# Patient Record
Sex: FEMALE | Race: WHITE | NOT HISPANIC OR LATINO | Employment: OTHER | URBAN - METROPOLITAN AREA
[De-identification: names, ages, dates, MRNs, and addresses within clinical notes are randomized per-mention and may not be internally consistent; named-entity substitution may affect disease eponyms.]

---

## 2017-02-21 ENCOUNTER — HOSPITAL ENCOUNTER (OUTPATIENT)
Dept: CARDIOLOGY | Facility: CLINIC | Age: 77
Discharge: HOME | End: 2017-02-21
Attending: INTERNAL MEDICINE | Admitting: INTERNAL MEDICINE
Payer: MEDICARE

## 2017-02-21 DIAGNOSIS — I10 BENIGN ESSENTIAL HYPERTENSION: ICD-10-CM

## 2017-02-21 DIAGNOSIS — I42.9 CARDIOMYOPATHY (H): ICD-10-CM

## 2017-02-21 LAB
ANION GAP SERPL CALCULATED.3IONS-SCNC: 12.6 MMOL/L (ref 6–17)
BUN SERPL-MCNC: 16 MG/DL (ref 7–30)
CALCIUM SERPL-MCNC: 9.6 MG/DL (ref 8.5–10.5)
CHLORIDE SERPL-SCNC: 88 MMOL/L (ref 98–107)
CO2 SERPL-SCNC: 34 MMOL/L (ref 23–29)
CREAT SERPL-MCNC: 0.79 MG/DL (ref 0.7–1.3)
GFR SERPL CREATININE-BSD FRML MDRD: 71 ML/MIN/1.7M2
GLUCOSE SERPL-MCNC: 113 MG/DL (ref 70–105)
POTASSIUM SERPL-SCNC: 3.6 MMOL/L (ref 3.5–5.1)
SODIUM SERPL-SCNC: 131 MMOL/L (ref 136–145)

## 2017-02-21 PROCEDURE — 80048 BASIC METABOLIC PNL TOTAL CA: CPT | Performed by: INTERNAL MEDICINE

## 2017-02-21 PROCEDURE — 36415 COLL VENOUS BLD VENIPUNCTURE: CPT | Performed by: INTERNAL MEDICINE

## 2017-02-21 PROCEDURE — 93306 TTE W/DOPPLER COMPLETE: CPT

## 2017-02-21 PROCEDURE — 93306 TTE W/DOPPLER COMPLETE: CPT | Mod: 26 | Performed by: INTERNAL MEDICINE

## 2017-02-27 ENCOUNTER — OFFICE VISIT (OUTPATIENT)
Dept: CARDIOLOGY | Facility: CLINIC | Age: 77
End: 2017-02-27
Attending: INTERNAL MEDICINE
Payer: COMMERCIAL

## 2017-02-27 VITALS
HEART RATE: 68 BPM | HEIGHT: 60 IN | DIASTOLIC BLOOD PRESSURE: 80 MMHG | SYSTOLIC BLOOD PRESSURE: 130 MMHG | WEIGHT: 146 LBS | BODY MASS INDEX: 28.66 KG/M2

## 2017-02-27 DIAGNOSIS — I10 BENIGN ESSENTIAL HYPERTENSION: ICD-10-CM

## 2017-02-27 DIAGNOSIS — E78.00 HYPERCHOLESTEROLEMIA: ICD-10-CM

## 2017-02-27 DIAGNOSIS — I42.9 CARDIOMYOPATHY (H): ICD-10-CM

## 2017-02-27 PROCEDURE — 99214 OFFICE O/P EST MOD 30 MIN: CPT | Performed by: INTERNAL MEDICINE

## 2017-02-27 RX ORDER — HYDROCHLOROTHIAZIDE 25 MG/1
25 TABLET ORAL DAILY
Qty: 90 TABLET | Refills: 3 | Status: SHIPPED | OUTPATIENT
Start: 2017-02-27 | End: 2017-09-05

## 2017-02-27 RX ORDER — VALSARTAN 320 MG/1
320 TABLET ORAL DAILY
Qty: 90 TABLET | Refills: 3 | Status: SHIPPED | OUTPATIENT
Start: 2017-02-27 | End: 2017-11-06

## 2017-02-27 NOTE — PROGRESS NOTES
REFERRING PHYSICIAN:  Malaika Rodriguez MD      HISTORY OF PRESENT ILLNESS:  It is my pleasure to see Larry Melendez who is a very pleasant 76-year-old patient with a history of idiopathic cardiomyopathy.  Her ejection fraction is usually around the 45% jodi and echocardiography which was performed approximately a week ago again showed an EF of 45%.  No significant valvular heart disease is present.  Her blood pressure is well controlled at 130/80.  In the past, the hydrochlorothiazide appears to cause hyponatremia and hypokalemia.  Her sodium a week ago was 131 which is mildly reduced and potassium was normal at 3.6.  On further discussion, however, she tells me that she has this cough which has been going on for a year.  It is a dry, hacking cough, it is a tickling cough and occurs at any time.  This is very suspicious for a lisinopril-induced cough.  She has been on lisinopril for a long time so it is somewhat odd that it would start now, but I have certainly seen patients who have been on long-term ACE inhibitors start to develop ACE inhibitor-induced cough.  So I will stop the lisinopril and switch her to valsartan 320 mg and continue hydrochlorothiazide 25 mg.  She has no chest pains or chest pressure.      IMPRESSION:   1.  Cardiomyopathy.  The ejection fraction is still maintained at the mildly reduced level at 45%.  The left ventricular size is normal.   2.  Possible lisinopril-induced cough.  The history is very suggestive of this.  The only unusual part is that she has been on this medication for a long time and it is interesting that this would start only a year ago.   3.  Hyponatremia most likely due to hydrochlorothiazide.  The degree of hyponatremia is mild.      PLAN:  We will stop the lisinopril/hydrochlorothiazide combination drug and start her on valsartan 320 mg and hydrochlorothiazide 25 mg.  Both of these pills will be separate because the combination is not generic.  We will check a  basic metabolic profile in 1 week's time.  I have warned her that it takes about 2 weeks for ACE inhibitor-induced cough to go away on cessation of the ACE inhibitor.  I will see her back again in 6 months to see how she is doing.  We will also check a blood pressure when she comes in for her blood test to ensure that her blood pressure is under good control with the valsartan.  It is my pleasure to be involved in the care of this very nice patient.      cc:   Malaika Rodriguez MD   [facility]   [address]   [place]  [address]         YADIRA PERKINS MD, [facility]             D: 2017 10:02   T: 2017 14:04   MT: geno      Name:     ELAYNE SALDAÑA   MRN:      [id number]        Account:      [id number]   :      1940           Service Date: 2017      Document: [id number]

## 2017-02-27 NOTE — LETTER
2/27/2017    Malaika Rodriguez MD  [employer] [employer]   [address]  [facility] [address]    RE: Larry Melendez       Dear Colleague,    REFERRING PHYSICIAN:  Malaika Rodriguez MD      It is my pleasure to see Larry Melendez who is a very pleasant 76-year-old patient with a history of idiopathic cardiomyopathy.  Her ejection fraction is usually around the 45% jodi and echocardiography which was performed approximately a week ago again showed an EF of 45%.  No significant valvular heart disease is present.  Her blood pressure is well controlled at 130/80.  In the past, the hydrochlorothiazide appears to cause hyponatremia and hypokalemia.  Her sodium a week ago was 131 which is mildly reduced and potassium was normal at 3.6.  On further discussion, however, she tells me that she has this cough which has been going on for a year.  It is a dry, hacking cough, it is a tickling cough and occurs at any time.  This is very suspicious for a lisinopril-induced cough.  She has been on lisinopril for a long time so it is somewhat odd that it would start now, but I have certainly seen patients who have been on long-term ACE inhibitors start to develop ACE inhibitor-induced cough.  So I will stop the lisinopril and switch her to valsartan 320 mg and continue hydrochlorothiazide 25 mg.  She has no chest pains or chest pressure.     Outpatient Encounter Prescriptions as of 2/27/2017   Medication Sig Dispense Refill     valsartan (DIOVAN) 320 MG tablet Take 1 tablet (320 mg) by mouth daily 90 tablet 3     hydrochlorothiazide (HYDRODIURIL) 25 MG tablet Take 1 tablet (25 mg) by mouth daily 90 tablet 3     Acetaminophen (TYLENOL PO) Take 500 mg by mouth as needed for mild pain or fever       Cholecalciferol (VITAMIN D) 2000 UNITS tablet Take 1 tablet by mouth daily       potassium chloride SA (K-DUR,KLOR-CON M) 10 MEQ tablet Take 1 tablet (10 mEq) by mouth daily 90 tablet 3     carvedilol (COREG) 25 MG tablet Take 1 tablet  (25 mg) by mouth 2 times daily 180 tablet 3     simvastatin (ZOCOR) 10 MG tablet Take 1 tablet (10 mg) by mouth At Bedtime 90 tablet 3     omeprazole (PRILOSEC OTC) 20 MG tablet Take 20 mg by mouth daily       cyanocobalamin 1000 MCG/ML injection Inject 1 mL as directed every 30 days.       [DISCONTINUED] lisinopril-hydrochlorothiazide (PRINZIDE,ZESTORETIC) 20-12.5 MG per tablet Take 1 tablet by mouth 2 times daily       No facility-administered encounter medications on file as of 2/27/2017.       IMPRESSION:   1.  Cardiomyopathy.  The ejection fraction is still maintained at the mildly reduced level at 45%.  The left ventricular size is normal.   2.  Possible lisinopril-induced cough.  The history is very suggestive of this.  The only unusual part is that she has been on this medication for a long time and it is interesting that this would start only a year ago.   3.  Hyponatremia most likely due to hydrochlorothiazide.  The degree of hyponatremia is mild.      PLAN:  We will stop the lisinopril/hydrochlorothiazide combination drug and start her on valsartan 320 mg and hydrochlorothiazide 25 mg.  Both of these pills will be separate because the combination is not generic.  We will check a basic metabolic profile in 1 week's time.  I have warned her that it takes about 2 weeks for ACE inhibitor-induced cough to go away on cessation of the ACE inhibitor.  I will see her back again in 6 months to see how she is doing.  We will also check a blood pressure when she comes in for her blood test to ensure that her blood pressure is under good control with the valsartan.  It is my pleasure to be involved in the care of this very nice patient.     Sincerely,    Brett Smith MD    [facility]

## 2017-02-27 NOTE — PROGRESS NOTES
HPI and Plan:   See dictation    Orders Placed This Encounter   Procedures     Basic metabolic panel     Basic metabolic panel     Follow-Up with Cardiologist     Follow-Up with Nurse       Orders Placed This Encounter   Medications     valsartan (DIOVAN) 320 MG tablet     Sig: Take 1 tablet (320 mg) by mouth daily     Dispense:  90 tablet     Refill:  3     hydrochlorothiazide (HYDRODIURIL) 25 MG tablet     Sig: Take 1 tablet (25 mg) by mouth daily     Dispense:  90 tablet     Refill:  3       Medications Discontinued During This Encounter   Medication Reason     lisinopril-hydrochlorothiazide (PRINZIDE,ZESTORETIC) 20-12.5 MG per tablet          Encounter Diagnoses   Name Primary?     Cardiomyopathy      Hypercholesterolemia      Benign essential hypertension        CURRENT MEDICATIONS:  Current Outpatient Prescriptions   Medication Sig Dispense Refill     valsartan (DIOVAN) 320 MG tablet Take 1 tablet (320 mg) by mouth daily 90 tablet 3     hydrochlorothiazide (HYDRODIURIL) 25 MG tablet Take 1 tablet (25 mg) by mouth daily 90 tablet 3     Acetaminophen (TYLENOL PO) Take 500 mg by mouth as needed for mild pain or fever       Cholecalciferol (VITAMIN D) 2000 UNITS tablet Take 1 tablet by mouth daily       potassium chloride SA (K-DUR,KLOR-CON M) 10 MEQ tablet Take 1 tablet (10 mEq) by mouth daily 90 tablet 3     carvedilol (COREG) 25 MG tablet Take 1 tablet (25 mg) by mouth 2 times daily 180 tablet 3     simvastatin (ZOCOR) 10 MG tablet Take 1 tablet (10 mg) by mouth At Bedtime 90 tablet 3     omeprazole (PRILOSEC OTC) 20 MG tablet Take 20 mg by mouth daily       cyanocobalamin 1000 MCG/ML injection Inject 1 mL as directed every 30 days.         ALLERGIES     Allergies   Allergen Reactions     Aspirin Other (See Comments)     Bleeding              Bleeding, GI Lesion         Codeine Sulfate GI Disturbance       PAST MEDICAL HISTORY:  Past Medical History   Diagnosis Date     Arthritis      Breast cancer (H)       Cardiomyopathy (H)      ideopathic     Hypercholesterolemia      Hypertension      Hypokalemia      Malignant neoplasm (H)      Shortness of breath      Shoulder pain        PAST SURGICAL HISTORY:  Past Surgical History   Procedure Laterality Date     Back surgery       Partial masectomy       Cholecystectomy       Eye surgery         FAMILY HISTORY:  Family History   Problem Relation Age of Onset     Family History Negative No family hx of        SOCIAL HISTORY:  Social History     Social History     Marital status: Single     Spouse name: N/A     Number of children: N/A     Years of education: N/A     Social History Main Topics     Smoking status: Never Smoker     Smokeless tobacco: None     Alcohol use Yes      Comment: socially     Drug use: No     Sexual activity: No     Other Topics Concern     Special Diet No     Exercise No     Social History Narrative       Review of Systems:  Skin:  Negative       Eyes:  Positive for glasses    ENT:  Negative      Respiratory:  Positive for cough;dyspnea on exertion     Cardiovascular:    Positive for;lightheadedness    Gastroenterology: Positive for nausea    Genitourinary:  not assessed      Musculoskeletal:  Positive for back pain;joint pain    Neurologic:  Negative      Psychiatric:  Negative      Heme/Lymph/Imm:  Negative      Endocrine:  Negative        Physical Exam:  Vitals: /80  Pulse 68  Ht 1.524 m (5')  Wt 66.2 kg (146 lb)  BMI 28.51 kg/m2    Constitutional:  cooperative, alert and oriented, well developed, well nourished, in no acute distress overweight      Skin:  warm and dry to the touch, no apparent skin lesions or masses noted        Head:  normocephalic, no masses or lesions;normocephalic        Eyes:  pupils equal and round, conjunctivae and lids unremarkable, sclera white, no xanthalasma, EOMS intact, no nystagmus        ENT:  no pallor or cyanosis, dentition good        Neck:  carotid pulses are full and equal bilaterally, JVP normal, no carotid  bruit, no thyromegaly        Chest:  normal breath sounds, clear to auscultation, normal A-P diameter, normal symmetry, normal respiratory excursion, no use of accessory muscles          Cardiac: regular rhythm;normal S1 and S2;no murmurs, gallops or rubs detected   S4              Abdomen:  abdomen soft, non-tender, BS normoactive, no mass, no HSM, no bruits        Vascular: pulses full and equal, no bruits auscultated                                        Extremities and Back:  no deformities, clubbing, cyanosis, erythema observed;no edema              Neurological:  affect appropriate, oriented to time, person and place;no gross motor deficits              CC  Brett Smith MD   [facility]  [address]  [place] [address]

## 2017-03-06 ENCOUNTER — ALLIED HEALTH/NURSE VISIT (OUTPATIENT)
Dept: CARDIOLOGY | Facility: CLINIC | Age: 77
End: 2017-03-06
Attending: INTERNAL MEDICINE
Payer: COMMERCIAL

## 2017-03-06 VITALS — SYSTOLIC BLOOD PRESSURE: 158 MMHG | HEART RATE: 76 BPM | DIASTOLIC BLOOD PRESSURE: 82 MMHG

## 2017-03-06 DIAGNOSIS — I10 BENIGN ESSENTIAL HYPERTENSION: ICD-10-CM

## 2017-03-06 DIAGNOSIS — I42.9 CARDIOMYOPATHY (H): ICD-10-CM

## 2017-03-06 LAB
ANION GAP SERPL CALCULATED.3IONS-SCNC: 12.6 MMOL/L (ref 6–17)
BUN SERPL-MCNC: 19 MG/DL (ref 7–30)
CALCIUM SERPL-MCNC: 9.7 MG/DL (ref 8.5–10.5)
CHLORIDE SERPL-SCNC: 90 MMOL/L (ref 98–107)
CO2 SERPL-SCNC: 33 MMOL/L (ref 23–29)
CREAT SERPL-MCNC: 1 MG/DL (ref 0.7–1.3)
GFR SERPL CREATININE-BSD FRML MDRD: 54 ML/MIN/1.7M2
GLUCOSE SERPL-MCNC: 119 MG/DL (ref 70–105)
POTASSIUM SERPL-SCNC: 3.6 MMOL/L (ref 3.5–5.1)
SODIUM SERPL-SCNC: 132 MMOL/L (ref 136–145)

## 2017-03-06 PROCEDURE — 99207 ZZC NO CHARGE LOS: CPT

## 2017-03-06 PROCEDURE — 80048 BASIC METABOLIC PNL TOTAL CA: CPT | Performed by: INTERNAL MEDICINE

## 2017-03-06 PROCEDURE — 36415 COLL VENOUS BLD VENIPUNCTURE: CPT | Performed by: INTERNAL MEDICINE

## 2017-03-06 NOTE — NURSING NOTE
Blood pressure check per Dr. Albino Connors.  B/P was 158/82, Pulse was 76.  Patient stated she did take her medications this morning.  Results sent to team 5 RN for review.

## 2017-03-06 NOTE — MR AVS SNAPSHOT
After Visit Summary   3/6/2017    Larry Melendez    MRN: [id number]           Patient Information     Date Of Birth          1940        Visit Information        Provider Department      3/6/2017 9:30 AM [facility]        Today's Diagnoses     Benign essential hypertension           Follow-ups after your visit        Your next 10 appointments already scheduled     Mar 06, 2017  9:00 AM CST   LAB with [facility]   [facility] ([facility])    [address] [place] [address] [address]  [facility] [address] [phone]           Patient must bring picture ID.  Patient should be prepared to give a urine specimen  Please do not eat 10-12 hours before your appointment if you are coming in fasting for labs on lipids, cholesterol, or glucose (sugar).  Pregnant women should follow their Care Team instructions. Water with medications is okay. Do not drink coffee or other fluids.   If you have concerns about taking  your medications, please ask at office or if scheduling via [place], send a message by clicking on Secure Messaging, Message Your Care Team.            Mar 06, 2017  9:30 AM CST   Nurse Only with [facility]   [facility] ([facility])    [address] [place] [address] [address]  [facility] [id number] [phone]              Who to contact     If you have questions or need follow up information about today's clinic visit or your schedule please contact [facility] directly at [phone].  Normal or non-critical lab and imaging results will be communicated to you by MyChart, letter or phone within 4 business days after the clinic has received the results. If you do not hear from us within 7 days, please contact the clinic through MyChart or phone. If you have a critical or abnormal lab  "result, we will notify you by phone as soon as possible.  Submit refill requests through [employer] or call your pharmacy and they will forward the refill request to us. Please allow 3 business days for your refill to be completed.          Additional Information About Your Visit        CPXihart Information     LiveLeaf lets you send messages to your doctor, view your test results, renew your prescriptions, schedule appointments and more. To sign up, go to [email or web address] . Click on \"Log in\" on the left side of the screen, which will take you to the Welcome page. Then click on \"Sign up Now\" on the right side of the page.     You will be asked to enter the access code listed below, as well as some personal information. Please follow the directions to create your username and password.     Your access code is: [email or web address]  Expires: 2017 10:06 AM     Your access code will  in 90 days. If you need help or a new code, please call your [facility] clinic or [phone].        Care EveryWhere ID     This is your Care EveryWhere ID. This could be used by other organizations to access your [facility] medical records  [id number]        Your Vitals Were     Pulse                   76            Blood Pressure from Last 3 Encounters:   17 158/82   17 130/80   16 128/72    Weight from Last 3 Encounters:   17 66.2 kg (146 lb)   16 64 kg (141 lb)   10/08/15 64 kg (141 lb)              We Performed the Following     Follow-Up with Nurse        Primary Care Provider Office Phone # Fax #    Malaika Rodriguez [phone]       [address]  [facility] [address]        Thank you!     Thank you for choosing [facility]  for your care. Our goal is always to provide you with excellent care. Hearing back from our patients is one way we can continue to improve our services. Please take a few minutes to complete the written " survey that you may receive in the mail after your visit with us. Thank you!             Your Updated Medication List - Protect others around you: Learn how to safely use, store and throw away your medicines at www.disposemymeds.org.          This list is accurate as of: 3/6/17  8:47 AM.  Always use your most recent med list.                   Brand Name Dispense Instructions for use    carvedilol 25 MG tablet    COREG    180 tablet    Take 1 tablet (25 mg) by mouth 2 times daily       cyanocobalamin 1000 MCG/ML injection    VITAMIN B12     Inject 1 mL as directed every 30 days.       hydrochlorothiazide 25 MG tablet    HYDRODIURIL    90 tablet    Take 1 tablet (25 mg) by mouth daily       potassium chloride SA 10 MEQ CR tablet    K-DUR/KLOR-CON M    90 tablet    Take 1 tablet (10 mEq) by mouth daily       priLOSEC OTC 20 MG tablet   Generic drug:  omeprazole      Take 20 mg by mouth daily       simvastatin 10 MG tablet    ZOCOR    90 tablet    Take 1 tablet (10 mg) by mouth At Bedtime       TYLENOL PO      Take 500 mg by mouth as needed for mild pain or fever       valsartan 320 MG tablet    DIOVAN    90 tablet    Take 1 tablet (320 mg) by mouth daily       vitamin D 2000 UNITS tablet      Take 1 tablet by mouth daily

## 2017-03-07 ENCOUNTER — TELEPHONE (OUTPATIENT)
Dept: CARDIOLOGY | Facility: CLINIC | Age: 77
End: 2017-03-07

## 2017-03-07 DIAGNOSIS — I10 HTN (HYPERTENSION): Primary | ICD-10-CM

## 2017-03-07 NOTE — TELEPHONE ENCOUNTER
"Reviewed BMP showing   Recent Labs   Lab Test  03/06/17   0847  02/21/17   0954   NA  132*  131*   POTASSIUM  3.6  3.6   CHLORIDE  90*  88*   CO2  33*  34*   ANIONGAP  12.6  12.6   GLC  119*  113*   BUN  19  16   CR  1.00  0.79   PENNY  9.7  9.6       Blood pressure check per Dr. Albino Connors. B/P was 158/82, Pulse was 76. Patient stated she did take her medications this morning.    Per office note dated 2/27/17, Dr. Smith recommended, \"We will stop the lisinopril/hydrochlorothiazide combination drug and start her on valsartan 320 mg and hydrochlorothiazide 25 mg. Both of these pills will be separate because the combination is not generic. We will check a basic metabolic profile in 1 week's time.\"     Will message Dr. Smith to review. LP[place] RN   "

## 2017-03-07 NOTE — TELEPHONE ENCOUNTER
BP check is of no benbefit as she did not take her meds. Would recheck in 1 week with taking her meds. BMP is acceptable though Na is a little low. Thx

## 2017-03-08 RX ORDER — AMLODIPINE BESYLATE 5 MG/1
5 TABLET ORAL DAILY
Qty: 30 TABLET | Refills: 2 | Status: SHIPPED | OUTPATIENT
Start: 2017-03-08 | End: 2017-05-04

## 2017-03-08 NOTE — TELEPHONE ENCOUNTER
Called pt with recommendations from Dr. Smith to start amlodipine 5 mg daily in addition the what she is already taking & get BP check in 2 weeks.   Prescription escripted to [facility] & order placed for BP check in 2 weeks. LPenfield RN

## 2017-03-23 ENCOUNTER — ALLIED HEALTH/NURSE VISIT (OUTPATIENT)
Dept: CARDIOLOGY | Facility: CLINIC | Age: 77
End: 2017-03-23
Payer: COMMERCIAL

## 2017-03-23 DIAGNOSIS — I10 BENIGN ESSENTIAL HYPERTENSION: Primary | ICD-10-CM

## 2017-03-23 PROCEDURE — 99207 ZZC NO CHARGE NURSE ONLY: CPT | Performed by: INTERNAL MEDICINE

## 2017-03-23 NOTE — MR AVS SNAPSHOT
"              After Visit Summary   3/23/2017    Larry Melendez    MRN: [id number]           Patient Information     Date Of Birth          1940        Visit Information        Provider Department      3/23/2017 10:30 AM [facility]        Today's Diagnoses     Benign essential hypertension    -  1       Follow-ups after your visit        Who to contact     If you have questions or need follow up information about today's clinic visit or your schedule please contact [facility] directly at [phone].  Normal or non-critical lab and imaging results will be communicated to you by [employer], letter or phone within 4 business days after the clinic has received the results. If you do not hear from us within 7 days, please contact the clinic through [employer] or phone. If you have a critical or abnormal lab result, we will notify you by phone as soon as possible.  Submit refill requests through i[employer]ult or call your pharmacy and they will forward the refill request to us. Please allow 3 business days for your refill to be completed.          Additional Information About Your Visit        MyChart Information     iCat[employer]ult lets you send messages to your doctor, view your test results, renew your prescriptions, schedule appointments and more. To sign up, go to [email or web address] . Click on \"Log in\" on the left side of the screen, which will take you to the Welcome page. Then click on \"Sign up Now\" on the right side of the page.     You will be asked to enter the access code listed below, as well as some personal information. Please follow the directions to create your username and password.     Your access code is: [email or web address]  Expires: 2017 11:06 AM     Your access code will  in 90 days. If you need help or a new code, please call your [facility] clinic or [phone].        Care EveryWhere " ID     This is your Care EveryWhere ID. This could be used by other organizations to access your [facility] medical records  [id number]         Blood Pressure from Last 3 Encounters:   03/06/17 158/82   02/27/17 130/80   02/18/16 128/72    Weight from Last 3 Encounters:   02/27/17 66.2 kg (146 lb)   02/18/16 64 kg (141 lb)   10/08/15 64 kg (141 lb)              Today, you had the following     No orders found for display       Primary Care Provider Office Phone # Fax #    Malaika Rodriguez [phone]       [address]  [facility] [address]        Thank you!     Thank you for choosing [facility] [place]  for your care. Our goal is always to provide you with excellent care. Hearing back from our patients is one way we can continue to improve our services. Please take a few minutes to complete the written survey that you may receive in the mail after your visit with us. Thank you!             Your Updated Medication List - Protect others around you: Learn how to safely use, store and throw away your medicines at www.disposemymeds.org.          This list is accurate as of: 3/23/17 10:42 AM.  Always use your most recent med list.                   Brand Name Dispense Instructions for use    amLODIPine 5 MG tablet    NORVASC    30 tablet    Take 1 tablet (5 mg) by mouth daily       carvedilol 25 MG tablet    COREG    180 tablet    Take 1 tablet (25 mg) by mouth 2 times daily       cyanocobalamin 1000 MCG/ML injection    VITAMIN B12     Inject 1 mL as directed every 30 days.       hydrochlorothiazide 25 MG tablet    HYDRODIURIL    90 tablet    Take 1 tablet (25 mg) by mouth daily       potassium chloride SA 10 MEQ CR tablet    K-DUR/KLOR-CON M    90 tablet    Take 1 tablet (10 mEq) by mouth daily       priLOSEC OTC 20 MG tablet   Generic drug:  omeprazole      Take 20 mg by mouth daily       simvastatin 10 MG tablet    ZOCOR    90 tablet    Take 1 tablet  (10 mg) by mouth At Bedtime       TYLENOL PO      Take 500 mg by mouth as needed for mild pain or fever       valsartan 320 MG tablet    DIOVAN    90 tablet    Take 1 tablet (320 mg) by mouth daily       vitamin D 2000 UNITS tablet      Take 1 tablet by mouth daily

## 2017-03-23 NOTE — NURSING NOTE
Patient here today for blood pressure check.      BP: 138/63  P:84    Patient is taking all medications as directed. Cough has improved.     Itzel PEREIRA CMA

## 2017-03-28 NOTE — TELEPHONE ENCOUNTER
Attempted to call pt with recommendations from Dr. Smith, left message for pt to call back. LPenfield RN

## 2017-03-29 DIAGNOSIS — I10 BENIGN ESSENTIAL HYPERTENSION: ICD-10-CM

## 2017-03-29 RX ORDER — POTASSIUM CHLORIDE 750 MG/1
10 TABLET, EXTENDED RELEASE ORAL DAILY
Qty: 90 TABLET | Refills: 3 | Status: SHIPPED | OUTPATIENT
Start: 2017-03-29 | End: 2017-12-08

## 2017-03-29 NOTE — TELEPHONE ENCOUNTER
Pt called back, informed of recommendations from Dr. Smith. Pt requests refill of potassium. Prescription refilled as requested. LPenfield RN

## 2017-05-04 DIAGNOSIS — I10 HTN (HYPERTENSION): ICD-10-CM

## 2017-05-04 RX ORDER — AMLODIPINE BESYLATE 5 MG/1
5 TABLET ORAL DAILY
Qty: 30 TABLET | Refills: 2 | Status: SHIPPED | OUTPATIENT
Start: 2017-05-04 | End: 2017-08-04

## 2017-08-04 DIAGNOSIS — I10 HTN (HYPERTENSION): ICD-10-CM

## 2017-08-04 RX ORDER — AMLODIPINE BESYLATE 5 MG/1
5 TABLET ORAL DAILY
Qty: 90 TABLET | Refills: 2 | Status: SHIPPED | OUTPATIENT
Start: 2017-08-04 | End: 2017-09-05

## 2017-09-05 ENCOUNTER — OFFICE VISIT (OUTPATIENT)
Dept: CARDIOLOGY | Facility: CLINIC | Age: 77
End: 2017-09-05
Attending: INTERNAL MEDICINE
Payer: COMMERCIAL

## 2017-09-05 VITALS
DIASTOLIC BLOOD PRESSURE: 64 MMHG | OXYGEN SATURATION: 98 % | SYSTOLIC BLOOD PRESSURE: 164 MMHG | WEIGHT: 140.9 LBS | HEART RATE: 80 BPM | BODY MASS INDEX: 27.66 KG/M2 | HEIGHT: 60 IN

## 2017-09-05 DIAGNOSIS — I42.0 DILATED CARDIOMYOPATHY (H): ICD-10-CM

## 2017-09-05 DIAGNOSIS — E78.00 PURE HYPERCHOLESTEROLEMIA: ICD-10-CM

## 2017-09-05 DIAGNOSIS — I42.9 CARDIOMYOPATHY (H): ICD-10-CM

## 2017-09-05 DIAGNOSIS — I10 BENIGN ESSENTIAL HYPERTENSION: ICD-10-CM

## 2017-09-05 DIAGNOSIS — I10 ESSENTIAL HYPERTENSION: ICD-10-CM

## 2017-09-05 DIAGNOSIS — E78.00 HYPERCHOLESTEROLEMIA: ICD-10-CM

## 2017-09-05 LAB
ANION GAP SERPL CALCULATED.3IONS-SCNC: 7.8 MMOL/L (ref 6–17)
BUN SERPL-MCNC: 12 MG/DL (ref 7–30)
CALCIUM SERPL-MCNC: 9.3 MG/DL (ref 8.5–10.5)
CHLORIDE SERPL-SCNC: 88 MMOL/L (ref 98–107)
CO2 SERPL-SCNC: 36 MMOL/L (ref 23–29)
CREAT SERPL-MCNC: 0.77 MG/DL (ref 0.7–1.3)
GFR SERPL CREATININE-BSD FRML MDRD: 73 ML/MIN/1.7M2
GLUCOSE SERPL-MCNC: 111 MG/DL (ref 70–105)
POTASSIUM SERPL-SCNC: 3.8 MMOL/L (ref 3.5–5.1)
SODIUM SERPL-SCNC: 128 MMOL/L (ref 136–145)

## 2017-09-05 PROCEDURE — 80048 BASIC METABOLIC PNL TOTAL CA: CPT | Performed by: INTERNAL MEDICINE

## 2017-09-05 PROCEDURE — 99214 OFFICE O/P EST MOD 30 MIN: CPT | Performed by: INTERNAL MEDICINE

## 2017-09-05 PROCEDURE — 36415 COLL VENOUS BLD VENIPUNCTURE: CPT | Performed by: INTERNAL MEDICINE

## 2017-09-05 RX ORDER — AMLODIPINE BESYLATE 10 MG/1
10 TABLET ORAL DAILY
Qty: 90 TABLET | Refills: 3 | Status: SHIPPED | OUTPATIENT
Start: 2017-09-05 | End: 2018-05-02

## 2017-09-05 RX ORDER — SIMVASTATIN 10 MG
10 TABLET ORAL AT BEDTIME
Qty: 90 TABLET | Refills: 3 | Status: SHIPPED | OUTPATIENT
Start: 2017-09-05 | End: 2018-11-15

## 2017-09-05 RX ORDER — CARVEDILOL 25 MG/1
37.5 TABLET ORAL 2 TIMES DAILY
Qty: 270 TABLET | Refills: 3 | Status: SHIPPED | OUTPATIENT
Start: 2017-09-05 | End: 2017-09-25

## 2017-09-05 NOTE — LETTER
9/5/2017    Malaika Rodriguez MD  [facility] [address]  [facility] [address]    RE: Larry Melendez       Dear Colleague,    I had the pleasure of seeing Larry Melendez in the [facility].    It was my pleasure to see your patient, Janessa Melendez, who has a history of an idiopathic cardiomyopathy and hypertension.  She has also run into problems with mild hyponatremia with her medical therapy.  Today her blood pressure is high at 164/64.  I rechecked her blood pressure and it was 168/88.  She is in a significant amount of discomfort with her left shoulder and she was also quite worried and crying because she appears to have a hematological disorder also.  She is following with hematologist and seeing the hematologist later this week.  Even though the hydrochlorothiazide which was reduced from 25 mg to 12.5 mg, her sodium is still low today at 128.  Her kidney function is normal and her potassium is normal at 3.8.  Her last echocardiogram showed no change in her ejection fraction from previously.  Her EF was felt to be in the 45% range.  The left ventricle is normal in size.  No significant valvular heart disease is present.  She is due to have surgery to her left shoulder in November because of bad arthritis and discomfort in that shoulder.      She does not have any symptoms of congestive heart failure.  She has no symptoms of angina pectoris.  She has no significant valvular heart disease and she has no malignant dysrhythmias.  Therefore, she would be regarded as being low risk for an intraoperative cardiac event.     Outpatient Encounter Prescriptions as of 9/5/2017   Medication Sig Dispense Refill     amLODIPine (NORVASC) 10 MG tablet Take 1 tablet (10 mg) by mouth daily 90 tablet 3     carvedilol (COREG) 25 MG tablet Take 1.5 tablets (37.5 mg) by mouth 2 times daily 270 tablet 3     simvastatin (ZOCOR) 10 MG tablet Take 1 tablet (10 mg) by mouth At  Bedtime 90 tablet 3     potassium chloride SA (K-DUR/KLOR-CON M) 10 MEQ CR tablet Take 1 tablet (10 mEq) by mouth daily 90 tablet 3     valsartan (DIOVAN) 320 MG tablet Take 1 tablet (320 mg) by mouth daily 90 tablet 3     Acetaminophen (TYLENOL PO) Take 500 mg by mouth as needed for mild pain or fever       Cholecalciferol (VITAMIN D) 2000 UNITS tablet Take 1 tablet by mouth daily       omeprazole (PRILOSEC OTC) 20 MG tablet Take 20 mg by mouth daily       [DISCONTINUED] amLODIPine (NORVASC) 5 MG tablet Take 1 tablet (5 mg) by mouth daily 90 tablet 2     [DISCONTINUED] hydrochlorothiazide (HYDRODIURIL) 25 MG tablet Take 1 tablet (25 mg) by mouth daily 90 tablet 3     [DISCONTINUED] carvedilol (COREG) 25 MG tablet Take 1 tablet (25 mg) by mouth 2 times daily 180 tablet 3     [DISCONTINUED] simvastatin (ZOCOR) 10 MG tablet Take 1 tablet (10 mg) by mouth At Bedtime 90 tablet 3     cyanocobalamin 1000 MCG/ML injection Inject 1 mL as directed every 30 days.       No facility-administered encounter medications on file as of 9/5/2017.       IMPRESSION:   1.  Essential hypertension.  Her blood pressure does not appear to be well controlled today, although she is quite emotional about all of the medical issues that her coming at her at one time which may be contributing to the higher than normal blood pressure (she told me that at the hematology office, her blood pressure was normal).   2.  Hyponatremia.  The most likely cause for this is hydrochlorothiazide but valsartan could also be contributing.   3.  Mild idiopathic cardiomyopathy.  Her ejection fraction in February was mildly reduced as it has been previously.   4.  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  She is due to have an operation in November.      PLAN:  We will try to get her blood pressure under better control.  I will stop the hydrochlorothiazide as this is causing hyponatremia.  I will increase the dose of  the carvedilol to 37.5 mg twice a day.  I will also increase the amlodipine to 10 mg per day.  I will have the patient follow up in 2 weeks' time with one of our nurse practitioners to see if the blood pressure has improved.  We will also check a basic metabolic profile at that time to see if the elimination of hydrochlorothiazide will reversed the hyponatremia.      The patient would be regarded as being low risk for an intraoperative cardiac event given that she does not have symptoms of decompensated heart failure, unstable angina, severe stenotic valvular lesions nor malignant dysrhythmias.  However, her blood pressure will need to become under better control prior to surgery, which I think will be achieved.  I will see the patient back again in 6 months' time and I will repeat her echocardiogram at that stage.      It is my pleasure to be involved in the care of this extremely nice patient.     Sincerely,    Brett Smith MD     [facility]

## 2017-09-05 NOTE — PROGRESS NOTES
HPI and Plan:   See dictation    Orders Placed This Encounter   Procedures     Basic metabolic panel     Basic metabolic panel     Follow-Up with Nurse     Follow-Up with Cardiologist     Echocardiogram       Orders Placed This Encounter   Medications     amLODIPine (NORVASC) 10 MG tablet     Sig: Take 1 tablet (10 mg) by mouth daily     Dispense:  90 tablet     Refill:  3     carvedilol (COREG) 25 MG tablet     Sig: Take 1.5 tablets (37.5 mg) by mouth 2 times daily     Dispense:  270 tablet     Refill:  3     simvastatin (ZOCOR) 10 MG tablet     Sig: Take 1 tablet (10 mg) by mouth At Bedtime     Dispense:  90 tablet     Refill:  3       Medications Discontinued During This Encounter   Medication Reason     hydrochlorothiazide (HYDRODIURIL) 25 MG tablet      amLODIPine (NORVASC) 5 MG tablet Reorder     carvedilol (COREG) 25 MG tablet Reorder     simvastatin (ZOCOR) 10 MG tablet Reorder         Encounter Diagnoses   Name Primary?     Dilated cardiomyopathy (H)      Hypercholesterolemia      Benign essential hypertension      Essential hypertension      Pure hypercholesterolemia        CURRENT MEDICATIONS:  Current Outpatient Prescriptions   Medication Sig Dispense Refill     amLODIPine (NORVASC) 10 MG tablet Take 1 tablet (10 mg) by mouth daily 90 tablet 3     carvedilol (COREG) 25 MG tablet Take 1.5 tablets (37.5 mg) by mouth 2 times daily 270 tablet 3     simvastatin (ZOCOR) 10 MG tablet Take 1 tablet (10 mg) by mouth At Bedtime 90 tablet 3     potassium chloride SA (K-DUR/KLOR-CON M) 10 MEQ CR tablet Take 1 tablet (10 mEq) by mouth daily 90 tablet 3     valsartan (DIOVAN) 320 MG tablet Take 1 tablet (320 mg) by mouth daily 90 tablet 3     Acetaminophen (TYLENOL PO) Take 500 mg by mouth as needed for mild pain or fever       Cholecalciferol (VITAMIN D) 2000 UNITS tablet Take 1 tablet by mouth daily       omeprazole (PRILOSEC OTC) 20 MG tablet Take 20 mg by mouth daily       [DISCONTINUED] amLODIPine (NORVASC) 5 MG  tablet Take 1 tablet (5 mg) by mouth daily 90 tablet 2     [DISCONTINUED] carvedilol (COREG) 25 MG tablet Take 1 tablet (25 mg) by mouth 2 times daily 180 tablet 3     [DISCONTINUED] simvastatin (ZOCOR) 10 MG tablet Take 1 tablet (10 mg) by mouth At Bedtime 90 tablet 3     cyanocobalamin 1000 MCG/ML injection Inject 1 mL as directed every 30 days.         ALLERGIES     Allergies   Allergen Reactions     Aspirin Other (See Comments)     Bleeding              Bleeding, GI Lesion         Codeine Sulfate GI Disturbance       PAST MEDICAL HISTORY:  Past Medical History:   Diagnosis Date     Arthritis      Breast cancer (H)      Cardiomyopathy (H)     ideopathic     Hypercholesterolemia      Hypertension      Hypokalemia      Malignant neoplasm (H)      Shortness of breath      Shoulder pain        PAST SURGICAL HISTORY:  Past Surgical History:   Procedure Laterality Date     BACK SURGERY       CHOLECYSTECTOMY       EYE SURGERY       partial masectomy         FAMILY HISTORY:  Family History   Problem Relation Age of Onset     Family History Negative No family hx of        SOCIAL HISTORY:  Social History     Social History     Marital status: Single     Spouse name: N/A     Number of children: N/A     Years of education: N/A     Social History Main Topics     Smoking status: Never Smoker     Smokeless tobacco: Never Used     Alcohol use Yes      Comment: socially     Drug use: No     Sexual activity: No     Other Topics Concern     Special Diet No     Exercise No     Social History Narrative       Review of Systems:  Skin:  Negative       Eyes:  Positive for glasses    ENT:  Negative      Respiratory:  Positive for dyspnea on exertion     Cardiovascular:  Negative Positive for;fatigue    Gastroenterology: Positive for nausea    Genitourinary:  not assessed      Musculoskeletal:  Positive for back pain;joint pain;nocturnal cramping Left shoulder pain   Neurologic:  Negative      Psychiatric:  Negative      Heme/Lymph/Imm:   Negative allergies;anemia    Endocrine:  Negative        Physical Exam:  Vitals: /64  Pulse 80  Ht 1.524 m (5')  Wt 63.9 kg (140 lb 14.4 oz)  SpO2 98%  BMI 27.52 kg/m2    Constitutional:  cooperative, alert and oriented, well developed, well nourished, in no acute distress overweight;appears anxious      Skin:  warm and dry to the touch, no apparent skin lesions or masses noted        Head:  normocephalic, no masses or lesions;normocephalic        Eyes:  pupils equal and round, conjunctivae and lids unremarkable, sclera white, no xanthalasma, EOMS intact, no nystagmus        ENT:  no pallor or cyanosis, dentition good        Neck:  carotid pulses are full and equal bilaterally, JVP normal, no carotid bruit, no thyromegaly        Chest:  normal breath sounds, clear to auscultation, normal A-P diameter, normal symmetry, normal respiratory excursion, no use of accessory muscles          Cardiac: regular rhythm;normal S1 and S2;no murmurs, gallops or rubs detected   S4              Abdomen:  abdomen soft, non-tender, BS normoactive, no mass, no HSM, no bruits        Vascular: pulses full and equal, no bruits auscultated                                        Extremities and Back:  no deformities, clubbing, cyanosis, erythema observed;no edema              Neurological:  affect appropriate, oriented to time, person and place;no gross motor deficits              CC  Brett Smith MD  [address]  [place] [address]

## 2017-09-05 NOTE — MR AVS SNAPSHOT
After Visit Summary   9/5/2017    Larry Melendez    MRN: [id number]           Patient Information     Date Of Birth          1940        Visit Information        Provider Department      9/5/2017 3:45 PM Brett Smith MD [facility]        Today's Diagnoses     Dilated cardiomyopathy (H)        Hypercholesterolemia        Benign essential hypertension        Essential hypertension        Pure hypercholesterolemia           Follow-ups after your visit        Additional Services     Follow-Up with Nurse           Follow-Up with Cardiologist                 Future tests that were ordered for you today     Open Future Orders        Priority Expected Expires Ordered    Follow-Up with Cardiologist Routine 3/4/2018 9/5/2018 9/5/2017    Basic metabolic panel Routine 3/4/2018 9/5/2018 9/5/2017    Echocardiogram Routine 3/4/2018 9/5/2018 9/5/2017    Follow-Up with Nurse Routine 9/19/2017 9/5/2018 9/5/2017    Basic metabolic panel Routine 9/19/2017 9/5/2018 9/5/2017            Who to contact     If you have questions or need follow up information about today's clinic visit or your schedule please contact [facility] directly at [phone].  Normal or non-critical lab and imaging results will be communicated to you by Collectrichart, letter or phone within 4 business days after the clinic has received the results. If you do not hear from us within 7 days, please contact the clinic through Collectrichart or phone. If you have a critical or abnormal lab result, we will notify you by phone as soon as possible.  Submit refill requests through MyEdu or call your pharmacy and they will forward the refill request to us. Please allow 3 business days for your refill to be completed.          Additional Information About Your Visit        MyChart Information     MyEdu lets you send messages to your doctor, view your test  "results, renew your prescriptions, schedule appointments and more. To sign up, go to [email or web address] . Click on \"Log in\" on the left side of the screen, which will take you to the Welcome page. Then click on \"Sign up Now\" on the right side of the page.     You will be asked to enter the access code listed below, as well as some personal information. Please follow the directions to create your username and password.     Your access code is: [email or web address]  Expires: 2017  3:58 PM     Your access code will  in 90 days. If you need help or a new code, please call your [place] clinic or [phone].        Care EveryWhere ID     This is your Care EveryWhere ID. This could be used by other organizations to access your [place] medical records  [id number]        Your Vitals Were     Pulse Height Pulse Oximetry BMI (Body Mass Index)          80 1.524 m (5') 98% 27.52 kg/m2         Blood Pressure from Last 3 Encounters:   17 164/64   17 158/82   17 130/80    Weight from Last 3 Encounters:   17 63.9 kg (140 lb 14.4 oz)   17 66.2 kg (146 lb)   16 64 kg (141 lb)              We Performed the Following     Follow-Up with Cardiologist          Today's Medication Changes          These changes are accurate as of: 17  3:58 PM.  If you have any questions, ask your nurse or doctor.               These medicines have changed or have updated prescriptions.        Dose/Directions    amLODIPine 10 MG tablet   Commonly known as:  NORVASC   This may have changed:    - medication strength  - how much to take   Used for:  Essential hypertension   Changed by:  Brett Smith MD        Dose:  10 mg   Take 1 tablet (10 mg) by mouth daily   Quantity:  90 tablet   Refills:  3       carvedilol 25 MG tablet   Commonly known as:  COREG   This may have changed:  how much to take   Used for:  Dilated cardiomyopathy (H), Benign essential hypertension   Changed by:  Sarah" Brett Lee MD        Dose:  37.5 mg   Take 1.5 tablets (37.5 mg) by mouth 2 times daily   Quantity:  270 tablet   Refills:  3         Stop taking these medicines if you haven't already. Please contact your care team if you have questions.     hydrochlorothiazide 25 MG tablet   Commonly known as:  HYDRODIURIL   Stopped by:  Brett Smith MD                Where to get your medicines      These medications were sent to [facility] [address] [place] [address] AT [place] [id number] [address]     Phone:  [phone]     amLODIPine 10 MG tablet    carvedilol 25 MG tablet    simvastatin 10 MG tablet                Primary Care Provider Office Phone # Fax #    Malaika Rodriguez [phone]       [address]  [facility] [address]        Equal Access to Services     [facility]: Hadii aad ku hadasho Soomaali, waaxda luqadaha, qaybta kaalmada adeegyada, waxay idiin hayaan adedakota galindo . So [facility] [phone].    ATENCIÓN: Si habla español, tiene a munoz disposición servicios gratuitos de asistencia lingüística. Jason al [phone].    We comply with applicable federal civil rights laws and [place] laws. We do not discriminate on the basis of race, color, national origin, age, disability sex, sexual orientation or gender identity.            Thank you!     Thank you for choosing [facility] [place]  for your care. Our goal is always to provide you with excellent care. Hearing back from our patients is one way we can continue to improve our services. Please take a few minutes to complete the written survey that you may receive in the mail after your visit with us. Thank you!             Your Updated Medication List - Protect others around you: Learn how to safely use, store and throw away your medicines at www.disposemymeds.org.          This list is accurate as of: 9/5/17   3:58 PM.  Always use your most recent med list.                   Brand Name Dispense Instructions for use Diagnosis    amLODIPine 10 MG tablet    NORVASC    90 tablet    Take 1 tablet (10 mg) by mouth daily    Essential hypertension       carvedilol 25 MG tablet    COREG    270 tablet    Take 1.5 tablets (37.5 mg) by mouth 2 times daily    Dilated cardiomyopathy (H), Benign essential hypertension       cyanocobalamin 1000 MCG/ML injection    VITAMIN B12     Inject 1 mL as directed every 30 days.        potassium chloride SA 10 MEQ CR tablet    K-DUR/KLOR-CON M    90 tablet    Take 1 tablet (10 mEq) by mouth daily    Benign essential hypertension       priLOSEC OTC 20 MG tablet   Generic drug:  omeprazole      Take 20 mg by mouth daily        simvastatin 10 MG tablet    ZOCOR    90 tablet    Take 1 tablet (10 mg) by mouth At Bedtime    Pure hypercholesterolemia       TYLENOL PO      Take 500 mg by mouth as needed for mild pain or fever        valsartan 320 MG tablet    DIOVAN    90 tablet    Take 1 tablet (320 mg) by mouth daily    Benign essential hypertension       vitamin D 2000 UNITS tablet      Take 1 tablet by mouth daily

## 2017-09-05 NOTE — PROGRESS NOTES
HISTORY OF PRESENT ILLNESS:  It was my pleasure to see your patient, Janessa Melendez, who has a history of an idiopathic cardiomyopathy and hypertension.  She has also run into problems with mild hyponatremia with her medical therapy.  Today her blood pressure is high at 164/64.  I rechecked her blood pressure and it was 168/88.  She is in a significant amount of discomfort with her left shoulder and she was also quite worried and crying because she appears to have a hematological disorder also.  She is following with hematologist and seeing the hematologist later this week.  Even though the hydrochlorothiazide which was reduced from 25 mg to 12.5 mg, her sodium is still low today at 128.  Her kidney function is normal and her potassium is normal at 3.8.  Her last echocardiogram showed no change in her ejection fraction from previously.  Her EF was felt to be in the 45% range.  The left ventricle is normal in size.  No significant valvular heart disease is present.  She is due to have surgery to her left shoulder in November because of bad arthritis and discomfort in that shoulder.      She does not have any symptoms of congestive heart failure.  She has no symptoms of angina pectoris.  She has no significant valvular heart disease and she has no malignant dysrhythmias.  Therefore, she would be regarded as being low risk for an intraoperative cardiac event.      IMPRESSION:   1.  Essential hypertension.  Her blood pressure does not appear to be well controlled today, although she is quite emotional about all of the medical issues that her coming at her at one time which may be contributing to the higher than normal blood pressure (she told me that at the hematology office, her blood pressure was normal).   2.  Hyponatremia.  The most likely cause for this is hydrochlorothiazide but valsartan could also be contributing.   3.  Mild idiopathic cardiomyopathy.  Her ejection fraction in February was mildly reduced  as it has been previously.   4.  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  She is due to have an operation in November.      PLAN:  We will try to get her blood pressure under better control.  I will stop the hydrochlorothiazide as this is causing hyponatremia.  I will increase the dose of the carvedilol to 37.5 mg twice a day.  I will also increase the amlodipine to 10 mg per day.  I will have the patient follow up in 2 weeks' time with one of our nurse practitioners to see if the blood pressure has improved.  We will also check a basic metabolic profile at that time to see if the elimination of hydrochlorothiazide will reversed the hyponatremia.      The patient would be regarded as being low risk for an intraoperative cardiac event given that she does not have symptoms of decompensated heart failure, unstable angina, severe stenotic valvular lesions nor malignant dysrhythmias.  However, her blood pressure will need to become under better control prior to surgery, which I think will be achieved.  I will see the patient back again in 6 months' time and I will repeat her echocardiogram at that stage.      It is my pleasure to be involved in the care of this extremely nice patient.      cc:   Malaika Rodriguez MD    [facility]   [facility] Document Management - [id number]   [address]   [place] [address]         YADIRA PERKINS MD, [facility]             D: 2017 16:06   T: 2017 17:52   MT: CAYDEN      Name:     ELAYNE SALDAÑA   MRN:      [id number]        Account:      [id number]   :      1940           Service Date: 2017      Document: [id number]

## 2017-09-19 ENCOUNTER — CARE COORDINATION (OUTPATIENT)
Dept: CARDIOLOGY | Facility: CLINIC | Age: 77
End: 2017-09-19

## 2017-09-19 ENCOUNTER — ALLIED HEALTH/NURSE VISIT (OUTPATIENT)
Dept: CARDIOLOGY | Facility: CLINIC | Age: 77
End: 2017-09-19
Payer: COMMERCIAL

## 2017-09-19 VITALS — DIASTOLIC BLOOD PRESSURE: 86 MMHG | SYSTOLIC BLOOD PRESSURE: 150 MMHG

## 2017-09-19 DIAGNOSIS — I10 ESSENTIAL HYPERTENSION: ICD-10-CM

## 2017-09-19 DIAGNOSIS — I10 BENIGN ESSENTIAL HYPERTENSION: ICD-10-CM

## 2017-09-19 DIAGNOSIS — E78.00 PURE HYPERCHOLESTEROLEMIA: ICD-10-CM

## 2017-09-19 DIAGNOSIS — E78.00 HYPERCHOLESTEROLEMIA: ICD-10-CM

## 2017-09-19 DIAGNOSIS — I42.0 DILATED CARDIOMYOPATHY (H): ICD-10-CM

## 2017-09-19 LAB
ANION GAP SERPL CALCULATED.3IONS-SCNC: 11.3 MMOL/L (ref 6–17)
BUN SERPL-MCNC: 13 MG/DL (ref 7–30)
CALCIUM SERPL-MCNC: 9.4 MG/DL (ref 8.5–10.5)
CHLORIDE SERPL-SCNC: 95 MMOL/L (ref 98–107)
CO2 SERPL-SCNC: 31 MMOL/L (ref 23–29)
CREAT SERPL-MCNC: 0.74 MG/DL (ref 0.7–1.3)
GFR SERPL CREATININE-BSD FRML MDRD: 77 ML/MIN/1.7M2
GLUCOSE SERPL-MCNC: 117 MG/DL (ref 70–105)
POTASSIUM SERPL-SCNC: 4.3 MMOL/L (ref 3.5–5.1)
SODIUM SERPL-SCNC: 133 MMOL/L (ref 136–145)

## 2017-09-19 PROCEDURE — 80048 BASIC METABOLIC PNL TOTAL CA: CPT | Performed by: INTERNAL MEDICINE

## 2017-09-19 PROCEDURE — 36415 COLL VENOUS BLD VENIPUNCTURE: CPT | Performed by: INTERNAL MEDICINE

## 2017-09-19 PROCEDURE — 99207 ZZC NO CHARGE NURSE ONLY: CPT | Performed by: INTERNAL MEDICINE

## 2017-09-19 NOTE — PROGRESS NOTES
Hyponatremia has significantly improved but BP is mildly raised. Will possibly be able to control BP with higher doses of carvedilol, otherwise we may be able to add hydralazine or hytrin. Is she due to see me again soon.Thx    Yes I would see her sooner. Thx

## 2017-09-19 NOTE — PROGRESS NOTES
"Reviewed BMP showing   Recent Labs   Lab Test  09/19/17   1108  09/05/17   1419   NA  133*  128*   POTASSIUM  4.3  3.8   CHLORIDE  95*  88*   CO2  31*  36*   ANIONGAP  11.3  7.8   GLC  117*  111*   BUN  13  12   CR  0.74  0.77   PENNY  9.4  9.3     BP reading today were   Pt. Here for a BP check. Automatic BP cuff was used.  3 readings were recorded with an average BP of 150/86.     Readings   1. 156/84  2.148/92  3.148/82    Per last office note dated 9/5/17, Dr. Smith recommended, \"We will try to get her blood pressure under better control.  I will stop the hydrochlorothiazide as this is causing hyponatremia.  I will increase the dose of the carvedilol to 37.5 mg twice a day.  I will also increase the amlodipine to 10 mg per day.  I will have the patient follow up in 2 weeks' time with one of our nurse practitioners to see if the blood pressure has improved.  We will also check a basic metabolic profile at that time to see if the elimination of hydrochlorothiazide will reversed the hyponatremia.\"    Will message Dr. Smith to review. LPenfield RN   "

## 2017-09-19 NOTE — PROGRESS NOTES
Pt. Here for a BP check. Automatic BP cuff was used.  3 readings were recorded with an average BP of 150/86.    Readings   1. 156/84  2.148/92  3.148/82

## 2017-09-19 NOTE — MR AVS SNAPSHOT
After Visit Summary   9/19/2017    Larry Melendez    MRN: [id number]           Patient Information     Date Of Birth          1940        Visit Information        Provider Department      9/19/2017 10:30 AM [facility]        Today's Diagnoses     Dilated cardiomyopathy (H)        Hypercholesterolemia        Benign essential hypertension        Essential hypertension        Pure hypercholesterolemia           Follow-ups after your visit        Your next 10 appointments already scheduled     Sep 19, 2017 11:00 AM CDT   LAB with [facility]   [facility] ([facility])    [address] [place] [id number] [phone]           Patient must bring picture ID. Patient should be prepared to give a urine specimen  Please do not eat 10-12 hours before your appointment if you are coming in fasting for labs on lipids, cholesterol, or glucose (sugar). Pregnant women should follow their Care Team instructions. Water with medications is okay. Do not drink coffee or other fluids. If you have concerns about taking  your medications, please ask at office or if scheduling via [employer], send a message by clicking on Secure Messaging, Message Your Care Team.              Who to contact     If you have questions or need follow up information about today's clinic visit or your schedule please contact [facility] directly at [phone].  Normal or non-critical lab and imaging results will be communicated to you by MyChart, letter or phone within 4 business days after the clinic has received the results. If you do not hear from us within 7 days, please contact the clinic through [employer]wishhart or phone. If you have a critical or abnormal lab result, we will notify you by phone as soon as possible.  Submit refill requests through [employer] or call  "your pharmacy and they will forward the refill request to us. Please allow 3 business days for your refill to be completed.          Additional Information About Your Visit        MyChart Information     [employer] lets you send messages to your doctor, view your test results, renew your prescriptions, schedule appointments and more. To sign up, go to [email or web address] . Click on \"Log in\" on the left side of the screen, which will take you to the Welcome page. Then click on \"Sign up Now\" on the right side of the page.     You will be asked to enter the access code listed below, as well as some personal information. Please follow the directions to create your username and password.     Your access code is: [email or web address]  Expires: 2017  3:58 PM     Your access code will  in 90 days. If you need help or a new code, please call your [facility] clinic or [phone].        Care EveryWhere ID     This is your Care EveryWhere ID. This could be used by other organizations to access your [facility] medical records  [id number]         Blood Pressure from Last 3 Encounters:   17 150/86   17 164/64   17 158/82    Weight from Last 3 Encounters:   17 63.9 kg (140 lb 14.4 oz)   17 66.2 kg (146 lb)   16 64 kg (141 lb)              We Performed the Following     Follow-Up with Nurse        Primary Care Provider Office Phone # Fax #    Malaika Rodriguez [phone]       [facility] [address]  [facility] [address]        Equal Access to Services     [facility] : Hadstew Feldman, fiorella merino, rakel ivey. So [facility] [phone].    ATENCIÓN: Si habla español, tiene a munoz disposición servicios gratuitos de asistencia lingüística. Jason al [phone].    We comply with applicable federal civil rights laws and [place] laws. We do not discriminate on the basis of race, color, national origin, " age, disability sex, sexual orientation or gender identity.            Thank you!     Thank you for choosing [facility] [place]  for your care. Our goal is always to provide you with excellent care. Hearing back from our patients is one way we can continue to improve our services. Please take a few minutes to complete the written survey that you may receive in the mail after your visit with us. Thank you!             Your Updated Medication List - Protect others around you: Learn how to safely use, store and throw away your medicines at www.disposemymeds.org.          This list is accurate as of: 9/19/17 10:35 AM.  Always use your most recent med list.                   Brand Name Dispense Instructions for use Diagnosis    amLODIPine 10 MG tablet    NORVASC    90 tablet    Take 1 tablet (10 mg) by mouth daily    Essential hypertension       carvedilol 25 MG tablet    COREG    270 tablet    Take 1.5 tablets (37.5 mg) by mouth 2 times daily    Dilated cardiomyopathy (H), Benign essential hypertension       cyanocobalamin 1000 MCG/ML injection    VITAMIN B12     Inject 1 mL as directed every 30 days.        potassium chloride SA 10 MEQ CR tablet    K-DUR/KLOR-CON M    90 tablet    Take 1 tablet (10 mEq) by mouth daily    Benign essential hypertension       priLOSEC OTC 20 MG tablet   Generic drug:  omeprazole      Take 20 mg by mouth daily        simvastatin 10 MG tablet    ZOCOR    90 tablet    Take 1 tablet (10 mg) by mouth At Bedtime    Pure hypercholesterolemia       TYLENOL PO      Take 500 mg by mouth as needed for mild pain or fever        valsartan 320 MG tablet    DIOVAN    90 tablet    Take 1 tablet (320 mg) by mouth daily    Benign essential hypertension       vitamin D 2000 UNITS tablet      Take 1 tablet by mouth daily

## 2017-09-21 ENCOUNTER — CARE COORDINATION (OUTPATIENT)
Dept: CARDIOLOGY | Facility: CLINIC | Age: 77
End: 2017-09-21

## 2017-09-21 NOTE — PROGRESS NOTES
"Received call from pt stating that she has had a lot of chest pressure, \"like her bra is too tight, \" and feeling bloated, states she feels \"like a whale.\" she states it feels like it did before when her diuretic was stopped. She states she gets a little short of breath with exertion, denies shortness of breath at rest. Her symptoms are a little worse with exertion, a little better with rest but symptoms are constant & has been there since Sunday and getting worse. He denies swelling in ankles, but states she feels like her abdomen is bloated. Symptoms are not affected by eating & states she has not eaten a lot and no heavy meals. Pt requesting to take a half dose (12.5 mg ) of her diuretic to see if this helps. Pt advised she needs to go to the ED but states she does not feel it is that bad, she is just a \"little uncomfortable.\" Pt advised ok to take half dose or 12.5 mg HCTZ but should go to the ED. Pt states she will try the half dose HCTZ & if symptoms do not improve she will go to the ED otherwise she will see Dr. Smith Tuesday as scheduled. Will message Dr. Smith to review. LPenfield RN   "

## 2017-09-22 DIAGNOSIS — E87.6 HYPOKALEMIA: Primary | ICD-10-CM

## 2017-09-22 DIAGNOSIS — I42.9 CARDIOMYOPATHY, UNSPECIFIED (H): ICD-10-CM

## 2017-09-22 NOTE — PROGRESS NOTES
Called pt with recommendations from Dr. Smith. Order for BMP entered & scheduled prior to office visit with Dr. Smith 9/25/17. LPenfield RN

## 2017-09-25 ENCOUNTER — OFFICE VISIT (OUTPATIENT)
Dept: CARDIOLOGY | Facility: CLINIC | Age: 77
End: 2017-09-25
Payer: COMMERCIAL

## 2017-09-25 VITALS
HEART RATE: 83 BPM | BODY MASS INDEX: 27.68 KG/M2 | DIASTOLIC BLOOD PRESSURE: 81 MMHG | WEIGHT: 141 LBS | HEIGHT: 60 IN | SYSTOLIC BLOOD PRESSURE: 147 MMHG

## 2017-09-25 DIAGNOSIS — E87.6 HYPOKALEMIA: Primary | ICD-10-CM

## 2017-09-25 DIAGNOSIS — I42.9 CARDIOMYOPATHY, UNSPECIFIED (H): ICD-10-CM

## 2017-09-25 DIAGNOSIS — I10 BENIGN ESSENTIAL HYPERTENSION: ICD-10-CM

## 2017-09-25 DIAGNOSIS — E87.6 HYPOKALEMIA: ICD-10-CM

## 2017-09-25 DIAGNOSIS — E78.00 PURE HYPERCHOLESTEROLEMIA: ICD-10-CM

## 2017-09-25 DIAGNOSIS — E78.00 HYPERCHOLESTEROLEMIA: ICD-10-CM

## 2017-09-25 DIAGNOSIS — I42.0 DILATED CARDIOMYOPATHY (H): ICD-10-CM

## 2017-09-25 LAB
ANION GAP SERPL CALCULATED.3IONS-SCNC: 11 MMOL/L (ref 6–17)
BUN SERPL-MCNC: 16 MG/DL (ref 7–30)
CALCIUM SERPL-MCNC: 9.7 MG/DL (ref 8.5–10.5)
CHLORIDE SERPL-SCNC: 89 MMOL/L (ref 98–107)
CO2 SERPL-SCNC: 33 MMOL/L (ref 23–29)
CREAT SERPL-MCNC: 0.88 MG/DL (ref 0.7–1.3)
GFR SERPL CREATININE-BSD FRML MDRD: 63 ML/MIN/1.7M2
GLUCOSE SERPL-MCNC: 153 MG/DL (ref 70–105)
POTASSIUM SERPL-SCNC: 4 MMOL/L (ref 3.5–5.1)
SODIUM SERPL-SCNC: 129 MMOL/L (ref 136–145)

## 2017-09-25 PROCEDURE — 99213 OFFICE O/P EST LOW 20 MIN: CPT | Performed by: INTERNAL MEDICINE

## 2017-09-25 PROCEDURE — 80048 BASIC METABOLIC PNL TOTAL CA: CPT | Performed by: INTERNAL MEDICINE

## 2017-09-25 PROCEDURE — 36415 COLL VENOUS BLD VENIPUNCTURE: CPT | Performed by: INTERNAL MEDICINE

## 2017-09-25 RX ORDER — CARVEDILOL 25 MG/1
50 TABLET ORAL 2 TIMES DAILY
Qty: 360 TABLET | Refills: 3 | Status: SHIPPED | OUTPATIENT
Start: 2017-09-25 | End: 2018-11-15

## 2017-09-25 NOTE — MR AVS SNAPSHOT
"              After Visit Summary   9/25/2017    Larry Melendez    MRN: [id number]           Patient Information     Date Of Birth          1940        Visit Information        Provider Department      9/25/2017 1:15 PM Brett Smith MD [facility] [place]        Today's Diagnoses     Hypokalemia    -  1    Cardiomyopathy, unspecified (H)        Hypercholesterolemia        Benign essential hypertension        Pure hypercholesterolemia        Dilated cardiomyopathy (H)           Follow-ups after your visit        Additional Services     Follow-Up with Nurse                 Future tests that were ordered for you today     Open Future Orders        Priority Expected Expires Ordered    Follow-Up with Nurse Routine 10/25/2017 9/25/2018 9/25/2017            Who to contact     If you have questions or need follow up information about today's clinic visit or your schedule please contact [facility] directly at [phone].  Normal or non-critical lab and imaging results will be communicated to you by MyChart, letter or phone within 4 business days after the clinic has received the results. If you do not hear from us within 7 days, please contact the clinic through [employer] or phone. If you have a critical or abnormal lab result, we will notify you by phone as soon as possible.  Submit refill requests through [employer] or call your pharmacy and they will forward the refill request to us. Please allow 3 business days for your refill to be completed.          Additional Information About Your Visit        MyChart Information     [employer] lets you send messages to your doctor, view your test results, renew your prescriptions, schedule appointments and more. To sign up, go to [email or web address] . Click on \"Log in\" on the left side of the screen, which will take you to the Welcome page. Then click on \"Sign up Now\" on the " right side of the page.     You will be asked to enter the access code listed below, as well as some personal information. Please follow the directions to create your username and password.     Your access code is: [email or web address]  Expires: 2017  3:58 PM     Your access code will  in 90 days. If you need help or a new code, please call your [facility] clinic or [phone].        Care EveryWhere ID     This is your Care EveryWhere ID. This could be used by other organizations to access your [facility] medical records  [id number]        Your Vitals Were     Pulse Height BMI (Body Mass Index)             83 1.524 m (5') 27.54 kg/m2          Blood Pressure from Last 3 Encounters:   17 147/81   17 150/86   17 164/64    Weight from Last 3 Encounters:   17 64 kg (141 lb)   17 63.9 kg (140 lb 14.4 oz)   17 66.2 kg (146 lb)                 Today's Medication Changes          These changes are accurate as of: 17  1:48 PM.  If you have any questions, ask your nurse or doctor.               These medicines have changed or have updated prescriptions.        Dose/Directions    carvedilol 25 MG tablet   Commonly known as:  COREG   This may have changed:  how much to take   Used for:  Dilated cardiomyopathy (H), Benign essential hypertension   Changed by:  Brett Smith MD        Dose:  50 mg   Take 2 tablets (50 mg) by mouth 2 times daily   Quantity:  360 tablet   Refills:  3            Where to get your medicines      These medications were sent to [facility] [address] [place], [place] [address] [place] [address]  [address] [address]     Phone:  [phone]     carvedilol 25 MG tablet                Primary Care Provider Office Phone # Fax #    Malaika Rodriguez [phone]       [employer]  [address]  [facility] [address]        Equal Access to Services     [place] [facility]: Emigdio samano  Soike, wamichelleda luqadaha, qaybta kaalmada mike, rakel lewis skylerjose greenbergloreta pernell. So [facility] [phone].    ATENCIÓN: Si olamide leahy, tiene a munoz disposición servicios gratuitos de asistencia lingüística. Jason gama [phone].    We comply with applicable federal civil rights laws and [place] laws. We do not discriminate on the basis of race, color, national origin, age, disability sex, sexual orientation or gender identity.            Thank you!     Thank you for choosing [facility] [place]  for your care. Our goal is always to provide you with excellent care. Hearing back from our patients is one way we can continue to improve our services. Please take a few minutes to complete the written survey that you may receive in the mail after your visit with us. Thank you!             Your Updated Medication List - Protect others around you: Learn how to safely use, store and throw away your medicines at www.disposemymeds.org.          This list is accurate as of: 9/25/17  1:48 PM.  Always use your most recent med list.                   Brand Name Dispense Instructions for use Diagnosis    amLODIPine 10 MG tablet    NORVASC    90 tablet    Take 1 tablet (10 mg) by mouth daily    Essential hypertension       carvedilol 25 MG tablet    COREG    360 tablet    Take 2 tablets (50 mg) by mouth 2 times daily    Dilated cardiomyopathy (H), Benign essential hypertension       cyanocobalamin 1000 MCG/ML injection    VITAMIN B12     Inject 1 mL as directed every 30 days.        potassium chloride SA 10 MEQ CR tablet    K-DUR/KLOR-CON M    90 tablet    Take 1 tablet (10 mEq) by mouth daily    Benign essential hypertension       priLOSEC OTC 20 MG tablet   Generic drug:  omeprazole      Take 20 mg by mouth daily        simvastatin 10 MG tablet    ZOCOR    90 tablet    Take 1 tablet (10 mg) by mouth At Bedtime    Pure hypercholesterolemia       TYLENOL PO      Take 500 mg by mouth as  needed for mild pain or fever        valsartan 320 MG tablet    DIOVAN    90 tablet    Take 1 tablet (320 mg) by mouth daily    Benign essential hypertension       vitamin D 2000 UNITS tablet      Take 1 tablet by mouth daily

## 2017-09-25 NOTE — PROGRESS NOTES
HPI and Plan:   See dictation    Orders Placed This Encounter   Procedures     Follow-Up with Nurse       Orders Placed This Encounter   Medications     carvedilol (COREG) 25 MG tablet     Sig: Take 2 tablets (50 mg) by mouth 2 times daily     Dispense:  360 tablet     Refill:  3       Medications Discontinued During This Encounter   Medication Reason     carvedilol (COREG) 25 MG tablet Reorder         Encounter Diagnoses   Name Primary?     Hypokalemia Yes     Cardiomyopathy, unspecified (H)      Hypercholesterolemia      Benign essential hypertension      Pure hypercholesterolemia      Dilated cardiomyopathy (H)        CURRENT MEDICATIONS:  Current Outpatient Prescriptions   Medication Sig Dispense Refill     carvedilol (COREG) 25 MG tablet Take 2 tablets (50 mg) by mouth 2 times daily 360 tablet 3     amLODIPine (NORVASC) 10 MG tablet Take 1 tablet (10 mg) by mouth daily 90 tablet 3     simvastatin (ZOCOR) 10 MG tablet Take 1 tablet (10 mg) by mouth At Bedtime 90 tablet 3     potassium chloride SA (K-DUR/KLOR-CON M) 10 MEQ CR tablet Take 1 tablet (10 mEq) by mouth daily 90 tablet 3     valsartan (DIOVAN) 320 MG tablet Take 1 tablet (320 mg) by mouth daily 90 tablet 3     Acetaminophen (TYLENOL PO) Take 500 mg by mouth as needed for mild pain or fever       Cholecalciferol (VITAMIN D) 2000 UNITS tablet Take 1 tablet by mouth daily       omeprazole (PRILOSEC OTC) 20 MG tablet Take 20 mg by mouth daily       cyanocobalamin 1000 MCG/ML injection Inject 1 mL as directed every 30 days.       [DISCONTINUED] carvedilol (COREG) 25 MG tablet Take 1.5 tablets (37.5 mg) by mouth 2 times daily 270 tablet 3       ALLERGIES     Allergies   Allergen Reactions     Aspirin Other (See Comments)     Bleeding              Bleeding, GI Lesion         Codeine Sulfate GI Disturbance       PAST MEDICAL HISTORY:  Past Medical History:   Diagnosis Date     Arthritis      Breast cancer (H)      Cardiomyopathy (H)     ideopathic      Hypercholesterolemia      Hypertension      Hypokalemia      Malignant neoplasm (H)      Shortness of breath      Shoulder pain        PAST SURGICAL HISTORY:  Past Surgical History:   Procedure Laterality Date     BACK SURGERY       CHOLECYSTECTOMY       EYE SURGERY       partial masectomy         FAMILY HISTORY:  Family History   Problem Relation Age of Onset     Family History Negative No family hx of        SOCIAL HISTORY:  Social History     Social History     Marital status: Single     Spouse name: N/A     Number of children: N/A     Years of education: N/A     Social History Main Topics     Smoking status: Never Smoker     Smokeless tobacco: Never Used     Alcohol use Yes      Comment: socially     Drug use: No     Sexual activity: No     Other Topics Concern     Special Diet No     Exercise No     Social History Narrative       Review of Systems:  Skin:  Negative       Eyes:  Positive for glasses    ENT:  Negative      Respiratory:  Negative       Cardiovascular:    Positive for;fatigue    Gastroenterology: Negative      Genitourinary:  not assessed      Musculoskeletal:  Positive for back pain;joint pain;nocturnal cramping Left shoulder pain   Neurologic:  Negative      Psychiatric:         Heme/Lymph/Imm:  Negative      Endocrine:  Negative        Physical Exam:  Vitals: /81  Pulse 83  Ht 1.524 m (5')  Wt 64 kg (141 lb)  BMI 27.54 kg/m2    Constitutional:           Skin:           Head:           Eyes:           ENT:           Neck:           Chest:             Cardiac:                    Abdomen:           Vascular:                                          Extremities and Back:                 Neurological:                 CC  No referring provider defined for this encounter.

## 2017-09-25 NOTE — LETTER
9/25/2017    Malaika Rodriguez MD  [employer] [address]  [facility] [address]    RE: Larry Benavideze       Dear Colleague,    I had the pleasure of seeing Larry Melendez in the [facility].    REFERRING PHYSICIAN:  Dr. Malaika Rodriguez.      It is my pleasure to see your patient, Larry Melendez, who is a very pleasant 76-year-old female patient with a history of mild idiopathic cardiomyopathy and hypertension.  If you remember, the patient was developing hyponatremia with hydrochlorothiazide, so we stopped the hydrochlorothiazide and increased the dose of carvedilol to 37.5 mg twice a day.  Her sodium did improve to 133, and her renal function remained normal.  However, the patient felt that she was getting bloated, especially in her abdominal area but not in her ankles.  So she started taking half the dose of the hydrochlorothiazide, which is 12.5 mg a day.  She did not take any today.  Her blood pressure today is 147/81.  I retook the blood pressure at 146/78.  Her sodium dropped again to 129 with the re-addition of the hydrochlorothiazide.  So we are in somewhat of a difficult situation because the hydrochlorothiazide clearly does reduce her blood pressure, but it causes hyponatremia also.  The patient's abdominal bloating has improved.  She feels that she is very gassy, and she notices a lot of borborygmi in her abdomen.  So I have a feeling that the bloating is not due to fluid, especially given that her ankles did not swell.      IMPRESSION:   1.  Hypertension.  Her blood pressure is not ideally controlled, but unfortunately we have with the dilemma of hydrochlorothiazide, improving blood pressure control but causing hyponatremia.   2.  Abdominal bloating is probably not due to fluid.      PLAN:  I will have her stop the hydrochlorothiazide, but I will increase the carvedilol to 50 mg twice a day.  We have enough blood pressure and heart rate to allow  us to do that, and hopefully we will get a bit better blood pressure control with the carvedilol 50 twice a day, valsartan 320 mg per day and the amlodipine medication.  I will have the patient follow up in 2 weeks' time with a nurse visit to check her blood pressure.  It has been my pleasure to be involved in the care of this somewhat difficult case.          Again, thank you for allowing me to participate in the care of your patient.      Sincerely,    Brett Smith MD    [facility]

## 2017-09-26 NOTE — PROGRESS NOTES
REFERRING PHYSICIAN:  Dr. Malaika Rodriguez.      HISTORY OF PRESENT ILLNESS:  It is my pleasure to see your patient, Larry Melendez, who is a very pleasant 76-year-old female patient with a history of mild idiopathic cardiomyopathy and hypertension.  If you remember, the patient was developing hyponatremia with hydrochlorothiazide, so we stopped the hydrochlorothiazide and increased the dose of carvedilol to 37.5 mg twice a day.  Her sodium did improve to 133, and her renal function remained normal.  However, the patient felt that she was getting bloated, especially in her abdominal area but not in her ankles.  So she started taking half the dose of the hydrochlorothiazide, which is 12.5 mg a day.  She did not take any today.  Her blood pressure today is 147/81.  I retook the blood pressure at 146/78.  Her sodium dropped again to 129 with the re-addition of the hydrochlorothiazide.  So we are in somewhat of a difficult situation because the hydrochlorothiazide clearly does reduce her blood pressure, but it causes hyponatremia also.  The patient's abdominal bloating has improved.  She feels that she is very gassy, and she notices a lot of borborygmi in her abdomen.  So I have a feeling that the bloating is not due to fluid, especially given that her ankles did not swell.      IMPRESSION:   1.  Hypertension.  Her blood pressure is not ideally controlled, but unfortunately we have with the dilemma of hydrochlorothiazide, improving blood pressure control but causing hyponatremia.   2.  Abdominal bloating is probably not due to fluid.      PLAN:  I will have her stop the hydrochlorothiazide, but I will increase the carvedilol to 50 mg twice a day.  We have enough blood pressure and heart rate to allow us to do that, and hopefully we will get a bit better blood pressure control with the carvedilol 50 twice a day, valsartan 320 mg per day and the amlodipine medication.  I will have the patient follow up in 2 weeks'  time with a nurse visit to check her blood pressure.  It has been my pleasure to be involved in the care of this somewhat difficult case.      cc:   [place] [address]         YADIRA PERKINS MD, [facility]             D: 2017 13:47   T: 2017 19:38   MT: OLGA      Name:     ELAYNE SALDAÑA   MRN:      [id number]        Account:      [id number]   :      1940           Service Date: 2017      Document: [id number]

## 2017-11-02 ENCOUNTER — ALLIED HEALTH/NURSE VISIT (OUTPATIENT)
Dept: CARDIOLOGY | Facility: CLINIC | Age: 77
End: 2017-11-02
Attending: INTERNAL MEDICINE
Payer: COMMERCIAL

## 2017-11-02 VITALS — DIASTOLIC BLOOD PRESSURE: 85 MMHG | HEART RATE: 82 BPM | SYSTOLIC BLOOD PRESSURE: 146 MMHG

## 2017-11-02 DIAGNOSIS — I10 BENIGN ESSENTIAL HYPERTENSION: ICD-10-CM

## 2017-11-02 PROCEDURE — 99207 ZZC NO CHARGE LOS: CPT | Performed by: INTERNAL MEDICINE

## 2017-11-02 NOTE — MR AVS SNAPSHOT
"              After Visit Summary   2017    Larry Melendez    MRN: [id number]           Patient Information     Date Of Birth          1940        Visit Information        Provider Department      2017 10:30 AM [facility]IT[facility]        Today's Diagnoses     Benign essential hypertension           Follow-ups after your visit        Follow-up notes from your care team     Return for BP Recheck.      Who to contact     If you have questions or need follow up information about today's clinic visit or your schedule please contact [facility] directly at [phone].  Normal or non-critical lab and imaging results will be communicated to you by Taquillahart, letter or phone within 4 business days after the clinic has received the results. If you do not hear from us within 7 days, please contact the clinic through Taquillahart or phone. If you have a critical or abnormal lab result, we will notify you by phone as soon as possible.  Submit refill requests through Good Thing or call your pharmacy and they will forward the refill request to us. Please allow 3 business days for your refill to be completed.          Additional Information About Your Visit        MyChart Information     Good Thing lets you send messages to your doctor, view your test results, renew your prescriptions, schedule appointments and more. To sign up, go to [email or web address] . Click on \"Log in\" on the left side of the screen, which will take you to the Welcome page. Then click on \"Sign up Now\" on the right side of the page.     You will be asked to enter the access code listed below, as well as some personal information. Please follow the directions to create your username and password.     Your access code is: [email or web address]  Expires: 2017  3:58 PM     Your access code will  in 90 days. If you need help or a new code, please call " your [place] clinic or [phone].        Care EveryWhere ID     This is your Care EveryWhere ID. This could be used by other organizations to access your [place] medical records  [id number]        Your Vitals Were     Pulse                   82            Blood Pressure from Last 3 Encounters:   11/02/17 146/85   09/25/17 147/81   09/19/17 150/86    Weight from Last 3 Encounters:   09/25/17 64 kg (141 lb)   09/05/17 63.9 kg (140 lb 14.4 oz)   02/27/17 66.2 kg (146 lb)              We Performed the Following     Follow-Up with Nurse        Primary Care Provider Office Phone # Fax #    Malaika Rodriguez [phone]       [facility] [address]  [facility] [address]        Equal Access to Services     [place] : Hadii samira sutton hadasho Soike, waaxda luqadaha, qaybta kaalmada adeegyada, rakel galindo . So [facility] [phone].    ATENCIÓN: Si habla español, tiene a munoz disposición servicios gratuitos de asistencia lingüística. Jason al [phone].    We comply with applicable federal civil rights laws and [place] laws. We do not discriminate on the basis of race, color, national origin, age, disability, sex, sexual orientation, or gender identity.            Thank you!     Thank you for choosing [facility]  for your care. Our goal is always to provide you with excellent care. Hearing back from our patients is one way we can continue to improve our services. Please take a few minutes to complete the written survey that you may receive in the mail after your visit with us. Thank you!             Your Updated Medication List - Protect others around you: Learn how to safely use, store and throw away your medicines at www.disposemymeds.org.          This list is accurate as of: 11/2/17 10:47 AM.  Always use your most recent med list.                   Brand Name Dispense Instructions for use Diagnosis    amLODIPine 10 MG tablet     NORVASC    90 tablet    Take 1 tablet (10 mg) by mouth daily    Essential hypertension       carvedilol 25 MG tablet    COREG    360 tablet    Take 2 tablets (50 mg) by mouth 2 times daily    Dilated cardiomyopathy (H), Benign essential hypertension       cyanocobalamin 1000 MCG/ML injection    VITAMIN B12     Inject 1 mL as directed every 30 days.        potassium chloride SA 10 MEQ CR tablet    K-DUR/KLOR-CON M    90 tablet    Take 1 tablet (10 mEq) by mouth daily    Benign essential hypertension       priLOSEC OTC 20 MG tablet   Generic drug:  omeprazole      Take 20 mg by mouth daily        simvastatin 10 MG tablet    ZOCOR    90 tablet    Take 1 tablet (10 mg) by mouth At Bedtime    Pure hypercholesterolemia       TYLENOL PO      Take 500 mg by mouth as needed for mild pain or fever        valsartan 320 MG tablet    DIOVAN    90 tablet    Take 1 tablet (320 mg) by mouth daily    Benign essential hypertension       vitamin D 2000 UNITS tablet      Take 1 tablet by mouth daily

## 2017-11-03 ENCOUNTER — CARE COORDINATION (OUTPATIENT)
Dept: CARDIOLOGY | Facility: CLINIC | Age: 77
End: 2017-11-03

## 2017-11-03 NOTE — PROGRESS NOTES
Would switch valsartan to either olmesartan 40mg per day or candesartan 32 mg per day depending on insurance. Thx

## 2017-11-03 NOTE — PROGRESS NOTES
Blood pressure check showed /85 (BP Location: Left arm) Pulse 82   Current meds are   Current Outpatient Prescriptions   Medication     carvedilol (COREG) 25 MG tablet     amLODIPine (NORVASC) 10 MG tablet     simvastatin (ZOCOR) 10 MG tablet     potassium chloride SA (K-DUR/KLOR-CON M) 10 MEQ CR tablet     valsartan (DIOVAN) 320 MG tablet     Acetaminophen (TYLENOL PO)     Cholecalciferol (VITAMIN D) 2000 UNITS tablet     omeprazole (PRILOSEC OTC) 20 MG tablet     cyanocobalamin 1000 MCG/ML injection     No current facility-administered medications for this visit.      Will message Dr. Smith to review. LPenfield RN

## 2017-11-06 DIAGNOSIS — I10 ESSENTIAL HYPERTENSION: Primary | ICD-10-CM

## 2017-11-06 RX ORDER — OLMESARTAN MEDOXOMIL 40 MG/1
40 TABLET ORAL DAILY
Qty: 30 TABLET | Refills: 1 | Status: SHIPPED | OUTPATIENT
Start: 2017-11-06 | End: 2017-11-07

## 2017-11-06 NOTE — ADDENDUM NOTE
Addended by: PENFIELD, LYNETTE EILEEN BARTELT on: 11/6/2017 04:57 PM     Modules accepted: Orders

## 2017-11-07 DIAGNOSIS — I10 ESSENTIAL HYPERTENSION: ICD-10-CM

## 2017-11-07 RX ORDER — OLMESARTAN MEDOXOMIL 40 MG/1
40 TABLET ORAL DAILY
Qty: 90 TABLET | Refills: 2 | Status: SHIPPED | OUTPATIENT
Start: 2017-11-07 | End: 2018-10-08

## 2017-11-22 ENCOUNTER — ALLIED HEALTH/NURSE VISIT (OUTPATIENT)
Dept: CARDIOLOGY | Facility: CLINIC | Age: 77
End: 2017-11-22
Attending: INTERNAL MEDICINE

## 2017-11-22 VITALS — DIASTOLIC BLOOD PRESSURE: 70 MMHG | SYSTOLIC BLOOD PRESSURE: 130 MMHG | HEART RATE: 82 BPM

## 2017-11-22 DIAGNOSIS — I10 ESSENTIAL HYPERTENSION: ICD-10-CM

## 2017-11-22 LAB
ANION GAP SERPL CALCULATED.3IONS-SCNC: 12.1 MMOL/L (ref 6–17)
BUN SERPL-MCNC: 11 MG/DL (ref 7–30)
CALCIUM SERPL-MCNC: 9.1 MG/DL (ref 8.5–10.5)
CHLORIDE SERPL-SCNC: 96 MMOL/L (ref 98–107)
CO2 SERPL-SCNC: 31 MMOL/L (ref 23–29)
CREAT SERPL-MCNC: 0.88 MG/DL (ref 0.7–1.3)
GFR SERPL CREATININE-BSD FRML MDRD: 62 ML/MIN/1.7M2
GLUCOSE SERPL-MCNC: 123 MG/DL (ref 70–105)
POTASSIUM SERPL-SCNC: 4.1 MMOL/L (ref 3.5–5.1)
SODIUM SERPL-SCNC: 135 MMOL/L (ref 136–145)

## 2017-11-22 PROCEDURE — 80048 BASIC METABOLIC PNL TOTAL CA: CPT | Performed by: INTERNAL MEDICINE

## 2017-11-22 PROCEDURE — 36415 COLL VENOUS BLD VENIPUNCTURE: CPT | Performed by: INTERNAL MEDICINE

## 2017-11-22 NOTE — NURSING NOTE
Took patients blood pressure today in clinic.  130/76 on Left arm.  Patient complains of bilateral edema, increased in the last 2 weeks.  Sent to lab for Blood Draw. Cee SOMMER([facility])

## 2017-11-22 NOTE — MR AVS SNAPSHOT
After Visit Summary   11/22/2017    Larry Melendez    MRN: [id number]           Patient Information     Date Of Birth          1940        Visit Information        Provider Department      11/22/2017 10:30 AM [facility]        Today's Diagnoses     Essential hypertension           Follow-ups after your visit        Your next 10 appointments already scheduled     Nov 22, 2017 11:00 AM CST   LAB with [facility]   [facility] ([facility])    [address] [place] [address] [address]  [facility] [id number] [phone]           Please do not eat 10-12 hours before your appointment if you are coming in fasting for labs on lipids, cholesterol, or glucose (sugar). This does not apply to pregnant women. Water, hot tea and black coffee (with nothing added) are okay. Do not drink other fluids, diet soda or chew gum.              Who to contact     If you have questions or need follow up information about today's clinic visit or your schedule please contact [facility] directly at [phone].  Normal or non-critical lab and imaging results will be communicated to you by Tellus Technologyhart, letter or phone within 4 business days after the clinic has received the results. If you do not hear from us within 7 days, please contact the clinic through Destineert or phone. If you have a critical or abnormal lab result, we will notify you by phone as soon as possible.  Submit refill requests through StarCard or call your pharmacy and they will forward the refill request to us. Please allow 3 business days for your refill to be completed.          Additional Information About Your Visit        Tellus Technologyhart Information     StarCard lets you send messages to your doctor, view your test results, renew your prescriptions, schedule appointments and more. To sign up, go to  [email or web address] . Click on \"Log in\" on the left side of the screen, which will take you to the Welcome page. Then click on \"Sign up Now\" on the right side of the page.     You will be asked to enter the access code listed below, as well as some personal information. Please follow the directions to create your username and password.     Your access code is: [email or web address]  Expires: 2017  2:58 PM     Your access code will  in 90 days. If you need help or a new code, please call your [place] clinic or [phone].        Care EveryWhere ID     This is your Care EveryWhere ID. This could be used by other organizations to access your [place] medical records  [id number]        Your Vitals Were     Pulse                   82            Blood Pressure from Last 3 Encounters:   17 130/70   17 146/85   17 147/81    Weight from Last 3 Encounters:   17 64 kg (141 lb)   17 63.9 kg (140 lb 14.4 oz)   17 66.2 kg (146 lb)              We Performed the Following     Follow-Up with Nurse        Primary Care Provider Office Phone # Fax #    Malaika Rodriguez [phone]       [facility] [address] [facility]  [facility] [address]        Equal Access to Services     [place] : Hadii samira Feldman, waaxda luqshana, qaybta kaalkate mckeon, rakel galindo . So [facility] [phone].    ATENCIÓN: Si habla español, tiene a munoz disposición servicios gratuitos de asistencia lingüística. Jason al [phone].    We comply with applicable federal civil rights laws and [place] laws. We do not discriminate on the basis of race, color, national origin, age, disability, sex, sexual orientation, or gender identity.            Thank you!     Thank you for choosing [facility]  for your care. Our goal is always to provide you with excellent care. Hearing back from our patients is one way we can continue to " improve our services. Please take a few minutes to complete the written survey that you may receive in the mail after your visit with us. Thank you!             Your Updated Medication List - Protect others around you: Learn how to safely use, store and throw away your medicines at www.disposemymeds.org.          This list is accurate as of: 11/22/17 10:31 AM.  Always use your most recent med list.                   Brand Name Dispense Instructions for use Diagnosis    amLODIPine 10 MG tablet    NORVASC    90 tablet    Take 1 tablet (10 mg) by mouth daily    Essential hypertension       carvedilol 25 MG tablet    COREG    360 tablet    Take 2 tablets (50 mg) by mouth 2 times daily    Dilated cardiomyopathy (H), Benign essential hypertension       cyanocobalamin 1000 MCG/ML injection    VITAMIN B12     Inject 1 mL as directed every 30 days.        olmesartan 40 MG tablet    BENICAR    90 tablet    Take 1 tablet (40 mg) by mouth daily    Essential hypertension       potassium chloride SA 10 MEQ CR tablet    K-DUR/KLOR-CON M    90 tablet    Take 1 tablet (10 mEq) by mouth daily    Benign essential hypertension       priLOSEC OTC 20 MG tablet   Generic drug:  omeprazole      Take 20 mg by mouth daily        simvastatin 10 MG tablet    ZOCOR    90 tablet    Take 1 tablet (10 mg) by mouth At Bedtime    Pure hypercholesterolemia       TYLENOL PO      Take 500 mg by mouth as needed for mild pain or fever        vitamin D 2000 UNITS tablet      Take 1 tablet by mouth daily

## 2017-11-27 ENCOUNTER — TELEPHONE (OUTPATIENT)
Dept: CARDIOLOGY | Facility: CLINIC | Age: 77
End: 2017-11-27

## 2017-11-27 NOTE — TELEPHONE ENCOUNTER
Took patients blood pressure today in clinic.  130/76 on Left arm.  Patient complains of bilateral edema, increased in the last 2 weeks.  Sent to lab for Blood Draw. Cee SOMMER([facility])    Reviewed BMP showing   Recent Labs   Lab Test  11/22/17   1026  09/25/17   1216   NA  135*  129*   POTASSIUM  4.1  4.0   CHLORIDE  96*  89*   CO2  31*  33*   ANIONGAP  12.1  11   GLC  123*  153*   BUN  11  16   CR  0.88  0.88   PENNY  9.1  9.7     Pt switched from valsartan to olmesartan 40mg per day two weeks ago. Will let Dr. Smith know.

## 2017-11-28 NOTE — TELEPHONE ENCOUNTER
Called pt to let her know that BMP & BP are acceptable. Pt states that her ankles & legs are still swollen, have been for 2-3 weeks. Pt states she cannot get her shoes on this morning, which are usually rather loose fitting. Pt states she follows a low sodium diet but admits to eating cheese, but not on a daily basis. She states the swelling is better in the morning but gets worse throughout the day. Pt advised she should come in to see an MARINO & scheduled to see BERNADETTE Rondon. Pt aware of apt date, time & location. LPenfield RN

## 2017-11-29 ENCOUNTER — OFFICE VISIT (OUTPATIENT)
Dept: CARDIOLOGY | Facility: CLINIC | Age: 77
End: 2017-11-29
Payer: COMMERCIAL

## 2017-11-29 VITALS
SYSTOLIC BLOOD PRESSURE: 131 MMHG | HEIGHT: 60 IN | BODY MASS INDEX: 28.23 KG/M2 | WEIGHT: 143.8 LBS | DIASTOLIC BLOOD PRESSURE: 75 MMHG | HEART RATE: 75 BPM

## 2017-11-29 DIAGNOSIS — R60.0 FLUID RETENTION IN LEGS: ICD-10-CM

## 2017-11-29 DIAGNOSIS — I25.10 CORONARY ARTERY DISEASE INVOLVING NATIVE CORONARY ARTERY OF NATIVE HEART WITHOUT ANGINA PECTORIS: ICD-10-CM

## 2017-11-29 DIAGNOSIS — E78.00 HYPERCHOLESTEROLEMIA: ICD-10-CM

## 2017-11-29 DIAGNOSIS — I10 ESSENTIAL HYPERTENSION: Primary | ICD-10-CM

## 2017-11-29 PROCEDURE — 99214 OFFICE O/P EST MOD 30 MIN: CPT | Performed by: PHYSICIAN ASSISTANT

## 2017-11-29 RX ORDER — FUROSEMIDE 20 MG
20 TABLET ORAL DAILY
Qty: 30 TABLET | Refills: 3 | Status: SHIPPED | OUTPATIENT
Start: 2017-11-29 | End: 2017-11-30

## 2017-11-29 NOTE — MR AVS SNAPSHOT
After Visit Summary   11/29/2017    Larry Melendez    MRN: [id number]           Patient Information     Date Of Birth          1940        Visit Information        Provider Department      11/29/2017 1:50 PM Annette López PA [facility]        Today's Diagnoses     Coronary artery disease involving native coronary artery of native heart without angina pectoris    -  1    Hypercholesterolemia        Essential hypertension        Fluid retention in legs          Care Instructions    Visit Summary:    Today we discussed:   You are retaining some fluid in your legs but you have had problems with the prior diuretic due to low blood sodium levels.  We will try a different diuretic today called furosemide (Lasix)-->please take 1 pill (20mg) once daily.    We will recheck labs in 1 week, and I will call you with results.  We will move up your echocardiogram which was scheduled for early next year. Since you have a surgery coming up it will be helpful to have it prior to this as well.       Please call my nurse Cece at [phone] with any questions or concerns. Scheduling phone number: [phone] if needed.                 Follow-ups after your visit        Future tests that were ordered for you today     Open Future Orders        Priority Expected Expires Ordered    Basic metabolic panel Routine 12/6/2017 11/29/2018 11/29/2017    Echocardiogram Routine 12/6/2017 11/29/2018 11/29/2017            Who to contact     If you have questions or need follow up information about today's clinic visit or your schedule please contact [facility] directly at [phone].  Normal or non-critical lab and imaging results will be communicated to you by MyChart, letter or phone within 4 business days after the clinic has received the results. If you do not hear from us within 7 days, please contact the clinic through A2Zlogixt or  "phone. If you have a critical or abnormal lab result, we will notify you by phone as soon as possible.  Submit refill requests through Sunshine or call your pharmacy and they will forward the refill request to us. Please allow 3 business days for your refill to be completed.          Additional Information About Your Visit        coconehart Information     Sunshine lets you send messages to your doctor, view your test results, renew your prescriptions, schedule appointments and more. To sign up, go to [email or web address] . Click on \"Log in\" on the left side of the screen, which will take you to the Welcome page. Then click on \"Sign up Now\" on the right side of the page.     You will be asked to enter the access code listed below, as well as some personal information. Please follow the directions to create your username and password.     Your access code is: [email or web address]  Expires: 2017  2:58 PM     Your access code will  in 90 days. If you need help or a new code, please call your [place] clinic or [phone].        Care EveryWhere ID     This is your Care EveryWhere ID. This could be used by other organizations to access your [place] medical records  [id number]        Your Vitals Were     Pulse Height BMI (Body Mass Index)             75 1.524 m (5') 28.08 kg/m2          Blood Pressure from Last 3 Encounters:   17 131/75   17 130/70   17 146/85    Weight from Last 3 Encounters:   17 65.2 kg (143 lb 12.8 oz)   17 64 kg (141 lb)   17 63.9 kg (140 lb 14.4 oz)                 Today's Medication Changes          These changes are accurate as of: 17  2:12 PM.  If you have any questions, ask your nurse or doctor.               Start taking these medicines.        Dose/Directions    furosemide 20 MG tablet   Commonly known as:  LASIX   Used for:  Fluid retention in legs, Essential hypertension        Dose:  20 mg   Take 1 tablet (20 mg) by mouth daily   Quantity:  " 30 tablet   Refills:  3            Where to get your medicines      These medications were sent to [facility] - [place], [place] - [address] AT [facility] [address]  [address] [address]     Phone:  [phone]     furosemide 20 MG tablet                Primary Care Provider Office Phone # Fax #    Malaika Rodriguez [phone]       [facility] [address]  [facility] [address]        Equal Access to Services     [place] : Hadii aad ku hadasho Soomaali, waaxda luqadaha, qaybta kaalmada adeegyada, waxay idiin hayaan adeeg khararadha galindo . So [facility] [phone].    ATENCIÓN: Si habla español, tiene a munoz disposición servicios gratuitos de asistencia lingüística. [facility] [phone].    We comply with applicable federal civil rights laws and [place] laws. We do not discriminate on the basis of race, color, national origin, age, disability, sex, sexual orientation, or gender identity.            Thank you!     Thank you for choosing [facility]  for your care. Our goal is always to provide you with excellent care. Hearing back from our patients is one way we can continue to improve our services. Please take a few minutes to complete the written survey that you may receive in the mail after your visit with us. Thank you!             Your Updated Medication List - Protect others around you: Learn how to safely use, store and throw away your medicines at www.disposemymeds.org.          This list is accurate as of: 11/29/17  2:12 PM.  Always use your most recent med list.                   Brand Name Dispense Instructions for use Diagnosis    amLODIPine 10 MG tablet    NORVASC    90 tablet    Take 1 tablet (10 mg) by mouth daily    Essential hypertension       carvedilol 25 MG tablet    COREG    360 tablet    Take 2 tablets (50 mg) by mouth 2 times daily    Dilated cardiomyopathy (H), Benign essential  hypertension       furosemide 20 MG tablet    LASIX    30 tablet    Take 1 tablet (20 mg) by mouth daily    Fluid retention in legs, Essential hypertension       olmesartan 40 MG tablet    BENICAR    90 tablet    Take 1 tablet (40 mg) by mouth daily    Essential hypertension       potassium chloride SA 10 MEQ CR tablet    K-DUR/KLOR-CON M    90 tablet    Take 1 tablet (10 mEq) by mouth daily    Benign essential hypertension       priLOSEC OTC 20 MG tablet   Generic drug:  omeprazole      Take 20 mg by mouth daily        simvastatin 10 MG tablet    ZOCOR    90 tablet    Take 1 tablet (10 mg) by mouth At Bedtime    Pure hypercholesterolemia       TYLENOL PO      Take 500 mg by mouth as needed for mild pain or fever        vitamin D 2000 UNITS tablet      Take 1 tablet by mouth daily

## 2017-11-29 NOTE — LETTER
11/29/2017    Malaika Rodriguez MD  [facility]   [address]  [facility] [address]    RE: Larry Conleychette       Dear Colleague,    I had the pleasure of seeing Larry Melendez in the [facility].      Cardiology Progress Note    Date of Service: 11/29/2017      Reason for visit:   Lower extremity edema, hypertension    Primary cardiologist: Dr. Brett Smith      HPI:  Ms. Melendez is a very pleasant 77 year old female with a history of mild idiopathic cardiomyopathy, mild coronary disease (as evidenced by CT coronary angio in 2009) and systemic hypertension.  She had been on a combo lisinopril/HCTZ which was controlling her blood pressure well for some time. However, she has had intermittent issues with hypokalemia and more recently, hyponatremia, felt secondary to the HCTZ. In addition, in Feb of this year she developed a cough; thus her ACE-I component was changed to valsartan. At that time, her echocardiogram showed mildly depressed but stable EF of 45% with possibly impaired relaxation pattern as well. She saw Dr. Smith last in September, at which time her BP was again somewhat elevated. The HCTZ was discontinued given the ongoing electrolyte disturbances, and her carvedilol and amlodipine doses were increased. She felt she was feeling bloated in her abdomen at that time, and thus restarted her HCTZ at 12.5mg daily. Follow up labs again showed hyponatremia. Thus, the HCTZ was once again discontinued and carvedilol increased a bit further to 50mg BID. Her systolic blood pressure remained suboptimally controlled upon routine recheck earlier this month (reported as 146/85). Via phone, Dr. Smith opted to change her valsartan to olmesartan 40mg daily. Her SBP has improved to 130s routinely, but she has now developed lower extremity edema, prompting her visit today.    She did have follow up labs after her recent medication changes which were  unremarkable. Her sodium is now near normal at 135, and potassium is also normal at 4.1. Over the last few weeks she has developed ankle edema which is new for her. She had trouble getting her shoes on yesterday and she is concerned. She has also noted a slight increase in ZHU, especially stairs or if she's carrying groceries etc. She denies CP, palpitations, PND, or orthopnea. She does also continue to have some bloating in her abdomen intermittently. BP is acceptable today at 131/75. Of note, she is still awaiting shoulder replacement surgery which is scheduled for January 3, 2018 and she is concerned this will impair her ability to have the surgery done.       ASSESSMENT/PLAN:    1.  Mild idiopathic cardiomyopathy:   --Most recent echo Feb 2017 showing EF 45% but may also be component of diastolic dysfunction noted.    --She is unable to tolerate HCTZ;  while this controls her BP well, she continues to have electrolyte abnormalities. However, without any diuretic she is now having fluid retention. Will trial addition of low dose furosemide, 20mg daily.   --She will continue to weigh herself daily at home and monitor her BP.    --Recheck labs in 1 week with close attention to electrolytes. It is noted she is already on low dose potassium supplementation.   --Given plans for shoulder surgery in January will also move up her scheduled repeat echocardiogram and rule out any worsening of her cardiomyopathy as she remains off ACE-I for the last several months.      2.  Hypertension:   --Improved today, and she states running 120s at home. Continue ARB and BB without change. If BP remains controlled and still some volume retention may also consider decrease of amlodipine dose.     3. Hypercholesterolemia.    --Last lipid profile 2015, excellent LDL 52. Continue simvastatin without change.       Follow up plan:  Will call pt with results of testing above; keep previous plan for follow up with Dr. Brett Smith in  March, or with myself earlier if needed.        Orders this Visit:  Orders Placed This Encounter   Procedures     Basic metabolic panel     Echocardiogram     Orders Placed This Encounter   Medications     furosemide (LASIX) 20 MG tablet     Sig: Take 1 tablet (20 mg) by mouth daily     Dispense:  30 tablet     Refill:  3     Medications Discontinued During This Encounter   Medication Reason     cyanocobalamin 1000 MCG/ML injection Medication Reconciliation Clean Up           CURRENT MEDICATIONS:  Current Outpatient Prescriptions   Medication Sig Dispense Refill     furosemide (LASIX) 20 MG tablet Take 1 tablet (20 mg) by mouth daily 30 tablet 3     olmesartan (BENICAR) 40 MG tablet Take 1 tablet (40 mg) by mouth daily 90 tablet 2     carvedilol (COREG) 25 MG tablet Take 2 tablets (50 mg) by mouth 2 times daily 360 tablet 3     amLODIPine (NORVASC) 10 MG tablet Take 1 tablet (10 mg) by mouth daily 90 tablet 3     simvastatin (ZOCOR) 10 MG tablet Take 1 tablet (10 mg) by mouth At Bedtime 90 tablet 3     potassium chloride SA (K-DUR/KLOR-CON M) 10 MEQ CR tablet Take 1 tablet (10 mEq) by mouth daily 90 tablet 3     Acetaminophen (TYLENOL PO) Take 500 mg by mouth as needed for mild pain or fever       Cholecalciferol (VITAMIN D) 2000 UNITS tablet Take 1 tablet by mouth daily       omeprazole (PRILOSEC OTC) 20 MG tablet Take 20 mg by mouth daily         ALLERGIES     Allergies   Allergen Reactions     Aspirin Other (See Comments)     Bleeding              Bleeding, GI Lesion         Codeine Sulfate GI Disturbance       PAST MEDICAL HISTORY:  Past Medical History:   Diagnosis Date     Arthritis      Breast cancer (H)      Cardiomyopathy (H)     ideopathic     Hypercholesterolemia      Hypertension      Hypokalemia      Malignant neoplasm (H)      Shortness of breath      Shoulder pain        PAST SURGICAL HISTORY:  Past Surgical History:   Procedure Laterality Date     BACK SURGERY       CHOLECYSTECTOMY       EYE SURGERY        partial masectomy         FAMILY HISTORY:  Family History   Problem Relation Age of Onset     Family History Negative No family hx of        SOCIAL HISTORY:  Social History     Social History     Marital status: Single     Spouse name: N/A     Number of children: N/A     Years of education: N/A     Social History Main Topics     Smoking status: Never Smoker     Smokeless tobacco: Never Used     Alcohol use Yes      Comment: socially     Drug use: No     Sexual activity: No     Other Topics Concern     Special Diet No     Exercise No     Social History Narrative       Review of Systems:  Cardiovascular: negative for chest pain, palpitations, orthopnea, PND, pos for LE edema  Constitutional: negative for chills, sweats, fevers   Resp: Negative for dyspnea at rest, pos for dyspnea on exertion, neg for cough, wheezing, hemoptysis, known chronic lung disease  HEENT: Negative for new visual changes, frequent headaches  Gastrointestinal: negative for abdominal pain, pos for bloating. Neg for diarrhea, blood in stool, nausea, vomiting  Hematologic/lymphatic: negative for current systemic anticoagulation, hx of blood clots  Musculoskeletal: negative for new back pain, pos joint pain--shoulder  Neurological: negative for focal weakness, LOC, seizures, syncope      Physical Exam:  Vitals: /75  Pulse 75  Ht 1.524 m (5')  Wt 65.2 kg (143 lb 12.8 oz)  BMI 28.08 kg/m2   Wt Readings from Last 4 Encounters:   11/29/17 65.2 kg (143 lb 12.8 oz)   09/25/17 64 kg (141 lb)   09/05/17 63.9 kg (140 lb 14.4 oz)   02/27/17 66.2 kg (146 lb)       GEN:  In general, this is a well nourished  female in no acute distress on room air.  Patient ambulatory.  HEENT:  Pupils equal, round. Sclerae nonicteric. Clear oropharynx. Mucous membranes moist.  NECK: Supple, no masses appreciated. Trachea midline. Mild JVD while sitting upright.  C/V:  Regular rate and rhythm, no murmur, rub or gallop.   RESP: Respirations are unlabored.  No use of accessory muscles. Clear to auscultation bilaterally without wheezing, rales, or rhonchi.  GI: Abdomen soft, nontender, nondistended. No HSM appreciated.   EXTREM: 1+ bilateral ankle edema. No cyanosis or clubbing.  MS: Large joints without obvious swelling or erythema.   NEURO: Alert and oriented, cooperative. Gait not formally assessed. No obvious focal deficits.   PSYCH: Normal affect.  SKIN: Warm and dry. No rashes or petechiae appreciated.       Recent Lab Results:    BMP RESULTS:  Lab Results   Component Value Date     (L) 11/22/2017    POTASSIUM 4.1 11/22/2017    CHLORIDE 96 (L) 11/22/2017    CO2 31 (H) 11/22/2017    ANIONGAP 12.1 11/22/2017     (H) 11/22/2017    BUN 11 11/22/2017    CR 0.88 11/22/2017    GFRESTIMATED 62 11/22/2017    GFRESTBLACK 76 11/22/2017    PENNY 9.1 11/22/2017        New/Pertinent imaging results since last visit:    ECHO FEB 2017  Interpretation Summary     Left ventricular systolic function is mildly reduced.  LVEF 45% by biplane.  The transmitral spectral Doppler flow pattern is suggestive of impaired LV  relaxation.     Compared to echo dated 10/05/2015 no significant changes.      Annette López PA-C  [facility]  Pager [phone]    Thank you for allowing me to participate in the care of your patient.    Sincerely,     BERNADETTE Rondon     [facility]

## 2017-11-29 NOTE — PROGRESS NOTES
Cardiology Progress Note    Date of Service: 11/29/2017      Reason for visit:   Lower extremity edema, hypertension    Primary cardiologist: Dr. Brett Smith      HPI:  Ms. Melendez is a very pleasant 77 year old female with a history of mild idiopathic cardiomyopathy, mild coronary disease (as evidenced by CT coronary angio in 2009) and systemic hypertension.  She had been on a combo lisinopril/HCTZ which was controlling her blood pressure well for some time. However, she has had intermittent issues with hypokalemia and more recently, hyponatremia, felt secondary to the HCTZ. In addition, in Feb of this year she developed a cough; thus her ACE-I component was changed to valsartan. At that time, her echocardiogram showed mildly depressed but stable EF of 45% with possibly impaired relaxation pattern as well. She saw Dr. Smith last in September, at which time her BP was again somewhat elevated. The HCTZ was discontinued given the ongoing electrolyte disturbances, and her carvedilol and amlodipine doses were increased. She felt she was feeling bloated in her abdomen at that time, and thus restarted her HCTZ at 12.5mg daily. Follow up labs again showed hyponatremia. Thus, the HCTZ was once again discontinued and carvedilol increased a bit further to 50mg BID. Her systolic blood pressure remained suboptimally controlled upon routine recheck earlier this month (reported as 146/85). Via phone, Dr. Smith opted to change her valsartan to olmesartan 40mg daily. Her SBP has improved to 130s routinely, but she has now developed lower extremity edema, prompting her visit today.    She did have follow up labs after her recent medication changes which were unremarkable. Her sodium is now near normal at 135, and potassium is also normal at 4.1. Over the last few weeks she has developed ankle edema which is new for her. She had trouble getting her shoes on yesterday and she is concerned. She has also noted a  slight increase in ZHU, especially stairs or if she's carrying groceries etc. She denies CP, palpitations, PND, or orthopnea. She does also continue to have some bloating in her abdomen intermittently. BP is acceptable today at 131/75. Of note, she is still awaiting shoulder replacement surgery which is scheduled for January 3, 2018 and she is concerned this will impair her ability to have the surgery done.       ASSESSMENT/PLAN:    1.  Mild idiopathic cardiomyopathy:   --Most recent echo Feb 2017 showing EF 45% but may also be component of diastolic dysfunction noted.    --She is unable to tolerate HCTZ;  while this controls her BP well, she continues to have electrolyte abnormalities. However, without any diuretic she is now having fluid retention. Will trial addition of low dose furosemide, 20mg daily.   --She will continue to weigh herself daily at home and monitor her BP.    --Recheck labs in 1 week with close attention to electrolytes. It is noted she is already on low dose potassium supplementation.   --Given plans for shoulder surgery in January will also move up her scheduled repeat echocardiogram and rule out any worsening of her cardiomyopathy as she remains off ACE-I for the last several months.      2.  Hypertension:   --Improved today, and she states running 120s at home. Continue ARB and BB without change. If BP remains controlled and still some volume retention may also consider decrease of amlodipine dose.     3. Hypercholesterolemia.    --Last lipid profile 2015, excellent LDL 52. Continue simvastatin without change.       Follow up plan:  Will call pt with results of testing above; keep previous plan for follow up with Dr. Brett Smith in March, or with myself earlier if needed.        Orders this Visit:  Orders Placed This Encounter   Procedures     Basic metabolic panel     Echocardiogram     Orders Placed This Encounter   Medications     furosemide (LASIX) 20 MG tablet     Sig: Take 1  tablet (20 mg) by mouth daily     Dispense:  30 tablet     Refill:  3     Medications Discontinued During This Encounter   Medication Reason     cyanocobalamin 1000 MCG/ML injection Medication Reconciliation Clean Up           CURRENT MEDICATIONS:  Current Outpatient Prescriptions   Medication Sig Dispense Refill     furosemide (LASIX) 20 MG tablet Take 1 tablet (20 mg) by mouth daily 30 tablet 3     olmesartan (BENICAR) 40 MG tablet Take 1 tablet (40 mg) by mouth daily 90 tablet 2     carvedilol (COREG) 25 MG tablet Take 2 tablets (50 mg) by mouth 2 times daily 360 tablet 3     amLODIPine (NORVASC) 10 MG tablet Take 1 tablet (10 mg) by mouth daily 90 tablet 3     simvastatin (ZOCOR) 10 MG tablet Take 1 tablet (10 mg) by mouth At Bedtime 90 tablet 3     potassium chloride SA (K-DUR/KLOR-CON M) 10 MEQ CR tablet Take 1 tablet (10 mEq) by mouth daily 90 tablet 3     Acetaminophen (TYLENOL PO) Take 500 mg by mouth as needed for mild pain or fever       Cholecalciferol (VITAMIN D) 2000 UNITS tablet Take 1 tablet by mouth daily       omeprazole (PRILOSEC OTC) 20 MG tablet Take 20 mg by mouth daily         ALLERGIES     Allergies   Allergen Reactions     Aspirin Other (See Comments)     Bleeding              Bleeding, GI Lesion         Codeine Sulfate GI Disturbance       PAST MEDICAL HISTORY:  Past Medical History:   Diagnosis Date     Arthritis      Breast cancer (H)      Cardiomyopathy (H)     ideopathic     Hypercholesterolemia      Hypertension      Hypokalemia      Malignant neoplasm (H)      Shortness of breath      Shoulder pain        PAST SURGICAL HISTORY:  Past Surgical History:   Procedure Laterality Date     BACK SURGERY       CHOLECYSTECTOMY       EYE SURGERY       partial masectomy         FAMILY HISTORY:  Family History   Problem Relation Age of Onset     Family History Negative No family hx of        SOCIAL HISTORY:  Social History     Social History     Marital status: Single     Spouse name: N/A      Number of children: N/A     Years of education: N/A     Social History Main Topics     Smoking status: Never Smoker     Smokeless tobacco: Never Used     Alcohol use Yes      Comment: socially     Drug use: No     Sexual activity: No     Other Topics Concern     Special Diet No     Exercise No     Social History Narrative       Review of Systems:  Cardiovascular: negative for chest pain, palpitations, orthopnea, PND, pos for LE edema  Constitutional: negative for chills, sweats, fevers   Resp: Negative for dyspnea at rest, pos for dyspnea on exertion, neg for cough, wheezing, hemoptysis, known chronic lung disease  HEENT: Negative for new visual changes, frequent headaches  Gastrointestinal: negative for abdominal pain, pos for bloating. Neg for diarrhea, blood in stool, nausea, vomiting  Hematologic/lymphatic: negative for current systemic anticoagulation, hx of blood clots  Musculoskeletal: negative for new back pain, pos joint pain--shoulder  Neurological: negative for focal weakness, LOC, seizures, syncope      Physical Exam:  Vitals: /75  Pulse 75  Ht 1.524 m (5')  Wt 65.2 kg (143 lb 12.8 oz)  BMI 28.08 kg/m2   Wt Readings from Last 4 Encounters:   11/29/17 65.2 kg (143 lb 12.8 oz)   09/25/17 64 kg (141 lb)   09/05/17 63.9 kg (140 lb 14.4 oz)   02/27/17 66.2 kg (146 lb)       GEN:  In general, this is a well nourished  female in no acute distress on room air.  Patient ambulatory.  HEENT:  Pupils equal, round. Sclerae nonicteric. Clear oropharynx. Mucous membranes moist.  NECK: Supple, no masses appreciated. Trachea midline. Mild JVD while sitting upright.  C/V:  Regular rate and rhythm, no murmur, rub or gallop.   RESP: Respirations are unlabored. No use of accessory muscles. Clear to auscultation bilaterally without wheezing, rales, or rhonchi.  GI: Abdomen soft, nontender, nondistended. No HSM appreciated.   EXTREM: 1+ bilateral ankle edema. No cyanosis or clubbing.  MS: Large joints  without obvious swelling or erythema.   NEURO: Alert and oriented, cooperative. Gait not formally assessed. No obvious focal deficits.   PSYCH: Normal affect.  SKIN: Warm and dry. No rashes or petechiae appreciated.       Recent Lab Results:    BMP RESULTS:  Lab Results   Component Value Date     (L) 11/22/2017    POTASSIUM 4.1 11/22/2017    CHLORIDE 96 (L) 11/22/2017    CO2 31 (H) 11/22/2017    ANIONGAP 12.1 11/22/2017     (H) 11/22/2017    BUN 11 11/22/2017    CR 0.88 11/22/2017    GFRESTIMATED 62 11/22/2017    GFRESTBLACK 76 11/22/2017    PENNY 9.1 11/22/2017        New/Pertinent imaging results since last visit:    ECHO FEB 2017  Interpretation Summary     Left ventricular systolic function is mildly reduced.  LVEF 45% by biplane.  The transmitral spectral Doppler flow pattern is suggestive of impaired LV  relaxation.     Compared to echo dated 10/05/2015 no significant changes.      Annette López PA-C  [facility]  Pager [phone]

## 2017-11-29 NOTE — PATIENT INSTRUCTIONS
Visit Summary:    Today we discussed:   You are retaining some fluid in your legs but you have had problems with the prior diuretic due to low blood sodium levels.  We will try a different diuretic today called furosemide (Lasix)-->please take 1 pill (20mg) once daily.    We will recheck labs in 1 week, and I will call you with results.  We will move up your echocardiogram which was scheduled for early next year. Since you have a surgery coming up it will be helpful to have it prior to this as well.       Please call my nurse Cece at [phone] with any questions or concerns. Scheduling phone number: [phone] if needed.

## 2017-11-30 DIAGNOSIS — I10 ESSENTIAL HYPERTENSION: ICD-10-CM

## 2017-11-30 DIAGNOSIS — R60.0 FLUID RETENTION IN LEGS: ICD-10-CM

## 2017-11-30 RX ORDER — FUROSEMIDE 20 MG
20 TABLET ORAL DAILY
Qty: 90 TABLET | Refills: 3 | Status: SHIPPED | OUTPATIENT
Start: 2017-11-30 | End: 2018-01-15 | Stop reason: DRUGHIGH

## 2017-12-07 ENCOUNTER — HOSPITAL ENCOUNTER (OUTPATIENT)
Dept: CARDIOLOGY | Facility: CLINIC | Age: 77
Discharge: HOME | End: 2017-12-07
Attending: INTERNAL MEDICINE | Admitting: INTERNAL MEDICINE
Payer: MEDICARE

## 2017-12-07 ENCOUNTER — TELEPHONE (OUTPATIENT)
Dept: CARDIOLOGY | Facility: CLINIC | Age: 77
End: 2017-12-07

## 2017-12-07 DIAGNOSIS — I25.10 CORONARY ARTERY DISEASE INVOLVING NATIVE CORONARY ARTERY OF NATIVE HEART WITHOUT ANGINA PECTORIS: ICD-10-CM

## 2017-12-07 DIAGNOSIS — I10 ESSENTIAL HYPERTENSION: ICD-10-CM

## 2017-12-07 LAB
ANION GAP SERPL CALCULATED.3IONS-SCNC: 11.3 MMOL/L (ref 6–17)
BUN SERPL-MCNC: 15 MG/DL (ref 7–30)
CALCIUM SERPL-MCNC: 9 MG/DL (ref 8.5–10.5)
CHLORIDE SERPL-SCNC: 94 MMOL/L (ref 98–107)
CO2 SERPL-SCNC: 31 MMOL/L (ref 23–29)
CREAT SERPL-MCNC: 0.61 MG/DL (ref 0.7–1.3)
GFR SERPL CREATININE-BSD FRML MDRD: >90 ML/MIN/1.7M2
GLUCOSE SERPL-MCNC: 124 MG/DL (ref 70–105)
POTASSIUM SERPL-SCNC: 3.3 MMOL/L (ref 3.5–5.1)
SODIUM SERPL-SCNC: 133 MMOL/L (ref 136–145)

## 2017-12-07 PROCEDURE — 93306 TTE W/DOPPLER COMPLETE: CPT | Mod: 26 | Performed by: INTERNAL MEDICINE

## 2017-12-07 PROCEDURE — 36415 COLL VENOUS BLD VENIPUNCTURE: CPT | Performed by: PHYSICIAN ASSISTANT

## 2017-12-07 PROCEDURE — 25500064 ZZH RX 255 OP 636: Performed by: FAMILY MEDICINE

## 2017-12-07 PROCEDURE — 40000264 ECHO COMPLETE WITH LUMASON

## 2017-12-07 PROCEDURE — 80048 BASIC METABOLIC PNL TOTAL CA: CPT | Performed by: PHYSICIAN ASSISTANT

## 2017-12-07 RX ADMIN — SULFUR HEXAFLUORIDE 5 ML: KIT at 14:29

## 2017-12-07 NOTE — TELEPHONE ENCOUNTER
Cardiology  December 7, 2017  3:50 PM    Called patient to discuss results of her echo and labs after medication changes.      Interpretation Summary     The visual ejection fraction is estimated at 50-55%.  The study was technically difficult. Contrast was used without apparent  Complications.      Results for ELAYNE SALDAÑA (MRN [id number]) as of 12/7/2017 15:46   Ref. Range 12/7/2017 13:09   Sodium Latest Ref Range: 136 - 145 mmol/L 133 (L)   Potassium Latest Ref Range: 3.5 - 5.1 mmol/L 3.3 (L)   Chloride Latest Ref Range: 98 - 107 mmol/L 94 (L)   Carbon Dioxide Latest Ref Range: 23 - 29 mmol/L 31 (H)   Urea Nitrogen Latest Ref Range: 7 - 30 mg/dL 15   Creatinine Latest Ref Range: 0.70 - 1.30 mg/dL 0.61 (L)   GFR Estimate Latest Ref Range: >60 mL/min/1.7m2 >90   GFR Estimate If Black Latest Ref Range: >60 mL/min/1.7m2 >90   Calcium Latest Ref Range: 8.5 - 10.5 mg/dL 9.0   Anion Gap Latest Ref Range: 6 - 17 mmol/L 11.3       ASSESSMENT/PLAN:  Echo shows slight improvement of EF; (now up to 50-55% compared to 2/2/107 45%). No mention of diastolic dysfunction.  Low again show mild hyponatremia and hypokalemia.   Called patient to discuss, no answer, voicemail left.   Would like update on her weights, BP, and overall how she's feeling prior to making medication changes.      Annette López PA-C  [facility]  Pager [phone]

## 2017-12-08 ENCOUNTER — CARE COORDINATION (OUTPATIENT)
Dept: CARDIOLOGY | Facility: CLINIC | Age: 77
End: 2017-12-08

## 2017-12-08 DIAGNOSIS — I10 BENIGN ESSENTIAL HYPERTENSION: ICD-10-CM

## 2017-12-08 DIAGNOSIS — E87.6 HYPOKALEMIA: Primary | ICD-10-CM

## 2017-12-08 RX ORDER — POTASSIUM CHLORIDE 750 MG/1
20 TABLET, EXTENDED RELEASE ORAL DAILY
Qty: 180 TABLET | Refills: 1 | Status: SHIPPED | OUTPATIENT
Start: 2017-12-08 | End: 2018-01-08

## 2017-12-08 NOTE — PROGRESS NOTES
Call back to patient with plan for repeat BMP in 7/10 days.  She now states she is having shoulder surgery on 1/3/2018 and will be seeing her Primary MD for pre-op sometime in December and will have labs then.    Elizabeth HALEY set patient up to see Dr Brett Connors on 12/22/20017@ 845 am - patient states agreement to come for visit as long as it does not snow.   Cece Roberts RN 12/08/17 2:52 PM     1503 Call back to patient ie Dr Brett Connors visit not needed per MD and his nurse team - patient agrees to get BMP at preop with PMD and have forwarded to Annette FLEMING.  Cece Roberts RN 12/08/17 3:04 PM

## 2017-12-08 NOTE — PROGRESS NOTES
Call to patient w/[facility] plan from Annette FLEMING  1. Echo looks ok  2. Continue lasix 20 mg daily  3. Increase potassium supplement to 20 meq daily.     Patient states weight is approximately 140 lbs and remains stable, edema in lower extremities is gone, patient feels better, blood pressure was done when test was done and was 133/80.   Patient states she is having shoulder surgery with Dr Horan at [place] in January and wants to make sure everything is in order and she is cleared to proceed from a cardiology standpoint.    Reviewed echo results and plan of care with patient who states understanding.   Messaged to PA and team 5 nurse to get review need for cardiac clearance with Dr Brett Connors.  Cece Roberts RN 12/08/17 2:29 PM

## 2017-12-08 NOTE — PROGRESS NOTES
Sounds good, I'm glad she's feeling better with less edema.  Please let her know I reviewed her labs with Dr. Smith as well, and he agreed with plan, but would like to repeat labs in the next few weeks to ensure the electrolytes remain acceptable. I placed an order for a BMP in 7-10 days. Can you please get this scheduled for her? I will follow up with her via phone, and Dr. Smith's team can arrange for whatever is needed regarding clearance for her surgery.    Thanks,  Annette

## 2017-12-08 NOTE — PROGRESS NOTES
Discussed cardiac clearance with Dr. Smith, recommends pt may proceed with shoulder surgery from a cardiovascular perspective. Pt does not need further testing or office visit. Attempted to call pt, left message for pt to call back. LPenfield RN

## 2017-12-11 NOTE — PROGRESS NOTES
Call from patient for letter from Dr Brett Connors for surgery clearance to be faxed to  Fax # [phone] for Dr Horan attn to [facility] for Orthopedic surgery on shoulder on 1/3/2018. See note from Dr Albino Connors nurse ie MD cleared for surgery - fowarded to Dr Albino Connors Team to address - patient informed.  Cece Roberts RN 12/11/17 11:35 AM

## 2017-12-15 ENCOUNTER — TRANSFERRED RECORDS (OUTPATIENT)
Dept: HEALTH INFORMATION MANAGEMENT | Facility: CLINIC | Age: 77
End: 2017-12-15

## 2017-12-15 ENCOUNTER — TELEPHONE (OUTPATIENT)
Dept: CARDIOLOGY | Facility: CLINIC | Age: 77
End: 2017-12-15

## 2017-12-15 LAB
ANION GAP SERPL CALCULATED.3IONS-SCNC: 12 MMOL/L
BUN SERPL-MCNC: 16 MG/DL
CALCIUM SERPL-MCNC: 9.5 MG/DL
CHLORIDE SERPLBLD-SCNC: 95 MMOL/L
CO2 SERPL-SCNC: 26 MMOL/L
CREAT SERPL-MCNC: 0.74 MG/DL
ERYTHROCYTE [DISTWIDTH] IN BLOOD BY AUTOMATED COUNT: 13.4 %
ERYTHROCYTE [DISTWIDTH] IN BLOOD BY AUTOMATED COUNT: 13.4 %
GFR SERPL CREATININE-BSD FRML MDRD: >60 ML/MIN/1.73M2
GLUCOSE SERPL-MCNC: 110 MG/DL (ref 70–99)
HCT VFR BLD AUTO: 37.2 %
HCT VFR BLD AUTO: 37.2 %
HEMOGLOBIN: 12.3 G/DL
HEMOGLOBIN: 12.6 G/DL
MCH RBC QN AUTO: 30.5 PG
MCH RBC QN AUTO: 30.5 PG
MCHC RBC AUTO-ENTMCNC: 33.9 G/DL
MCHC RBC AUTO-ENTMCNC: 33.9 G/DL
MCV RBC AUTO: 90 FL
MCV RBC AUTO: 90 FL
PLATELET # BLD AUTO: 310 10^9/L
PLATELET # BLD AUTO: 310 10^9/L
POTASSIUM SERPL-SCNC: 4.1 MMOL/L
RBC # BLD AUTO: 4.13 10^12/L
RBC # BLD AUTO: 4.13 10^12/L
SODIUM SERPL-SCNC: 133 MMOL/L
WBC # BLD AUTO: 8.3 10^9/L
WBC # BLD AUTO: 8.3 10^9/L

## 2017-12-15 NOTE — TELEPHONE ENCOUNTER
Triage call from patient: she states she was called to set up a bmp for follow up, however she states her pre-op physical was done today at her [facility] and they did a bmp. She asks that [facility]INO Annette López access that report rather than draw the same lab twice this week. Repeat bmp was ordered 7-10 days after increasing potassium to 20mEq daily with lasix 20mg daily.  Will message MARINO López with update

## 2017-12-22 NOTE — PROGRESS NOTES
Letter stating that pt is cleared from CV standpoint for shoulder surgery signed by Dr. Smith & faxed to Dr. Horan at [phone].

## 2017-12-27 DIAGNOSIS — I42.0 DILATED CARDIOMYOPATHY (H): Primary | ICD-10-CM

## 2018-01-08 ENCOUNTER — NURSING HOME VISIT (OUTPATIENT)
Dept: GERIATRICS | Facility: CLINIC | Age: 78
End: 2018-01-08
Payer: COMMERCIAL

## 2018-01-08 VITALS
WEIGHT: 151.2 LBS | TEMPERATURE: 98.3 F | BODY MASS INDEX: 31.74 KG/M2 | DIASTOLIC BLOOD PRESSURE: 73 MMHG | HEIGHT: 58 IN | SYSTOLIC BLOOD PRESSURE: 160 MMHG | HEART RATE: 78 BPM | OXYGEN SATURATION: 95 % | RESPIRATION RATE: 18 BRPM

## 2018-01-08 DIAGNOSIS — K59.01 SLOW TRANSIT CONSTIPATION: ICD-10-CM

## 2018-01-08 DIAGNOSIS — Z96.612 S/P REVERSE TOTAL SHOULDER ARTHROPLASTY, LEFT: ICD-10-CM

## 2018-01-08 DIAGNOSIS — I25.10 CORONARY ARTERY DISEASE INVOLVING NATIVE CORONARY ARTERY OF NATIVE HEART WITHOUT ANGINA PECTORIS: ICD-10-CM

## 2018-01-08 DIAGNOSIS — Z71.89 ADVANCED DIRECTIVES, COUNSELING/DISCUSSION: ICD-10-CM

## 2018-01-08 DIAGNOSIS — M12.812 ROTATOR CUFF ARTHROPATHY, LEFT: Primary | ICD-10-CM

## 2018-01-08 DIAGNOSIS — D62 ANEMIA DUE TO BLOOD LOSS, ACUTE: ICD-10-CM

## 2018-01-08 DIAGNOSIS — I10 ESSENTIAL HYPERTENSION: ICD-10-CM

## 2018-01-08 PROCEDURE — 99310 SBSQ NF CARE HIGH MDM 45: CPT | Performed by: NURSE PRACTITIONER

## 2018-01-08 RX ORDER — POTASSIUM CHLORIDE 750 MG/1
10 TABLET, EXTENDED RELEASE ORAL 2 TIMES DAILY
COMMUNITY
End: 2018-06-13

## 2018-01-08 NOTE — PROGRESS NOTES
[place] [facility]  PRIMARY CARE PROVIDER AND CLINIC:  Malaika Rodriguez [facility] [address] / [facility] [address]  Chief Complaint   Patient presents with     Hospital F/U       HPI:    Larry Melendez is a 77 year old  (1940),admitted to the [facility] from [facility] .  Hospital stay 01/03/2018 through 01/06/2018.  Admitted to this facility for  rehab, medical management and nursing care.  HPI information obtained from: facility chart records, facility staff, patient report, [facility] chart review and Care Everywhere [facility] chart review.   She is status post left reverse total shoulder arthroplasty for rotator cuff arthropathy 1/3/2018 by Dr Maged Horan. Tolerated the procedure well. Hgb 10.6 at discharge.      Current issues are:         Rotator cuff arthropathy, left  S/P reverse total shoulder arthroplasty, left-feeling fairly well. Pain controlled, but is severe at times. Reports the  immobilizer is uncomfortable.   Coronary artery disease involving native coronary artery of native heart without angina pectoris-reports wheezing at times with activity, which is not new for her. No cough, shortness of breath or chest pain. Using IS.   Anemia due to blood loss, acute-pre op Hgb 12.6. She saw Hematology in 9/2017 for normocytic normochromic anemia.   Essential hypertension-BPs:  160/73, 148/80, 166/78   HR: 74-85  Slow transit constipation-reports no BM for 1-2 days. No nausea or abdominal pain. Fair appetite    CODE STATUS/ADVANCE DIRECTIVES DISCUSSION:   CPR/Full code   Patient's living condition: lives alone    ALLERGIES:Aspirin and Codeine sulfate  PAST MEDICAL HISTORY:  has a past medical history of Arthritis; Breast cancer (H); Cardiomyopathy (H); Hypercholesterolemia; Hypertension; Hypokalemia; Malignant neoplasm (H); Shortness of breath; and Shoulder pain.  PAST SURGICAL HISTORY:  has a past surgical history that includes back  "surgery; partial masectomy; Cholecystectomy; and Eye surgery.  FAMILY HISTORY: family history is negative for Family History Negative.  SOCIAL HISTORY:  reports that she has never smoked. She has never used smokeless tobacco. She reports that she drinks alcohol. She reports that she does not use illicit drugs.    Post Discharge Medication Reconciliation Status: discharge medications reconciled and changed, per note/orders (see AVS).  Current Outpatient Prescriptions   Medication Sig Dispense Refill     ONDANSETRON PO Take 4 mg by mouth every 8 hours as needed for nausea       OXYCODONE HCL PO Take 5-10 mg by mouth every 4 hours as needed       potassium chloride (K-TAB,KLOR-CON) 10 MEQ tablet Take 10 mEq by mouth 2 times daily       Omeprazole (PRILOSEC PO) Take 10 mg by mouth daily       furosemide (LASIX) 20 MG tablet Take 1 tablet (20 mg) by mouth daily 90 tablet 3     olmesartan (BENICAR) 40 MG tablet Take 1 tablet (40 mg) by mouth daily 90 tablet 2     carvedilol (COREG) 25 MG tablet Take 2 tablets (50 mg) by mouth 2 times daily 360 tablet 3     amLODIPine (NORVASC) 10 MG tablet Take 1 tablet (10 mg) by mouth daily 90 tablet 3     simvastatin (ZOCOR) 10 MG tablet Take 1 tablet (10 mg) by mouth At Bedtime 90 tablet 3     Acetaminophen (TYLENOL PO) Take 500 mg by mouth every 6 hours as needed for mild pain or fever        Cholecalciferol (VITAMIN D) 2000 UNITS tablet Take 1 tablet by mouth daily         ROS:  10 point ROS of systems including Constitutional, Eyes, Respiratory, Cardiovascular, Gastroenterology, Genitourinary, Integumentary, Muscularskeletal, Psychiatric were all negative except for pertinent positives noted in my HPI.    Exam:  /73  Pulse 78  Temp 98.3  F (36.8  C)  Resp 18  Ht 4' 10\" (1.473 m)  Wt 151 lb 3.2 oz (68.6 kg)  SpO2 95%  BMI 31.6 kg/m2  GENERAL APPEARANCE:  Alert, in no distress  ENT:  Mouth and posterior oropharynx normal, moist mucous membranes, normal hearing " acuity  EYES:  EOM normal, conjunctiva and lids normal, PERRL  NECK:  No adenopathy,masses or thyromegaly  RESP:  respiratory effort and palpation of chest normal, lungs clear to auscultation , no respiratory distress  CV:  Palpation and auscultation of heart done , regular rate and rhythm, no murmur, +2 pedal pulses, peripheral edema trace in bilateral LE  ABDOMEN:  normal bowel sounds, soft, nontender, no hepatosplenomegaly or other masses  M/S:   left UE in immobilizer,  +CMS fingers. Good strength and ROM other extremities. No joint inflammation  SKIN:  left shoulder incision clean,dry, intact, no erythema. No rashes or open areas  PSYCH:  oriented X 3, normal insight, judgement and memory, affect and mood normal    Lab/Diagnostic data:  Last Basic Metabolic Panel:  Lab Results   Component Value Date     12/15/2017      Lab Results   Component Value Date    POTASSIUM 4.1 12/15/2017     Lab Results   Component Value Date    CHLORIDE 95 12/15/2017     Lab Results   Component Value Date    PENNY 9.5 12/15/2017     Lab Results   Component Value Date    CO2 26 12/15/2017     Lab Results   Component Value Date    BUN 16 12/15/2017     Lab Results   Component Value Date    CR 0.74 12/15/2017     Lab Results   Component Value Date     12/15/2017     Lab Results   Component Value Date    WBC 8.3 12/15/2017     Lab Results   Component Value Date    RBC 4.13 12/15/2017     Lab Results   Component Value Date    HGB 12.6 12/15/2017    HGB 12.3 12/15/2017     Lab Results   Component Value Date    HCT 37.2 12/15/2017     Lab Results   Component Value Date    MCV 90 12/15/2017     Lab Results   Component Value Date    MCH 30.5 12/15/2017     Lab Results   Component Value Date    MCHC 33.9 12/15/2017     Lab Results   Component Value Date    RDW 13.4 12/15/2017     Lab Results   Component Value Date     12/15/2017       ASSESSMENT / PLAN:  (M12.812) Rotator cuff arthropathy, left  (primary encounter  diagnosis)  (Z96.612) S/P reverse total shoulder arthroplasty, left  Comment: pain controlled. Incision healing without signs of infection. She declines scheduled pain meds.   Plan: PHYSICAL THERAPY/OT. Continue tylenol, oxycodone. Maintain immobilizer, therapy will check placement. Ortho follow up 7-10 days. Encourage IS.     (I25.10) Coronary artery disease involving native coronary artery of native heart without angina pectoris  Comment: no acute issues  Plan: continue carvedilol, lasix, olmesartan, statin. Cardiology follow up per usual routine.     (D62) Anemia due to blood loss, acute  Comment: acute on chronic anemia  Plan: Hgb    (I10) Essential hypertension  Comment: controlled. Pain contributing to occasional elevated reading  Plan: continue amlodipine, carvedilol, lasix, olmesartan. Monitor VS. Follow BMP. Manage pain.     (K59.01) Slow transit constipation  Comment: related to surgery, narcotic, impaired mobility  Plan: senna S bid and monitor    (Z71.89) Advanced directives, counseling/discussion  Comment: does not have a health care directive. She requests  Full Code  Plan: POLST completed.           Total time spent with patient visit at the skilled nursing facility was 38 mins including patient visit and review of past records. Greater than 50% of total time spent with counseling and coordinating care due to complexity of care    Electronically signed by:  KATIE Cain CNP

## 2018-01-09 PROBLEM — Z96.612 S/P REVERSE TOTAL SHOULDER ARTHROPLASTY, LEFT: Status: ACTIVE | Noted: 2018-01-09

## 2018-01-09 PROBLEM — D62 ANEMIA DUE TO BLOOD LOSS, ACUTE: Status: ACTIVE | Noted: 2018-01-09

## 2018-01-09 PROBLEM — K59.01 SLOW TRANSIT CONSTIPATION: Status: ACTIVE | Noted: 2018-01-09

## 2018-01-09 PROBLEM — R53.81 PHYSICAL DECONDITIONING: Status: ACTIVE | Noted: 2018-01-09

## 2018-01-09 PROBLEM — Z71.89 ADVANCED DIRECTIVES, COUNSELING/DISCUSSION: Status: ACTIVE | Noted: 2018-01-09

## 2018-01-09 PROBLEM — M12.812 ROTATOR CUFF ARTHROPATHY, LEFT: Status: ACTIVE | Noted: 2018-01-09

## 2018-01-09 RX ORDER — AMOXICILLIN 250 MG
1 CAPSULE ORAL 2 TIMES DAILY
COMMUNITY
End: 2018-06-12

## 2018-01-11 ENCOUNTER — NURSING HOME VISIT (OUTPATIENT)
Dept: GERIATRICS | Facility: CLINIC | Age: 78
End: 2018-01-11
Payer: COMMERCIAL

## 2018-01-11 VITALS
OXYGEN SATURATION: 96 % | DIASTOLIC BLOOD PRESSURE: 67 MMHG | HEART RATE: 78 BPM | WEIGHT: 146.2 LBS | BODY MASS INDEX: 30.69 KG/M2 | RESPIRATION RATE: 18 BRPM | HEIGHT: 58 IN | SYSTOLIC BLOOD PRESSURE: 148 MMHG | TEMPERATURE: 98.3 F

## 2018-01-11 DIAGNOSIS — D62 ANEMIA DUE TO BLOOD LOSS, ACUTE: ICD-10-CM

## 2018-01-11 DIAGNOSIS — I25.10 CORONARY ARTERY DISEASE INVOLVING NATIVE CORONARY ARTERY OF NATIVE HEART WITHOUT ANGINA PECTORIS: ICD-10-CM

## 2018-01-11 DIAGNOSIS — M12.812 ROTATOR CUFF ARTHROPATHY, LEFT: Primary | ICD-10-CM

## 2018-01-11 DIAGNOSIS — K59.01 SLOW TRANSIT CONSTIPATION: ICD-10-CM

## 2018-01-11 DIAGNOSIS — Z96.612 S/P REVERSE TOTAL SHOULDER ARTHROPLASTY, LEFT: ICD-10-CM

## 2018-01-11 DIAGNOSIS — R53.81 PHYSICAL DECONDITIONING: ICD-10-CM

## 2018-01-11 DIAGNOSIS — I10 ESSENTIAL HYPERTENSION: ICD-10-CM

## 2018-01-11 PROCEDURE — 99309 SBSQ NF CARE MODERATE MDM 30: CPT | Performed by: NURSE PRACTITIONER

## 2018-01-11 NOTE — PROGRESS NOTES
[facility] [facility]    Chief Complaint   Patient presents with     RECHECK       HPI:    Larry Melendez is a 77 year old  (1940), who is being seen today for an episodic care visit at [facility].  HPI information obtained from: facility chart records, facility staff, patient report, [facility] chart review and Care Everywhere [facility] chart review.   She came to this facility 1/6/2018 for short term rehab and medical management status post left reverse total shoulder arthroplasty for rotator cuff arthropathy 1/3/2018 by Dr Maged Horan. Tolerated the procedure well. Hgb 10.6 at discharge.      Current issues are:          Rotator cuff arthropathy, left  S/P reverse total shoulder arthroplasty, left-reports pain is controlled. Feeling well.   Anemia due to blood loss, acute-Hgb ordered for today but phlebotomist was unable to obtain sample from her foot. Unable to use LUE due to surgery, unable to use RUE due to history of breast cancer. Denies feeling dizzy or lightheaded.   Coronary artery disease involving native coronary artery of native heart without angina pectoris-mild shortness of breath with exertion. No cough or chest pain.   Essential hypertension-BPs:  148/67, 124/54, 160/73  HR: 74-85  Slow transit constipation-reports regular BMs  Physical deconditioning-ambulating 200 ft without device. Requires min assist with cares.       ALLERGIES: Aspirin and Codeine sulfate  Past Medical, Surgical, Family and Social History reviewed and updated in [facility].    Current Outpatient Prescriptions   Medication Sig Dispense Refill     senna-docusate (SENOKOT-S;PERICOLACE) 8.6-50 MG per tablet Take 1 tablet by mouth 2 times daily       ONDANSETRON PO Take 4 mg by mouth every 8 hours as needed for nausea       OXYCODONE HCL PO Take 5-10 mg by mouth every 4 hours as needed       potassium chloride (K-TAB,KLOR-CON) 10 MEQ tablet Take 10 mEq by mouth 2 times daily       Omeprazole (PRILOSEC  "PO) Take 10 mg by mouth daily       furosemide (LASIX) 20 MG tablet Take 1 tablet (20 mg) by mouth daily 90 tablet 3     olmesartan (BENICAR) 40 MG tablet Take 1 tablet (40 mg) by mouth daily 90 tablet 2     carvedilol (COREG) 25 MG tablet Take 2 tablets (50 mg) by mouth 2 times daily 360 tablet 3     amLODIPine (NORVASC) 10 MG tablet Take 1 tablet (10 mg) by mouth daily 90 tablet 3     simvastatin (ZOCOR) 10 MG tablet Take 1 tablet (10 mg) by mouth At Bedtime 90 tablet 3     Acetaminophen (TYLENOL PO) Take 500 mg by mouth every 6 hours as needed for mild pain or fever        Cholecalciferol (VITAMIN D) 2000 UNITS tablet Take 1 tablet by mouth daily       Medications reviewed:  Medications reconciled to facility chart and changes were made to reflect current medications as identified as above med list. Below are the changes that were made:   Medications stopped since last EPIC medication reconciliation:   There are no discontinued medications.    Medications started since last [facility] medication reconciliation:  No orders of the defined types were placed in this encounter.      REVIEW OF SYSTEMS:  10 point ROS of systems including Constitutional, Eyes, Respiratory, Cardiovascular, Gastroenterology, Genitourinary, Integumentary, Muscularskeletal, Psychiatric were all negative except for pertinent positives noted in my HPI.    Physical Exam:  /67  Pulse 78  Temp 98.3  F (36.8  C)  Resp 18  Ht 4' 10\" (1.473 m)  Wt 146 lb 3.2 oz (66.3 kg)  SpO2 96%  BMI 30.56 kg/m2  GENERAL APPEARANCE:  Alert, in no distress  ENT:  Mouth and posterior oropharynx normal, moist mucous membranes, normal hearing acuity  EYES:  EOM normal, conjunctiva and lids normal  NECK:  No adenopathy,masses or thyromegaly  RESP:  respiratory effort and palpation of chest normal, lungs clear to auscultation , no respiratory distress  CV:  Palpation and auscultation of heart done , regular rate and rhythm, no murmur, +2 pedal pulses, no LE " edema  ABDOMEN: soft, nontender, no hepatosplenomegaly or other masses  M/S:   left UE in immobilizer,  +CMS fingers. Good strength and ROM other extremities. No joint inflammation  SKIN:  left shoulder incision clean,dry, intact, no erythema. No rashes or open areas  PSYCH:  oriented X 3, normal insight, judgement and memory, affect and mood normal    Recent Labs:   Last Basic Metabolic Panel:  Lab Results   Component Value Date     12/15/2017      Lab Results   Component Value Date    POTASSIUM 4.1 12/15/2017     Lab Results   Component Value Date    CHLORIDE 95 12/15/2017     Lab Results   Component Value Date    PENNY 9.5 12/15/2017     Lab Results   Component Value Date    CO2 26 12/15/2017     Lab Results   Component Value Date    BUN 16 12/15/2017     Lab Results   Component Value Date    CR 0.74 12/15/2017     Lab Results   Component Value Date     12/15/2017     Lab Results   Component Value Date    WBC 8.3 12/15/2017     Lab Results   Component Value Date    RBC 4.13 12/15/2017     Lab Results   Component Value Date    HGB 12.6 12/15/2017    HGB 12.3 12/15/2017     Lab Results   Component Value Date    HCT 37.2 12/15/2017     Lab Results   Component Value Date    MCV 90 12/15/2017    MCV 90 12/15/2017     Lab Results   Component Value Date    MCH 30.5 12/15/2017     Lab Results   Component Value Date    MCHC 33.9 12/15/2017     Lab Results   Component Value Date    RDW 13.4 12/15/2017     Lab Results   Component Value Date     12/15/2017   ASSESSMENT / PLAN:  (M12.812) Rotator cuff arthropathy, left  (primary encounter diagnosis)  (Z96.612) S/P reverse total shoulder arthroplasty, left  Comment: incision healing without signs of infection. Pain improved  Plan: continue oxycodone, tylenol. Maintain immobilizer. Ortho follow up as scheduled.     (D62) Anemia due to blood loss, acute  Comment: unable to obtain Hgb today. No s/s of worsening anemia  Plan: monitor symptoms    (I25.10) Coronary  artery disease involving native coronary artery of native heart without angina pectoris  Comment: no acute issues  Plan: continue carvedilol, lasix, olmesartan, statin. Cardiology follow up per usual routine.     (I10) Essential hypertension  Comment: controlled.   Plan: continue amlodipine, carvedilol, lasix, olmesartan. Monitor VS.     (K59.01) Slow transit constipation  Comment: improved  Plan: continue bowel regimen    (R53.81) Physical deconditioning  Comment: making good progress in therapies  Plan: continue PHYSICAL THERAPY/OT. Anticipate discharge next week. She is considering staying with a friend for a period of time before returning home alone.         Electronically signed by  KATIE Cain CNP

## 2018-01-15 ENCOUNTER — DISCHARGE SUMMARY NURSING HOME (OUTPATIENT)
Dept: GERIATRICS | Facility: CLINIC | Age: 78
End: 2018-01-15
Payer: COMMERCIAL

## 2018-01-15 VITALS
TEMPERATURE: 98.4 F | DIASTOLIC BLOOD PRESSURE: 56 MMHG | HEIGHT: 58 IN | OXYGEN SATURATION: 100 % | HEART RATE: 77 BPM | RESPIRATION RATE: 18 BRPM | SYSTOLIC BLOOD PRESSURE: 127 MMHG | WEIGHT: 143.6 LBS | BODY MASS INDEX: 30.14 KG/M2

## 2018-01-15 DIAGNOSIS — K59.01 SLOW TRANSIT CONSTIPATION: ICD-10-CM

## 2018-01-15 DIAGNOSIS — M12.812 ROTATOR CUFF ARTHROPATHY, LEFT: Primary | ICD-10-CM

## 2018-01-15 DIAGNOSIS — I25.10 CORONARY ARTERY DISEASE INVOLVING NATIVE CORONARY ARTERY OF NATIVE HEART WITHOUT ANGINA PECTORIS: ICD-10-CM

## 2018-01-15 DIAGNOSIS — D62 ANEMIA DUE TO BLOOD LOSS, ACUTE: ICD-10-CM

## 2018-01-15 DIAGNOSIS — I10 ESSENTIAL HYPERTENSION: ICD-10-CM

## 2018-01-15 DIAGNOSIS — R53.81 PHYSICAL DECONDITIONING: ICD-10-CM

## 2018-01-15 DIAGNOSIS — Z96.612 S/P REVERSE TOTAL SHOULDER ARTHROPLASTY, LEFT: ICD-10-CM

## 2018-01-15 PROCEDURE — 99316 NF DSCHRG MGMT 30 MIN+: CPT | Performed by: NURSE PRACTITIONER

## 2018-01-15 NOTE — PROGRESS NOTES
[place] [facility] DISCHARGE SUMMARY    PATIENT'S NAME: Larry Melendez  YOB: 1940  MEDICAL RECORD NUMBER:  [id number]    PRIMARY CARE PROVIDER AND CLINIC RESPONSIBLE [facility]ER[facility] [address] / [facility] [address]     CODE STATUS/ADVANCE DIRECTIVES DISCUSSION:   CPR/Full code        Allergies   Allergen Reactions     Aspirin Other (See Comments)     Bleeding              Bleeding, GI Lesion         Codeine Sulfate GI Disturbance       TRANSFERRING PROVIDERS: KATIE Cain CNP, Ortiz Weinstein MD  DATE OF SNF ADMISSION:  January / 06 / 2018  DATE OF SNF (anticipated) DISCHARGE: January / 17 / 2018  DISCHARGE DISPOSITION: Non-FMG Provider   Nursing Facility: [facility]  stay 01/03/2018 to 01/06/2018.     Condition on Discharge:  Improving.  Cognitive Scores: SLUMS 21/30    Equipment: no aids    DISCHARGE DIAGNOSIS:   1. Rotator cuff arthropathy, left    2. S/P reverse total shoulder arthroplasty, left    3. Anemia due to blood loss, acute    4. Coronary artery disease involving native coronary artery of native heart without angina pectoris    5. Essential hypertension    6. Slow transit constipation    7. Physical deconditioning        HPI Nursing Facility Course:  HPI information obtained from: facility chart records, facility staff, patient report, [facility] chart review and Care Everywhere [facility] chart review.She came to this facility for short term rehab and medical management status post left reverse total shoulder arthroplasty for rotator cuff arthropathy 1/3/2018 by Dr Maged Horan. Tolerated the procedure well. Hgb 10.6 at hospital discharge.  She has worked with PHYSICAL THERAPY and OT with good results. Ambulating 200 ft with no device. Independent with toileting and dressing. Requires stand by to min assist with bathing.   ASSESSMENT / PLAN:  (M12.982) Rotator cuff  arthropathy, left  (primary encounter diagnosis)  (Z96.612) S/P reverse total shoulder arthroplasty, left  Comment: pain controlled. Incision healing without signs of infection.   Plan: discharge home, with assistance from a friend. Continue oxycodone and tylenol. Maintain immobilizer. Outpatient therapies and follow up as below.     (D62) Anemia due to blood loss, acute  Comment: venipuncture was attempted from her LE, but unsuccessful. Her LUE can't be used due to shoulder surgery and RUE can't be used due to history of breast cancer. No s/s of anemia.   Plan: follow up labs per PCP    (I25.10) Coronary artery disease involving native coronary artery of native heart without angina pectoris  Comment: no acute issues.   Plan: continue carvedilol, lasix, olmesartan, statin. Follow up with Cardiology per usual routine.     (I10) Essential hypertension  Comment: controlled with BPs: 127/56, 148/67, 124/54   HR: 77-83  Plan: continue amlodipine, carvedilol, lasix, olmesartan. Follow up with PCP    (K59.01) Slow transit constipation  Comment: managed  Plan: continue bowel regimen    (R53.81) Physical deconditioning  Comment: progress in therapies as above  Plan: outpatient therapies for ongoing strengthening and ADL safety.       PAST MEDICAL HISTORY:  has a past medical history of Arthritis; Breast cancer (H); Cardiomyopathy (H); Hypercholesterolemia; Hypertension; Hypokalemia; Malignant neoplasm (H); Shortness of breath; and Shoulder pain.    DISCHARGE MEDICATIONS:  Current Outpatient Prescriptions   Medication Sig Dispense Refill     Furosemide (LASIX PO) Take 20 mg by mouth every other day       senna-docusate (SENOKOT-S;PERICOLACE) 8.6-50 MG per tablet Take 1 tablet by mouth 2 times daily       OXYCODONE HCL PO Take 5-10 mg by mouth every 4 hours as needed       potassium chloride (K-TAB,KLOR-CON) 10 MEQ tablet Take 10 mEq by mouth 2 times daily       Omeprazole (PRILOSEC PO) Take 10 mg by mouth daily        "olmesartan (BENICAR) 40 MG tablet Take 1 tablet (40 mg) by mouth daily 90 tablet 2     carvedilol (COREG) 25 MG tablet Take 2 tablets (50 mg) by mouth 2 times daily 360 tablet 3     amLODIPine (NORVASC) 10 MG tablet Take 1 tablet (10 mg) by mouth daily 90 tablet 3     simvastatin (ZOCOR) 10 MG tablet Take 1 tablet (10 mg) by mouth At Bedtime 90 tablet 3     Acetaminophen (TYLENOL PO) Take 500 mg by mouth every 6 hours as needed for mild pain or fever        Cholecalciferol (VITAMIN D) 2000 UNITS tablet Take 1 tablet by mouth daily         MEDICATION CHANGES/RATIONALE:   Senna scheduled  Controlled medications sent with patient: Medication: oxycodone , 60 tabs given to patient at the time of discharge to take home     ROS:    10 point ROS of systems including Constitutional, Eyes, Respiratory, Cardiovascular, Gastroenterology, Genitourinary, Integumentary, Muscularskeletal, Psychiatric were all negative except for pertinent positives noted in my HPI.    Physical Exam:   Vitals: /56  Pulse 77  Temp 98.4  F (36.9  C)  Resp 18  Ht 4' 10\" (1.473 m)  Wt 143 lb 9.6 oz (65.1 kg)  SpO2 100%  BMI 30.01 kg/m2  BMI= Body mass index is 30.01 kg/(m^2).    GENERAL APPEARANCE:  Alert, in no distress  ENT:  Mouth and posterior oropharynx normal, moist mucous membranes, normal hearing acuity  EYES:  EOM normal, conjunctiva and lids normal  NECK:  No adenopathy,masses or thyromegaly  RESP:  respiratory effort and palpation of chest normal, lungs clear to auscultation , no respiratory distress  CV:  Palpation and auscultation of heart done , regular rate and rhythm, no murmur, +2 pedal pulses, no LE edema  ABDOMEN: soft, nontender, no hepatosplenomegaly or other masses  M/S:   left UE in immobilizer,  +CMS fingers. Good strength and ROM other extremities. No joint inflammation  SKIN:  left shoulder incision clean,dry, intact, no erythema. No rashes or open areas  PSYCH:  oriented X 3, normal insight, judgement and memory, " affect and mood normal    DISCHARGE PLAN:  Occupational Therapy, Physical Therapy and From:  [facility]  Patient instructed to follow-up with:  PCP in 7 days      Current [place] scheduled appointments:  No future appointments.    MTM referral needed and placed by this provider: No    Pending labs: None  SNF labs   Last Basic Metabolic Panel:  Lab Results   Component Value Date     12/15/2017      Lab Results   Component Value Date    POTASSIUM 4.1 12/15/2017     Lab Results   Component Value Date    CHLORIDE 95 12/15/2017     Lab Results   Component Value Date    PENNY 9.5 12/15/2017     Lab Results   Component Value Date    CO2 26 12/15/2017     Lab Results   Component Value Date    BUN 16 12/15/2017     Lab Results   Component Value Date    CR 0.74 12/15/2017     Lab Results   Component Value Date     12/15/2017     Lab Results   Component Value Date    WBC 8.3 12/15/2017     Lab Results   Component Value Date    RBC 4.13 12/15/2017     Lab Results   Component Value Date    HGB 12.6 12/15/2017    HGB 12.3 12/15/2017     Lab Results   Component Value Date    HCT 37.2 12/15/2017     Lab Results   Component Value Date    MCV 90 12/15/2017     Lab Results   Component Value Date    MCH 30.5 12/15/2017     Lab Results   Component Value Date    MCHC 33.9 12/15/2017     Lab Results   Component Value Date    RDW 13.4 12/15/2017     Lab Results   Component Value Date     12/15/2017     Discharge Treatments: Shoulder immobilizer at all times    TOTAL DISCHARGE TIME:   Greater than 30 minutes  Electronically signed by:  KATIE Cain CNP

## 2018-03-09 ENCOUNTER — APPOINTMENT (OUTPATIENT)
Dept: ULTRASOUND IMAGING | Facility: CLINIC | Age: 78
End: 2018-03-09
Attending: EMERGENCY MEDICINE
Payer: MEDICARE

## 2018-03-09 ENCOUNTER — APPOINTMENT (OUTPATIENT)
Dept: GENERAL RADIOLOGY | Facility: CLINIC | Age: 78
End: 2018-03-09
Attending: EMERGENCY MEDICINE
Payer: MEDICARE

## 2018-03-09 ENCOUNTER — HOSPITAL ENCOUNTER (EMERGENCY)
Facility: CLINIC | Age: 78
Discharge: HOME | End: 2018-03-09
Attending: EMERGENCY MEDICINE | Admitting: EMERGENCY MEDICINE
Payer: MEDICARE

## 2018-03-09 VITALS
WEIGHT: 138 LBS | HEIGHT: 60 IN | BODY MASS INDEX: 27.09 KG/M2 | TEMPERATURE: 98.2 F | HEART RATE: 91 BPM | OXYGEN SATURATION: 96 % | DIASTOLIC BLOOD PRESSURE: 78 MMHG | RESPIRATION RATE: 20 BRPM | SYSTOLIC BLOOD PRESSURE: 147 MMHG

## 2018-03-09 DIAGNOSIS — Z98.890 HISTORY OF SHOULDER SURGERY: ICD-10-CM

## 2018-03-09 DIAGNOSIS — M79.89 LEG SWELLING: ICD-10-CM

## 2018-03-09 LAB
ALBUMIN SERPL-MCNC: 4.2 G/DL (ref 3.4–5)
ALP SERPL-CCNC: 102 U/L (ref 40–150)
ALT SERPL W P-5'-P-CCNC: 19 U/L (ref 0–50)
ANION GAP SERPL CALCULATED.3IONS-SCNC: 12 MMOL/L (ref 3–14)
AST SERPL W P-5'-P-CCNC: 16 U/L (ref 0–45)
BASOPHILS # BLD AUTO: 0 10E9/L (ref 0–0.2)
BASOPHILS NFR BLD AUTO: 0.6 %
BILIRUB SERPL-MCNC: 0.5 MG/DL (ref 0.2–1.3)
BUN SERPL-MCNC: 13 MG/DL (ref 7–30)
CALCIUM SERPL-MCNC: 9.2 MG/DL (ref 8.5–10.1)
CHLORIDE SERPL-SCNC: 99 MMOL/L (ref 94–109)
CO2 SERPL-SCNC: 24 MMOL/L (ref 20–32)
CREAT SERPL-MCNC: 0.6 MG/DL (ref 0.52–1.04)
DIFFERENTIAL METHOD BLD: NORMAL
EOSINOPHIL # BLD AUTO: 0.1 10E9/L (ref 0–0.7)
EOSINOPHIL NFR BLD AUTO: 2.2 %
ERYTHROCYTE [DISTWIDTH] IN BLOOD BY AUTOMATED COUNT: 15 % (ref 10–15)
GFR SERPL CREATININE-BSD FRML MDRD: >90 ML/MIN/1.7M2
GLUCOSE SERPL-MCNC: 110 MG/DL (ref 70–99)
HCT VFR BLD AUTO: 38 % (ref 35–47)
HGB BLD-MCNC: 12.7 G/DL (ref 11.7–15.7)
IMM GRANULOCYTES # BLD: 0 10E9/L (ref 0–0.4)
IMM GRANULOCYTES NFR BLD: 0.2 %
LYMPHOCYTES # BLD AUTO: 1.7 10E9/L (ref 0.8–5.3)
LYMPHOCYTES NFR BLD AUTO: 26.9 %
MCH RBC QN AUTO: 28.5 PG (ref 26.5–33)
MCHC RBC AUTO-ENTMCNC: 33.4 G/DL (ref 31.5–36.5)
MCV RBC AUTO: 85 FL (ref 78–100)
MONOCYTES # BLD AUTO: 0.5 10E9/L (ref 0–1.3)
MONOCYTES NFR BLD AUTO: 8.5 %
NEUTROPHILS # BLD AUTO: 3.9 10E9/L (ref 1.6–8.3)
NEUTROPHILS NFR BLD AUTO: 61.6 %
NRBC # BLD AUTO: 0 10*3/UL
NRBC BLD AUTO-RTO: 0 /100
NT-PROBNP SERPL-MCNC: 1542 PG/ML (ref 0–1800)
PLATELET # BLD AUTO: 291 10E9/L (ref 150–450)
POTASSIUM SERPL-SCNC: 3.5 MMOL/L (ref 3.4–5.3)
PROT SERPL-MCNC: 8.1 G/DL (ref 6.8–8.8)
RBC # BLD AUTO: 4.45 10E12/L (ref 3.8–5.2)
SODIUM SERPL-SCNC: 135 MMOL/L (ref 133–144)
WBC # BLD AUTO: 6.4 10E9/L (ref 4–11)

## 2018-03-09 PROCEDURE — 93970 EXTREMITY STUDY: CPT

## 2018-03-09 PROCEDURE — 83880 ASSAY OF NATRIURETIC PEPTIDE: CPT | Performed by: EMERGENCY MEDICINE

## 2018-03-09 PROCEDURE — 85025 COMPLETE CBC W/AUTO DIFF WBC: CPT | Performed by: EMERGENCY MEDICINE

## 2018-03-09 PROCEDURE — 99285 EMERGENCY DEPT VISIT HI MDM: CPT | Mod: 25

## 2018-03-09 PROCEDURE — 80053 COMPREHEN METABOLIC PANEL: CPT | Performed by: EMERGENCY MEDICINE

## 2018-03-09 PROCEDURE — 93005 ELECTROCARDIOGRAM TRACING: CPT

## 2018-03-09 PROCEDURE — 71046 X-RAY EXAM CHEST 2 VIEWS: CPT

## 2018-03-09 ASSESSMENT — ENCOUNTER SYMPTOMS
FEVER: 0
SHORTNESS OF BREATH: 1
COUGH: 0

## 2018-03-09 NOTE — ED AVS SNAPSHOT
Emergency Department    [address] [facility] [address]    Phone:  [phone]    Fax:  [phone]                                       Larry Melendez   MRN: [id number]    Department:   Emergency Department   Date of Visit:  3/9/2018           After Visit Summary Signature Page     I have received my discharge instructions, and my questions have been answered. I have discussed any challenges I see with this plan with the nurse or doctor.    ..........................................................................................................................................  Patient/Patient Representative Signature      ..........................................................................................................................................  Patient Representative Print Name and Relationship to Patient    ..................................................               ................................................  Date                                            Time    ..........................................................................................................................................  Reviewed by Signature/Title    ...................................................              ..............................................  Date                                                            Time

## 2018-03-09 NOTE — ED AVS SNAPSHOT
Emergency Department    [address] [facility] [address]    Phone:  [phone]    Fax:  [phone]                                       Larry Melendez   MRN: [id number]    Department:   Emergency Department   Date of Visit:  3/9/2018           Patient Information     Date Of Birth          1940        Your diagnoses for this visit were:     Leg swelling     History of shoulder surgery        You were seen by Larry Chu MD.      Follow-up Information     Follow up with Malaika Rodriguez MD. Call in 3 days.    Specialty:  Family Practice    Contact information:    [facility]  [address]  [facility] [address] [phone]          Follow up with  Emergency Department.    Specialty:  EMERGENCY MEDICINE    Why:  As needed, If symptoms worsen    Contact information:    [address] [facility] [address] [phone]      Discharge References/Attachments     LEG SWELLING IN BOTH LEGS (ENGLISH)      Future Appointments        Provider Department Dept Phone Center    5/2/2018 1:20 PM CHANDLER An [facility] HEART AT [place] [phone] [facility]    5/2/2018 2:15 PM Brett Smith MD, MD [facility] Heart [place] [phone] [facility]      24 Hour Appointment Hotline       To make an appointment at any [facility] call [phone] ([phone]). If you don't have a family doctor or clinic, we will help you find one. [facility] are conveniently located to serve the needs of you and your family.             Review of your medicines      Our records show that you are taking the medicines listed below. If these are incorrect, please call your family doctor or clinic.        Dose / Directions Last dose taken    amLODIPine 10 MG tablet   Commonly known as:  NORVASC   Dose:  10 mg   Quantity:  90 tablet        Take 1 tablet (10 mg) by mouth daily    Refills:  3        carvedilol 25 MG tablet   Commonly known as:  COREG   Dose:  50 mg   Quantity:  360 tablet        Take 2 tablets (50 mg) by mouth 2 times daily   Refills:  3        LASIX PO   Dose:  20 mg        Take 20 mg by mouth every other day   Refills:  0        olmesartan 40 MG tablet   Commonly known as:  BENICAR   Dose:  40 mg   Quantity:  90 tablet        Take 1 tablet (40 mg) by mouth daily   Refills:  2        OXYCODONE HCL PO   Dose:  5-10 mg        Take 5-10 mg by mouth every 4 hours as needed   Refills:  0        potassium chloride 10 MEQ tablet   Commonly known as:  K-TAB,KLOR-CON   Dose:  10 mEq        Take 10 mEq by mouth 2 times daily   Refills:  0        PRILOSEC PO   Dose:  10 mg        Take 10 mg by mouth daily   Refills:  0        senna-docusate 8.6-50 MG per tablet   Commonly known as:  SENOKOT-S;PERICOLACE   Dose:  1 tablet        Take 1 tablet by mouth 2 times daily   Refills:  0        simvastatin 10 MG tablet   Commonly known as:  ZOCOR   Dose:  10 mg   Quantity:  90 tablet        Take 1 tablet (10 mg) by mouth At Bedtime   Refills:  3        TYLENOL PO   Dose:  500 mg        Take 500 mg by mouth every 6 hours as needed for mild pain or fever   Refills:  0        vitamin D 2000 UNITS tablet   Dose:  1 tablet        Take 1 tablet by mouth daily   Refills:  0                Procedures and tests performed during your visit     CBC with platelets differential    Comprehensive metabolic panel    EKG 12-lead, tracing only    Nt probnp inpatient    US Lower Extremity Venous Duplex Bilateral    XR Chest 2 Views      Orders Needing Specimen Collection     None      Pending Results     Date and Time Order Name Status Description    3/9/2018 1844 EKG 12-lead, tracing only Preliminary             Pending Culture Results     No orders found from 3/7/2018 to 3/10/2018.            Pending Results Instructions     If you had any lab results that were not finalized at the time of your Discharge, you  can call the ED Lab Result RN at [phone]. You will be contacted by this team for any positive Lab results or changes in treatment. The nurses are available 7 days a week from 10A to 6:30P.  You can leave a message 24 hours per day and they will return your call.        Test Results From Your Hospital Stay        3/9/2018  7:11 PM      Component Results     Component Value Ref Range & Units Status    WBC 6.4 4.0 - 11.0 10e9/L Final    RBC Count 4.45 3.8 - 5.2 10e12/L Final    Hemoglobin 12.7 11.7 - 15.7 g/dL Final    Hematocrit 38.0 35.0 - 47.0 % Final    MCV 85 78 - 100 fl Final    MCH 28.5 26.5 - 33.0 pg Final    MCHC 33.4 31.5 - 36.5 g/dL Final    RDW 15.0 10.0 - 15.0 % Final    Platelet Count 291 150 - 450 10e9/L Final    Diff Method Automated Method  Final    % Neutrophils 61.6 % Final    % Lymphocytes 26.9 % Final    % Monocytes 8.5 % Final    % Eosinophils 2.2 % Final    % Basophils 0.6 % Final    % Immature Granulocytes 0.2 % Final    Nucleated RBCs 0 0 /100 Final    Absolute Neutrophil 3.9 1.6 - 8.3 10e9/L Final    Absolute Lymphocytes 1.7 0.8 - 5.3 10e9/L Final    Absolute Monocytes 0.5 0.0 - 1.3 10e9/L Final    Absolute Eosinophils 0.1 0.0 - 0.7 10e9/L Final    Absolute Basophils 0.0 0.0 - 0.2 10e9/L Final    Abs Immature Granulocytes 0.0 0 - 0.4 10e9/L Final    Absolute Nucleated RBC 0.0  Final         3/9/2018  7:34 PM      Component Results     Component Value Ref Range & Units Status    Sodium 135 133 - 144 mmol/L Final    Potassium 3.5 3.4 - 5.3 mmol/L Final    Chloride 99 94 - 109 mmol/L Final    Carbon Dioxide 24 20 - 32 mmol/L Final    Anion Gap 12 3 - 14 mmol/L Final    Glucose 110 (H) 70 - 99 mg/dL Final    Urea Nitrogen 13 7 - 30 mg/dL Final    Creatinine 0.60 0.52 - 1.04 mg/dL Final    GFR Estimate >90 >60 mL/min/1.7m2 Final    Non  GFR Calc    GFR Estimate If Black >90 >60 mL/min/1.7m2 Final    African American GFR Calc    Calcium 9.2 8.5 - 10.1 mg/dL Final    Bilirubin  Total 0.5 0.2 - 1.3 mg/dL Final    Albumin 4.2 3.4 - 5.0 g/dL Final    Protein Total 8.1 6.8 - 8.8 g/dL Final    Alkaline Phosphatase 102 40 - 150 U/L Final    ALT 19 0 - 50 U/L Final    AST 16 0 - 45 U/L Final         3/9/2018  7:34 PM      Component Results     Component Value Ref Range & Units Status    N-Terminal Pro BNP Inpatient 1542 0 - 1800 pg/mL Final       Reference range shown and results flagged as abnormal are suggested inpatient   cut points for confirming diagnosis if CHF in an acute setting. Establishing a   baseline value for each individual patient is useful for follow-up. An   inpatient or emergency department NT-proPBNP <300 pg/mL effectively rules out   acute CHF, with 99% negative predictive value.  The outpatient non-acute reference range for ruling out CHF is:   0-125 pg/mL (age 18 to less than 75)   0-450 pg/mL (age 75 yrs and older)           3/9/2018  8:43 PM      Narrative     CHEST TWO VIEWS     3/9/2018 7:15 PM     HISTORY: Two months exertional dyspnea.     COMPARISON: None.    FINDINGS: Heart at upper limits of normal in size. Prior right  thoracotomy with fibrosis right mid lung and some pleural thickening  about the right hemithorax. Nothing clearly acute though I do not have  previous films for comparison. Left total shoulder arthroplasty.        Impression     IMPRESSION:   1. Fibrotic changes and probable pleural thickening right mid lung and  base. Prior right thoracotomy.  2. Nothing clearly acute.     RICK AGUERO MD         3/9/2018  8:45 PM      Narrative     VENOUS ULTRASOUND BOTH LEGS  3/9/2018 8:25 PM     HISTORY:  Leg swelling.  Rule out DVT.    COMPARISON: None.    FINDINGS: Examination of the deep veins with graded compression and  color flow Doppler with spectral wave form analysis shows  no evidence  of thrombus in the common femoral vein, femoral vein, popliteal vein  or calf veins.  There is no venous insufficiency.        Impression     IMPRESSION: No evidence  of deep venous thrombosis.       RICK AGUERO MD                Clinical Quality Measure: Blood Pressure Screening     Your blood pressure was checked while you were in the emergency department today. The last reading we obtained was  BP: 150/79 . Please read the guidelines below about what these numbers mean and what you should do about them.  If your systolic blood pressure (the top number) is less than 120 and your diastolic blood pressure (the bottom number) is less than 80, then your blood pressure is normal. There is nothing more that you need to do about it.  If your systolic blood pressure (the top number) is 120-139 or your diastolic blood pressure (the bottom number) is 80-89, your blood pressure may be higher than it should be. You should have your blood pressure rechecked within a year by a primary care provider.  If your systolic blood pressure (the top number) is 140 or greater or your diastolic blood pressure (the bottom number) is 90 or greater, you may have high blood pressure. High blood pressure is treatable, but if left untreated over time it can put you at risk for heart attack, stroke, or kidney failure. You should have your blood pressure rechecked by a primary care provider within the next 4 weeks.  If your provider in the emergency department today gave you specific instructions to follow-up with your doctor or provider even sooner than that, you should follow that instruction and not wait for up to 4 weeks for your follow-up visit.        Thank you for choosing [facility]       Thank you for choosing [facility] for your care. Our goal is always to provide you with excellent care. Hearing back from our patients is one way we can continue to improve our services. Please take a few minutes to complete the written survey that you may receive in the mail after you visit with us. Thank you!        [employer] Information     [employer] lets you send messages to your doctor, view your test results, renew  "your prescriptions, schedule appointments and more. To sign up, go to [email or web address] . Click on \"Log in\" on the left side of the screen, which will take you to the Welcome page. Then click on \"Sign up Now\" on the right side of the page.     You will be asked to enter the access code listed below, as well as some personal information. Please follow the directions to create your username and password.     Your access code is: [email or web address]  Expires: 2018  8:49 PM     Your access code will  in 90 days. If you need help or a new code, please call your [facility] clinic or [phone].        Care EveryWhere ID     This is your Care EveryWhere ID. This could be used by other organizations to access your [facility] medical records  [id number]        Equal Access to Services     [place] : Emigdio Feldman, fiorella merino, lilian mckeon, rakel galindo . So [facility] [phone].    ATENCIÓN: Si habla español, tiene a munoz disposición servicios gratuitos de asistencia lingüística. Llame al [phone].    We comply with applicable federal civil rights laws and [place] laws. We do not discriminate on the basis of race, color, national origin, age, disability, sex, sexual orientation, or gender identity.            After Visit Summary       This is your record. Keep this with you and show to your community pharmacist(s) and doctor(s) at your next visit.                  "

## 2018-03-10 LAB — INTERPRETATION ECG - MUSE: NORMAL

## 2018-03-10 NOTE — ED NOTES
Patient given ice pack for shoulder. Patient had shoulder surgery 8 weeks ago, states she has not been home to ice it. Patient is resting comfortably at this time, updated on wait for ultrasound.

## 2018-05-02 ENCOUNTER — OFFICE VISIT (OUTPATIENT)
Dept: CARDIOLOGY | Facility: CLINIC | Age: 78
End: 2018-05-02
Attending: INTERNAL MEDICINE
Payer: COMMERCIAL

## 2018-05-02 VITALS
HEART RATE: 70 BPM | BODY MASS INDEX: 28.07 KG/M2 | WEIGHT: 143 LBS | HEIGHT: 60 IN | DIASTOLIC BLOOD PRESSURE: 84 MMHG | SYSTOLIC BLOOD PRESSURE: 138 MMHG

## 2018-05-02 DIAGNOSIS — I10 ESSENTIAL HYPERTENSION: ICD-10-CM

## 2018-05-02 DIAGNOSIS — I42.0 DILATED CARDIOMYOPATHY (H): Primary | ICD-10-CM

## 2018-05-02 DIAGNOSIS — R60.0 FLUID RETENTION IN LEGS: ICD-10-CM

## 2018-05-02 DIAGNOSIS — I10 BENIGN ESSENTIAL HYPERTENSION: ICD-10-CM

## 2018-05-02 DIAGNOSIS — E78.00 PURE HYPERCHOLESTEROLEMIA: ICD-10-CM

## 2018-05-02 LAB
ANION GAP SERPL CALCULATED.3IONS-SCNC: 12.4 MMOL/L (ref 6–17)
BUN SERPL-MCNC: 13 MG/DL (ref 7–30)
CALCIUM SERPL-MCNC: 9.4 MG/DL (ref 8.5–10.5)
CHLORIDE SERPL-SCNC: 99 MMOL/L (ref 98–107)
CO2 SERPL-SCNC: 31 MMOL/L (ref 23–29)
CREAT SERPL-MCNC: 0.84 MG/DL (ref 0.7–1.3)
GFR SERPL CREATININE-BSD FRML MDRD: 66 ML/MIN/1.7M2
GLUCOSE SERPL-MCNC: 141 MG/DL (ref 70–105)
POTASSIUM SERPL-SCNC: 4.4 MMOL/L (ref 3.5–5.1)
SODIUM SERPL-SCNC: 138 MMOL/L (ref 136–145)

## 2018-05-02 PROCEDURE — 99214 OFFICE O/P EST MOD 30 MIN: CPT | Performed by: INTERNAL MEDICINE

## 2018-05-02 PROCEDURE — 36415 COLL VENOUS BLD VENIPUNCTURE: CPT | Performed by: INTERNAL MEDICINE

## 2018-05-02 PROCEDURE — 80048 BASIC METABOLIC PNL TOTAL CA: CPT | Performed by: INTERNAL MEDICINE

## 2018-05-02 RX ORDER — TERAZOSIN 2 MG/1
2 CAPSULE ORAL AT BEDTIME
Qty: 30 CAPSULE | Refills: 11 | Status: SHIPPED | OUTPATIENT
Start: 2018-05-02 | End: 2018-05-03

## 2018-05-02 NOTE — LETTER
5/2/2018      Malaika Rodriguez MD, MD  [facility] [address]  [facility] [address]      RE: Larry Melendez       Dear Colleague,    I had the pleasure of seeing Larry Melendez in the [facility].    Service Date: 05/02/2018      REFERRING PHYSICIAN:  Dr. Malaika Rodriguez.      HISTORY OF PRESENT ILLNESS:  It is my pleasure to see your patient, Larry Melendez, who we are seeing for mild idiopathic cardiomyopathy, essential hypertension, hyponatremia due to hydrochlorothiazide and peripheral edema.  The patient was seen in the emergency room at the beginning of March for leg swelling.  Ultrasound of both legs showed no evidence of DVT.  Almost certainly the cause of her peripheral edema is amlodipine 10 mg, which has a 25% incidence of edema at that dose.  We were finding it difficult to control her blood pressure.  In the past, hydrochlorothiazide worked very well but it caused significant hyponatremia.  It appears now that even though her blood pressure is relatively well controlled at 138/84 that we cannot use amlodipine.  She is having significant difficulty putting shoes on now with this drug.  Today, her electrolytes are excellent.  Her sodium is 138, potassium is 4.4.  Her BUN is 13, and creatinine is 0.84.  So, the furosemide 20 mg which she is taking every day now is not causing the same degree of hyponatremia that hydrochlorothiazide did.  Her last echocardiogram showed that her ejection fraction was in the 50%-55% range, which is a significant improvement from previously.  No significant valvular heart disease is present.  As I mentioned, her blood pressure is upper normal at 138/84.      IMPRESSION:   1.  Cardiomyopathy.  The patient's ejection fraction is in the low normal range at 50%-55%.   2.  Essential hypertension.  It has been difficult finding the right combination of antiarrhythmics without causing side effects as I mentioned above.   3.   Peripheral edema almost certainly due to amlodipine 10 mg per day.  This dose of amlodipine has a high incidence of peripheral edema.   4.  Normalization of hyponatremia on cessation of hydrochlorothiazide.      PLAN:  I will stop the amlodipine medication, and I will start her on an alpha blocker, terazosin 2 mg per day.  I will have the patient return to see us in 2 weeks' time to see if her blood pressure is under good control.  This medication can be titrated up.  I will see the patient myself in about 6 months' time.  She has been a pleasure to be involved in the care of this very nice patient.  As always, she has been told to call us if she has any questions or any concerns.      cc:   Malaika Rodriguez MD    [facility]    [address] [place] [address]         YADIRA PERKNIS MD, [facility]             D: 2018   T: 2018   MT: OLGA      Name:     ELAYNE SALDAÑA   MRN:      [id number]        Account:      [id number]   :      1940           Service Date: 2018      Document: [id number]           Outpatient Encounter Prescriptions as of 2018   Medication Sig Dispense Refill     Acetaminophen (TYLENOL PO) Take 500 mg by mouth every 6 hours as needed for mild pain or fever        carvedilol (COREG) 25 MG tablet Take 2 tablets (50 mg) by mouth 2 times daily 360 tablet 3     Cholecalciferol (VITAMIN D) 2000 UNITS tablet Take 1 tablet by mouth daily       Furosemide (LASIX PO) Take 20 mg by mouth every other day       olmesartan (BENICAR) 40 MG tablet Take 1 tablet (40 mg) by mouth daily 90 tablet 2     Omeprazole (PRILOSEC PO) Take 10 mg by mouth daily       potassium chloride (K-TAB,KLOR-CON) 10 MEQ tablet Take 10 mEq by mouth 2 times daily       simvastatin (ZOCOR) 10 MG tablet Take 1 tablet (10 mg) by mouth At Bedtime 90 tablet 3     [DISCONTINUED] terazosin (HYTRIN) 2 MG capsule Take 1 capsule (2 mg) by mouth At Bedtime 30 capsule 11     OXYCODONE  HCL PO Take 5-10 mg by mouth every 4 hours as needed       senna-docusate (SENOKOT-S;PERICOLACE) 8.6-50 MG per tablet Take 1 tablet by mouth 2 times daily       [DISCONTINUED] amLODIPine (NORVASC) 10 MG tablet Take 1 tablet (10 mg) by mouth daily 90 tablet 3     No facility-administered encounter medications on file as of 5/2/2018.        Again, thank you for allowing me to participate in the care of your patient.      Sincerely,    Brett Smith MD, MD     [facility]

## 2018-05-02 NOTE — MR AVS SNAPSHOT
After Visit Summary   5/2/2018    Larry Melendez    MRN: [id number]           Patient Information     Date Of Birth          1940        Visit Information        Provider Department      5/2/2018 2:15 PM Brett Smith MD [facility]        Today's Diagnoses     Dilated cardiomyopathy (H)    -  1    Benign essential hypertension        Essential hypertension        Pure hypercholesterolemia        Fluid retention in legs           Follow-ups after your visit        Additional Services     Follow-Up with Cardiac Advanced Practice Provider           Follow-Up with Cardiologist                 Your next 10 appointments already scheduled     Jun 12, 2018  2:30 PM CDT   Return Visit with KATIE Herrera CNP   [facility])    [facility] [place] [id number]  [facility] [address] [phone] OPT 2              Future tests that were ordered for you today     Open Future Orders        Priority Expected Expires Ordered    Lipid Profile Routine 10/29/2018 5/2/2019 5/2/2018    ALT Routine 10/29/2018 5/2/2019 5/2/2018    Basic metabolic panel Routine 10/29/2018 5/2/2019 5/2/2018    Follow-Up with Cardiologist Routine 10/29/2018 5/2/2019 5/2/2018    Follow-Up with Cardiac Advanced Practice Provider Routine 5/16/2018 5/2/2019 5/2/2018            Who to contact     If you have questions or need follow up information about today's clinic visit or your schedule please contact [facility] directly at [phone].  Normal or non-critical lab and imaging results will be communicated to you by MyChart, letter or phone within 4 business days after the clinic has received the results. If you do not hear from us within 7 days, please contact the clinic through MyChart or phone. If you have a critical or abnormal lab result, we will notify you by  "phone as soon as possible.  Submit refill requests through [employer] or call your pharmacy and they will forward the refill request to us. Please allow 3 business days for your refill to be completed.          Additional Information About Your Visit        [employer] Information     [employer] lets you send messages to your doctor, view your test results, renew your prescriptions, schedule appointments and more. To sign up, go to [email or web address] . Click on \"Log in\" on the left side of the screen, which will take you to the Welcome page. Then click on \"Sign up Now\" on the right side of the page.     You will be asked to enter the access code listed below, as well as some personal information. Please follow the directions to create your username and password.     Your access code is: [email or web address]  Expires: 2018  9:49 PM     Your access code will  in 90 days. If you need help or a new code, please call your [facility] clinic or [phone].        Care EveryWhere ID     This is your Care EveryWhere ID. This could be used by other organizations to access your [facility] medical records  [id number]        Your Vitals Were     Pulse Height BMI (Body Mass Index)             70 1.524 m (5') 27.93 kg/m2          Blood Pressure from Last 3 Encounters:   18 138/84   18 147/78   01/15/18 127/56    Weight from Last 3 Encounters:   18 64.9 kg (143 lb)   18 62.6 kg (138 lb)   01/15/18 65.1 kg (143 lb 9.6 oz)              We Performed the Following     Follow-Up with Cardiologist          Today's Medication Changes          These changes are accurate as of 18  2:51 PM.  If you have any questions, ask your nurse or doctor.               Start taking these medicines.        Dose/Directions    terazosin 2 MG capsule   Commonly known as:  HYTRIN   Used for:  Benign essential hypertension   Started by:  Brett Smith MD        Dose:  2 mg   Take 1 capsule (2 mg) by mouth At Bedtime "   Quantity:  30 capsule   Refills:  11         Stop taking these medicines if you haven't already. Please contact your care team if you have questions.     amLODIPine 10 MG tablet   Commonly known as:  NORVASC   Stopped by:  Brett Smith MD                Where to get your medicines      These medications were sent to [facility] - [place], [place] - [address] AT [facility] [address]  [address] [address]     Phone:  [phone]     terazosin 2 MG capsule                Primary Care Provider Office Phone # Fax #    Malaika Rodriguez [phone]       [facility]  [address]  [facility] [address]        Equal Access to Services     [place] : Hadii aad ku hadasho Soomaali, waaxda luqadaha, qaybta kaalmada adeegyada, waxay rubenin haysaran galindo . So [facility] [phone].    ATENCIÓN: Si habla español, tiene a munoz disposición servicios gratuitos de asistencia lingüística. [facility] [phone].    We comply with applicable federal civil rights laws and [place] laws. We do not discriminate on the basis of race, color, national origin, age, disability, sex, sexual orientation, or gender identity.            Thank you!     Thank you for choosing [facility]  for your care. Our goal is always to provide you with excellent care. Hearing back from our patients is one way we can continue to improve our services. Please take a few minutes to complete the written survey that you may receive in the mail after your visit with us. Thank you!             Your Updated Medication List - Protect others around you: Learn how to safely use, store and throw away your medicines at www.disposemymeds.org.          This list is accurate as of 5/2/18  2:51 PM.  Always use your most recent med list.                   Brand Name Dispense Instructions for use Diagnosis    carvedilol 25 MG tablet    COREG    360  tablet    Take 2 tablets (50 mg) by mouth 2 times daily    Dilated cardiomyopathy (H), Benign essential hypertension       LASIX PO      Take 20 mg by mouth every other day        olmesartan 40 MG tablet    BENICAR    90 tablet    Take 1 tablet (40 mg) by mouth daily    Essential hypertension       OXYCODONE HCL PO      Take 5-10 mg by mouth every 4 hours as needed        potassium chloride 10 MEQ tablet    K-TAB,KLOR-CON     Take 10 mEq by mouth 2 times daily        PRILOSEC PO      Take 10 mg by mouth daily        senna-docusate 8.6-50 MG per tablet    SENOKOT-S;PERICOLACE     Take 1 tablet by mouth 2 times daily        simvastatin 10 MG tablet    ZOCOR    90 tablet    Take 1 tablet (10 mg) by mouth At Bedtime    Pure hypercholesterolemia       terazosin 2 MG capsule    HYTRIN    30 capsule    Take 1 capsule (2 mg) by mouth At Bedtime    Benign essential hypertension       TYLENOL PO      Take 500 mg by mouth every 6 hours as needed for mild pain or fever        vitamin D 2000 units tablet      Take 1 tablet by mouth daily

## 2018-05-02 NOTE — LETTER
5/2/2018    Malaika Rodriguez MD, MD  [address]  [facility] [address]    RE: Larry Melendez       Dear Colleague,    I had the pleasure of seeing Larry Melendez in the [facility].    HPI and Plan:   See dictation    Orders Placed This Encounter   Procedures     Lipid Profile     ALT     Basic metabolic panel     Follow-Up with Cardiac Advanced Practice Provider     Follow-Up with Cardiologist       Orders Placed This Encounter   Medications     terazosin (HYTRIN) 2 MG capsule     Sig: Take 1 capsule (2 mg) by mouth At Bedtime     Dispense:  30 capsule     Refill:  11       Medications Discontinued During This Encounter   Medication Reason     amLODIPine (NORVASC) 10 MG tablet          Encounter Diagnoses   Name Primary?     Dilated cardiomyopathy (H) Yes     Benign essential hypertension      Essential hypertension      Pure hypercholesterolemia      Fluid retention in legs        CURRENT MEDICATIONS:  Current Outpatient Prescriptions   Medication Sig Dispense Refill     Acetaminophen (TYLENOL PO) Take 500 mg by mouth every 6 hours as needed for mild pain or fever        carvedilol (COREG) 25 MG tablet Take 2 tablets (50 mg) by mouth 2 times daily 360 tablet 3     Cholecalciferol (VITAMIN D) 2000 UNITS tablet Take 1 tablet by mouth daily       Furosemide (LASIX PO) Take 20 mg by mouth every other day       olmesartan (BENICAR) 40 MG tablet Take 1 tablet (40 mg) by mouth daily 90 tablet 2     Omeprazole (PRILOSEC PO) Take 10 mg by mouth daily       potassium chloride (K-TAB,KLOR-CON) 10 MEQ tablet Take 10 mEq by mouth 2 times daily       simvastatin (ZOCOR) 10 MG tablet Take 1 tablet (10 mg) by mouth At Bedtime 90 tablet 3     terazosin (HYTRIN) 2 MG capsule Take 1 capsule (2 mg) by mouth At Bedtime 30 capsule 11     OXYCODONE HCL PO Take 5-10 mg by mouth every 4 hours as needed       senna-docusate (SENOKOT-S;PERICOLACE) 8.6-50 MG per tablet Take 1  tablet by mouth 2 times daily         ALLERGIES     Allergies   Allergen Reactions     Aspirin Other (See Comments)     Bleeding              Bleeding, GI Lesion         Codeine Sulfate GI Disturbance       PAST MEDICAL HISTORY:  Past Medical History:   Diagnosis Date     Arthritis      Breast cancer (H)      Cardiomyopathy (H)     ideopathic     Hypercholesterolemia      Hypertension      Hypokalemia      Malignant neoplasm (H)      Shortness of breath      Shoulder pain        PAST SURGICAL HISTORY:  Past Surgical History:   Procedure Laterality Date     BACK SURGERY       CHOLECYSTECTOMY       EYE SURGERY       partial masectomy         FAMILY HISTORY:  Family History   Problem Relation Age of Onset     Family History Negative No family hx of        SOCIAL HISTORY:  Social History     Social History     Marital status: Single     Spouse name: N/A     Number of children: N/A     Years of education: N/A     Social History Main Topics     Smoking status: Never Smoker     Smokeless tobacco: Never Used     Alcohol use Yes      Comment: socially     Drug use: No     Sexual activity: No     Other Topics Concern     Special Diet No     Exercise No     Social History Narrative       Review of Systems:  Skin:  Positive for pigmentation;rash     Eyes:  Positive for glasses    ENT:  Negative      Respiratory:  Positive for wheezing;dyspnea on exertion;shortness of breath     Cardiovascular:    Positive for;edema    Gastroenterology: Negative      Genitourinary:  not assessed      Musculoskeletal:  Positive for back pain;joint pain;nocturnal cramping Left shoulder pain   Neurologic:  Negative      Psychiatric:  Negative      Heme/Lymph/Imm:  Positive for allergies    Endocrine:  Negative        Physical Exam:  Vitals: /84  Pulse 70  Ht 1.524 m (5')  Wt 64.9 kg (143 lb)  BMI 27.93 kg/m2    Constitutional:  cooperative, alert and oriented, well developed, well nourished, in no acute distress overweight;appears anxious       Skin:  warm and dry to the touch, no apparent skin lesions or masses noted          Head:  normocephalic, no masses or lesions;normocephalic        Eyes:  pupils equal and round, conjunctivae and lids unremarkable, sclera white, no xanthalasma, EOMS intact, no nystagmus        Lymph:      ENT:  no pallor or cyanosis, dentition good        Neck:  carotid pulses are full and equal bilaterally, JVP normal, no carotid bruit        Respiratory:  normal breath sounds, clear to auscultation, normal A-P diameter, normal symmetry, normal respiratory excursion, no use of accessory muscles         Cardiac: regular rhythm;normal S1 and S2;no murmurs, gallops or rubs detected   S4            pulses full and equal, no bruits auscultated                                        GI:  not assessed this visit        Extremities and Muscular Skeletal:  no deformities, clubbing, cyanosis, erythema observed   bilateral LE edema;trace          Neurological:  no gross motor deficits;affect appropriate        Psych:  Alert and Oriented x 3        CC  Brett Smith MD  [address]  [place] [address]                Thank you for allowing me to participate in the care of your patient.      Sincerely,     Brett Smith MD, MD     [facility]    cc:   Brett Smith MD  [address]  [place] [address]

## 2018-05-02 NOTE — PROGRESS NOTES
HPI and Plan:   See dictation    Orders Placed This Encounter   Procedures     Lipid Profile     ALT     Basic metabolic panel     Follow-Up with Cardiac Advanced Practice Provider     Follow-Up with Cardiologist       Orders Placed This Encounter   Medications     terazosin (HYTRIN) 2 MG capsule     Sig: Take 1 capsule (2 mg) by mouth At Bedtime     Dispense:  30 capsule     Refill:  11       Medications Discontinued During This Encounter   Medication Reason     amLODIPine (NORVASC) 10 MG tablet          Encounter Diagnoses   Name Primary?     Dilated cardiomyopathy (H) Yes     Benign essential hypertension      Essential hypertension      Pure hypercholesterolemia      Fluid retention in legs        CURRENT MEDICATIONS:  Current Outpatient Prescriptions   Medication Sig Dispense Refill     Acetaminophen (TYLENOL PO) Take 500 mg by mouth every 6 hours as needed for mild pain or fever        carvedilol (COREG) 25 MG tablet Take 2 tablets (50 mg) by mouth 2 times daily 360 tablet 3     Cholecalciferol (VITAMIN D) 2000 UNITS tablet Take 1 tablet by mouth daily       Furosemide (LASIX PO) Take 20 mg by mouth every other day       olmesartan (BENICAR) 40 MG tablet Take 1 tablet (40 mg) by mouth daily 90 tablet 2     Omeprazole (PRILOSEC PO) Take 10 mg by mouth daily       potassium chloride (K-TAB,KLOR-CON) 10 MEQ tablet Take 10 mEq by mouth 2 times daily       simvastatin (ZOCOR) 10 MG tablet Take 1 tablet (10 mg) by mouth At Bedtime 90 tablet 3     terazosin (HYTRIN) 2 MG capsule Take 1 capsule (2 mg) by mouth At Bedtime 30 capsule 11     OXYCODONE HCL PO Take 5-10 mg by mouth every 4 hours as needed       senna-docusate (SENOKOT-S;PERICOLACE) 8.6-50 MG per tablet Take 1 tablet by mouth 2 times daily         ALLERGIES     Allergies   Allergen Reactions     Aspirin Other (See Comments)     Bleeding              Bleeding, GI Lesion         Codeine Sulfate GI Disturbance       PAST MEDICAL HISTORY:  Past Medical History:    Diagnosis Date     Arthritis      Breast cancer (H)      Cardiomyopathy (H)     ideopathic     Hypercholesterolemia      Hypertension      Hypokalemia      Malignant neoplasm (H)      Shortness of breath      Shoulder pain        PAST SURGICAL HISTORY:  Past Surgical History:   Procedure Laterality Date     BACK SURGERY       CHOLECYSTECTOMY       EYE SURGERY       partial masectomy         FAMILY HISTORY:  Family History   Problem Relation Age of Onset     Family History Negative No family hx of        SOCIAL HISTORY:  Social History     Social History     Marital status: Single     Spouse name: N/A     Number of children: N/A     Years of education: N/A     Social History Main Topics     Smoking status: Never Smoker     Smokeless tobacco: Never Used     Alcohol use Yes      Comment: socially     Drug use: No     Sexual activity: No     Other Topics Concern     Special Diet No     Exercise No     Social History Narrative       Review of Systems:  Skin:  Positive for pigmentation;rash     Eyes:  Positive for glasses    ENT:  Negative      Respiratory:  Positive for wheezing;dyspnea on exertion;shortness of breath     Cardiovascular:    Positive for;edema    Gastroenterology: Negative      Genitourinary:  not assessed      Musculoskeletal:  Positive for back pain;joint pain;nocturnal cramping Left shoulder pain   Neurologic:  Negative      Psychiatric:  Negative      Heme/Lymph/Imm:  Positive for allergies    Endocrine:  Negative        Physical Exam:  Vitals: /84  Pulse 70  Ht 1.524 m (5')  Wt 64.9 kg (143 lb)  BMI 27.93 kg/m2    Constitutional:  cooperative, alert and oriented, well developed, well nourished, in no acute distress overweight;appears anxious      Skin:  warm and dry to the touch, no apparent skin lesions or masses noted          Head:  normocephalic, no masses or lesions;normocephalic        Eyes:  pupils equal and round, conjunctivae and lids unremarkable, sclera white, no xanthalasma,  EOMS intact, no nystagmus        Lymph:      ENT:  no pallor or cyanosis, dentition good        Neck:  carotid pulses are full and equal bilaterally, JVP normal, no carotid bruit        Respiratory:  normal breath sounds, clear to auscultation, normal A-P diameter, normal symmetry, normal respiratory excursion, no use of accessory muscles         Cardiac: regular rhythm;normal S1 and S2;no murmurs, gallops or rubs detected   S4            pulses full and equal, no bruits auscultated                                        GI:  not assessed this visit        Extremities and Muscular Skeletal:  no deformities, clubbing, cyanosis, erythema observed   bilateral LE edema;trace          Neurological:  no gross motor deficits;affect appropriate        Psych:  Alert and Oriented x 3        CC  Brett Smith MD  [address]  [place] [address]

## 2018-05-03 DIAGNOSIS — I10 BENIGN ESSENTIAL HYPERTENSION: ICD-10-CM

## 2018-05-03 RX ORDER — TERAZOSIN 2 MG/1
2 CAPSULE ORAL AT BEDTIME
Qty: 90 CAPSULE | Refills: 3 | Status: SHIPPED | OUTPATIENT
Start: 2018-05-03 | End: 2018-06-12

## 2018-05-03 NOTE — PROGRESS NOTES
Service Date: 05/02/2018      REFERRING PHYSICIAN:  Dr. Malaika Rodriguez.      HISTORY OF PRESENT ILLNESS:  It is my pleasure to see your patient, Larry Melendez, who we are seeing for mild idiopathic cardiomyopathy, essential hypertension, hyponatremia due to hydrochlorothiazide and peripheral edema.  The patient was seen in the emergency room at the beginning of March for leg swelling.  Ultrasound of both legs showed no evidence of DVT.  Almost certainly the cause of her peripheral edema is amlodipine 10 mg, which has a 25% incidence of edema at that dose.  We were finding it difficult to control her blood pressure.  In the past, hydrochlorothiazide worked very well but it caused significant hyponatremia.  It appears now that even though her blood pressure is relatively well controlled at 138/84 that we cannot use amlodipine.  She is having significant difficulty putting shoes on now with this drug.  Today, her electrolytes are excellent.  Her sodium is 138, potassium is 4.4.  Her BUN is 13, and creatinine is 0.84.  So, the furosemide 20 mg which she is taking every day now is not causing the same degree of hyponatremia that hydrochlorothiazide did.  Her last echocardiogram showed that her ejection fraction was in the 50%-55% range, which is a significant improvement from previously.  No significant valvular heart disease is present.  As I mentioned, her blood pressure is upper normal at 138/84.      IMPRESSION:   1.  Cardiomyopathy.  The patient's ejection fraction is in the low normal range at 50%-55%.   2.  Essential hypertension.  It has been difficult finding the right combination of antiarrhythmics without causing side effects as I mentioned above.   3.  Peripheral edema almost certainly due to amlodipine 10 mg per day.  This dose of amlodipine has a high incidence of peripheral edema.   4.  Normalization of hyponatremia on cessation of hydrochlorothiazide.      PLAN:  I will stop the amlodipine  medication, and I will start her on an alpha blocker, terazosin 2 mg per day.  I will have the patient return to see us in 2 weeks' time to see if her blood pressure is under good control.  This medication can be titrated up.  I will see the patient myself in about 6 months' time.  She has been a pleasure to be involved in the care of this very nice patient.  As always, she has been told to call us if she has any questions or any concerns.      cc:   Malaika Rodriguez MD    [facility]    [address] [place] [address]         YADIRA PERKINS MD, [facility]             D: 2018   T: 2018   MT: OLGA      Name:     ELAYNE SALDAÑA   MRN:      [id number]        Account:      [id number]   :      1940           Service Date: 2018      Document: [id number]

## 2018-06-12 ENCOUNTER — DOCUMENTATION ONLY (OUTPATIENT)
Dept: CARDIOLOGY | Facility: CLINIC | Age: 78
End: 2018-06-12

## 2018-06-12 ENCOUNTER — OFFICE VISIT (OUTPATIENT)
Dept: CARDIOLOGY | Facility: CLINIC | Age: 78
End: 2018-06-12
Attending: INTERNAL MEDICINE
Payer: COMMERCIAL

## 2018-06-12 VITALS
DIASTOLIC BLOOD PRESSURE: 84 MMHG | HEART RATE: 84 BPM | BODY MASS INDEX: 28.88 KG/M2 | HEIGHT: 60 IN | WEIGHT: 147.1 LBS | SYSTOLIC BLOOD PRESSURE: 146 MMHG

## 2018-06-12 DIAGNOSIS — I25.10 CORONARY ARTERY DISEASE INVOLVING NATIVE CORONARY ARTERY OF NATIVE HEART WITHOUT ANGINA PECTORIS: ICD-10-CM

## 2018-06-12 DIAGNOSIS — I42.0 DILATED CARDIOMYOPATHY (H): ICD-10-CM

## 2018-06-12 DIAGNOSIS — I10 BENIGN ESSENTIAL HYPERTENSION: ICD-10-CM

## 2018-06-12 DIAGNOSIS — I10 BENIGN ESSENTIAL HYPERTENSION: Primary | ICD-10-CM

## 2018-06-12 DIAGNOSIS — E78.00 HYPERCHOLESTEROLEMIA: Primary | ICD-10-CM

## 2018-06-12 DIAGNOSIS — I10 ESSENTIAL HYPERTENSION: ICD-10-CM

## 2018-06-12 DIAGNOSIS — E78.00 PURE HYPERCHOLESTEROLEMIA: ICD-10-CM

## 2018-06-12 LAB — NT-PROBNP SERPL-MCNC: 2627 PG/ML (ref 0–450)

## 2018-06-12 PROCEDURE — 36415 COLL VENOUS BLD VENIPUNCTURE: CPT | Performed by: NURSE PRACTITIONER

## 2018-06-12 PROCEDURE — 83880 ASSAY OF NATRIURETIC PEPTIDE: CPT | Performed by: NURSE PRACTITIONER

## 2018-06-12 PROCEDURE — 99214 OFFICE O/P EST MOD 30 MIN: CPT | Performed by: NURSE PRACTITIONER

## 2018-06-12 RX ORDER — POTASSIUM CHLORIDE 750 MG/1
10 TABLET, EXTENDED RELEASE ORAL 2 TIMES DAILY
Qty: 90 TABLET | Refills: 1 | Status: CANCELLED | OUTPATIENT
Start: 2018-06-12

## 2018-06-12 RX ORDER — TERAZOSIN 5 MG/1
5 CAPSULE ORAL AT BEDTIME
Qty: 90 CAPSULE | Refills: 3 | Status: SHIPPED | OUTPATIENT
Start: 2018-06-12 | End: 2019-06-24

## 2018-06-12 NOTE — MR AVS SNAPSHOT
After Visit Summary   6/12/2018    Larry Melendez    MRN: [id number]           Patient Information     Date Of Birth          1940        Visit Information        Provider Department      6/12/2018 2:30 PM Lakhwinder Bianchi APRN CNP [facility]        Today's Diagnoses     Hypercholesterolemia    -  1    Dilated cardiomyopathy (H)        Benign essential hypertension        Essential hypertension        Pure hypercholesterolemia        Coronary artery disease involving native coronary artery of native heart without angina pectoris          Care Instructions    Take extra dose of furosemide(Lasix) today    Check a marker for fluid retention  If that is up, we will have you take more extra furosemide    Increase terazosin to 4mg daily(2 pills until gone)  Then increase Terazosin to 5mg daily( one daily) when you  the rx    See me in one month with nonfasting labs          Follow-ups after your visit        Additional Services     Follow-Up with Cardiac Advanced Practice Provider                 Future tests that were ordered for you today     Open Future Orders        Priority Expected Expires Ordered    Basic metabolic panel Routine 7/17/2018 6/12/2019 6/12/2018    Follow-Up with Cardiac Advanced Practice Provider Routine 7/17/2018 6/12/2019 6/12/2018    N terminal pro BNP outpatient Routine 6/12/2018 6/12/2019 6/12/2018            Who to contact     If you have questions or need follow up information about today's clinic visit or your schedule please contact [facility] directly at [phone].  Normal or non-critical lab and imaging results will be communicated to you by MyChart, letter or phone within 4 business days after the clinic has received the results. If you do not hear from us within 7 days, please contact the clinic through MyChart or phone. If you have a critical or abnormal lab  "result, we will notify you by phone as soon as possible.  Submit refill requests through [employer] or call your pharmacy and they will forward the refill request to us. Please allow 3 business days for your refill to be completed.          Additional Information About Your Visit        [employer] Information     [employer] lets you send messages to your doctor, view your test results, renew your prescriptions, schedule appointments and more. To sign up, go to [email or web address] . Click on \"Log in\" on the left side of the screen, which will take you to the Welcome page. Then click on \"Sign up Now\" on the right side of the page.     You will be asked to enter the access code listed below, as well as some personal information. Please follow the directions to create your username and password.     Your access code is: [email or web address]  Expires: 9/10/2018  2:53 PM     Your access code will  in 90 days. If you need help or a new code, please call your [place] clinic or [phone].        Care EveryWhere ID     This is your Care EveryWhere ID. This could be used by other organizations to access your [place] medical records  [id number]        Your Vitals Were     Pulse Height BMI (Body Mass Index)             84 1.524 m (5') 28.73 kg/m2          Blood Pressure from Last 3 Encounters:   18 146/84   18 138/84   18 147/78    Weight from Last 3 Encounters:   18 66.7 kg (147 lb 1.6 oz)   18 64.9 kg (143 lb)   18 62.6 kg (138 lb)              We Performed the Following     Follow-Up with Cardiac Advanced Practice Provider          Today's Medication Changes          These changes are accurate as of 18  2:53 PM.  If you have any questions, ask your nurse or doctor.               These medicines have changed or have updated prescriptions.        Dose/Directions    terazosin 5 MG capsule   Commonly known as:  HYTRIN   This may have changed:    - medication strength  - how much to take "   Used for:  Benign essential hypertension   Changed by:  Lakhwinder Bianchi APRN CNP        Dose:  5 mg   Take 1 capsule (5 mg) by mouth At Bedtime   Quantity:  90 capsule   Refills:  3            Where to get your medicines      These medications were sent to [id number] - [place], [place] - [address] AT [facility] [address]  [address] [facility] [place] [address]     Phone:  [phone]     terazosin 5 MG capsule                Primary Care Provider Office Phone # Fax #    Malaika Rodriguez [phone]       [email or web address]  [address]  [facility] [address]        Equal Access to Services     [place] : Hadii aad ku hadasho Soike, waaxda luqadaha, qaybta kaalmada adeegyada, rakel galindo . So [facility] [phone].    ATENCIÓN: Si habla español, tiene a munoz disposición servicios gratuitos de asistencia lingüística. Wesleyame al [phone].    We comply with applicable federal civil rights laws and [place] laws. We do not discriminate on the basis of race, color, national origin, age, disability, sex, sexual orientation, or gender identity.            Thank you!     Thank you for choosing [facility]  for your care. Our goal is always to provide you with excellent care. Hearing back from our patients is one way we can continue to improve our services. Please take a few minutes to complete the written survey that you may receive in the mail after your visit with us. Thank you!             Your Updated Medication List - Protect others around you: Learn how to safely use, store and throw away your medicines at www.disposemymeds.org.          This list is accurate as of 6/12/18  2:53 PM.  Always use your most recent med list.                   Brand Name Dispense Instructions for use Diagnosis    carvedilol 25 MG tablet    COREG    360 tablet    Take 2 tablets (50 mg) by mouth 2 times daily    Dilated  cardiomyopathy (H), Benign essential hypertension       LASIX PO      Take 20 mg by mouth daily        olmesartan 40 MG tablet    BENICAR    90 tablet    Take 1 tablet (40 mg) by mouth daily    Essential hypertension       OXYCODONE HCL PO      Take 5-10 mg by mouth every 4 hours as needed        potassium chloride 10 MEQ tablet    K-TAB,KLOR-CON     Take 10 mEq by mouth 2 times daily        PRILOSEC PO      Take 10 mg by mouth daily        simvastatin 10 MG tablet    ZOCOR    90 tablet    Take 1 tablet (10 mg) by mouth At Bedtime    Pure hypercholesterolemia       terazosin 5 MG capsule    HYTRIN    90 capsule    Take 1 capsule (5 mg) by mouth At Bedtime    Benign essential hypertension       TYLENOL PO      Take 500 mg by mouth every 6 hours as needed for mild pain or fever        vitamin D 2000 units tablet      Take 1 tablet by mouth daily

## 2018-06-12 NOTE — LETTER
6/12/2018    Malaika Rodriguez MD, MD  [facility] [address]  [facility] [address]    RE: Larry Melendez       Dear Colleague,    I had the pleasure of seeing Larry Melendez in the [facility].    History of Present Illness:    Larry Melendez is a 77 year old female followed here by Dr. Albino Connors.  She returns today to follow-up on changes made to her blood pressure medications at the last office visit.    She has a history of a mild idiopathic cardiomyopathy, with hypertension and hyponatremia due to hydrochlorothiazide and edema.  She had a coronary angiogram in 2009 showing normal coronary arteries.  Her edema has resolved with discontinuation of amlodipine at the last office visit.  She had gone to the emergency room in the spring of this year due to leg swelling and ultrasound ruled out DVT. Remote history of hemoptysis suspicious for cancer with right lung resection which she reports was negative for cancer.    With discontinuation of amlodipine she was placed on terazosin and remains on carvedilol Lasix 20 mg a day Benicar 40 mg per day for her blood pressure.    Last echocardiogram in December 2017 revealed LV function of 50-55%.  She has normal renal function.  Last basic metabolic panel early May off of hydrochlorothiazide showed resolution of her hyponatremia with a sodium of 138.    Today her blood pressure is high between 150 and 160 systolic.  She is quite emotionally upset.    Social history: She lost her daughter due to enlarged heart and she had 3 children.  The youngest was 6 months old at the time.  These children are now grown and one of [facility]'s greatest support systems.  Her son age 57 has been diagnosed with lung cancer and undergone treatment.  He is awaiting his scan results today to see if the treatment was successful.  She moved here from [place] after marrying a man in the  and became .  She was living  with a gentleman for 27 years and was not .  He went to a nursing home and passed away several years ago.  She has been employed at [employer] however had shoulder replacement several months ago and is not able to work.  This has put financial stress on her as well.    Today she feels slightly breathless.  I suspect this in part is from the anxiety over the social issues coupled with the fact that her blood pressure is high.  Her edema has resolved.  Her lungs are clear.  JVP is mildly elevated. Lungs are clear; JVP mildly elevated.        Impression/Plan:    1.  History of idiopathic cardiomyopathy-improved   -ejection fraction 50-55%    2.  Normal coronary arteries as of 2009    3.  Hypertension-uncontrolled (she did have an elevated left ventricular end-diastolic pressure in the Cath Lab in 2009) associated with increased shortness of breath and weight gain of approximately 5 pounds.  I suspect she has diastolic dysfunction.  -Increase trazodone to 4 mg per day until her prescription is gone, then fill the 5 mg prescription I sent in.  She is having a mild amount of dry mouth and if this worsens we may need to switch to hydralazine  -Continue Benicar and Coreg  -Increase furosemide to 40 mg once today then back to 20 mg daily  -Check a BNP.  If this is elevated, continue higher dose Lasix for a few days and follow basic metabolic panel closely with her history of hyponatremia (on hydrochlorothiazide)  -See me back in 4-6 weeks with a basic metabolic panel  -Edema has resolved off amlodipine    4.  Dyslipidemia  -Continue simvastatin 10 mg per day  -Last LDL in our system in 2015 was 52  -Follow through primary care    5.  Full social stressors causing some anxiety likely contributing to her uncontrolled blood pressures.    It has been a pleasure seeing Lrary Melendez in follow up.  I will see her back in approximately 4-6 weeks and I will call her with her BNP levels when they  return.  Greater than 50% of this 30 minute visit was spent in counseling    Lakhwinder Bianchi, MSN, APRN-BC, CNP  Cardiology    Orders Placed This Encounter   Procedures     N terminal pro BNP outpatient     Basic metabolic panel     Follow-Up with Cardiac Advanced Practice Provider     Orders Placed This Encounter   Medications     terazosin (HYTRIN) 5 MG capsule     Sig: Take 1 capsule (5 mg) by mouth At Bedtime     Dispense:  90 capsule     Refill:  3     Medications Discontinued During This Encounter   Medication Reason     senna-docusate (SENOKOT-S;PERICOLACE) 8.6-50 MG per tablet Therapy completed     terazosin (HYTRIN) 2 MG capsule Reorder         Encounter Diagnoses   Name Primary?     Dilated cardiomyopathy (H)      Benign essential hypertension      Essential hypertension      Pure hypercholesterolemia      Hypercholesterolemia Yes     Coronary artery disease involving native coronary artery of native heart without angina pectoris        CURRENT MEDICATIONS:  Current Outpatient Prescriptions   Medication Sig Dispense Refill     Acetaminophen (TYLENOL PO) Take 500 mg by mouth every 6 hours as needed for mild pain or fever        carvedilol (COREG) 25 MG tablet Take 2 tablets (50 mg) by mouth 2 times daily 360 tablet 3     Cholecalciferol (VITAMIN D) 2000 UNITS tablet Take 1 tablet by mouth daily       Furosemide (LASIX PO) Take 20 mg by mouth daily        olmesartan (BENICAR) 40 MG tablet Take 1 tablet (40 mg) by mouth daily 90 tablet 2     Omeprazole (PRILOSEC PO) Take 10 mg by mouth daily       potassium chloride (K-TAB,KLOR-CON) 10 MEQ tablet Take 10 mEq by mouth 2 times daily       simvastatin (ZOCOR) 10 MG tablet Take 1 tablet (10 mg) by mouth At Bedtime 90 tablet 3     terazosin (HYTRIN) 5 MG capsule Take 1 capsule (5 mg) by mouth At Bedtime 90 capsule 3     OXYCODONE HCL PO Take 5-10 mg by mouth every 4 hours as needed       [DISCONTINUED] terazosin (HYTRIN) 2 MG capsule Take 1 capsule (2 mg) by  mouth At Bedtime 90 capsule 3       ALLERGIES     Allergies   Allergen Reactions     Amlodipine      swelling     Aspirin Other (See Comments)     Bleeding              Bleeding, GI Lesion         Codeine Sulfate GI Disturbance       PAST MEDICAL HISTORY:  Past Medical History:   Diagnosis Date     Arthritis      Breast cancer (H)      Cardiomyopathy (H)     ideopathic     Hypercholesterolemia      Hypertension      Hypokalemia      Malignant neoplasm (H)      Shortness of breath      Shoulder pain        PAST SURGICAL HISTORY:  Past Surgical History:   Procedure Laterality Date     BACK SURGERY       CHOLECYSTECTOMY       EYE SURGERY       partial masectomy         FAMILY HISTORY:  Family History   Problem Relation Age of Onset     Other Cancer Mother      Other Cancer Brother      Family History Negative No family hx of        SOCIAL HISTORY:  Social History     Social History     Marital status: Single     Spouse name: N/A     Number of children: N/A     Years of education: N/A     Social History Main Topics     Smoking status: Never Smoker     Smokeless tobacco: Never Used     Alcohol use Yes      Comment: socially     Drug use: No     Sexual activity: No     Other Topics Concern     Special Diet No     Exercise No     Social History Narrative       Review of Systems:  Skin:  Negative       Eyes:  Positive for glasses    ENT:  Negative      Respiratory:  Positive for wheezing;dyspnea on exertion;shortness of breath;dyspnea at rest SOB on and off, sometimes when at rest   Cardiovascular:  Negative for;palpitations;edema;lightheadedness;dizziness Positive for;fatigue    Gastroenterology: Positive for reflux    Genitourinary:  not assessed      Musculoskeletal:  Positive for back pain;joint pain;nocturnal cramping    Neurologic:  Negative      Psychiatric:  Positive for anxiety    Heme/Lymph/Imm:  Positive for allergies    Endocrine:  Negative        Physical Exam:  Vitals: /84  Pulse 84  Ht 1.524 m (5')   Wt 66.7 kg (147 lb 1.6 oz)  BMI 28.73 kg/m2    Constitutional:  cooperative, alert and oriented, well developed, well nourished, in no acute distress overweight;appears anxious      Skin:  warm and dry to the touch, no apparent skin lesions or masses noted          Head:  normocephalic, no masses or lesions;normocephalic        Eyes:  pupils equal and round, conjunctivae and lids unremarkable, sclera white, no xanthalasma, EOMS intact, no nystagmus        Lymph:      ENT:  no pallor or cyanosis, dentition good        Neck:  carotid pulses are full and equal bilaterally, JVP normal, no carotid bruit        Respiratory:  normal breath sounds, clear to auscultation, normal A-P diameter, normal symmetry, normal respiratory excursion, no use of accessory muscles         Cardiac: regular rhythm;normal S1 and S2;no murmurs, gallops or rubs detected   S4            pulses full and equal, no bruits auscultated                                        GI:  not assessed this visit        Extremities and Muscular Skeletal:  no deformities, clubbing, cyanosis, erythema observed   bilateral LE edema;trace          Neurological:  no gross motor deficits;affect appropriate        Psych:  Alert and Oriented x 3      Recent Lab Results:  LIPID RESULTS:  Lab Results   Component Value Date    CHOL 135 10/08/2015    HDL 59 10/08/2015    LDL 52 (L) 10/08/2015    TRIG 120 10/08/2015    CHOLHDLRATIO 2.3 10/08/2015       LIVER ENZYME RESULTS:  Lab Results   Component Value Date    AST 16 03/09/2018    ALT 19 03/09/2018       CBC RESULTS:  Lab Results   Component Value Date    WBC 6.4 03/09/2018    RBC 4.45 03/09/2018    HGB 12.7 03/09/2018    HCT 38.0 03/09/2018    MCV 85 03/09/2018    MCH 28.5 03/09/2018    MCHC 33.4 03/09/2018    RDW 15.0 03/09/2018     03/09/2018       BMP RESULTS:  Lab Results   Component Value Date     05/02/2018    POTASSIUM 4.4 05/02/2018    CHLORIDE 99 05/02/2018    CO2 31 (H) 05/02/2018    ANIONGAP 12.4  05/02/2018     (H) 05/02/2018    BUN 13 05/02/2018    CR 0.84 05/02/2018    GFRESTIMATED 66 05/02/2018    GFRESTBLACK 80 05/02/2018    PENNY 9.4 05/02/2018        A1C RESULTS:  No results found for: A1C    INR RESULTS:  Lab Results   Component Value Date    INR 0.97 06/05/2009       Thank you for allowing me to participate in the care of your patient.    Sincerely,     KATIE Herrera [facility]

## 2018-06-12 NOTE — PROGRESS NOTES
History of Present Illness:    Larry Melendez is a 77 year old female followed here by Dr. Albino Connors.  She returns today to follow-up on changes made to her blood pressure medications at the last office visit.    She has a history of a mild idiopathic cardiomyopathy, with hypertension and hyponatremia due to hydrochlorothiazide and edema.  She had a coronary angiogram in 2009 showing normal coronary arteries.  Her edema has resolved with discontinuation of amlodipine at the last office visit.  She had gone to the emergency room in the spring of this year due to leg swelling and ultrasound ruled out DVT. Remote history of hemoptysis suspicious for cancer with right lung resection which she reports was negative for cancer.    With discontinuation of amlodipine she was placed on terazosin and remains on carvedilol Lasix 20 mg a day Benicar 40 mg per day for her blood pressure.    Last echocardiogram in December 2017 revealed LV function of 50-55%.  She has normal renal function.  Last basic metabolic panel early May off of hydrochlorothiazide showed resolution of her hyponatremia with a sodium of 138.    Today her blood pressure is high between 150 and 160 systolic.  She is quite emotionally upset.    Social history: She lost her daughter due to enlarged heart and she had 3 children.  The youngest was 6 months old at the time.  These children are now grown and one of Larry's greatest support systems.  Her son age 57 has been diagnosed with lung cancer and undergone treatment.  He is awaiting his scan results today to see if the treatment was successful.  She moved here from [place] after marrying a man in the  and became .  She was living with a gentleman for 27 years and was not .  He went to a nursing home and passed away several years ago.  She has been employed at [employer] however had shoulder replacement several months ago and is not able to work.  This has put  financial stress on her as well.    Today she feels slightly breathless.  I suspect this in part is from the anxiety over the social issues coupled with the fact that her blood pressure is high.  Her edema has resolved.  Her lungs are clear.  JVP is mildly elevated. Lungs are clear; JVP mildly elevated.        Impression/Plan:    1.  History of idiopathic cardiomyopathy-improved   -ejection fraction 50-55%    2.  Normal coronary arteries as of 2009    3.  Hypertension-uncontrolled (she did have an elevated left ventricular end-diastolic pressure in the Cath Lab in 2009) associated with increased shortness of breath and weight gain of approximately 5 pounds.  I suspect she has diastolic dysfunction.  -Increase terazosin to 4 mg per day until her prescription is gone, then fill the 5 mg prescription I sent in.  She is having a mild amount of dry mouth and if this worsens we may need to switch to hydralazine  -Continue Benicar and Coreg  -Increase furosemide to 40 mg once today then back to 20 mg daily  -Check a BNP.  If this is elevated, continue higher dose Lasix for a few days and follow basic metabolic panel closely with her history of hyponatremia (on hydrochlorothiazide)  -See me back in 4-6 weeks with a basic metabolic panel  -Edema has resolved off amlodipine    4.  Dyslipidemia  -Continue simvastatin 10 mg per day  -Last LDL in our system in 2015 was 52  -Follow through primary care    5.  Full social stressors causing some anxiety likely contributing to her uncontrolled blood pressures.    It has been a pleasure seeing Larry Melendez in follow up.  I will see her back in approximately 4-6 weeks and I will call her with her BNP levels when they return.  Greater than 50% of this 30 minute visit was spent in counseling    Lakhwinder Bianchi, MSN, APRN-BC, CNP  Cardiology    Orders Placed This Encounter   Procedures     N terminal pro BNP outpatient     Basic metabolic panel     Follow-Up with Cardiac  Advanced Practice Provider     Orders Placed This Encounter   Medications     terazosin (HYTRIN) 5 MG capsule     Sig: Take 1 capsule (5 mg) by mouth At Bedtime     Dispense:  90 capsule     Refill:  3     Medications Discontinued During This Encounter   Medication Reason     senna-docusate (SENOKOT-S;PERICOLACE) 8.6-50 MG per tablet Therapy completed     terazosin (HYTRIN) 2 MG capsule Reorder         Encounter Diagnoses   Name Primary?     Dilated cardiomyopathy (H)      Benign essential hypertension      Essential hypertension      Pure hypercholesterolemia      Hypercholesterolemia Yes     Coronary artery disease involving native coronary artery of native heart without angina pectoris        CURRENT MEDICATIONS:  Current Outpatient Prescriptions   Medication Sig Dispense Refill     Acetaminophen (TYLENOL PO) Take 500 mg by mouth every 6 hours as needed for mild pain or fever        carvedilol (COREG) 25 MG tablet Take 2 tablets (50 mg) by mouth 2 times daily 360 tablet 3     Cholecalciferol (VITAMIN D) 2000 UNITS tablet Take 1 tablet by mouth daily       Furosemide (LASIX PO) Take 20 mg by mouth daily        olmesartan (BENICAR) 40 MG tablet Take 1 tablet (40 mg) by mouth daily 90 tablet 2     Omeprazole (PRILOSEC PO) Take 10 mg by mouth daily       potassium chloride (K-TAB,KLOR-CON) 10 MEQ tablet Take 10 mEq by mouth 2 times daily       simvastatin (ZOCOR) 10 MG tablet Take 1 tablet (10 mg) by mouth At Bedtime 90 tablet 3     terazosin (HYTRIN) 5 MG capsule Take 1 capsule (5 mg) by mouth At Bedtime 90 capsule 3     OXYCODONE HCL PO Take 5-10 mg by mouth every 4 hours as needed       [DISCONTINUED] terazosin (HYTRIN) 2 MG capsule Take 1 capsule (2 mg) by mouth At Bedtime 90 capsule 3       ALLERGIES     Allergies   Allergen Reactions     Amlodipine      swelling     Aspirin Other (See Comments)     Bleeding              Bleeding, GI Lesion         Codeine Sulfate GI Disturbance       PAST MEDICAL HISTORY:  Past  Medical History:   Diagnosis Date     Arthritis      Breast cancer (H)      Cardiomyopathy (H)     ideopathic     Hypercholesterolemia      Hypertension      Hypokalemia      Malignant neoplasm (H)      Shortness of breath      Shoulder pain        PAST SURGICAL HISTORY:  Past Surgical History:   Procedure Laterality Date     BACK SURGERY       CHOLECYSTECTOMY       EYE SURGERY       partial masectomy         FAMILY HISTORY:  Family History   Problem Relation Age of Onset     Other Cancer Mother      Other Cancer Brother      Family History Negative No family hx of        SOCIAL HISTORY:  Social History     Social History     Marital status: Single     Spouse name: N/A     Number of children: N/A     Years of education: N/A     Social History Main Topics     Smoking status: Never Smoker     Smokeless tobacco: Never Used     Alcohol use Yes      Comment: socially     Drug use: No     Sexual activity: No     Other Topics Concern     Special Diet No     Exercise No     Social History Narrative       Review of Systems:  Skin:  Negative       Eyes:  Positive for glasses    ENT:  Negative      Respiratory:  Positive for wheezing;dyspnea on exertion;shortness of breath;dyspnea at rest SOB on and off, sometimes when at rest   Cardiovascular:  Negative for;palpitations;edema;lightheadedness;dizziness Positive for;fatigue    Gastroenterology: Positive for reflux    Genitourinary:  not assessed      Musculoskeletal:  Positive for back pain;joint pain;nocturnal cramping    Neurologic:  Negative      Psychiatric:  Positive for anxiety    Heme/Lymph/Imm:  Positive for allergies    Endocrine:  Negative        Physical Exam:  Vitals: /84  Pulse 84  Ht 1.524 m (5')  Wt 66.7 kg (147 lb 1.6 oz)  BMI 28.73 kg/m2    Constitutional:  cooperative, alert and oriented, well developed, well nourished, in no acute distress overweight;appears anxious      Skin:  warm and dry to the touch, no apparent skin lesions or masses noted           Head:  normocephalic, no masses or lesions;normocephalic        Eyes:  pupils equal and round, conjunctivae and lids unremarkable, sclera white, no xanthalasma, EOMS intact, no nystagmus        Lymph:      ENT:  no pallor or cyanosis, dentition good        Neck:  carotid pulses are full and equal bilaterally, JVP normal, no carotid bruit        Respiratory:  normal breath sounds, clear to auscultation, normal A-P diameter, normal symmetry, normal respiratory excursion, no use of accessory muscles         Cardiac: regular rhythm;normal S1 and S2;no murmurs, gallops or rubs detected   S4            pulses full and equal, no bruits auscultated                                        GI:  not assessed this visit        Extremities and Muscular Skeletal:  no deformities, clubbing, cyanosis, erythema observed   bilateral LE edema;trace          Neurological:  no gross motor deficits;affect appropriate        Psych:  Alert and Oriented x 3      Recent Lab Results:  LIPID RESULTS:  Lab Results   Component Value Date    CHOL 135 10/08/2015    HDL 59 10/08/2015    LDL 52 (L) 10/08/2015    TRIG 120 10/08/2015    CHOLHDLRATIO 2.3 10/08/2015       LIVER ENZYME RESULTS:  Lab Results   Component Value Date    AST 16 03/09/2018    ALT 19 03/09/2018       CBC RESULTS:  Lab Results   Component Value Date    WBC 6.4 03/09/2018    RBC 4.45 03/09/2018    HGB 12.7 03/09/2018    HCT 38.0 03/09/2018    MCV 85 03/09/2018    MCH 28.5 03/09/2018    MCHC 33.4 03/09/2018    RDW 15.0 03/09/2018     03/09/2018       BMP RESULTS:  Lab Results   Component Value Date     05/02/2018    POTASSIUM 4.4 05/02/2018    CHLORIDE 99 05/02/2018    CO2 31 (H) 05/02/2018    ANIONGAP 12.4 05/02/2018     (H) 05/02/2018    BUN 13 05/02/2018    CR 0.84 05/02/2018    GFRESTIMATED 66 05/02/2018    GFRESTBLACK 80 05/02/2018    PENNY 9.4 05/02/2018        A1C RESULTS:  No results found for: A1C    INR RESULTS:  Lab Results   Component Value Date     INR 0.97 06/05/2009           CC  Brett Smith MD  [id number]  [place] [id number]

## 2018-06-12 NOTE — PATIENT INSTRUCTIONS
Take extra dose of furosemide(Lasix) today    Check a marker for fluid retention  If that is up, we will have you take more extra furosemide    Increase terazosin to 4mg daily(2 pills until gone)  Then increase Terazosin to 5mg daily( one daily) when you  the rx    See me in one month with nonfasting labs

## 2018-06-12 NOTE — LETTER
6/12/2018    Malaika Rodriguez MD, MD  [employer] [address]  [facility] [address]    RE: Larry Melendez       Dear Colleague,    I had the pleasure of seeing Larry Melendez in the [facility].    History of Present Illness:    Larry Melendez is a 77 year old female followed here by Dr. Albino Connors.  She returns today to follow-up on changes made to her blood pressure medications at the last office visit.    She has a history of a mild idiopathic cardiomyopathy, with hypertension and hyponatremia due to hydrochlorothiazide and edema.  She had a coronary angiogram in 2009 showing normal coronary arteries.  Her edema has resolved with discontinuation of amlodipine at the last office visit.  She had gone to the emergency room in the spring of this year due to leg swelling and ultrasound ruled out DVT. Remote history of hemoptysis suspicious for cancer with right lung resection which she reports was negative for cancer.    With discontinuation of amlodipine she was placed on terazosin and remains on carvedilol Lasix 20 mg a day Benicar 40 mg per day for her blood pressure.    Last echocardiogram in December 2017 revealed LV function of 50-55%.  She has normal renal function.  Last basic metabolic panel early May off of hydrochlorothiazide showed resolution of her hyponatremia with a sodium of 138.    Today her blood pressure is high between 150 and 160 systolic.  She is quite emotionally upset.    Social history: She lost her daughter due to enlarged heart and she had 3 children.  The youngest was 6 months old at the time.  These children are now grown and one of [facility]'s greatest support systems.  Her son age 57 has been diagnosed with lung cancer and undergone treatment.  He is awaiting his scan results today to see if the treatment was successful.  She moved here from [place] after marrying a man in the  and became .  She was living  with a gentleman for 27 years and was not .  He went to a nursing home and passed away several years ago.  She has been employed at [employer] however had shoulder replacement several months ago and is not able to work.  This has put financial stress on her as well.    Today she feels slightly breathless.  I suspect this in part is from the anxiety over the social issues coupled with the fact that her blood pressure is high.  Her edema has resolved.  Her lungs are clear.  JVP is mildly elevated. Lungs are clear; JVP mildly elevated.        Impression/Plan:    1.  History of idiopathic cardiomyopathy-improved   -ejection fraction 50-55%    2.  Normal coronary arteries as of 2009    3.  Hypertension-uncontrolled (she did have an elevated left ventricular end-diastolic pressure in the Cath Lab in 2009) associated with increased shortness of breath and weight gain of approximately 5 pounds.  I suspect she has diastolic dysfunction.  -Increase trazodone to 4 mg per day until her prescription is gone, then fill the 5 mg prescription I sent in.  She is having a mild amount of dry mouth and if this worsens we may need to switch to hydralazine  -Continue Benicar and Coreg  -Increase furosemide to 40 mg once today then back to 20 mg daily  -Check a BNP.  If this is elevated, continue higher dose Lasix for a few days and follow basic metabolic panel closely with her history of hyponatremia (on hydrochlorothiazide)  -See me back in 4-6 weeks with a basic metabolic panel  -Edema has resolved off amlodipine    4.  Dyslipidemia  -Continue simvastatin 10 mg per day  -Last LDL in our system in 2015 was 52  -Follow through primary care    5.  Full social stressors causing some anxiety likely contributing to her uncontrolled blood pressures.    It has been a pleasure seeing Larry Melendez in follow up.  I will see her back in approximately 4-6 weeks and I will call her with her BNP levels when they  return.  Greater than 50% of this 30 minute visit was spent in counseling    Lakhwinder Bianchi, MSN, APRN-BC, CNP  Cardiology    Orders Placed This Encounter   Procedures     N terminal pro BNP outpatient     Basic metabolic panel     Follow-Up with Cardiac Advanced Practice Provider     Orders Placed This Encounter   Medications     terazosin (HYTRIN) 5 MG capsule     Sig: Take 1 capsule (5 mg) by mouth At Bedtime     Dispense:  90 capsule     Refill:  3     Medications Discontinued During This Encounter   Medication Reason     senna-docusate (SENOKOT-S;PERICOLACE) 8.6-50 MG per tablet Therapy completed     terazosin (HYTRIN) 2 MG capsule Reorder         Encounter Diagnoses   Name Primary?     Dilated cardiomyopathy (H)      Benign essential hypertension      Essential hypertension      Pure hypercholesterolemia      Hypercholesterolemia Yes     Coronary artery disease involving native coronary artery of native heart without angina pectoris        CURRENT MEDICATIONS:  Current Outpatient Prescriptions   Medication Sig Dispense Refill     Acetaminophen (TYLENOL PO) Take 500 mg by mouth every 6 hours as needed for mild pain or fever        carvedilol (COREG) 25 MG tablet Take 2 tablets (50 mg) by mouth 2 times daily 360 tablet 3     Cholecalciferol (VITAMIN D) 2000 UNITS tablet Take 1 tablet by mouth daily       Furosemide (LASIX PO) Take 20 mg by mouth daily        olmesartan (BENICAR) 40 MG tablet Take 1 tablet (40 mg) by mouth daily 90 tablet 2     Omeprazole (PRILOSEC PO) Take 10 mg by mouth daily       potassium chloride (K-TAB,KLOR-CON) 10 MEQ tablet Take 10 mEq by mouth 2 times daily       simvastatin (ZOCOR) 10 MG tablet Take 1 tablet (10 mg) by mouth At Bedtime 90 tablet 3     terazosin (HYTRIN) 5 MG capsule Take 1 capsule (5 mg) by mouth At Bedtime 90 capsule 3     OXYCODONE HCL PO Take 5-10 mg by mouth every 4 hours as needed       [DISCONTINUED] terazosin (HYTRIN) 2 MG capsule Take 1 capsule (2 mg) by  mouth At Bedtime 90 capsule 3       ALLERGIES     Allergies   Allergen Reactions     Amlodipine      swelling     Aspirin Other (See Comments)     Bleeding              Bleeding, GI Lesion         Codeine Sulfate GI Disturbance       PAST MEDICAL HISTORY:  Past Medical History:   Diagnosis Date     Arthritis      Breast cancer (H)      Cardiomyopathy (H)     ideopathic     Hypercholesterolemia      Hypertension      Hypokalemia      Malignant neoplasm (H)      Shortness of breath      Shoulder pain        PAST SURGICAL HISTORY:  Past Surgical History:   Procedure Laterality Date     BACK SURGERY       CHOLECYSTECTOMY       EYE SURGERY       partial masectomy         FAMILY HISTORY:  Family History   Problem Relation Age of Onset     Other Cancer Mother      Other Cancer Brother      Family History Negative No family hx of        SOCIAL HISTORY:  Social History     Social History     Marital status: Single     Spouse name: N/A     Number of children: N/A     Years of education: N/A     Social History Main Topics     Smoking status: Never Smoker     Smokeless tobacco: Never Used     Alcohol use Yes      Comment: socially     Drug use: No     Sexual activity: No     Other Topics Concern     Special Diet No     Exercise No     Social History Narrative       Review of Systems:  Skin:  Negative       Eyes:  Positive for glasses    ENT:  Negative      Respiratory:  Positive for wheezing;dyspnea on exertion;shortness of breath;dyspnea at rest SOB on and off, sometimes when at rest   Cardiovascular:  Negative for;palpitations;edema;lightheadedness;dizziness Positive for;fatigue    Gastroenterology: Positive for reflux    Genitourinary:  not assessed      Musculoskeletal:  Positive for back pain;joint pain;nocturnal cramping    Neurologic:  Negative      Psychiatric:  Positive for anxiety    Heme/Lymph/Imm:  Positive for allergies    Endocrine:  Negative        Physical Exam:  Vitals: /84  Pulse 84  Ht 1.524 m (5')   Wt 66.7 kg (147 lb 1.6 oz)  BMI 28.73 kg/m2    Constitutional:  cooperative, alert and oriented, well developed, well nourished, in no acute distress overweight;appears anxious      Skin:  warm and dry to the touch, no apparent skin lesions or masses noted          Head:  normocephalic, no masses or lesions;normocephalic        Eyes:  pupils equal and round, conjunctivae and lids unremarkable, sclera white, no xanthalasma, EOMS intact, no nystagmus        Lymph:      ENT:  no pallor or cyanosis, dentition good        Neck:  carotid pulses are full and equal bilaterally, JVP normal, no carotid bruit        Respiratory:  normal breath sounds, clear to auscultation, normal A-P diameter, normal symmetry, normal respiratory excursion, no use of accessory muscles         Cardiac: regular rhythm;normal S1 and S2;no murmurs, gallops or rubs detected   S4            pulses full and equal, no bruits auscultated                                        GI:  not assessed this visit        Extremities and Muscular Skeletal:  no deformities, clubbing, cyanosis, erythema observed   bilateral LE edema;trace          Neurological:  no gross motor deficits;affect appropriate        Psych:  Alert and Oriented x 3      Recent Lab Results:  LIPID RESULTS:  Lab Results   Component Value Date    CHOL 135 10/08/2015    HDL 59 10/08/2015    LDL 52 (L) 10/08/2015    TRIG 120 10/08/2015    CHOLHDLRATIO 2.3 10/08/2015       LIVER ENZYME RESULTS:  Lab Results   Component Value Date    AST 16 03/09/2018    ALT 19 03/09/2018       CBC RESULTS:  Lab Results   Component Value Date    WBC 6.4 03/09/2018    RBC 4.45 03/09/2018    HGB 12.7 03/09/2018    HCT 38.0 03/09/2018    MCV 85 03/09/2018    MCH 28.5 03/09/2018    MCHC 33.4 03/09/2018    RDW 15.0 03/09/2018     03/09/2018       BMP RESULTS:  Lab Results   Component Value Date     05/02/2018    POTASSIUM 4.4 05/02/2018    CHLORIDE 99 05/02/2018    CO2 31 (H) 05/02/2018    ANIONGAP 12.4  05/02/2018     (H) 05/02/2018    BUN 13 05/02/2018    CR 0.84 05/02/2018    GFRESTIMATED 66 05/02/2018    GFRESTBLACK 80 05/02/2018    PENNY 9.4 05/02/2018        A1C RESULTS:  No results found for: A1C    INR RESULTS:  Lab Results   Component Value Date    INR 0.97 06/05/2009           CC  Brett Smith MD  [address]  [place] [address]                  Thank you for allowing me to participate in the care of your patient.      Sincerely,     KATIE Herrera CNP     [facility]    cc:   Brett Smith MD  [address]  [place] [address]

## 2018-06-13 RX ORDER — POTASSIUM CHLORIDE 750 MG/1
10 TABLET, EXTENDED RELEASE ORAL 2 TIMES DAILY
Qty: 100 TABLET | Refills: 3 | Status: SHIPPED | OUTPATIENT
Start: 2018-06-13 | End: 2018-06-14

## 2018-06-13 NOTE — PROGRESS NOTES
Please call her  bnp is up to 2627 and she was sob with 4 pound wt gain when I saw her    I had told her to take lasix 20mg bid today and I think we need to do that for 4-5 days either 40 daily or 20 bid with one extra potassium pill each day for 4-5 days(already takes 2 per day)    Have her track weight and call after 4 days of this and see if we do 5 days    Needs bmp repeat in one week with bp check please with nursing    Let me know how weight does please, as always    thx

## 2018-06-13 NOTE — PROGRESS NOTES
Called pt, reviewed labs showing she may have extra fluid, so DTK would like her to take Lasix 20mg BID x 4-5 days, not just 1 day. Pt took Lasix 20mg BID yesterday, she will take again today. Told pt to plan on taking Lasix 20mg BID through Saturday 6/16, as well as 1 extra tablet of KCL today through Saturday, and go back to normal dose of Lasix and KCL on Sunday. Pt stated understanding. Pt weighing herself but not writing it down. Asked pt to write down her weight and we will call her Friday for update. Wt is down 3# today per pt from yesterday. Pt scheduled for BP check next week on 6/19, added BMP to that. Pt in agreement. Pt also requested KCL refill, Rx sent. Messaged CORE RN board to call pt Friday with update. SUDHA Moore 3:18 PM 06/13/18

## 2018-06-14 RX ORDER — POTASSIUM CHLORIDE 750 MG/1
10 TABLET, EXTENDED RELEASE ORAL 2 TIMES DAILY
Qty: 100 TABLET | Refills: 3 | Status: SHIPPED | OUTPATIENT
Start: 2018-06-14 | End: 2018-11-26

## 2018-06-15 NOTE — PROGRESS NOTES
Spoke w/ pt. Her breathing is much more comfortable, and she can move around her house w/o significant sx.     Clinic wt 6/12 147#. Wt is down to 140.6# and she plans to cont w/ increased lasix/KCL through tomorrow.     We reviewed plan for BP check and BMP 6/19.     Confirmed w/ her a refill for KCL was sent yesterday.    Asked her to call w/ concerns/questions. FYI to Lakhwinder Ho CNP.    Valerie Joyner [facility] Clinic RN 9:47 AM 06/15/18  [phone]

## 2018-06-19 ENCOUNTER — DOCUMENTATION ONLY (OUTPATIENT)
Dept: CARDIOLOGY | Facility: CLINIC | Age: 78
End: 2018-06-19

## 2018-06-19 ENCOUNTER — ALLIED HEALTH/NURSE VISIT (OUTPATIENT)
Dept: CARDIOLOGY | Facility: CLINIC | Age: 78
End: 2018-06-19
Attending: NURSE PRACTITIONER
Payer: COMMERCIAL

## 2018-06-19 DIAGNOSIS — I10 BENIGN ESSENTIAL HYPERTENSION: ICD-10-CM

## 2018-06-19 LAB
ANION GAP SERPL CALCULATED.3IONS-SCNC: 9.6 MMOL/L (ref 6–17)
BUN SERPL-MCNC: 13 MG/DL (ref 7–30)
CALCIUM SERPL-MCNC: 9.3 MG/DL (ref 8.5–10.5)
CHLORIDE SERPL-SCNC: 97 MMOL/L (ref 98–107)
CO2 SERPL-SCNC: 31 MMOL/L (ref 23–29)
CREAT SERPL-MCNC: 0.82 MG/DL (ref 0.7–1.3)
GFR SERPL CREATININE-BSD FRML MDRD: 68 ML/MIN/1.7M2
GLUCOSE SERPL-MCNC: 109 MG/DL (ref 70–105)
POTASSIUM SERPL-SCNC: 4.6 MMOL/L (ref 3.5–5.1)
SODIUM SERPL-SCNC: 133 MMOL/L (ref 136–145)

## 2018-06-19 PROCEDURE — 80048 BASIC METABOLIC PNL TOTAL CA: CPT | Performed by: NURSE PRACTITIONER

## 2018-06-19 PROCEDURE — 99207 ZZC NO CHARGE NURSE ONLY: CPT

## 2018-06-19 PROCEDURE — 36415 COLL VENOUS BLD VENIPUNCTURE: CPT | Performed by: NURSE PRACTITIONER

## 2018-06-19 NOTE — PROGRESS NOTES
Last office visit: 6/12/2018    Previous blood pressure:   146/84   Mm Hg     Previous heart rate: 84     bpm    Time of reading:10:15am    Morning medications were taken at: 6:45am    Today's blood pressure:  148/80     mm Hg    Today's heart rate:   74    bpm     Ordering Provider:KATIE Herrera CNP    Results sent to team # :Core team    Additional comments:

## 2018-06-19 NOTE — PROGRESS NOTES
Her labs today look okay    bp here high    I need her to wear a  24 hour bp monitor as OP before I see her in July    Want to make sure none of these numbers are white coat    Can you call to arrange please    She wore one in past and they looked pretty good in spite of her being 140 in clinic

## 2018-06-19 NOTE — MR AVS SNAPSHOT
After Visit Summary   6/19/2018    Larry Melendez    MRN: [id number]           Patient Information     Date Of Birth          1940        Visit Information        Provider Department      6/19/2018 10:30 AM [facility]        Today's Diagnoses     Benign essential hypertension           Follow-ups after your visit        Your next 10 appointments already scheduled     Jun 19, 2018 10:30 AM CDT   Nurse Only with [facility]   [facility] ([facility])    [address] [id number] [phone] OPT 2            Jun 19, 2018 10:50 AM CDT   LAB with [facility]   [facility] ([facility])    [address] [place] [id number] [phone]           Please do not eat 10-12 hours before your appointment if you are coming in fasting for labs on lipids, cholesterol, or glucose (sugar). This does not apply to pregnant women. Water, hot tea and black coffee (with nothing added) are okay. Do not drink other fluids, diet soda or chew gum.            Jul 17, 2018  8:30 AM CDT   LAB with [facility]   [facility] ([facility])    [address] [place] [id number] [phone]           Please do not eat 10-12 hours before your appointment if you are coming in fasting for labs on lipids, cholesterol, or glucose (sugar). This does not apply to pregnant women. Water, hot tea and black coffee (with nothing added) are okay. Do not drink other fluids, diet soda or chew gum.            Jul 17, 2018  9:30 AM CDT   Return Visit with KATIE Herrera CNP   [facility] ([facility])    [address] [address] [phone] OPT 2              Who to  "contact     If you have questions or need follow up information about today's clinic visit or your schedule please contact [facility] directly at [phone].  Normal or non-critical lab and imaging results will be communicated to you by MyChart, letter or phone within 4 business days after the clinic has received the results. If you do not hear from us within 7 days, please contact the clinic through MyChart or phone. If you have a critical or abnormal lab result, we will notify you by phone as soon as possible.  Submit refill requests through [employer] or call your pharmacy and they will forward the refill request to us. Please allow 3 business days for your refill to be completed.          Additional Information About Your Visit        [employer] Information     [facility] lets you send messages to your doctor, view your test results, renew your prescriptions, schedule appointments and more. To sign up, go to [email or web address] . Click on \"Log in\" on the left side of the screen, which will take you to the Welcome page. Then click on \"Sign up Now\" on the right side of the page.     You will be asked to enter the access code listed below, as well as some personal information. Please follow the directions to create your username and password.     Your access code is: [email or web address]  Expires: 9/10/2018  2:53 PM     Your access code will  in 90 days. If you need help or a new code, please call your [place] clinic or [phone].        Care EveryWhere ID     This is your Care EveryWhere ID. This could be used by other organizations to access your [place] medical records  [id number]         Blood Pressure from Last 3 Encounters:   18 146/84   18 138/84   18 147/78    Weight from Last 3 Encounters:   18 66.7 kg (147 lb 1.6 oz)   18 64.9 kg (143 lb)   18 62.6 kg (138 lb)              We Performed the Following     Follow-Up with Nurse     "    Primary Care Provider Office Phone # Fax #    Malaika Rodriguez [phone]       [facility] [address]  [facility] [address]        Equal Access to Services     [place] : Hadii samira ku alessandroo Soklausali, waaxda luqadaha, qaybta kaalmada adeeliasda, rakel fernando laKimberlysaran gimenez. So [facility] [phone].    ATENCIÓN: Si habla español, tiene a munoz disposición servicios gratuitos de asistencia lingüística. Llame al [phone].    We comply with applicable federal civil rights laws and [place] laws. We do not discriminate on the basis of race, color, national origin, age, disability, sex, sexual orientation, or gender identity.            Thank you!     Thank you for choosing [facility]  for your care. Our goal is always to provide you with excellent care. Hearing back from our patients is one way we can continue to improve our services. Please take a few minutes to complete the written survey that you may receive in the mail after your visit with us. Thank you!             Your Updated Medication List - Protect others around you: Learn how to safely use, store and throw away your medicines at www.disposemymeds.org.          This list is accurate as of 6/19/18 10:22 AM.  Always use your most recent med list.                   Brand Name Dispense Instructions for use Diagnosis    carvedilol 25 MG tablet    COREG    360 tablet    Take 2 tablets (50 mg) by mouth 2 times daily    Dilated cardiomyopathy (H), Benign essential hypertension       LASIX PO      Take 20 mg by mouth daily        olmesartan 40 MG tablet    BENICAR    90 tablet    Take 1 tablet (40 mg) by mouth daily    Essential hypertension       OXYCODONE HCL PO      Take 5-10 mg by mouth every 4 hours as needed        potassium chloride 10 MEQ tablet    K-TAB,KLOR-CON    100 tablet    Take 1 tablet (10 mEq) by mouth 2 times daily And as needed as directed.    Benign essential  hypertension       PRILOSEC PO      Take 10 mg by mouth daily        simvastatin 10 MG tablet    ZOCOR    90 tablet    Take 1 tablet (10 mg) by mouth At Bedtime    Pure hypercholesterolemia       terazosin 5 MG capsule    HYTRIN    90 capsule    Take 1 capsule (5 mg) by mouth At Bedtime    Benign essential hypertension       TYLENOL PO      Take 500 mg by mouth every 6 hours as needed for mild pain or fever        vitamin D 2000 units tablet      Take 1 tablet by mouth daily

## 2018-06-20 NOTE — PROGRESS NOTES
Called and spoke with pt.  Reviewed that BMP done yesterday was reviewed by DTK and looks ok.  Also reviewed that DTK recommends she have a 24-hr BP monitor done before she is seen on 7/17/18 in order to determine if high BPs in clinic are true or related to white coat.  Pt verbalized understanding and agrees to be transferred to scheduling to arrange.    SUDHA Black 2:39 PM 6/20/2018

## 2018-06-20 NOTE — PROGRESS NOTES
Called pt, no answer, LVM requesting return call to review non-urgent labs and recommendations from DTK.    SUDHA Black 10:19 AM 6/20/2018

## 2018-06-20 NOTE — PROGRESS NOTES
Received return VM from pt.    Called pt, no answer, LVM requesting return call again to review non-urgent lab results and recommendations from DTK.    SUDHA Black 1:17 PM 6/20/2018

## 2018-06-21 ENCOUNTER — DOCUMENTATION ONLY (OUTPATIENT)
Dept: CARDIOLOGY | Facility: CLINIC | Age: 78
End: 2018-06-21

## 2018-06-21 NOTE — PROGRESS NOTES
-Can you call her and let her know that the only diagnosis medicare will cover for an ambulatory BP monitor that I just ordered is HTN with no history of hypertension      She has HTN    So [facility] know [facility] and cannot be sure that the test will be covered or paid for and I cannot change it    See what she says    I think cost is usually around 70.00 but not sure if you have a way to find out--wendy natarajan may know    thanks

## 2018-06-22 NOTE — PROGRESS NOTES
Called Beth with [employer] ([phone]) and inquired what out-of-pocket cost of abmulatory BP monitor would be for pt since medicare will not cover based on diagnosis code used.  Beth states she will have  Financial counselor call pt with out-of-pocket quote.      Of note, pt was scheduled for BP monitor to be placed today; however, it was cancelled by pt since it will not be covered by insurance per scheduling note.    Called pt, no answer, LVM informing her that she will be hearing from  financial counselor with out-of-pocket quote for BP monitor and she can then decide if she would like to reschedule BP monitor placement or not.    Routed to JENNIFER as KIARA Black RN 10:37 AM 6/22/2018

## 2018-07-17 ENCOUNTER — OFFICE VISIT (OUTPATIENT)
Dept: CARDIOLOGY | Facility: CLINIC | Age: 78
End: 2018-07-17
Attending: NURSE PRACTITIONER
Payer: COMMERCIAL

## 2018-07-17 VITALS
HEIGHT: 60 IN | HEART RATE: 78 BPM | BODY MASS INDEX: 28.66 KG/M2 | WEIGHT: 146 LBS | DIASTOLIC BLOOD PRESSURE: 80 MMHG | SYSTOLIC BLOOD PRESSURE: 130 MMHG

## 2018-07-17 DIAGNOSIS — I10 BENIGN ESSENTIAL HYPERTENSION: ICD-10-CM

## 2018-07-17 DIAGNOSIS — R06.02 SHORTNESS OF BREATH: Primary | ICD-10-CM

## 2018-07-17 DIAGNOSIS — E78.00 HYPERCHOLESTEROLEMIA: ICD-10-CM

## 2018-07-17 LAB
ANION GAP SERPL CALCULATED.3IONS-SCNC: 10.8 MMOL/L (ref 6–17)
BUN SERPL-MCNC: 12 MG/DL (ref 7–30)
CALCIUM SERPL-MCNC: 9 MG/DL (ref 8.5–10.5)
CHLORIDE SERPL-SCNC: 97 MMOL/L (ref 98–107)
CO2 SERPL-SCNC: 30 MMOL/L (ref 23–29)
CREAT SERPL-MCNC: 0.82 MG/DL (ref 0.7–1.3)
GFR SERPL CREATININE-BSD FRML MDRD: 68 ML/MIN/1.7M2
GLUCOSE SERPL-MCNC: 111 MG/DL (ref 70–105)
POTASSIUM SERPL-SCNC: 3.8 MMOL/L (ref 3.5–5.1)
SODIUM SERPL-SCNC: 134 MMOL/L (ref 136–145)

## 2018-07-17 PROCEDURE — 36415 COLL VENOUS BLD VENIPUNCTURE: CPT | Performed by: NURSE PRACTITIONER

## 2018-07-17 PROCEDURE — 99214 OFFICE O/P EST MOD 30 MIN: CPT | Performed by: NURSE PRACTITIONER

## 2018-07-17 PROCEDURE — 80048 BASIC METABOLIC PNL TOTAL CA: CPT | Performed by: NURSE PRACTITIONER

## 2018-07-17 RX ORDER — FUROSEMIDE 20 MG
TABLET ORAL
Qty: 100 TABLET | Refills: 3 | Status: SHIPPED | OUTPATIENT
Start: 2018-07-17 | End: 2019-06-19

## 2018-07-17 NOTE — PROGRESS NOTES
History of Present Illness:     Larry Melendez is a 77 year old female followed here by Dr. Albino Connors.  She returns today to follow-up on her hypertension and lab work.     She has a history of a mild idiopathic cardiomyopathy, hypertension and hyponatremia due to hydrochlorothiazide and edema.  She had a coronary angiogram in 2009 showing normal coronary arteries.  Her edema has resolved with discontinuation of amlodipine at the last office visit.  She had gone to the emergency room in the spring of this year due to leg swelling and ultrasound ruled out DVT. Remote history of hemoptysis suspicious for cancer with right lung resection which she reports was negative for cancer.     With discontinuation of amlodipine she was placed on terazosin and remains on carvedilol, Lasix 20 mg a day, and Benicar 40 mg per day for her blood pressure.     Last echocardiogram in December 2017 revealed LV function of 50-55%.  She has normal renal function.  Last basic metabolic panel early May off of hydrochlorothiazide showed resolution of her hyponatremia with a sodium of 138.  She is now on furosemide 20 mg per day and I briefly increase this to 40 mg a day after her last office visit as she had an elevated BNP to over 2000 with breathlessness with activity.  With a brief increase in her furosemide she felt significantly better however her sodium dropped again to the 133-134 range and is stable there.     Her blood pressure last time was quite elevated, this is improved today and at home. I had increased terazosin at the last office visit.    She has multiple stressors going on.     Social history: She lost her daughter due to enlarged heart and she had 3 children.  The youngest was 6 months old at the time.  These children are now grown and one of Larry's greatest support systems.  Her son age 57 has been diagnosed with lung cancer and undergone treatment and thankfully his tumors are stable.  Unfortunately he  recently lost his wife to lung cancer in this patient is flying out to [place] for several weeks to be with him.  She moved here from [facility] after marrying a man in the  and became .  She was living with a gentleman for 27 years and was not .  He went to a nursing home and passed away several years ago.  She has been employed at [employer] however had shoulder replacement several months ago and is not able to work.  This has put financial stress on her as well.  She plans to look for alternate employment when she returns from [place].     Overall she is feeling much less breathless.  She has no edema.  Blood pressure is reasonably controlled.  Her JVP levels look lower today.  Her lungs are clear.  She notes more breathlessness in the humid weather.    Basic metabolic panel today: Sodium 134 potassium 3.8 BUN 12 creatinine 0.82     Impression/Plan:     1.  History of idiopathic cardiomyopathy-improved   (she did have an elevated left ventricular end-diastolic pressure in the Cath Lab in 2009)   -ejection fraction 50-55%  -Recently she had increased shortness of breath which may have been some diastolic dysfunction coupled with hypertension  -Responded well to furosemide increased briefly  -She will remain on her 20 mg of furosemide 5 days a week and we will try a 40 mg dose Tuesdays and Thursdays.  I told her sparingly she could take a 40 mg dose a third time during the week as minimally as possible.  -She will have a repeat BMP when she returns from her trip to [place] to follow her sodium closely     2.  Normal coronary arteries as of 2009     3.  Hypertension- improved control   -Continue Terazosin to 5mg.  -She is having a mild amount of dry mouth and if this worsens we may need to switch to hydralazine  -Continue Benicar and Coreg  -Increase furosemide slightly as noted above    4.  Dyslipidemia  -Continue simvastatin 10 mg per day  -Last LDL in our system in 2015 was 52  -Follow through  primary care     5.  Full social stressors causing some anxiety likely contributing to her uncontrolled blood pressures at times       It has been a pleasure seeing Larry Melendez in follow up. Repeat labs when she returns from [place] in several weeks; see us back in the fall.    Greater than 50% of this 30 minute visit was spent in counseling    Lakhwinder Bianchi, MSN, APRN-BC, CNP  Cardiology    Orders Placed This Encounter   Procedures     Basic metabolic panel     Orders Placed This Encounter   Medications     furosemide (LASIX) 20 MG tablet     Si pills(40mg) on Tuesday and Thursday, one pill the other 5 days of the week and as directe     Dispense:  100 tablet     Refill:  3     Dose change     Medications Discontinued During This Encounter   Medication Reason     Furosemide (LASIX PO)          Encounter Diagnoses   Name Primary?     Benign essential hypertension      Shortness of breath Yes     Hypercholesterolemia        CURRENT MEDICATIONS:  Current Outpatient Prescriptions   Medication Sig Dispense Refill     Acetaminophen (TYLENOL PO) Take 500 mg by mouth every 6 hours as needed for mild pain or fever        carvedilol (COREG) 25 MG tablet Take 2 tablets (50 mg) by mouth 2 times daily 360 tablet 3     Cholecalciferol (VITAMIN D) 2000 UNITS tablet Take 1 tablet by mouth daily       furosemide (LASIX) 20 MG tablet 2 pills(40mg) on Tuesday and Thursday, one pill the other 5 days of the week and as directe 100 tablet 3     olmesartan (BENICAR) 40 MG tablet Take 1 tablet (40 mg) by mouth daily 90 tablet 2     Omeprazole (PRILOSEC PO) Take 10 mg by mouth daily       OXYCODONE HCL PO Take 5-10 mg by mouth every 4 hours as needed       potassium chloride (K-TAB,KLOR-CON) 10 MEQ tablet Take 1 tablet (10 mEq) by mouth 2 times daily And as needed as directed. 100 tablet 3     simvastatin (ZOCOR) 10 MG tablet Take 1 tablet (10 mg) by mouth At Bedtime 90 tablet 3     terazosin (HYTRIN) 5 MG capsule Take  1 capsule (5 mg) by mouth At Bedtime 90 capsule 3     [DISCONTINUED] Furosemide (LASIX PO) Take 20 mg by mouth daily          ALLERGIES     Allergies   Allergen Reactions     Amlodipine      swelling     Aspirin Other (See Comments)     Bleeding              Bleeding, GI Lesion         Codeine Sulfate GI Disturbance       PAST MEDICAL HISTORY:  Past Medical History:   Diagnosis Date     Arthritis      Breast cancer (H)      Cardiomyopathy (H)     ideopathic     Hypercholesterolemia      Hypertension      Hypokalemia      Malignant neoplasm (H)      Shortness of breath      Shoulder pain        PAST SURGICAL HISTORY:  Past Surgical History:   Procedure Laterality Date     BACK SURGERY       CHOLECYSTECTOMY       EYE SURGERY       partial masectomy         FAMILY HISTORY:  Family History   Problem Relation Age of Onset     Other Cancer Mother      Other Cancer Brother      Family History Negative No family hx of        SOCIAL HISTORY:  Social History     Social History     Marital status: Single     Spouse name: N/A     Number of children: N/A     Years of education: N/A     Social History Main Topics     Smoking status: Never Smoker     Smokeless tobacco: Never Used     Alcohol use Yes      Comment: socially     Drug use: No     Sexual activity: No     Other Topics Concern     Special Diet No     Exercise No     Social History Narrative       Review of Systems:  Skin:  Negative       Eyes:  Positive for glasses    ENT:  Negative      Respiratory:  Positive for wheezing;dyspnea on exertion;shortness of breath;dyspnea at rest worse in humidity and better on higher dose lasix   Cardiovascular:  Negative      Gastroenterology: Positive for reflux;heartburn    Genitourinary:  not assessed      Musculoskeletal:  Positive for back pain;joint pain;nocturnal cramping    Neurologic:  Negative      Psychiatric:  Positive for anxiety    Heme/Lymph/Imm:  Positive for allergies    Endocrine:  Negative        Physical  Exam:  Vitals: /80  Pulse 78  Ht 1.524 m (5')  Wt 66.2 kg (146 lb)  BMI 28.51 kg/m2    Constitutional:  cooperative, alert and oriented, well developed, well nourished, in no acute distress overweight;appears anxious      Skin:  warm and dry to the touch, no apparent skin lesions or masses noted          Head:  normocephalic, no masses or lesions;normocephalic        Eyes:  pupils equal and round, conjunctivae and lids unremarkable, sclera white, no xanthalasma, EOMS intact, no nystagmus        Lymph:      ENT:  no pallor or cyanosis, dentition good        Neck:  carotid pulses are full and equal bilaterally, JVP normal, no carotid bruit        Respiratory:  normal breath sounds, clear to auscultation, normal A-P diameter, normal symmetry, normal respiratory excursion, no use of accessory muscles         Cardiac: regular rhythm;normal S1 and S2;no murmurs, gallops or rubs detected   S4            pulses full and equal, no bruits auscultated                                        GI:  not assessed this visit        Extremities and Muscular Skeletal:  no deformities, clubbing, cyanosis, erythema observed   bilateral LE edema;trace          Neurological:  no gross motor deficits;affect appropriate        Psych:  Alert and Oriented x 3      Recent Lab Results:  LIPID RESULTS:  Lab Results   Component Value Date    CHOL 135 10/08/2015    HDL 59 10/08/2015    LDL 52 (L) 10/08/2015    TRIG 120 10/08/2015    CHOLHDLRATIO 2.3 10/08/2015       LIVER ENZYME RESULTS:  Lab Results   Component Value Date    AST 16 03/09/2018    ALT 19 03/09/2018       CBC RESULTS:  Lab Results   Component Value Date    WBC 6.4 03/09/2018    RBC 4.45 03/09/2018    HGB 12.7 03/09/2018    HCT 38.0 03/09/2018    MCV 85 03/09/2018    MCH 28.5 03/09/2018    MCHC 33.4 03/09/2018    RDW 15.0 03/09/2018     03/09/2018       BMP RESULTS:  Lab Results   Component Value Date     (L) 07/17/2018    POTASSIUM 3.8 07/17/2018    CHLORIDE 97  (L) 07/17/2018    CO2 30 (H) 07/17/2018    ANIONGAP 10.8 07/17/2018     (H) 07/17/2018    BUN 12 07/17/2018    CR 0.82 07/17/2018    GFRESTIMATED 68 07/17/2018    GFRESTBLACK 82 07/17/2018    PENNY 9.0 07/17/2018        A1C RESULTS:  No results found for: A1C    INR RESULTS:  Lab Results   Component Value Date    INR 0.97 06/05/2009           CC  KATIE Herrera CNP  [id number]  [place] [address]

## 2018-07-17 NOTE — LETTER
7/17/2018    Malaika Rodriguez MD, MD  [employer] [address]  [place] [place] [facility] [address]    RE: Larry Melendez       Dear Colleague,    I had the pleasure of seeing Larry Melendez in the [facility].    History of Present Illness:     Larry Melendez is a 77 year old female followed here by Dr. Albino Connors.  She returns today to follow-up on her hypertension and lab work.     She has a history of a mild idiopathic cardiomyopathy, hypertension and hyponatremia due to hydrochlorothiazide and edema.  She had a coronary angiogram in 2009 showing normal coronary arteries.  Her edema has resolved with discontinuation of amlodipine at the last office visit.  She had gone to the emergency room in the spring of this year due to leg swelling and ultrasound ruled out DVT. Remote history of hemoptysis suspicious for cancer with right lung resection which she reports was negative for cancer.     With discontinuation of amlodipine she was placed on terazosin and remains on carvedilol, Lasix 20 mg a day, and Benicar 40 mg per day for her blood pressure.     Last echocardiogram in December 2017 revealed LV function of 50-55%.  She has normal renal function.  Last basic metabolic panel early May off of hydrochlorothiazide showed resolution of her hyponatremia with a sodium of 138.  She is now on furosemide 20 mg per day and I briefly increase this to 40 mg a day after her last office visit as she had an elevated BNP to over 2000 with breathlessness with activity.  With a brief increase in her furosemide she felt significantly better however her sodium dropped again to the 133-134 range and is stable there.     Her blood pressure last time was quite elevated, this is improved today and at home. I had increased terazosin at the last office visit.    She has multiple stressors going on.     Social history: She lost her daughter due to enlarged heart and she had 3 children.  The  youngest was 6 months old at the time.  These children are now grown and one of [facility] greatest support systems.  Her son age 57 has been diagnosed with lung cancer and undergone treatment and thankfully his tumors are stable.  Unfortunately he recently lost his wife to lung cancer in this patient is flying out to [place] for several weeks to be with him.  She moved here from [facility] after marrying a man in the  and became .  She was living with a gentleman for 27 years and was not .  He went to a nursing home and passed away several years ago.  She has been employed at [employer] however had shoulder replacement several months ago and is not able to work.  This has put financial stress on her as well.  She plans to look for alternate employment when she returns from [place].     Overall she is feeling much less breathless.  She has no edema.  Blood pressure is reasonably controlled.  Her JVP levels look lower today.  Her lungs are clear.  She notes more breathlessness in the humid weather.    Basic metabolic panel today: Sodium 134 potassium 3.8 BUN 12 creatinine 0.82     Impression/Plan:     1.  History of idiopathic cardiomyopathy-improved   (she did have an elevated left ventricular end-diastolic pressure in the Cath Lab in 2009)   -ejection fraction 50-55%  -Recently she had increased shortness of breath which may have been some diastolic dysfunction coupled with hypertension  -Responded well to furosemide increased briefly  -She will remain on her 20 mg of furosemide 5 days a week and we will try a 40 mg dose Tuesdays and Thursdays.  I told her sparingly she could take a 40 mg dose a third time during the week as minimally as possible.  -She will have a repeat BMP when she returns from her trip to [place] to follow her sodium closely     2.  Normal coronary arteries as of 2009     3.  Hypertension- improved control   -Continue Terazosin to 5mg.  -She is having a mild amount of dry  mouth and if this worsens we may need to switch to hydralazine  -Continue Benicar and Coreg  -Increase furosemide slightly as noted above    4.  Dyslipidemia  -Continue simvastatin 10 mg per day  -Last LDL in our system in  was 52  -Follow through primary care     5.  Full social stressors causing some anxiety likely contributing to her uncontrolled blood pressures at times       It has been a pleasure seeing Larry Melendez in follow up. Repeat labs when she returns from [place] in several weeks; see us back in the fall.    Greater than 50% of this 30 minute visit was spent in counseling    Lakhwinder Bianchi, MSN, APRN-BC, CNP  Cardiology    Orders Placed This Encounter   Procedures     Basic metabolic panel     Orders Placed This Encounter   Medications     furosemide (LASIX) 20 MG tablet     Si pills(40mg) on Tuesday and Thursday, one pill the other 5 days of the week and as directe     Dispense:  100 tablet     Refill:  3     Dose change     Medications Discontinued During This Encounter   Medication Reason     Furosemide (LASIX PO)          Encounter Diagnoses   Name Primary?     Benign essential hypertension      Shortness of breath Yes     Hypercholesterolemia        CURRENT MEDICATIONS:  Current Outpatient Prescriptions   Medication Sig Dispense Refill     Acetaminophen (TYLENOL PO) Take 500 mg by mouth every 6 hours as needed for mild pain or fever        carvedilol (COREG) 25 MG tablet Take 2 tablets (50 mg) by mouth 2 times daily 360 tablet 3     Cholecalciferol (VITAMIN D) 2000 UNITS tablet Take 1 tablet by mouth daily       furosemide (LASIX) 20 MG tablet 2 pills(40mg) on Tuesday and Thursday, one pill the other 5 days of the week and as directe 100 tablet 3     olmesartan (BENICAR) 40 MG tablet Take 1 tablet (40 mg) by mouth daily 90 tablet 2     Omeprazole (PRILOSEC PO) Take 10 mg by mouth daily       OXYCODONE HCL PO Take 5-10 mg by mouth every 4 hours as needed       potassium  chloride (K-TAB,KLOR-CON) 10 MEQ tablet Take 1 tablet (10 mEq) by mouth 2 times daily And as needed as directed. 100 tablet 3     simvastatin (ZOCOR) 10 MG tablet Take 1 tablet (10 mg) by mouth At Bedtime 90 tablet 3     terazosin (HYTRIN) 5 MG capsule Take 1 capsule (5 mg) by mouth At Bedtime 90 capsule 3     [DISCONTINUED] Furosemide (LASIX PO) Take 20 mg by mouth daily          ALLERGIES     Allergies   Allergen Reactions     Amlodipine      swelling     Aspirin Other (See Comments)     Bleeding              Bleeding, GI Lesion         Codeine Sulfate GI Disturbance       PAST MEDICAL HISTORY:  Past Medical History:   Diagnosis Date     Arthritis      Breast cancer (H)      Cardiomyopathy (H)     ideopathic     Hypercholesterolemia      Hypertension      Hypokalemia      Malignant neoplasm (H)      Shortness of breath      Shoulder pain        PAST SURGICAL HISTORY:  Past Surgical History:   Procedure Laterality Date     BACK SURGERY       CHOLECYSTECTOMY       EYE SURGERY       partial masectomy         FAMILY HISTORY:  Family History   Problem Relation Age of Onset     Other Cancer Mother      Other Cancer Brother      Family History Negative No family hx of        SOCIAL HISTORY:  Social History     Social History     Marital status: Single     Spouse name: N/A     Number of children: N/A     Years of education: N/A     Social History Main Topics     Smoking status: Never Smoker     Smokeless tobacco: Never Used     Alcohol use Yes      Comment: socially     Drug use: No     Sexual activity: No     Other Topics Concern     Special Diet No     Exercise No     Social History Narrative       Review of Systems:  Skin:  Negative       Eyes:  Positive for glasses    ENT:  Negative      Respiratory:  Positive for wheezing;dyspnea on exertion;shortness of breath;dyspnea at rest worse in humidity and better on higher dose lasix   Cardiovascular:  Negative      Gastroenterology: Positive for reflux;heartburn     Genitourinary:  not assessed      Musculoskeletal:  Positive for back pain;joint pain;nocturnal cramping    Neurologic:  Negative      Psychiatric:  Positive for anxiety    Heme/Lymph/Imm:  Positive for allergies    Endocrine:  Negative        Physical Exam:  Vitals: /80  Pulse 78  Ht 1.524 m (5')  Wt 66.2 kg (146 lb)  BMI 28.51 kg/m2    Constitutional:  cooperative, alert and oriented, well developed, well nourished, in no acute distress overweight;appears anxious      Skin:  warm and dry to the touch, no apparent skin lesions or masses noted          Head:  normocephalic, no masses or lesions;normocephalic        Eyes:  pupils equal and round, conjunctivae and lids unremarkable, sclera white, no xanthalasma, EOMS intact, no nystagmus        Lymph:      ENT:  no pallor or cyanosis, dentition good        Neck:  carotid pulses are full and equal bilaterally, JVP normal, no carotid bruit        Respiratory:  normal breath sounds, clear to auscultation, normal A-P diameter, normal symmetry, normal respiratory excursion, no use of accessory muscles         Cardiac: regular rhythm;normal S1 and S2;no murmurs, gallops or rubs detected   S4            pulses full and equal, no bruits auscultated                                        GI:  not assessed this visit        Extremities and Muscular Skeletal:  no deformities, clubbing, cyanosis, erythema observed   bilateral LE edema;trace          Neurological:  no gross motor deficits;affect appropriate        Psych:  Alert and Oriented x 3      Recent Lab Results:  LIPID RESULTS:  Lab Results   Component Value Date    CHOL 135 10/08/2015    HDL 59 10/08/2015    LDL 52 (L) 10/08/2015    TRIG 120 10/08/2015    CHOLHDLRATIO 2.3 10/08/2015       LIVER ENZYME RESULTS:  Lab Results   Component Value Date    AST 16 03/09/2018    ALT 19 03/09/2018       CBC RESULTS:  Lab Results   Component Value Date    WBC 6.4 03/09/2018    RBC 4.45 03/09/2018    HGB 12.7 03/09/2018     HCT 38.0 03/09/2018    MCV 85 03/09/2018    MCH 28.5 03/09/2018    MCHC 33.4 03/09/2018    RDW 15.0 03/09/2018     03/09/2018       BMP RESULTS:  Lab Results   Component Value Date     (L) 07/17/2018    POTASSIUM 3.8 07/17/2018    CHLORIDE 97 (L) 07/17/2018    CO2 30 (H) 07/17/2018    ANIONGAP 10.8 07/17/2018     (H) 07/17/2018    BUN 12 07/17/2018    CR 0.82 07/17/2018    GFRESTIMATED 68 07/17/2018    GFRESTBLACK 82 07/17/2018    PENNY 9.0 07/17/2018        A1C RESULTS:  No results found for: A1C    INR RESULTS:  Lab Results   Component Value Date    INR 0.97 06/05/2009         Thank you for allowing me to participate in the care of your patient.    Sincerely,     KATIE Herrera CNP     [facility]

## 2018-07-17 NOTE — PATIENT INSTRUCTIONS
Increase lasix to 2 pills on Tuesday, Thursday and one pill the other 5 days of the week  You can take a third day in one week if needed on occasion if you are breathless    Labs in 6-8 weeks  See us in November

## 2018-10-08 DIAGNOSIS — I10 ESSENTIAL HYPERTENSION: ICD-10-CM

## 2018-10-08 RX ORDER — OLMESARTAN MEDOXOMIL 40 MG/1
40 TABLET ORAL DAILY
Qty: 90 TABLET | Refills: 3 | Status: SHIPPED | OUTPATIENT
Start: 2018-10-08 | End: 2018-11-26 | Stop reason: ALTCHOICE

## 2018-11-12 DIAGNOSIS — E78.00 PURE HYPERCHOLESTEROLEMIA: ICD-10-CM

## 2018-11-12 DIAGNOSIS — I10 BENIGN ESSENTIAL HYPERTENSION: ICD-10-CM

## 2018-11-12 LAB
ALT SERPL W P-5'-P-CCNC: 18 U/L (ref 5–30)
ANION GAP SERPL CALCULATED.3IONS-SCNC: 12.8 MMOL/L (ref 6–17)
BUN SERPL-MCNC: 19 MG/DL (ref 7–30)
CALCIUM SERPL-MCNC: 9.6 MG/DL (ref 8.5–10.5)
CHLORIDE SERPL-SCNC: 97 MMOL/L (ref 98–107)
CHOLEST SERPL-MCNC: 139 MG/DL
CO2 SERPL-SCNC: 31 MMOL/L (ref 23–29)
CREAT SERPL-MCNC: 0.9 MG/DL (ref 0.7–1.3)
GFR SERPL CREATININE-BSD FRML MDRD: 61 ML/MIN/1.7M2
GLUCOSE SERPL-MCNC: 138 MG/DL (ref 70–105)
HDLC SERPL-MCNC: 55 MG/DL
LDLC SERPL CALC-MCNC: 59 MG/DL
NONHDLC SERPL-MCNC: 84 MG/DL
POTASSIUM SERPL-SCNC: 3.8 MMOL/L (ref 3.5–5.1)
SODIUM SERPL-SCNC: 137 MMOL/L (ref 136–145)
TRIGL SERPL-MCNC: 127 MG/DL

## 2018-11-12 PROCEDURE — 36415 COLL VENOUS BLD VENIPUNCTURE: CPT | Performed by: INTERNAL MEDICINE

## 2018-11-12 PROCEDURE — 84460 ALANINE AMINO (ALT) (SGPT): CPT | Performed by: INTERNAL MEDICINE

## 2018-11-12 PROCEDURE — 80048 BASIC METABOLIC PNL TOTAL CA: CPT | Performed by: INTERNAL MEDICINE

## 2018-11-12 PROCEDURE — 80061 LIPID PANEL: CPT | Performed by: INTERNAL MEDICINE

## 2018-11-15 DIAGNOSIS — I10 BENIGN ESSENTIAL HYPERTENSION: ICD-10-CM

## 2018-11-15 DIAGNOSIS — E78.00 PURE HYPERCHOLESTEROLEMIA: ICD-10-CM

## 2018-11-15 DIAGNOSIS — I42.0 DILATED CARDIOMYOPATHY (H): ICD-10-CM

## 2018-11-15 RX ORDER — SIMVASTATIN 10 MG
10 TABLET ORAL AT BEDTIME
Qty: 90 TABLET | Refills: 1 | Status: SHIPPED | OUTPATIENT
Start: 2018-11-15 | End: 2019-05-14

## 2018-11-15 RX ORDER — CARVEDILOL 25 MG/1
50 TABLET ORAL 2 TIMES DAILY
Qty: 360 TABLET | Refills: 1 | Status: SHIPPED | OUTPATIENT
Start: 2018-11-15 | End: 2019-05-31

## 2018-11-26 ENCOUNTER — OFFICE VISIT (OUTPATIENT)
Dept: CARDIOLOGY | Facility: CLINIC | Age: 78
End: 2018-11-26
Payer: COMMERCIAL

## 2018-11-26 VITALS
DIASTOLIC BLOOD PRESSURE: 80 MMHG | HEIGHT: 60 IN | HEART RATE: 74 BPM | WEIGHT: 143 LBS | SYSTOLIC BLOOD PRESSURE: 156 MMHG | BODY MASS INDEX: 28.07 KG/M2

## 2018-11-26 DIAGNOSIS — I10 BENIGN ESSENTIAL HYPERTENSION: ICD-10-CM

## 2018-11-26 DIAGNOSIS — R06.02 SHORTNESS OF BREATH: Primary | ICD-10-CM

## 2018-11-26 DIAGNOSIS — I10 ESSENTIAL HYPERTENSION: ICD-10-CM

## 2018-11-26 DIAGNOSIS — I42.0 DILATED CARDIOMYOPATHY (H): ICD-10-CM

## 2018-11-26 DIAGNOSIS — I42.9 CARDIOMYOPATHY, UNSPECIFIED TYPE (H): ICD-10-CM

## 2018-11-26 DIAGNOSIS — I25.10 CORONARY ARTERY DISEASE INVOLVING NATIVE CORONARY ARTERY OF NATIVE HEART WITHOUT ANGINA PECTORIS: ICD-10-CM

## 2018-11-26 DIAGNOSIS — E78.00 HYPERCHOLESTEROLEMIA: ICD-10-CM

## 2018-11-26 PROCEDURE — 99214 OFFICE O/P EST MOD 30 MIN: CPT | Performed by: INTERNAL MEDICINE

## 2018-11-26 RX ORDER — CANDESARTAN 32 MG/1
32 TABLET ORAL DAILY
Qty: 90 TABLET | Refills: 3 | Status: SHIPPED | OUTPATIENT
Start: 2018-11-26 | End: 2018-11-28

## 2018-11-26 RX ORDER — POTASSIUM CHLORIDE 750 MG/1
10 TABLET, EXTENDED RELEASE ORAL 2 TIMES DAILY
Qty: 100 TABLET | Refills: 3 | Status: SHIPPED | OUTPATIENT
Start: 2018-11-26 | End: 2019-07-30

## 2018-11-26 RX ORDER — CANDESARTAN 32 MG/1
32 TABLET ORAL DAILY
Qty: 90 TABLET | Refills: 3 | Status: CANCELLED | OUTPATIENT
Start: 2018-11-26

## 2018-11-26 NOTE — TELEPHONE ENCOUNTER
Candesartan is not covered by patient's insurance.  Requesting a different drug form (Losartan, valsartan, etc.)  Sent to Dr. Smith's nurse to consult with him.

## 2018-11-26 NOTE — PROGRESS NOTES
HPI and Plan:   See dictation    Orders Placed This Encounter   Procedures     Basic metabolic panel     Lipid Profile     ALT     Follow-Up with Nurse     Follow-Up with Cardiologist     Echocardiogram     Echocardiogram       Orders Placed This Encounter   Medications     candesartan 32 MG TABS     Sig: Take 32 mg by mouth daily     Dispense:  90 tablet     Refill:  3     potassium chloride (K-TAB,KLOR-CON) 10 MEQ tablet     Sig: Take 1 tablet (10 mEq) by mouth 2 times daily And as needed as directed.     Dispense:  100 tablet     Refill:  3       Medications Discontinued During This Encounter   Medication Reason     OXYCODONE HCL PO Therapy completed     olmesartan (BENICAR) 40 MG tablet Alternate therapy     potassium chloride (K-TAB,KLOR-CON) 10 MEQ tablet Reorder         Encounter Diagnoses   Name Primary?     Shortness of breath Yes     Hypercholesterolemia      Dilated cardiomyopathy (H)      Benign essential hypertension      Essential hypertension      Coronary artery disease involving native coronary artery of native heart without angina pectoris      Cardiomyopathy, unspecified type (H)        CURRENT MEDICATIONS:  Current Outpatient Prescriptions   Medication Sig Dispense Refill     Acetaminophen (TYLENOL PO) Take 500 mg by mouth every 6 hours as needed for mild pain or fever        candesartan 32 MG TABS Take 32 mg by mouth daily 90 tablet 3     carvedilol (COREG) 25 MG tablet Take 2 tablets (50 mg) by mouth 2 times daily 360 tablet 1     Cholecalciferol (VITAMIN D) 2000 UNITS tablet Take 1 tablet by mouth daily       furosemide (LASIX) 20 MG tablet 2 pills(40mg) on Tuesday and Thursday, one pill the other 5 days of the week and as directe (Patient taking differently: 20 mg daily As directed) 100 tablet 3     Omeprazole (PRILOSEC PO) Take 10 mg by mouth daily       potassium chloride (K-TAB,KLOR-CON) 10 MEQ tablet Take 1 tablet (10 mEq) by mouth 2 times daily And as needed as directed. 100 tablet 3      simvastatin (ZOCOR) 10 MG tablet Take 1 tablet (10 mg) by mouth At Bedtime 90 tablet 1     terazosin (HYTRIN) 5 MG capsule Take 1 capsule (5 mg) by mouth At Bedtime 90 capsule 3       ALLERGIES     Allergies   Allergen Reactions     Amlodipine      swelling     Aspirin Other (See Comments)     Bleeding              Bleeding, GI Lesion         Codeine Sulfate GI Disturbance       PAST MEDICAL HISTORY:  Past Medical History:   Diagnosis Date     Arthritis      Breast cancer (H)      Cardiomyopathy (H)     ideopathic     Coronary artery disease 9/25/2014     Hypercholesterolemia      Hypertension      Hypokalemia      Malignant neoplasm (H)      Shortness of breath      Shoulder pain        PAST SURGICAL HISTORY:  Past Surgical History:   Procedure Laterality Date     BACK SURGERY       CHOLECYSTECTOMY       EYE SURGERY       partial masectomy         FAMILY HISTORY:  Family History   Problem Relation Age of Onset     Other Cancer Mother      Other Cancer Brother      Family History Negative No family hx of        SOCIAL HISTORY:  Social History     Social History     Marital status: Single     Spouse name: N/A     Number of children: N/A     Years of education: N/A     Social History Main Topics     Smoking status: Never Smoker     Smokeless tobacco: Never Used     Alcohol use Yes      Comment: socially     Drug use: No     Sexual activity: No     Other Topics Concern     Special Diet No     Exercise No     Social History Narrative       Review of Systems:  Skin:  Negative       Eyes:  Positive for glasses    ENT:  Negative      Respiratory:  Positive for dyspnea on exertion     Cardiovascular:  Negative      Gastroenterology: Positive for heartburn    Genitourinary:  not assessed      Musculoskeletal:  Positive for back pain;joint pain    Neurologic:  Negative      Psychiatric:  Positive for anxiety    Heme/Lymph/Imm:  Positive for allergies;weight loss    Endocrine:  Negative        Physical Exam:  Vitals: /80   Pulse 74  Ht 1.524 m (5')  Wt 64.9 kg (143 lb)  BMI 27.93 kg/m2    Constitutional:  cooperative, alert and oriented, well developed, well nourished, in no acute distress overweight;appears anxious      Skin:  warm and dry to the touch, no apparent skin lesions or masses noted          Head:  normocephalic, no masses or lesions;normocephalic        Eyes:  pupils equal and round, conjunctivae and lids unremarkable, sclera white, no xanthalasma, EOMS intact, no nystagmus        Lymph:      ENT:  no pallor or cyanosis, dentition good        Neck:  carotid pulses are full and equal bilaterally, JVP normal, no carotid bruit        Respiratory:  normal breath sounds, clear to auscultation, normal A-P diameter, normal symmetry, normal respiratory excursion, no use of accessory muscles         Cardiac: regular rhythm;normal S1 and S2;no murmurs, gallops or rubs detected   S4            pulses full and equal                                        GI:  not assessed this visit        Extremities and Muscular Skeletal:  no deformities, clubbing, cyanosis, erythema observed;no edema              Neurological:  no gross motor deficits;affect appropriate        Psych:  Alert and Oriented x 3        CC  No referring provider defined for this encounter.

## 2018-11-26 NOTE — MR AVS SNAPSHOT
After Visit Summary   11/26/2018    Larry Melendez    MRN: [id number]           Patient Information     Date Of Birth          1940        Visit Information        Provider Department      11/26/2018 12:15 PM Brett Smith MD [facility]        Today's Diagnoses     Shortness of breath    -  1    Hypercholesterolemia        Dilated cardiomyopathy (H)        Benign essential hypertension        Essential hypertension        Coronary artery disease involving native coronary artery of native heart without angina pectoris        Cardiomyopathy, unspecified type (H)           Follow-ups after your visit        Additional Services     Follow-Up with Nurse           Follow-Up with Cardiologist                 Your next 10 appointments already scheduled     Dec 14, 2018  3:00 PM CST   Ech Complete with [id number]   [facility] CV Echocardiography (Cardiovascular Imaging at [facility])    [address]  [address]  [facility] [id number] [phone]           1.  Please bring or wear a comfortable two-piece outfit. 2.  You may eat, drink and take your normal medicines. 3.  For any questions that cannot be answered, please contact the ordering physician 4.  Please do not wear perfumes or scented lotions on the day of your exam.            Dec 14, 2018  4:00 PM CST   Nurse Only with MANE AMBULATORY MONITORING   [facility] ([facility])    [address]  [place] [id number] [phone] OPT 2              Future tests that were ordered for you today     Open Future Orders        Priority Expected Expires Ordered    Basic metabolic panel Routine 11/26/2019 11/27/2019 11/26/2018    Lipid Profile Routine 11/26/2019 11/26/2019 11/26/2018    ALT Routine 11/26/2019 11/26/2019 11/26/2018    Echocardiogram Routine 11/26/2019 11/27/2019 11/26/2018    Follow-Up with  Cardiologist Routine 11/26/2019 11/27/2019 11/26/2018    Follow-Up with Nurse Routine 12/10/2018 11/26/2019 11/26/2018    Echocardiogram Routine 12/3/2018 11/26/2019 11/26/2018            Who to contact     If you have questions or need follow up information about today's clinic visit or your schedule please contact [facility] directly at [phone].  Normal or non-critical lab and imaging results will be communicated to you by MyChart, letter or phone within 4 business days after the clinic has received the results. If you do not hear from us within 7 days, please contact the clinic through MyChart or phone. If you have a critical or abnormal lab result, we will notify you by phone as soon as possible.  Submit refill requests through [employer] or call your pharmacy and they will forward the refill request to us. Please allow 3 business days for your refill to be completed.          Additional Information About Your Visit        Care EveryWhere ID     This is your Care EveryWhere ID. This could be used by other organizations to access your [place] medical records  [id number]        Your Vitals Were     Pulse Height BMI (Body Mass Index)             74 1.524 m (5') 27.93 kg/m2          Blood Pressure from Last 3 Encounters:   11/26/18 156/80   07/17/18 130/80   06/12/18 146/84    Weight from Last 3 Encounters:   11/26/18 64.9 kg (143 lb)   07/17/18 66.2 kg (146 lb)   06/12/18 66.7 kg (147 lb 1.6 oz)                 Today's Medication Changes          These changes are accurate as of 11/26/18 12:50 PM.  If you have any questions, ask your nurse or doctor.               Start taking these medicines.        Dose/Directions    candesartan cilexetil 32 MG Tabs   Used for:  Dilated cardiomyopathy (H), Benign essential hypertension   Started by:  Brett Smith MD        Dose:  32 mg   Take 32 mg by mouth daily   Quantity:  90 tablet   Refills:  3         These  medicines have changed or have updated prescriptions.        Dose/Directions    furosemide 20 MG tablet   Commonly known as:  LASIX   This may have changed:    - how much to take  - when to take this  - additional instructions   Used for:  Benign essential hypertension        2 pills(40mg) on Tuesday and Thursday, one pill the other 5 days of the week and as directe   Quantity:  100 tablet   Refills:  3         Stop taking these medicines if you haven't already. Please contact your care team if you have questions.     olmesartan 40 MG tablet   Commonly known as:  BENICAR   Stopped by:  Brett Smith MD                Where to get your medicines      These medications were sent to [facility] [address] [place] [address] [facility] & [address] [address]     Phone:  [phone]     candesartan cilexetil 32 MG Tabs    potassium chloride 10 MEQ tablet                Primary Care Provider Office Phone # Fax #    Malaika Rodriguez [phone]       [facility] [address]  [facility] [address]        Equal Access to Services     [facility]: Hadii aad ku hadasho Soomaali, waaxda luqadaha, qaybta kaalmada adeegyada, rakel gimenez. So [facility] [phone].    ATENCIÓN: Si habla español, tiene a munoz disposición servicios gratuitos de asistencia lingüística. [facility] [phone].    We comply with applicable federal civil rights laws and [place] laws. We do not discriminate on the basis of race, color, national origin, age, disability, sex, sexual orientation, or gender identity.            Thank you!     Thank you for choosing [facility]  for your care. Our goal is always to provide you with excellent care. Hearing back from our patients is one way we can continue to improve our services. Please take a few minutes to complete the written survey that you may receive  in the mail after your visit with us. Thank you!             Your Updated Medication List - Protect others around you: Learn how to safely use, store and throw away your medicines at www.disposemymeds.org.          This list is accurate as of 11/26/18 12:50 PM.  Always use your most recent med list.                   Brand Name Dispense Instructions for use Diagnosis    candesartan cilexetil 32 MG Tabs     90 tablet    Take 32 mg by mouth daily    Dilated cardiomyopathy (H), Benign essential hypertension       carvedilol 25 MG tablet    COREG    360 tablet    Take 2 tablets (50 mg) by mouth 2 times daily    Dilated cardiomyopathy (H), Benign essential hypertension       furosemide 20 MG tablet    LASIX    100 tablet    2 pills(40mg) on Tuesday and Thursday, one pill the other 5 days of the week and as directe    Benign essential hypertension       potassium chloride 10 MEQ tablet    K-TAB,KLOR-CON    100 tablet    Take 1 tablet (10 mEq) by mouth 2 times daily And as needed as directed.    Benign essential hypertension       PRILOSEC PO      Take 10 mg by mouth daily        simvastatin 10 MG tablet    ZOCOR    90 tablet    Take 1 tablet (10 mg) by mouth At Bedtime    Pure hypercholesterolemia       terazosin 5 MG capsule    HYTRIN    90 capsule    Take 1 capsule (5 mg) by mouth At Bedtime    Benign essential hypertension       TYLENOL PO      Take 500 mg by mouth every 6 hours as needed for mild pain or fever        vitamin D3 2000 units tablet    CHOLECALCIFEROL     Take 1 tablet by mouth daily

## 2018-11-26 NOTE — LETTER
11/26/2018    Malaika Rodriguez MD, MD  [employer] [address]  [facility] [address]    RE: Larry Melendez       Dear Colleague,    I had the pleasure of seeing Larry Melendez in the [facility].    HPI and Plan:   See dictation    Orders Placed This Encounter   Procedures     Basic metabolic panel     Lipid Profile     ALT     Follow-Up with Nurse     Follow-Up with Cardiologist     Echocardiogram     Echocardiogram       Orders Placed This Encounter   Medications     candesartan 32 MG TABS     Sig: Take 32 mg by mouth daily     Dispense:  90 tablet     Refill:  3     potassium chloride (K-TAB,KLOR-CON) 10 MEQ tablet     Sig: Take 1 tablet (10 mEq) by mouth 2 times daily And as needed as directed.     Dispense:  100 tablet     Refill:  3       Medications Discontinued During This Encounter   Medication Reason     OXYCODONE HCL PO Therapy completed     olmesartan (BENICAR) 40 MG tablet Alternate therapy     potassium chloride (K-TAB,KLOR-CON) 10 MEQ tablet Reorder         Encounter Diagnoses   Name Primary?     Shortness of breath Yes     Hypercholesterolemia      Dilated cardiomyopathy (H)      Benign essential hypertension      Essential hypertension      Coronary artery disease involving native coronary artery of native heart without angina pectoris      Cardiomyopathy, unspecified type (H)        CURRENT MEDICATIONS:  Current Outpatient Prescriptions   Medication Sig Dispense Refill     Acetaminophen (TYLENOL PO) Take 500 mg by mouth every 6 hours as needed for mild pain or fever        candesartan 32 MG TABS Take 32 mg by mouth daily 90 tablet 3     carvedilol (COREG) 25 MG tablet Take 2 tablets (50 mg) by mouth 2 times daily 360 tablet 1     Cholecalciferol (VITAMIN D) 2000 UNITS tablet Take 1 tablet by mouth daily       furosemide (LASIX) 20 MG tablet 2 pills(40mg) on Tuesday and Thursday, one pill the other 5 days of the week and as directe (Patient taking  differently: 20 mg daily As directed) 100 tablet 3     Omeprazole (PRILOSEC PO) Take 10 mg by mouth daily       potassium chloride (K-TAB,KLOR-CON) 10 MEQ tablet Take 1 tablet (10 mEq) by mouth 2 times daily And as needed as directed. 100 tablet 3     simvastatin (ZOCOR) 10 MG tablet Take 1 tablet (10 mg) by mouth At Bedtime 90 tablet 1     terazosin (HYTRIN) 5 MG capsule Take 1 capsule (5 mg) by mouth At Bedtime 90 capsule 3       ALLERGIES     Allergies   Allergen Reactions     Amlodipine      swelling     Aspirin Other (See Comments)     Bleeding              Bleeding, GI Lesion         Codeine Sulfate GI Disturbance       PAST MEDICAL HISTORY:  Past Medical History:   Diagnosis Date     Arthritis      Breast cancer (H)      Cardiomyopathy (H)     ideopathic     Coronary artery disease 9/25/2014     Hypercholesterolemia      Hypertension      Hypokalemia      Malignant neoplasm (H)      Shortness of breath      Shoulder pain        PAST SURGICAL HISTORY:  Past Surgical History:   Procedure Laterality Date     BACK SURGERY       CHOLECYSTECTOMY       EYE SURGERY       partial masectomy         FAMILY HISTORY:  Family History   Problem Relation Age of Onset     Other Cancer Mother      Other Cancer Brother      Family History Negative No family hx of        SOCIAL HISTORY:  Social History     Social History     Marital status: Single     Spouse name: N/A     Number of children: N/A     Years of education: N/A     Social History Main Topics     Smoking status: Never Smoker     Smokeless tobacco: Never Used     Alcohol use Yes      Comment: socially     Drug use: No     Sexual activity: No     Other Topics Concern     Special Diet No     Exercise No     Social History Narrative       Review of Systems:  Skin:  Negative       Eyes:  Positive for glasses    ENT:  Negative      Respiratory:  Positive for dyspnea on exertion     Cardiovascular:  Negative      Gastroenterology: Positive for heartburn    Genitourinary:   not assessed      Musculoskeletal:  Positive for back pain;joint pain    Neurologic:  Negative      Psychiatric:  Positive for anxiety    Heme/Lymph/Imm:  Positive for allergies;weight loss    Endocrine:  Negative        Physical Exam:  Vitals: /80  Pulse 74  Ht 1.524 m (5')  Wt 64.9 kg (143 lb)  BMI 27.93 kg/m2    Constitutional:  cooperative, alert and oriented, well developed, well nourished, in no acute distress overweight;appears anxious      Skin:  warm and dry to the touch, no apparent skin lesions or masses noted          Head:  normocephalic, no masses or lesions;normocephalic        Eyes:  pupils equal and round, conjunctivae and lids unremarkable, sclera white, no xanthalasma, EOMS intact, no nystagmus        Lymph:      ENT:  no pallor or cyanosis, dentition good        Neck:  carotid pulses are full and equal bilaterally, JVP normal, no carotid bruit        Respiratory:  normal breath sounds, clear to auscultation, normal A-P diameter, normal symmetry, normal respiratory excursion, no use of accessory muscles         Cardiac: regular rhythm;normal S1 and S2;no murmurs, gallops or rubs detected   S4            pulses full and equal                                        GI:  not assessed this visit        Extremities and Muscular Skeletal:  no deformities, clubbing, cyanosis, erythema observed;no edema              Neurological:  no gross motor deficits;affect appropriate        Psych:  Alert and Oriented x 3        CC  No referring provider defined for this encounter.                Thank you for allowing me to participate in the care of your patient.      Sincerely,     Brett Smith MD, MD     [facility]    cc:   No referring provider defined for this encounter.

## 2018-11-26 NOTE — LETTER
2018      Malaika Rodriguez MD, MD  [employer] [address]  [facility] [address]      RE: Larry Melendez       Dear Colleague,    I had the pleasure of seeing Larry Melendez in the [facility].    Service Date: 2018      REFERRING PHYSICIAN:  Dr. Malaika Rodriguez.        HISTORY OF PRESENT ILLNESS:  It is my pleasure to see your patient, Janessa Melendez, who is a pleasant 78-year-old patient with a history of idiopathic cardiomyopathy and essential hypertension.  If you remember, control of her blood pressure has become more difficult.  We did use hydrochlorothiazide in the past which did successfully control her blood pressure along with her other medications, but then she developed significant hyponatremia.  She also developed significant peripheral edema from amlodipine 10 mg.  Her blood pressure today is on the higher side.  Initially, it was 150/86.  On recheck it was 156/80.  Her blood pressure 4 months ago was normal at 130/80.  She is under a significant amount of stress recently.  Her daughter-in-law  from lung cancer.  Her son unfortunately has been diagnosed with lung cancer also.      She has been complaining of considerable GI upset over the last few months.  She is taking olmesartan.  One of the peculiar side effects that olmesartan is associated with is it can cause a syndrome just like celiac disease and cause malabsorption.  So, I will stop olmesartan and start her on candesartan, which is not associated with this condition.  I will start her on a 32 mg per day dose.  Her lipids are excellent today with an LDL of 59, HDL of 55 and triglycerides of 127.  Her renal function was normal with a BUN of 19 and a creatinine of 0.90.  Her sodium is now back to normal at 137 with a potassium of 3.8.  For some reason she did not have an echocardiogram performed prior to my visit, though it was ordered.  Her last echocardiogram was  performed this time last year on .      IMPRESSION:   1.  Essential hypertension.  Her blood pressure does not appear to be ideally controlled, although it has been in the past.  She is under a considerable amount of stress at present.  This may be an aggravating factor.     2.  Idiopathic cardiomyopathy.  Last echocardiogram was a year ago and this showed mildly reduced left ventricular systolic function with an EF of 50%-55%.   3.  GI upset.  Olmesartan can cause a celiac type syndrome with malabsorption.        PLAN:  We will stop the olmesartan and start the candesartan 32 mg per day.  I would suspect it will take a few weeks for the GI symptoms to improve if it is indeed caused by olmesartan.  In 2 weeks' time, we will have the patient return for an echocardiogram to follow her cardiomyopathy and at that time she will have taken 2 weeks of candesartan and we can recheck her blood pressure with a nurse visit.  I will have her followup provisionally in a year's time with a repeat echocardiogram.  We will also check a basic metabolic profile and lipids at that stage.  As always, the patient has been told to contact me if she has any questions or any concerns.      It has been my pleasure to be involved in the care of this very nice patient.  I look forward to seeing her again.      cc:   Malaika Rodriguez MD   [facility] [address]    [facility] Document Management     [address] [address]   [place]  [address]         YADIR[facility] PERKINS MD, [facility]             D: 2018   T: 2018   MT: ROSARIO      Name:     ELAYNE SALDAÑA   MRN:      [id number]        Account:      [id number]   :      1940           Service Date: 2018      Document: [id number]         Outpatient Encounter Prescriptions as of 2018   Medication Sig Dispense Refill     Acetaminophen (TYLENOL PO) Take 500 mg by mouth every 6 hours as needed for mild pain or fever        carvedilol  (COREG) 25 MG tablet Take 2 tablets (50 mg) by mouth 2 times daily 360 tablet 1     Cholecalciferol (VITAMIN D) 2000 UNITS tablet Take 1 tablet by mouth daily       furosemide (LASIX) 20 MG tablet 2 pills(40mg) on Tuesday and Thursday, one pill the other 5 days of the week and as directe (Patient taking differently: 20 mg daily As directed) 100 tablet 3     Omeprazole (PRILOSEC PO) Take 10 mg by mouth daily       potassium chloride (K-TAB,KLOR-CON) 10 MEQ tablet Take 1 tablet (10 mEq) by mouth 2 times daily And as needed as directed. 100 tablet 3     simvastatin (ZOCOR) 10 MG tablet Take 1 tablet (10 mg) by mouth At Bedtime 90 tablet 1     terazosin (HYTRIN) 5 MG capsule Take 1 capsule (5 mg) by mouth At Bedtime 90 capsule 3     [DISCONTINUED] candesartan 32 MG TABS Take 32 mg by mouth daily 90 tablet 3     [DISCONTINUED] olmesartan (BENICAR) 40 MG tablet Take 1 tablet (40 mg) by mouth daily 90 tablet 3     [DISCONTINUED] OXYCODONE HCL PO Take 5-10 mg by mouth every 4 hours as needed       [DISCONTINUED] potassium chloride (K-TAB,KLOR-CON) 10 MEQ tablet Take 1 tablet (10 mEq) by mouth 2 times daily And as needed as directed. 100 tablet 3     No facility-administered encounter medications on file as of 11/26/2018.        Again, thank you for allowing me to participate in the care of your patient.      Sincerely,    Brett Smith MD, MD     [facility]

## 2018-11-26 NOTE — PROGRESS NOTES
Service Date: 2018      REFERRING PHYSICIAN:  Dr. Malaika Rodriguez.        HISTORY OF PRESENT ILLNESS:  It is my pleasure to see your patient, Janessa Melendez, who is a pleasant 78-year-old patient with a history of idiopathic cardiomyopathy and essential hypertension.  If you remember, control of her blood pressure has become more difficult.  We did use hydrochlorothiazide in the past which did successfully control her blood pressure along with her other medications, but then she developed significant hyponatremia.  She also developed significant peripheral edema from amlodipine 10 mg.  Her blood pressure today is on the higher side.  Initially, it was 150/86.  On recheck it was 156/80.  Her blood pressure 4 months ago was normal at 130/80.  She is under a significant amount of stress recently.  Her daughter-in-law  from lung cancer.  Her son unfortunately has been diagnosed with lung cancer also.      She has been complaining of considerable GI upset over the last few months.  She is taking olmesartan.  One of the peculiar side effects that olmesartan is associated with is it can cause a syndrome just like celiac disease and cause malabsorption.  So, I will stop olmesartan and start her on candesartan, which is not associated with this condition.  I will start her on a 32 mg per day dose.  Her lipids are excellent today with an LDL of 59, HDL of 55 and triglycerides of 127.  Her renal function was normal with a BUN of 19 and a creatinine of 0.90.  Her sodium is now back to normal at 137 with a potassium of 3.8.  For some reason she did not have an echocardiogram performed prior to my visit, though it was ordered.  Her last echocardiogram was performed this time last year on .      IMPRESSION:   1.  Essential hypertension.  Her blood pressure does not appear to be ideally controlled, although it has been in the past.  She is under a considerable amount of stress at present.  This may be an  aggravating factor.     2.  Idiopathic cardiomyopathy.  Last echocardiogram was a year ago and this showed mildly reduced left ventricular systolic function with an EF of 50%-55%.   3.  GI upset.  Olmesartan can cause a celiac type syndrome with malabsorption.        PLAN:  We will stop the olmesartan and start the candesartan 32 mg per day.  I would suspect it will take a few weeks for the GI symptoms to improve if it is indeed caused by olmesartan.  In 2 weeks' time, we will have the patient return for an echocardiogram to follow her cardiomyopathy and at that time she will have taken 2 weeks of candesartan and we can recheck her blood pressure with a nurse visit.  I will have her followup provisionally in a year's time with a repeat echocardiogram.  We will also check a basic metabolic profile and lipids at that stage.  As always, the patient has been told to contact me if she has any questions or any concerns.      It has been my pleasure to be involved in the care of this very nice patient.  I look forward to seeing her again.      cc:   Malaika Rodriguez MD   [facility] [address]    [facility] Document Management     [address] [id number]   [place]  [address]         YADIRA PERKINS MD, [facility]             D: 2018   T: 2018   MT: ROSARIO      Name:     ELAYNE SALDAÑA   MRN:      [id number]        Account:      [id number]   :      1940           Service Date: 2018      Document: [id number]

## 2018-11-27 ENCOUNTER — CARE COORDINATION (OUTPATIENT)
Dept: CARDIOLOGY | Facility: CLINIC | Age: 78
End: 2018-11-27

## 2018-11-27 DIAGNOSIS — I10 ESSENTIAL HYPERTENSION: Primary | ICD-10-CM

## 2018-11-27 NOTE — PROGRESS NOTES
"Received fax from [employer] that candesartan is not covered by pt's insurance. Preferred alternatives are losartan, irbesartan, valsartan or olmesartan.     Per office note dated 10/26/18, Dr. Smith recommended, \"She has been complaining of considerable GI upset over the last few months.  She is taking olmesartan.  One of the peculiar side effects that olmesartan is associated with is it can cause a syndrome just like celiac disease and cause malabsorption.  So, I will stop olmesartan and start her on candesartan, which is not associated with this condition.  I will start her on a 32 mg per day dose...We will stop the olmesartan and start the candesartan 32 mg per day.  I would suspect it will take a few weeks for the GI symptoms to improve if it is indeed caused by olmesartan.  In 2 weeks' time, we will have the patient return for an echocardiogram to follow her cardiomyopathy and at that time she will have taken 2 weeks of candesartan and we can recheck her blood pressure with a nurse visit.     Will message Dr. Smith to review. LPenfield RN   "

## 2018-11-28 RX ORDER — IRBESARTAN 300 MG/1
300 TABLET ORAL DAILY
Qty: 30 TABLET | Refills: 1 | Status: SHIPPED | OUTPATIENT
Start: 2018-11-28 | End: 2019-02-25

## 2018-11-28 NOTE — PROGRESS NOTES
Try irbesartan 300 mg per day. Unfortunately not as powerful as olmesartan which candesartan is.. Thx

## 2018-11-28 NOTE — PROGRESS NOTES
Called pt with recommendations from Dr. Smith. Prescription escripted to [facility] as requested. LPenfield RN

## 2018-12-14 ENCOUNTER — ALLIED HEALTH/NURSE VISIT (OUTPATIENT)
Dept: CARDIOLOGY | Facility: CLINIC | Age: 78
End: 2018-12-14
Attending: INTERNAL MEDICINE
Payer: COMMERCIAL

## 2018-12-14 ENCOUNTER — HOSPITAL ENCOUNTER (OUTPATIENT)
Dept: CARDIOLOGY | Facility: CLINIC | Age: 78
Discharge: HOME | End: 2018-12-14
Attending: INTERNAL MEDICINE | Admitting: INTERNAL MEDICINE
Payer: MEDICARE

## 2018-12-14 ENCOUNTER — TELEPHONE (OUTPATIENT)
Dept: CARDIOLOGY | Facility: CLINIC | Age: 78
End: 2018-12-14

## 2018-12-14 VITALS — SYSTOLIC BLOOD PRESSURE: 160 MMHG | DIASTOLIC BLOOD PRESSURE: 89 MMHG

## 2018-12-14 DIAGNOSIS — I10 ESSENTIAL HYPERTENSION, BENIGN: Primary | ICD-10-CM

## 2018-12-14 DIAGNOSIS — I10 ESSENTIAL HYPERTENSION: ICD-10-CM

## 2018-12-14 DIAGNOSIS — I42.9 CARDIOMYOPATHY, UNSPECIFIED TYPE (H): ICD-10-CM

## 2018-12-14 PROCEDURE — 93306 TTE W/DOPPLER COMPLETE: CPT

## 2018-12-14 PROCEDURE — 93306 TTE W/DOPPLER COMPLETE: CPT | Mod: 26 | Performed by: INTERNAL MEDICINE

## 2018-12-14 RX ORDER — HYDRALAZINE HYDROCHLORIDE 25 MG/1
25 TABLET, FILM COATED ORAL 3 TIMES DAILY
Qty: 270 TABLET | Refills: 3 | Status: SHIPPED | OUTPATIENT
Start: 2018-12-14 | End: 2020-01-27

## 2018-12-14 NOTE — PROGRESS NOTES
Last office visit:  18    Previous blood pressure:  156/82    Mm Hg     Previous heart rate:    74  bpm    Time of readin    Morning medications were taken at:   1413    Today's blood pressure:  156/92     mm Hg    Today's heart rate:    80   bpm       1420:    BP  160/89  Mm Hg    P  82 bpm    Ordering Provider: Albino Connors    Results sent to team # : 5    Additional comments:   Pt was sent on her way.  She had an Echo appt upstairs to get to today..

## 2018-12-14 NOTE — TELEPHONE ENCOUNTER
She needs to add hydralazine 25mg tid    Nurse bp check in 2 weeks and message KFS as he will be back    Please send in rx

## 2018-12-14 NOTE — TELEPHONE ENCOUNTER
Called pt with recommendations.  Pt agreeable to plan.  Prescription sent to pts preferred pharmacy.  Order placed for 2 week F/U BP check.  Pt unable to schedule BP check at this time and requests scheduling contact her at a later date for BP appt.

## 2018-12-14 NOTE — TELEPHONE ENCOUNTER
"Louis Payne, LPN  [facility] [[email or web address]] Heart Team 5             Pt was in clinic today (12/14/18) for ambulatory BP check.     1415:   /92   P 80     1420:   /89   P 82     Pt sent on her way.  She had an echo appt upstairs today.          Per LOV with Dr. Smith (11/26/18), \"We will stop the olmesartan and start the candesartan 32 mg per day.  I would suspect it will take a few weeks for the GI symptoms to improve if it is indeed caused by olmesartan.  In 2 weeks' time, we will have the patient return for an echocardiogram to follow her cardiomyopathy and at that time she will have taken 2 weeks of candesartan and we can recheck her blood pressure with a nurse visit.\"    Will route to Lakhwinder Bianchi for review and recommendations in Dr. ELIZABETH's absence.   "

## 2018-12-17 ENCOUNTER — TELEPHONE (OUTPATIENT)
Dept: CARDIOLOGY | Facility: CLINIC | Age: 78
End: 2018-12-17

## 2018-12-17 NOTE — TELEPHONE ENCOUNTER
Reviewed echo showing   Left ventricular systolic function is mild to moderately reduced.  The visual ejection fraction is estimated at 40-45%.  There is mild-moderate global hypokinesia of the left ventricle.  Diastolic Doppler findings (E/E' ratio and/or other parameters) suggest left ventricular filling pressures are increased.  The right ventricular systolic function is normal.  There is mild (1+) mitral regurgitation.  There is trace aortic regurgitation.     On direct comparison to echo images dated 12/07/2017 there is an interval decline in LVEF.    Echo dated 12/07/17 showed EF 50-55%  Echo dated 02/21/17 showed EF 45%  Echo dated 10/05/15 showed EF 44%  Echo dated 09/26/14 showed EF 45-48%  Echo dated 06/13/13 showed EF 45%    Attempted to call pt with results, left message for pt to call back.   Would have pt see MARINO this week to review drop in EF, left message for pt to call back. LPenfield RN

## 2018-12-18 NOTE — TELEPHONE ENCOUNTER
Call back received from patient.  Reviewed echo and recommendations for MARINO F/U to review results.  Pt states understanding and is agreeable with plan.      Of note, pt states she has not started the recommended Hydralazine TID.  She preferred not to start a new medication and states she plans to record daily BP's and discuss with provider however she has not started recording BP's as of yet.  Advised pt to being recording daily BP's and being record to OV.     Pt call transferred to scheduling to make MARINO OV appt.

## 2018-12-21 ENCOUNTER — OFFICE VISIT (OUTPATIENT)
Dept: CARDIOLOGY | Facility: CLINIC | Age: 78
End: 2018-12-21
Payer: COMMERCIAL

## 2018-12-21 VITALS
WEIGHT: 144.8 LBS | HEIGHT: 60 IN | BODY MASS INDEX: 28.43 KG/M2 | DIASTOLIC BLOOD PRESSURE: 72 MMHG | HEART RATE: 80 BPM | SYSTOLIC BLOOD PRESSURE: 142 MMHG

## 2018-12-21 DIAGNOSIS — I10 ESSENTIAL HYPERTENSION, BENIGN: Primary | ICD-10-CM

## 2018-12-21 DIAGNOSIS — I42.9 CARDIOMYOPATHY, UNSPECIFIED TYPE (H): ICD-10-CM

## 2018-12-21 PROCEDURE — 99214 OFFICE O/P EST MOD 30 MIN: CPT | Performed by: NURSE PRACTITIONER

## 2018-12-21 ASSESSMENT — MIFFLIN-ST. JEOR: SCORE: 1063.31

## 2018-12-21 NOTE — PATIENT INSTRUCTIONS
Today's Recommendations    1. Continue all medications without changes.    Please send a e|tab message or call [phone] with questions or concerns.     Scheduling number [phone].

## 2018-12-21 NOTE — LETTER
12/21/2018    Malaika Rodriguez MD, MD  "[employer] [address]  [facility] [address]    RE: Larry Melendez       Dear Colleague,    I had the pleasure of seeing Larry Melendez in the [facility].    Cardiology Clinic Progress Note  Larry Melendez MRN# [id number]   YOB: 1940 Age: 78 year old          Assessment and Plan:     1. Idiopathic cardiomyopathy    Left drop in the LVEF from 40-45% on recent echocardiogram.  Previous echocardiogram in November 2017 showed LVEF 50-55%    Reviewed with Dr. Smith and will get an CT coronary angiogram for evaluation of EF drop.    2. Hypertension    Elevated initially in clinic, but patient reports home blood pressures are relatively well controlled.  She states she has an element of whitecoat syndrome.    Continue carvedilol 50 mg twice daily, hydralazine 25 mg 3 times daily, irbesartan 300 mg daily and terazosin 5 mg daily at bedtime.    3. Hyperlipidemia    Fasting lipids from 11/12/18: , HDL 55, LDL 59, Trigs 127    Continue simvastatin 10 mg daily         History of Presenting Illness:    Larry Melendez is a very pleasant 78 year old patient of Dr. Smith who presents today to follow up post echocardiogram and for hypertension management. She has a past medical history significant for idiopathic cardiomyopathy, hypertension, and hyperlipidemia. She is accompanied by her son who is visiting for the holidays.    Her blood pressure has becoming increasingly difficult to control.  Initially was well controlled on hydrochlorothiazide, but due to significant hyponatremia and had to be discontinued.  Amlodipine 10 mg caused significant peripheral edema.  She is currently under significant amount of stress as her daughter-in-law a recently passed away from lung cancer in her son has also been diagnosed with lung cancer.    She was seen by Dr. Smith in November of 2018. Due  to considerable GI upset, he recommended stopping olmesartan as it can cause a syndrome like celiac disease causing malabsorption. He started candesartan 32 mg daily in it's place. Candesartan was not covered by her insurance. So, then irbesartan 300 mg daily was started. She returned for a BP follow up and her BP was 156/92. In his absence Lakhwinder Bianchi recommended hydralazine 25 mg TID.     A repeat echocardiogram for surveillance of her cardiomyopathy was completed on 12/14/18. It revealed that her LVEF had dropped back to 40-45% with mild to moderate global hypokinesis. Her previous LVEF was 50-55% in November 2017. Of note, her BP at the time of echocardiogram was 169/95.  She monitors her blood pressure frequently at home and states runs between 120-135/65-72.  She states she has started the hydralazine, but has difficulty remembering the midday dosing at times.     Today in clinic her blood pressure was initially elevated at 160/70.  Upon my recheck it decreased to 142/72.  She denies any progressive shortness of breath on exertion and her lower extremity edema is controlled with 20-40 mg of p.o. Lasix as needed.  She does not have any orthopnea, PND, palpitations or chest discomfort.               Review of Systems:   Review of Systems:  Skin:  Positive for pigmentation;rash   Eyes:  Positive for glasses  ENT:  Negative    Respiratory:  Positive for dyspnea on exertion(stairs)  Cardiovascular:  Negative    Gastroenterology: Positive for heartburn  Genitourinary:  Negative    Musculoskeletal:  Positive for back pain;joint pain;arthritis  Neurologic:  Negative    Psychiatric:  Positive for anxiety  Heme/Lymph/Imm:  Positive for allergies;weight loss  Endocrine:  Negative              Physical Exam:     Vitals: /72   Pulse 80   Ht 1.524 m (5')   Wt 65.7 kg (144 lb 12.8 oz)   BMI 28.28 kg/m     Constitutional:  cooperative, alert and oriented, well developed, well nourished, in no acute distress  overweight      Skin:  warm and dry to the touch, no apparent skin lesions or masses noted        Head:  normocephalic, no masses or lesions;normocephalic        Eyes:  pupils equal and round, conjunctivae and lids unremarkable, sclera white, no xanthalasma, EOMS intact, no nystagmus        ENT:  no pallor or cyanosis, dentition good        Neck:  carotid pulses are full and equal bilaterally, JVP normal, no carotid bruit        Chest:  normal breath sounds, clear to auscultation, normal A-P diameter, normal symmetry, normal respiratory excursion, no use of accessory muscles        Cardiac: regular rhythm;normal S1 and S2;no murmurs, gallops or rubs detected   S4              Abdomen:  not assessed this visit        Vascular: pulses full and equal                                      Extremities and Back:  no deformities, clubbing, cyanosis, erythema observed        Neurological:  no gross motor deficits;affect appropriate               Medications:     Current Outpatient Medications   Medication Sig Dispense Refill     Acetaminophen (TYLENOL PO) Take 500 mg by mouth every 6 hours as needed for mild pain or fever        carvedilol (COREG) 25 MG tablet Take 2 tablets (50 mg) by mouth 2 times daily 360 tablet 1     Cholecalciferol (VITAMIN D) 2000 UNITS tablet Take 1 tablet by mouth daily       furosemide (LASIX) 20 MG tablet 2 pills(40mg) on Tuesday and Thursday, one pill the other 5 days of the week and as directe (Patient taking differently: 20 mg daily As directed) 100 tablet 3     hydrALAZINE (APRESOLINE) 25 MG tablet Take 1 tablet (25 mg) by mouth 3 times daily 270 tablet 3     irbesartan (AVAPRO) 300 MG tablet Take 1 tablet (300 mg) by mouth daily 30 tablet 1     Omeprazole (PRILOSEC PO) Take 10 mg by mouth daily       potassium chloride (K-TAB,KLOR-CON) 10 MEQ tablet Take 1 tablet (10 mEq) by mouth 2 times daily And as needed as directed. 100 tablet 3     simvastatin (ZOCOR) 10 MG tablet Take 1 tablet (10 mg)  by mouth At Bedtime 90 tablet 1     terazosin (HYTRIN) 5 MG capsule Take 1 capsule (5 mg) by mouth At Bedtime 90 capsule 3       Family History   Problem Relation Age of Onset     Other Cancer Mother      Other Cancer Brother      Family History Negative No family hx of        Social History     Socioeconomic History     Marital status: Single     Spouse name: Not on file     Number of children: Not on file     Years of education: Not on file     Highest education level: Not on file   Social Needs     Financial resource strain: Not on file     Food insecurity - worry: Not on file     Food insecurity - inability: Not on file     Transportation needs - medical: Not on file     Transportation needs - non-medical: Not on file   Occupational History     Not on file   Tobacco Use     Smoking status: Never Smoker     Smokeless tobacco: Never Used   Substance and Sexual Activity     Alcohol use: Yes     Comment: socially     Drug use: No     Sexual activity: No   Other Topics Concern     Parent/sibling w/ CABG, MI or angioplasty before 65F 55M? Not Asked      Service Not Asked     Blood Transfusions Not Asked     Caffeine Concern Not Asked     Occupational Exposure Not Asked     Hobby Hazards Not Asked     Sleep Concern Not Asked     Stress Concern Not Asked     Weight Concern Not Asked     Special Diet No     Back Care Not Asked     Exercise No     Bike Helmet Not Asked     Seat Belt Not Asked     Self-Exams Not Asked   Social History Narrative     Not on file            Past Medical History:     Past Medical History:   Diagnosis Date     Arthritis      Breast cancer (H)      Cardiomyopathy (H)     ideopathic     Coronary artery disease 9/25/2014     Hypercholesterolemia      Hypertension      Hypokalemia      Malignant neoplasm (H)      Shortness of breath      Shoulder pain               Past Surgical History:     Past Surgical History:   Procedure Laterality Date     BACK SURGERY       CHOLECYSTECTOMY       EYE  SURGERY       partial masectomy                Allergies:   Amlodipine; Aspirin; and Codeine sulfate       Data:   All laboratory data reviewed:    Recent Labs   Lab Test 11/12/18  0905 06/12/18  1512 10/08/15  0744 08/29/14   LDL 59  --  52* 73   HDL 55  --  59 59   NHDL 84  --   --   --    CHOL 139  --  135 157   TRIG 127  --  120 124   TSH  --   --   --  0.54   NTBNP  --  2,627*  --   --        Lab Results   Component Value Date    WBC 6.4 03/09/2018    RBC 4.45 03/09/2018    HGB 12.7 03/09/2018    HCT 38.0 03/09/2018    MCV 85 03/09/2018    MCH 28.5 03/09/2018    MCHC 33.4 03/09/2018    RDW 15.0 03/09/2018     03/09/2018       Lab Results   Component Value Date     11/12/2018    POTASSIUM 3.8 11/12/2018    CHLORIDE 97 (L) 11/12/2018    CO2 31 (H) 11/12/2018    ANIONGAP 12.8 11/12/2018     (H) 11/12/2018    BUN 19 11/12/2018    CR 0.90 11/12/2018    GFRESTIMATED 61 11/12/2018    GFRESTBLACK 74 11/12/2018    PENNY 9.6 11/12/2018      Lab Results   Component Value Date    AST 16 03/09/2018    ALT 18 11/12/2018       No results found for: A1C    Lab Results   Component Value Date    INR 0.97 06/05/2009         Thank you for allowing me to participate in the care of your patient.    Sincerely,     KATIE MANSFIELD [facility]

## 2018-12-21 NOTE — PROGRESS NOTES
Cardiology Clinic Progress Note  Larry Melendez MRN# [id number]   YOB: 1940 Age: 78 year old          Assessment and Plan:     1. Idiopathic cardiomyopathy    Left drop in the LVEF from 40-45% on recent echocardiogram.  Previous echocardiogram in November 2017 showed LVEF 50-55%    Reviewed with Dr. Smith and will get an CT coronary angiogram for evaluation of EF drop.    2. Hypertension    Elevated initially in clinic, but patient reports home blood pressures are relatively well controlled.  She states she has an element of whitecoat syndrome.    Continue carvedilol 50 mg twice daily, hydralazine 25 mg 3 times daily, irbesartan 300 mg daily and terazosin 5 mg daily at bedtime.    3. Hyperlipidemia    Fasting lipids from 11/12/18: , HDL 55, LDL 59, Trigs 127    Continue simvastatin 10 mg daily         History of Presenting Illness:    Larry Melendez is a very pleasant 78 year old patient of Dr. Smith who presents today to follow up post echocardiogram and for hypertension management. She has a past medical history significant for idiopathic cardiomyopathy, hypertension, and hyperlipidemia. She is accompanied by her son who is visiting for the holidays.    Her blood pressure has becoming increasingly difficult to control.  Initially was well controlled on hydrochlorothiazide, but due to significant hyponatremia and had to be discontinued.  Amlodipine 10 mg caused significant peripheral edema.  She is currently under significant amount of stress as her daughter-in-law a recently passed away from lung cancer in her son has also been diagnosed with lung cancer.    She was seen by Dr. Smith in November of 2018. Due to considerable GI upset, he recommended stopping olmesartan as it can cause a syndrome like celiac disease causing malabsorption. He started candesartan 32 mg daily in it's place. Candesartan was not covered by her insurance. So, then irbesartan 300  mg daily was started. She returned for a BP follow up and her BP was 156/92. In his absence Lakhwinder Bianchi recommended hydralazine 25 mg TID.     A repeat echocardiogram for surveillance of her cardiomyopathy was completed on 12/14/18. It revealed that her LVEF had dropped back to 40-45% with mild to moderate global hypokinesis. Her previous LVEF was 50-55% in November 2017. Of note, her BP at the time of echocardiogram was 169/95.  She monitors her blood pressure frequently at home and states runs between 120-135/65-72.  She states she has started the hydralazine, but has difficulty remembering the midday dosing at times.     Today in clinic her blood pressure was initially elevated at 160/70.  Upon my recheck it decreased to 142/72.  She denies any progressive shortness of breath on exertion and her lower extremity edema is controlled with 20-40 mg of p.o. Lasix as needed.  She does not have any orthopnea, PND, palpitations or chest discomfort.               Review of Systems:   Review of Systems:  Skin:  Positive for pigmentation;rash   Eyes:  Positive for glasses  ENT:  Negative    Respiratory:  Positive for dyspnea on exertion(stairs)  Cardiovascular:  Negative    Gastroenterology: Positive for heartburn  Genitourinary:  Negative    Musculoskeletal:  Positive for back pain;joint pain;arthritis  Neurologic:  Negative    Psychiatric:  Positive for anxiety  Heme/Lymph/Imm:  Positive for allergies;weight loss  Endocrine:  Negative              Physical Exam:     Vitals: /72   Pulse 80   Ht 1.524 m (5')   Wt 65.7 kg (144 lb 12.8 oz)   BMI 28.28 kg/m    Constitutional:  cooperative, alert and oriented, well developed, well nourished, in no acute distress overweight      Skin:  warm and dry to the touch, no apparent skin lesions or masses noted        Head:  normocephalic, no masses or lesions;normocephalic        Eyes:  pupils equal and round, conjunctivae and lids unremarkable, sclera white, no  xanthalasma, EOMS intact, no nystagmus        ENT:  no pallor or cyanosis, dentition good        Neck:  carotid pulses are full and equal bilaterally, JVP normal, no carotid bruit        Chest:  normal breath sounds, clear to auscultation, normal A-P diameter, normal symmetry, normal respiratory excursion, no use of accessory muscles        Cardiac: regular rhythm;normal S1 and S2;no murmurs, gallops or rubs detected   S4              Abdomen:  not assessed this visit        Vascular: pulses full and equal                                      Extremities and Back:  no deformities, clubbing, cyanosis, erythema observed        Neurological:  no gross motor deficits;affect appropriate               Medications:     Current Outpatient Medications   Medication Sig Dispense Refill     Acetaminophen (TYLENOL PO) Take 500 mg by mouth every 6 hours as needed for mild pain or fever        carvedilol (COREG) 25 MG tablet Take 2 tablets (50 mg) by mouth 2 times daily 360 tablet 1     Cholecalciferol (VITAMIN D) 2000 UNITS tablet Take 1 tablet by mouth daily       furosemide (LASIX) 20 MG tablet 2 pills(40mg) on Tuesday and Thursday, one pill the other 5 days of the week and as directe (Patient taking differently: 20 mg daily As directed) 100 tablet 3     hydrALAZINE (APRESOLINE) 25 MG tablet Take 1 tablet (25 mg) by mouth 3 times daily 270 tablet 3     irbesartan (AVAPRO) 300 MG tablet Take 1 tablet (300 mg) by mouth daily 30 tablet 1     Omeprazole (PRILOSEC PO) Take 10 mg by mouth daily       potassium chloride (K-TAB,KLOR-CON) 10 MEQ tablet Take 1 tablet (10 mEq) by mouth 2 times daily And as needed as directed. 100 tablet 3     simvastatin (ZOCOR) 10 MG tablet Take 1 tablet (10 mg) by mouth At Bedtime 90 tablet 1     terazosin (HYTRIN) 5 MG capsule Take 1 capsule (5 mg) by mouth At Bedtime 90 capsule 3       Family History   Problem Relation Age of Onset     Other Cancer Mother      Other Cancer Brother      Family History  Negative No family hx of        Social History     Socioeconomic History     Marital status: Single     Spouse name: Not on file     Number of children: Not on file     Years of education: Not on file     Highest education level: Not on file   Social Needs     Financial resource strain: Not on file     Food insecurity - worry: Not on file     Food insecurity - inability: Not on file     Transportation needs - medical: Not on file     Transportation needs - non-medical: Not on file   Occupational History     Not on file   Tobacco Use     Smoking status: Never Smoker     Smokeless tobacco: Never Used   Substance and Sexual Activity     Alcohol use: Yes     Comment: socially     Drug use: No     Sexual activity: No   Other Topics Concern     Parent/sibling w/ CABG, MI or angioplasty before 65F 55M? Not Asked      Service Not Asked     Blood Transfusions Not Asked     Caffeine Concern Not Asked     Occupational Exposure Not Asked     Hobby Hazards Not Asked     Sleep Concern Not Asked     Stress Concern Not Asked     Weight Concern Not Asked     Special Diet No     Back Care Not Asked     Exercise No     Bike Helmet Not Asked     Seat Belt Not Asked     Self-Exams Not Asked   Social History Narrative     Not on file            Past Medical History:     Past Medical History:   Diagnosis Date     Arthritis      Breast cancer (H)      Cardiomyopathy (H)     ideopathic     Coronary artery disease 9/25/2014     Hypercholesterolemia      Hypertension      Hypokalemia      Malignant neoplasm (H)      Shortness of breath      Shoulder pain               Past Surgical History:     Past Surgical History:   Procedure Laterality Date     BACK SURGERY       CHOLECYSTECTOMY       EYE SURGERY       partial masectomy                Allergies:   Amlodipine; Aspirin; and Codeine sulfate       Data:   All laboratory data reviewed:    Recent Labs   Lab Test 11/12/18  0905 06/12/18  1512 10/08/15  0744 08/29/14   LDL 59  --  52* 73    HDL 55  --  59 59   NHDL 84  --   --   --    CHOL 139  --  135 157   TRIG 127  --  120 124   TSH  --   --   --  0.54   NTBNP  --  2,627*  --   --        Lab Results   Component Value Date    WBC 6.4 03/09/2018    RBC 4.45 03/09/2018    HGB 12.7 03/09/2018    HCT 38.0 03/09/2018    MCV 85 03/09/2018    MCH 28.5 03/09/2018    MCHC 33.4 03/09/2018    RDW 15.0 03/09/2018     03/09/2018       Lab Results   Component Value Date     11/12/2018    POTASSIUM 3.8 11/12/2018    CHLORIDE 97 (L) 11/12/2018    CO2 31 (H) 11/12/2018    ANIONGAP 12.8 11/12/2018     (H) 11/12/2018    BUN 19 11/12/2018    CR 0.90 11/12/2018    GFRESTIMATED 61 11/12/2018    GFRESTBLACK 74 11/12/2018    PENNY 9.6 11/12/2018      Lab Results   Component Value Date    AST 16 03/09/2018    ALT 18 11/12/2018       No results found for: A1C    Lab Results   Component Value Date    INR 0.97 06/05/2009         MELE NIXON APRN, CNP  [facility]

## 2018-12-28 ENCOUNTER — TELEPHONE (OUTPATIENT)
Dept: CARDIOLOGY | Facility: CLINIC | Age: 78
End: 2018-12-28

## 2018-12-28 DIAGNOSIS — I42.9 CARDIOMYOPATHY, UNSPECIFIED TYPE (H): ICD-10-CM

## 2018-12-28 DIAGNOSIS — I25.10 CORONARY ARTERY DISEASE: Primary | ICD-10-CM

## 2018-12-28 NOTE — TELEPHONE ENCOUNTER
Staff message received:   Shannan Daley APRN CNP  [facility] Heart Team 4             Please call patient and let her know that Dr. Smith would like a CT angiogram with EF drop.     Thanks     Shannan      CTA ordered. Spoke to patient and informed her of recommendation above. Pt verbalized understanding. Pt transferred to scheduling.

## 2019-01-21 ENCOUNTER — HOSPITAL ENCOUNTER (OUTPATIENT)
Dept: CARDIOLOGY | Facility: CLINIC | Age: 79
Discharge: HOME | End: 2019-01-21
Attending: NURSE PRACTITIONER | Admitting: NURSE PRACTITIONER
Payer: COMMERCIAL

## 2019-01-21 VITALS — SYSTOLIC BLOOD PRESSURE: 134 MMHG | DIASTOLIC BLOOD PRESSURE: 77 MMHG | HEART RATE: 76 BPM

## 2019-01-21 DIAGNOSIS — I25.10 CORONARY ARTERY DISEASE: ICD-10-CM

## 2019-01-21 LAB
CREAT BLD-MCNC: 0.8 MG/DL (ref 0.52–1.04)
GFR SERPL CREATININE-BSD FRML MDRD: 70 ML/MIN/{1.73_M2}

## 2019-01-21 PROCEDURE — 75574 CT ANGIO HRT W/3D IMAGE: CPT | Mod: 26 | Performed by: INTERNAL MEDICINE

## 2019-01-21 PROCEDURE — 82565 ASSAY OF CREATININE: CPT

## 2019-01-21 PROCEDURE — 25000132 ZZH RX MED GY IP 250 OP 250 PS 637: Performed by: INTERNAL MEDICINE

## 2019-01-21 PROCEDURE — 25000125 ZZHC RX 250: Performed by: INTERNAL MEDICINE

## 2019-01-21 PROCEDURE — 75574 CT ANGIO HRT W/3D IMAGE: CPT

## 2019-01-21 PROCEDURE — 25000128 H RX IP 250 OP 636: Performed by: NURSE PRACTITIONER

## 2019-01-21 RX ORDER — DIPHENHYDRAMINE HCL 25 MG
25 CAPSULE ORAL
Status: DISCONTINUED | OUTPATIENT
Start: 2019-01-21 | End: 2019-01-22 | Stop reason: HOSPADM

## 2019-01-21 RX ORDER — IOPAMIDOL 755 MG/ML
500 INJECTION, SOLUTION INTRAVASCULAR ONCE
Status: COMPLETED | OUTPATIENT
Start: 2019-01-21 | End: 2019-01-21

## 2019-01-21 RX ORDER — METOPROLOL TARTRATE 1 MG/ML
5-15 INJECTION, SOLUTION INTRAVENOUS
Status: DISCONTINUED | OUTPATIENT
Start: 2019-01-21 | End: 2019-01-22 | Stop reason: HOSPADM

## 2019-01-21 RX ORDER — METHYLPREDNISOLONE SODIUM SUCCINATE 125 MG/2ML
125 INJECTION, POWDER, LYOPHILIZED, FOR SOLUTION INTRAMUSCULAR; INTRAVENOUS
Status: DISCONTINUED | OUTPATIENT
Start: 2019-01-21 | End: 2019-01-22 | Stop reason: HOSPADM

## 2019-01-21 RX ORDER — NITROGLYCERIN 0.4 MG/1
0.4 TABLET SUBLINGUAL
Status: DISCONTINUED | OUTPATIENT
Start: 2019-01-21 | End: 2019-01-22 | Stop reason: HOSPADM

## 2019-01-21 RX ORDER — ACYCLOVIR 200 MG/1
0-1 CAPSULE ORAL
Status: DISCONTINUED | OUTPATIENT
Start: 2019-01-21 | End: 2019-01-22 | Stop reason: HOSPADM

## 2019-01-21 RX ORDER — DIPHENHYDRAMINE HYDROCHLORIDE 50 MG/ML
25-50 INJECTION INTRAMUSCULAR; INTRAVENOUS
Status: DISCONTINUED | OUTPATIENT
Start: 2019-01-21 | End: 2019-01-22 | Stop reason: HOSPADM

## 2019-01-21 RX ORDER — ONDANSETRON 2 MG/ML
4 INJECTION INTRAMUSCULAR; INTRAVENOUS
Status: DISCONTINUED | OUTPATIENT
Start: 2019-01-21 | End: 2019-01-22 | Stop reason: HOSPADM

## 2019-01-21 RX ORDER — METOPROLOL TARTRATE 50 MG
50-100 TABLET ORAL
Status: COMPLETED | OUTPATIENT
Start: 2019-01-21 | End: 2019-01-21

## 2019-01-21 RX ADMIN — METOPROLOL TARTRATE 10 MG: 5 INJECTION, SOLUTION INTRAVENOUS at 12:58

## 2019-01-21 RX ADMIN — NITROGLYCERIN 0.4 MG: 0.4 TABLET SUBLINGUAL at 12:55

## 2019-01-21 RX ADMIN — METOPROLOL TARTRATE 10 MG: 5 INJECTION, SOLUTION INTRAVENOUS at 12:40

## 2019-01-21 RX ADMIN — METOPROLOL TARTRATE 100 MG: 50 TABLET ORAL at 11:28

## 2019-01-21 RX ADMIN — METOPROLOL TARTRATE 10 MG: 5 INJECTION, SOLUTION INTRAVENOUS at 12:50

## 2019-01-21 RX ADMIN — IOPAMIDOL 120 ML: 755 INJECTION, SOLUTION INTRAVENOUS at 13:07

## 2019-01-21 RX ADMIN — SODIUM CHLORIDE 100 ML: 9 INJECTION, SOLUTION INTRAVENOUS at 13:07

## 2019-01-21 RX ADMIN — METOPROLOL TARTRATE 10 MG: 5 INJECTION, SOLUTION INTRAVENOUS at 12:45

## 2019-01-22 ENCOUNTER — TELEPHONE (OUTPATIENT)
Dept: CARDIOLOGY | Facility: CLINIC | Age: 79
End: 2019-01-22

## 2019-01-22 NOTE — TELEPHONE ENCOUNTER
RN called patient with CTa results. Patient reported to this RN that she does not have CP, but has been having frequent episodes of SOB with activity that is new. Patient would like to discuss next steps further at OV with MARINO Campbell. RN transferred patient to scheduling to arrange f/u OV with MARINO Campbell to discuss SOB and angiogram. Patient will call clinic prior to OV with any increase in symptoms of SOB or new onset of CP.     Please let her know that the CTa showed mild non-obstructive CAD concentrated in her LAD. If she is asymptomatic then we will continue medical management. Her LDL is at goal on simvastatin 10 mg daily. If symptomatic (SOB, chest pain, etc.) proceed to invasive angiogram given LVEF drop. She is already on maximum therapy for ARB and BB. I will be happy to discuss in an office visit if she wishes. Otherwise, follow up with repeat echocardiogram in 2 months (recent change in ARB) to see if she has had an improvement in her EF.     Shannan Daley, KATIE, CNP

## 2019-02-01 ENCOUNTER — TELEPHONE (OUTPATIENT)
Dept: CARDIOLOGY | Facility: CLINIC | Age: 79
End: 2019-02-01

## 2019-02-01 ENCOUNTER — OFFICE VISIT (OUTPATIENT)
Dept: CARDIOLOGY | Facility: CLINIC | Age: 79
End: 2019-02-01
Payer: COMMERCIAL

## 2019-02-01 VITALS
HEART RATE: 70 BPM | WEIGHT: 146 LBS | SYSTOLIC BLOOD PRESSURE: 128 MMHG | HEIGHT: 60 IN | DIASTOLIC BLOOD PRESSURE: 82 MMHG | BODY MASS INDEX: 28.66 KG/M2

## 2019-02-01 DIAGNOSIS — I42.9 CARDIOMYOPATHY, UNSPECIFIED TYPE (H): Primary | ICD-10-CM

## 2019-02-01 DIAGNOSIS — I10 BENIGN ESSENTIAL HYPERTENSION: ICD-10-CM

## 2019-02-01 PROCEDURE — 99214 OFFICE O/P EST MOD 30 MIN: CPT | Performed by: NURSE PRACTITIONER

## 2019-02-01 ASSESSMENT — MIFFLIN-ST. JEOR: SCORE: 1068.75

## 2019-02-01 NOTE — TELEPHONE ENCOUNTER
LUAN from patient reporting her BP this afternoon at 2:30pm. Patient states her BP was 119/60 this afternoon. Patient states she was instructed to call in her BP at her OV this morning with Shannan, as her BP always runs higher in the clinic. Will route to Shannan as PANFILO.

## 2019-02-01 NOTE — LETTER
2/1/2019    Malaika Rodriguez MD, MD  [employer] [address]  [facility] [address]    RE: Laryr Melendez       Dear Colleague,    I had the pleasure of seeing Larry Melendez in the [facility].    Cardiology Clinic Progress Note  Larry Melendez MRN# [id number]   YOB: 1940 Age: 78 year old          Assessment and Plan:     1. Idiopathic cardiomyopathy    Left drop in the LVEF from 40-45% on recent echocardiogram.  Previous echocardiogram in November 2017 showed LVEF 50-55%    CT coronary angiogram showed non-obstructive disease    Patient is asymptomatic    Follow up in 1 year with Dr. Smith and repeat echocardiogram    2. Non-obstructive CAD    CT coronary angiogram showed total score of 319 with 274 concentrated in the left main.     Continue risk factor modification with statin therapy.  Of note, she is not on aspirin due to history of GI bleeding.    3. Hypertension    Elevated initially again in clinic, but patient reports home blood pressures are relatively well controlled.      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    Continue carvedilol 50 mg twice daily, hydralazine 25 mg 3 times daily, irbesartan 300 mg daily and terazosin 5 mg daily at bedtime.    4. Hyperlipidemia    Fasting lipids from 11/12/18: , HDL 55, LDL 59, Trigs 127    Continue simvastatin 10 mg daily         History of Presenting Illness:    Larry Melendez is a very pleasant 78 year old patient of Dr. Smith who presents today to follow for a CT coronary angiogram. She has a past medical history significant for idiopathic cardiomyopathy, hypertension, and hyperlipidemia.    Her blood pressure has becoming increasingly difficult to control.  Initially was well controlled on hydrochlorothiazide, but due to significant hyponatremia and had to be discontinued.  Amlodipine 10 mg caused significant peripheral edema.  She is currently under significant  amount of stress as her daughter-in-law a recently passed away from lung cancer in her son has also been diagnosed with lung cancer.    She was seen by Dr. Smith in November of 2018. Due to considerable GI upset, he recommended stopping olmesartan as it can cause a syndrome like celiac disease causing malabsorption. He started candesartan 32 mg daily in it's place. Candesartan was not covered by her insurance. So, then irbesartan 300 mg daily was started. She returned for a BP follow up and her BP was 156/92. In his absence Lakhwinder Bianchi recommended hydralazine 25 mg TID.     A repeat echocardiogram for surveillance of her cardiomyopathy was completed on 12/14/18. It revealed that her LVEF had dropped back to 40-45% with mild to moderate global hypokinesis. Her previous LVEF was 50-55% in November 2017. Of note, her BP at the time of echocardiogram was 169/95.  She monitors her blood pressure frequently at home and states runs between 120-135/65-72.  Due to the decline in her ejection fraction a CT coronary angiogram was obtained.  Her total score was 319 placing her in the 77th percentile when compared to age and gender matched control group.  Majority of the calcium was concentrated in the left anterior descending artery.    Today in clinic her blood pressure was again initially elevated but improved to 128/82 upon recheck.  She denies any progressive shortness of breath on exertion and her lower extremity edema is controlled with 20-40 mg of p.o. Lasix as needed.  She does not have any orthopnea, PND, palpitations or chest discomfort.               Review of Systems:   Review of Systems:  Skin:  Negative     Eyes:  Positive for glasses  ENT:  Negative    Respiratory:  Positive for wheezing  Cardiovascular:  Negative    Gastroenterology: Positive for heartburn  Genitourinary:  Negative    Musculoskeletal:  Positive for back pain;joint pain;arthritis  Neurologic:  Negative    Psychiatric:  Positive  for anxiety  Heme/Lymph/Imm:  Negative    Endocrine:  Negative              Physical Exam:     Vitals: /82   Pulse 70   Ht 1.524 m (5')   Wt 66.2 kg (146 lb)   BMI 28.51 kg/m     Constitutional:  cooperative, alert and oriented, well developed, well nourished, in no acute distress overweight      Skin:  warm and dry to the touch, no apparent skin lesions or masses noted        Head:  normocephalic, no masses or lesions;normocephalic        Eyes:  pupils equal and round, conjunctivae and lids unremarkable, sclera white, no xanthalasma, EOMS intact, no nystagmus        ENT:  no pallor or cyanosis, dentition good        Neck:  carotid pulses are full and equal bilaterally, JVP normal, no carotid bruit        Chest:  normal breath sounds, clear to auscultation, normal A-P diameter, normal symmetry, normal respiratory excursion, no use of accessory muscles        Cardiac: regular rhythm;normal S1 and S2;no murmurs, gallops or rubs detected   S4              Abdomen:  not assessed this visit        Vascular: pulses full and equal                                      Extremities and Back:  no edema        Neurological:  no gross motor deficits;affect appropriate               Medications:     Current Outpatient Medications   Medication Sig Dispense Refill     Acetaminophen (TYLENOL PO) Take 500 mg by mouth every 6 hours as needed for mild pain or fever        carvedilol (COREG) 25 MG tablet Take 2 tablets (50 mg) by mouth 2 times daily 360 tablet 1     Cholecalciferol (VITAMIN D) 2000 UNITS tablet Take 1 tablet by mouth daily       furosemide (LASIX) 20 MG tablet 2 pills(40mg) on Tuesday and Thursday, one pill the other 5 days of the week and as directe (Patient taking differently: 20 mg daily As directed) 100 tablet 3     hydrALAZINE (APRESOLINE) 25 MG tablet Take 1 tablet (25 mg) by mouth 3 times daily 270 tablet 3     irbesartan (AVAPRO) 300 MG tablet Take 1 tablet (300 mg) by mouth daily 30 tablet 1      Omeprazole (PRILOSEC PO) Take 10 mg by mouth daily       potassium chloride (K-TAB,KLOR-CON) 10 MEQ tablet Take 1 tablet (10 mEq) by mouth 2 times daily And as needed as directed. 100 tablet 3     simvastatin (ZOCOR) 10 MG tablet Take 1 tablet (10 mg) by mouth At Bedtime 90 tablet 1     terazosin (HYTRIN) 5 MG capsule Take 1 capsule (5 mg) by mouth At Bedtime 90 capsule 3       Family History   Problem Relation Age of Onset     Other Cancer Mother      Other Cancer Brother      Family History Negative No family hx of        Social History     Socioeconomic History     Marital status: Single     Spouse name: Not on file     Number of children: Not on file     Years of education: Not on file     Highest education level: Not on file   Social Needs     Financial resource strain: Not on file     Food insecurity - worry: Not on file     Food insecurity - inability: Not on file     Transportation needs - medical: Not on file     Transportation needs - non-medical: Not on file   Occupational History     Not on file   Tobacco Use     Smoking status: Never Smoker     Smokeless tobacco: Never Used   Substance and Sexual Activity     Alcohol use: Yes     Comment: socially     Drug use: No     Sexual activity: No   Other Topics Concern     Parent/sibling w/ CABG, MI or angioplasty before 65F 55M? Not Asked      Service Not Asked     Blood Transfusions Not Asked     Caffeine Concern Not Asked     Occupational Exposure Not Asked     Hobby Hazards Not Asked     Sleep Concern Not Asked     Stress Concern Not Asked     Weight Concern Not Asked     Special Diet No     Back Care Not Asked     Exercise No     Bike Helmet Not Asked     Seat Belt Not Asked     Self-Exams Not Asked   Social History Narrative     Not on file            Past Medical History:     Past Medical History:   Diagnosis Date     Arthritis      Breast cancer (H)      Cardiomyopathy (H)     ideopathic     Coronary artery disease 9/25/2014      Hypercholesterolemia      Hypertension      Hypokalemia      Malignant neoplasm (H)      Shortness of breath      Shoulder pain               Past Surgical History:     Past Surgical History:   Procedure Laterality Date     BACK SURGERY       CHOLECYSTECTOMY       EYE SURGERY       partial masectomy                Allergies:   Amlodipine; Aspirin; and Codeine sulfate       Data:   All laboratory data reviewed:    Recent Labs   Lab Test 11/12/18  0905 06/12/18  1512 10/08/15  0744 08/29/14   LDL 59  --  52* 73   HDL 55  --  59 59   NHDL 84  --   --   --    CHOL 139  --  135 157   TRIG 127  --  120 124   TSH  --   --   --  0.54   NTBNP  --  2,627*  --   --        Lab Results   Component Value Date    WBC 6.4 03/09/2018    RBC 4.45 03/09/2018    HGB 12.7 03/09/2018    HCT 38.0 03/09/2018    MCV 85 03/09/2018    MCH 28.5 03/09/2018    MCHC 33.4 03/09/2018    RDW 15.0 03/09/2018     03/09/2018       Lab Results   Component Value Date     11/12/2018    POTASSIUM 3.8 11/12/2018    CHLORIDE 97 (L) 11/12/2018    CO2 31 (H) 11/12/2018    ANIONGAP 12.8 11/12/2018     (H) 11/12/2018    BUN 19 11/12/2018    CR 0.90 11/12/2018    GFRESTIMATED 70 01/21/2019    GFRESTBLACK 84 01/21/2019    PENNY 9.6 11/12/2018      Lab Results   Component Value Date    AST 16 03/09/2018    ALT 18 11/12/2018       No results found for: A1C    Lab Results   Component Value Date    INR 0.97 06/05/2009         KATIE MANSFIELD, CNP  [facility]      Thank you for allowing me to participate in the care of your patient.      Sincerely,     KATIE MANSFIELD CNP     [facility]    cc:   Malaika Rodriguez MD  [facility] [address]  [place] [address]

## 2019-02-01 NOTE — PATIENT INSTRUCTIONS
Today's Recommendations    1. Check BP later today and call with results  2. Continue all medications without changes.  3. Please follow up with Dr. Smith in 1 year with echocardiogram.    Please send a [employer] message or call [phone] with questions or concerns.     Scheduling number [phone].

## 2019-02-01 NOTE — LETTER
2/1/2019    Malaika Rodriguez MD, MD  [place] [address]  [facility] [address]    RE: Larry Melendez       Dear Colleague,    I had the pleasure of seeing Larry Melendez in the [facility].    Cardiology Clinic Progress Note  Larry Melendez MRN# [id number]   YOB: 1940 Age: 78 year old          Assessment and Plan:     1. Idiopathic cardiomyopathy    Left drop in the LVEF from 40-45% on recent echocardiogram.  Previous echocardiogram in November 2017 showed LVEF 50-55%    CT coronary angiogram showed non-obstructive disease    Patient is asymptomatic    Follow up in 1 year with Dr. Smith and repeat echocardiogram    2. Non-obstructive CAD    CT coronary angiogram showed total score of 319 with 274 concentrated in the left main.     Continue risk factor modification with statin therapy.  Of note, she is not on aspirin due to history of GI bleeding.    3. Hypertension    Elevated initially again in clinic, but patient reports home blood pressures are relatively well controlled.      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    Continue carvedilol 50 mg twice daily, hydralazine 25 mg 3 times daily, irbesartan 300 mg daily and terazosin 5 mg daily at bedtime.    4. Hyperlipidemia    Fasting lipids from 11/12/18: , HDL 55, LDL 59, Trigs 127    Continue simvastatin 10 mg daily         History of Presenting Illness:    Larry Melendez is a very pleasant 78 year old patient of Dr. Smith who presents today to follow for a CT coronary angiogram. She has a past medical history significant for idiopathic cardiomyopathy, hypertension, and hyperlipidemia.    Her blood pressure has becoming increasingly difficult to control.  Initially was well controlled on hydrochlorothiazide, but due to significant hyponatremia and had to be discontinued.  Amlodipine 10 mg caused significant peripheral edema.  She is currently under significant  amount of stress as her daughter-in-law a recently passed away from lung cancer in her son has also been diagnosed with lung cancer.    She was seen by Dr. Smith in November of 2018. Due to considerable GI upset, he recommended stopping olmesartan as it can cause a syndrome like celiac disease causing malabsorption. He started candesartan 32 mg daily in it's place. Candesartan was not covered by her insurance. So, then irbesartan 300 mg daily was started. She returned for a BP follow up and her BP was 156/92. In his absence Lakhwinder Bianchi recommended hydralazine 25 mg TID.     A repeat echocardiogram for surveillance of her cardiomyopathy was completed on 12/14/18. It revealed that her LVEF had dropped back to 40-45% with mild to moderate global hypokinesis. Her previous LVEF was 50-55% in November 2017. Of note, her BP at the time of echocardiogram was 169/95.  She monitors her blood pressure frequently at home and states runs between 120-135/65-72.  Due to the decline in her ejection fraction a CT coronary angiogram was obtained.  Her total score was 319 placing her in the 77th percentile when compared to age and gender matched control group.  Majority of the calcium was concentrated in the left anterior descending artery.    Today in clinic her blood pressure was again initially elevated but improved to 128/82 upon recheck.  She denies any progressive shortness of breath on exertion and her lower extremity edema is controlled with 20-40 mg of p.o. Lasix as needed.  She does not have any orthopnea, PND, palpitations or chest discomfort.               Review of Systems:   Review of Systems:  Skin:  Negative     Eyes:  Positive for glasses  ENT:  Negative    Respiratory:  Positive for wheezing  Cardiovascular:  Negative    Gastroenterology: Positive for heartburn  Genitourinary:  Negative    Musculoskeletal:  Positive for back pain;joint pain;arthritis  Neurologic:  Negative    Psychiatric:  Positive  for anxiety  Heme/Lymph/Imm:  Negative    Endocrine:  Negative              Physical Exam:     Vitals: /82   Pulse 70   Ht 1.524 m (5')   Wt 66.2 kg (146 lb)   BMI 28.51 kg/m     Constitutional:  cooperative, alert and oriented, well developed, well nourished, in no acute distress overweight      Skin:  warm and dry to the touch, no apparent skin lesions or masses noted        Head:  normocephalic, no masses or lesions;normocephalic        Eyes:  pupils equal and round, conjunctivae and lids unremarkable, sclera white, no xanthalasma, EOMS intact, no nystagmus        ENT:  no pallor or cyanosis, dentition good        Neck:  carotid pulses are full and equal bilaterally, JVP normal, no carotid bruit        Chest:  normal breath sounds, clear to auscultation, normal A-P diameter, normal symmetry, normal respiratory excursion, no use of accessory muscles        Cardiac: regular rhythm;normal S1 and S2;no murmurs, gallops or rubs detected   S4              Abdomen:  not assessed this visit        Vascular: pulses full and equal                                      Extremities and Back:  no edema        Neurological:  no gross motor deficits;affect appropriate               Medications:     Current Outpatient Medications   Medication Sig Dispense Refill     Acetaminophen (TYLENOL PO) Take 500 mg by mouth every 6 hours as needed for mild pain or fever        carvedilol (COREG) 25 MG tablet Take 2 tablets (50 mg) by mouth 2 times daily 360 tablet 1     Cholecalciferol (VITAMIN D) 2000 UNITS tablet Take 1 tablet by mouth daily       furosemide (LASIX) 20 MG tablet 2 pills(40mg) on Tuesday and Thursday, one pill the other 5 days of the week and as directe (Patient taking differently: 20 mg daily As directed) 100 tablet 3     hydrALAZINE (APRESOLINE) 25 MG tablet Take 1 tablet (25 mg) by mouth 3 times daily 270 tablet 3     irbesartan (AVAPRO) 300 MG tablet Take 1 tablet (300 mg) by mouth daily 30 tablet 1      Omeprazole (PRILOSEC PO) Take 10 mg by mouth daily       potassium chloride (K-TAB,KLOR-CON) 10 MEQ tablet Take 1 tablet (10 mEq) by mouth 2 times daily And as needed as directed. 100 tablet 3     simvastatin (ZOCOR) 10 MG tablet Take 1 tablet (10 mg) by mouth At Bedtime 90 tablet 1     terazosin (HYTRIN) 5 MG capsule Take 1 capsule (5 mg) by mouth At Bedtime 90 capsule 3       Family History   Problem Relation Age of Onset     Other Cancer Mother      Other Cancer Brother      Family History Negative No family hx of        Social History     Socioeconomic History     Marital status: Single     Spouse name: Not on file     Number of children: Not on file     Years of education: Not on file     Highest education level: Not on file   Social Needs     Financial resource strain: Not on file     Food insecurity - worry: Not on file     Food insecurity - inability: Not on file     Transportation needs - medical: Not on file     Transportation needs - non-medical: Not on file   Occupational History     Not on file   Tobacco Use     Smoking status: Never Smoker     Smokeless tobacco: Never Used   Substance and Sexual Activity     Alcohol use: Yes     Comment: socially     Drug use: No     Sexual activity: No   Other Topics Concern     Parent/sibling w/ CABG, MI or angioplasty before 65F 55M? Not Asked      Service Not Asked     Blood Transfusions Not Asked     Caffeine Concern Not Asked     Occupational Exposure Not Asked     Hobby Hazards Not Asked     Sleep Concern Not Asked     Stress Concern Not Asked     Weight Concern Not Asked     Special Diet No     Back Care Not Asked     Exercise No     Bike Helmet Not Asked     Seat Belt Not Asked     Self-Exams Not Asked   Social History Narrative     Not on file            Past Medical History:     Past Medical History:   Diagnosis Date     Arthritis      Breast cancer (H)      Cardiomyopathy (H)     ideopathic     Coronary artery disease 9/25/2014      Hypercholesterolemia      Hypertension      Hypokalemia      Malignant neoplasm (H)      Shortness of breath      Shoulder pain               Past Surgical History:     Past Surgical History:   Procedure Laterality Date     BACK SURGERY       CHOLECYSTECTOMY       EYE SURGERY       partial masectomy                Allergies:   Amlodipine; Aspirin; and Codeine sulfate       Data:   All laboratory data reviewed:    Recent Labs   Lab Test 11/12/18  0905 06/12/18  1512 10/08/15  0744 08/29/14   LDL 59  --  52* 73   HDL 55  --  59 59   NHDL 84  --   --   --    CHOL 139  --  135 157   TRIG 127  --  120 124   TSH  --   --   --  0.54   NTBNP  --  2,627*  --   --        Lab Results   Component Value Date    WBC 6.4 03/09/2018    RBC 4.45 03/09/2018    HGB 12.7 03/09/2018    HCT 38.0 03/09/2018    MCV 85 03/09/2018    MCH 28.5 03/09/2018    MCHC 33.4 03/09/2018    RDW 15.0 03/09/2018     03/09/2018       Lab Results   Component Value Date     11/12/2018    POTASSIUM 3.8 11/12/2018    CHLORIDE 97 (L) 11/12/2018    CO2 31 (H) 11/12/2018    ANIONGAP 12.8 11/12/2018     (H) 11/12/2018    BUN 19 11/12/2018    CR 0.90 11/12/2018    GFRESTIMATED 70 01/21/2019    GFRESTBLACK 84 01/21/2019    PENNY 9.6 11/12/2018      Lab Results   Component Value Date    AST 16 03/09/2018    ALT 18 11/12/2018       No results found for: A1C    Lab Results   Component Value Date    INR 0.97 06/05/2009             Thank you for allowing me to participate in the care of your patient.    Sincerely,     KATIE MANSFIELD [facility]

## 2019-02-25 DIAGNOSIS — I10 ESSENTIAL HYPERTENSION: ICD-10-CM

## 2019-02-25 RX ORDER — IRBESARTAN 300 MG/1
300 TABLET ORAL DAILY
Qty: 90 TABLET | Refills: 1 | Status: SHIPPED | OUTPATIENT
Start: 2019-02-25 | End: 2019-08-01

## 2019-05-14 DIAGNOSIS — E78.00 PURE HYPERCHOLESTEROLEMIA: ICD-10-CM

## 2019-05-14 RX ORDER — SIMVASTATIN 10 MG
10 TABLET ORAL AT BEDTIME
Qty: 90 TABLET | Refills: 0 | Status: SHIPPED | OUTPATIENT
Start: 2019-05-14 | End: 2019-07-30

## 2019-05-31 DIAGNOSIS — I10 BENIGN ESSENTIAL HYPERTENSION: ICD-10-CM

## 2019-05-31 DIAGNOSIS — I42.0 DILATED CARDIOMYOPATHY (H): ICD-10-CM

## 2019-05-31 RX ORDER — CARVEDILOL 25 MG/1
50 TABLET ORAL 2 TIMES DAILY
Qty: 360 TABLET | Refills: 0 | Status: SHIPPED | OUTPATIENT
Start: 2019-05-31 | End: 2020-01-27

## 2019-06-19 DIAGNOSIS — I10 BENIGN ESSENTIAL HYPERTENSION: ICD-10-CM

## 2019-06-19 RX ORDER — FUROSEMIDE 20 MG
TABLET ORAL
Qty: 100 TABLET | Refills: 3 | Status: SHIPPED | OUTPATIENT
Start: 2019-06-19 | End: 2020-01-27

## 2019-06-24 DIAGNOSIS — I10 BENIGN ESSENTIAL HYPERTENSION: ICD-10-CM

## 2019-06-24 RX ORDER — TERAZOSIN 5 MG/1
5 CAPSULE ORAL AT BEDTIME
Qty: 90 CAPSULE | Refills: 1 | Status: SHIPPED | OUTPATIENT
Start: 2019-06-24 | End: 2019-12-12

## 2019-07-30 DIAGNOSIS — E78.00 PURE HYPERCHOLESTEROLEMIA: ICD-10-CM

## 2019-07-30 DIAGNOSIS — I10 BENIGN ESSENTIAL HYPERTENSION: ICD-10-CM

## 2019-07-30 RX ORDER — SIMVASTATIN 10 MG
10 TABLET ORAL AT BEDTIME
Qty: 90 TABLET | Refills: 1 | Status: SHIPPED | OUTPATIENT
Start: 2019-07-30 | End: 2020-01-27

## 2019-07-30 RX ORDER — POTASSIUM CHLORIDE 750 MG/1
10 TABLET, EXTENDED RELEASE ORAL 2 TIMES DAILY
Qty: 180 TABLET | Refills: 1 | Status: SHIPPED | OUTPATIENT
Start: 2019-07-30 | End: 2020-01-27

## 2019-08-01 DIAGNOSIS — I10 ESSENTIAL HYPERTENSION: ICD-10-CM

## 2019-08-01 RX ORDER — IRBESARTAN 300 MG/1
300 TABLET ORAL DAILY
Qty: 90 TABLET | Refills: 1 | Status: SHIPPED | OUTPATIENT
Start: 2019-08-01 | End: 2020-01-27

## 2019-12-12 DIAGNOSIS — I10 BENIGN ESSENTIAL HYPERTENSION: ICD-10-CM

## 2019-12-12 RX ORDER — TERAZOSIN 5 MG/1
5 CAPSULE ORAL AT BEDTIME
Qty: 90 CAPSULE | Refills: 0 | Status: SHIPPED | OUTPATIENT
Start: 2019-12-12 | End: 2020-01-27

## 2020-01-27 DIAGNOSIS — I10 ESSENTIAL HYPERTENSION, BENIGN: ICD-10-CM

## 2020-01-27 DIAGNOSIS — I10 BENIGN ESSENTIAL HYPERTENSION: ICD-10-CM

## 2020-01-27 DIAGNOSIS — I42.0 DILATED CARDIOMYOPATHY (H): ICD-10-CM

## 2020-01-27 DIAGNOSIS — I10 ESSENTIAL HYPERTENSION: ICD-10-CM

## 2020-01-27 DIAGNOSIS — E78.00 PURE HYPERCHOLESTEROLEMIA: ICD-10-CM

## 2020-01-27 RX ORDER — TERAZOSIN 5 MG/1
5 CAPSULE ORAL AT BEDTIME
Qty: 90 CAPSULE | Refills: 0 | Status: SHIPPED | OUTPATIENT
Start: 2020-01-27 | End: 2020-03-13

## 2020-01-27 RX ORDER — HYDRALAZINE HYDROCHLORIDE 25 MG/1
25 TABLET, FILM COATED ORAL 3 TIMES DAILY
Qty: 270 TABLET | Refills: 0 | Status: SHIPPED | OUTPATIENT
Start: 2020-01-27 | End: 2020-03-13

## 2020-01-27 RX ORDER — CARVEDILOL 25 MG/1
50 TABLET ORAL 2 TIMES DAILY
Qty: 360 TABLET | Refills: 0 | Status: SHIPPED | OUTPATIENT
Start: 2020-01-27 | End: 2020-03-13

## 2020-01-27 RX ORDER — SIMVASTATIN 10 MG
10 TABLET ORAL AT BEDTIME
Qty: 90 TABLET | Refills: 0 | Status: SHIPPED | OUTPATIENT
Start: 2020-01-27 | End: 2020-03-13

## 2020-01-27 RX ORDER — IRBESARTAN 300 MG/1
300 TABLET ORAL DAILY
Qty: 90 TABLET | Refills: 0 | Status: SHIPPED | OUTPATIENT
Start: 2020-01-27 | End: 2020-03-13

## 2020-01-27 RX ORDER — FUROSEMIDE 20 MG
TABLET ORAL
Qty: 100 TABLET | Refills: 0 | Status: SHIPPED | OUTPATIENT
Start: 2020-01-27 | End: 2020-03-13

## 2020-01-27 RX ORDER — POTASSIUM CHLORIDE 750 MG/1
10 TABLET, EXTENDED RELEASE ORAL 2 TIMES DAILY
Qty: 180 TABLET | Refills: 0 | Status: SHIPPED | OUTPATIENT
Start: 2020-01-27 | End: 2020-03-13

## 2020-03-03 DIAGNOSIS — I10 ESSENTIAL HYPERTENSION: Primary | ICD-10-CM

## 2020-03-03 DIAGNOSIS — E78.00 PURE HYPERCHOLESTEROLEMIA: ICD-10-CM

## 2020-03-03 DIAGNOSIS — I25.10 CORONARY ARTERY DISEASE INVOLVING NATIVE CORONARY ARTERY OF NATIVE HEART WITHOUT ANGINA PECTORIS: ICD-10-CM

## 2020-03-10 DIAGNOSIS — E78.00 PURE HYPERCHOLESTEROLEMIA: ICD-10-CM

## 2020-03-10 DIAGNOSIS — I10 ESSENTIAL HYPERTENSION: ICD-10-CM

## 2020-03-10 DIAGNOSIS — I25.10 CORONARY ARTERY DISEASE INVOLVING NATIVE CORONARY ARTERY OF NATIVE HEART WITHOUT ANGINA PECTORIS: ICD-10-CM

## 2020-03-10 LAB
ALT SERPL W P-5'-P-CCNC: <5 U/L (ref 5–30)
ANION GAP SERPL CALCULATED.3IONS-SCNC: 13.4 MMOL/L (ref 6–17)
BUN SERPL-MCNC: 14 MG/DL (ref 7–30)
CALCIUM SERPL-MCNC: 9.2 MG/DL (ref 8.5–10.5)
CHLORIDE SERPL-SCNC: 97 MMOL/L (ref 98–107)
CHOLEST SERPL-MCNC: 124 MG/DL
CO2 SERPL-SCNC: 31 MMOL/L (ref 23–29)
CREAT SERPL-MCNC: 0.83 MG/DL (ref 0.7–1.3)
GFR SERPL CREATININE-BSD FRML MDRD: 66 ML/MIN/{1.73_M2}
GLUCOSE SERPL-MCNC: 120 MG/DL (ref 70–105)
HDLC SERPL-MCNC: 56 MG/DL
LDLC SERPL CALC-MCNC: 43 MG/DL
NONHDLC SERPL-MCNC: 68 MG/DL
POTASSIUM SERPL-SCNC: 3.4 MMOL/L (ref 3.5–5.1)
SODIUM SERPL-SCNC: 138 MMOL/L (ref 136–145)
TRIGL SERPL-MCNC: 124 MG/DL

## 2020-03-10 PROCEDURE — 36415 COLL VENOUS BLD VENIPUNCTURE: CPT | Performed by: INTERNAL MEDICINE

## 2020-03-10 PROCEDURE — 80048 BASIC METABOLIC PNL TOTAL CA: CPT | Performed by: INTERNAL MEDICINE

## 2020-03-10 PROCEDURE — 84460 ALANINE AMINO (ALT) (SGPT): CPT | Performed by: INTERNAL MEDICINE

## 2020-03-10 PROCEDURE — 80061 LIPID PANEL: CPT | Performed by: INTERNAL MEDICINE

## 2020-03-13 ENCOUNTER — OFFICE VISIT (OUTPATIENT)
Dept: CARDIOLOGY | Facility: CLINIC | Age: 80
End: 2020-03-13
Payer: COMMERCIAL

## 2020-03-13 ENCOUNTER — HOSPITAL ENCOUNTER (OUTPATIENT)
Dept: CARDIOLOGY | Facility: CLINIC | Age: 80
Discharge: HOME | End: 2020-03-13
Attending: FAMILY MEDICINE | Admitting: FAMILY MEDICINE
Payer: COMMERCIAL

## 2020-03-13 VITALS
DIASTOLIC BLOOD PRESSURE: 87 MMHG | BODY MASS INDEX: 28.75 KG/M2 | HEART RATE: 85 BPM | HEIGHT: 59 IN | SYSTOLIC BLOOD PRESSURE: 168 MMHG | WEIGHT: 142.6 LBS

## 2020-03-13 DIAGNOSIS — E78.00 HYPERCHOLESTEROLEMIA: ICD-10-CM

## 2020-03-13 DIAGNOSIS — I25.10 CORONARY ARTERY DISEASE INVOLVING NATIVE CORONARY ARTERY OF NATIVE HEART WITHOUT ANGINA PECTORIS: ICD-10-CM

## 2020-03-13 DIAGNOSIS — I10 ESSENTIAL HYPERTENSION, BENIGN: ICD-10-CM

## 2020-03-13 DIAGNOSIS — I42.0 DILATED CARDIOMYOPATHY (H): ICD-10-CM

## 2020-03-13 DIAGNOSIS — R06.02 SHORTNESS OF BREATH: ICD-10-CM

## 2020-03-13 DIAGNOSIS — I42.0 DILATED CARDIOMYOPATHY (H): Primary | ICD-10-CM

## 2020-03-13 DIAGNOSIS — E78.00 PURE HYPERCHOLESTEROLEMIA: ICD-10-CM

## 2020-03-13 DIAGNOSIS — I10 BENIGN ESSENTIAL HYPERTENSION: ICD-10-CM

## 2020-03-13 DIAGNOSIS — I10 ESSENTIAL HYPERTENSION: Primary | ICD-10-CM

## 2020-03-13 DIAGNOSIS — R03.0 ELEVATED BLOOD PRESSURE READING WITHOUT DIAGNOSIS OF HYPERTENSION: ICD-10-CM

## 2020-03-13 DIAGNOSIS — I42.9 CARDIOMYOPATHY, UNSPECIFIED TYPE (H): ICD-10-CM

## 2020-03-13 PROCEDURE — 93306 TTE W/DOPPLER COMPLETE: CPT

## 2020-03-13 PROCEDURE — 93306 TTE W/DOPPLER COMPLETE: CPT | Mod: 26 | Performed by: INTERNAL MEDICINE

## 2020-03-13 PROCEDURE — 99214 OFFICE O/P EST MOD 30 MIN: CPT | Mod: 25 | Performed by: INTERNAL MEDICINE

## 2020-03-13 RX ORDER — TERAZOSIN 5 MG/1
5 CAPSULE ORAL AT BEDTIME
Qty: 90 CAPSULE | Refills: 3 | Status: SHIPPED | OUTPATIENT
Start: 2020-03-13 | End: 2021-03-09

## 2020-03-13 RX ORDER — FUROSEMIDE 20 MG
TABLET ORAL
Qty: 100 TABLET | Refills: 3 | Status: SHIPPED | OUTPATIENT
Start: 2020-03-13 | End: 2020-03-16

## 2020-03-13 RX ORDER — POTASSIUM CHLORIDE 750 MG/1
10 TABLET, EXTENDED RELEASE ORAL 2 TIMES DAILY
Qty: 180 TABLET | Refills: 3 | Status: SHIPPED | OUTPATIENT
Start: 2020-03-13 | End: 2021-03-29

## 2020-03-13 RX ORDER — CARVEDILOL 25 MG/1
50 TABLET ORAL 2 TIMES DAILY
Qty: 360 TABLET | Refills: 3 | Status: SHIPPED | OUTPATIENT
Start: 2020-03-13 | End: 2021-05-13

## 2020-03-13 RX ORDER — HYDRALAZINE HYDROCHLORIDE 50 MG/1
50 TABLET, FILM COATED ORAL 3 TIMES DAILY
Qty: 270 TABLET | Refills: 3 | Status: SHIPPED | OUTPATIENT
Start: 2020-03-13 | End: 2021-03-29

## 2020-03-13 RX ORDER — IRBESARTAN 300 MG/1
300 TABLET ORAL DAILY
Qty: 90 TABLET | Refills: 3 | Status: SHIPPED | OUTPATIENT
Start: 2020-03-13 | End: 2021-04-19

## 2020-03-13 RX ORDER — SIMVASTATIN 10 MG
10 TABLET ORAL AT BEDTIME
Qty: 90 TABLET | Refills: 3 | Status: SHIPPED | OUTPATIENT
Start: 2020-03-13 | End: 2021-04-16

## 2020-03-13 ASSESSMENT — MIFFLIN-ST. JEOR: SCORE: 1032.46

## 2020-03-13 NOTE — PROGRESS NOTES
HPI and Plan:   See dictation    Orders Placed This Encounter   Procedures     Basic metabolic panel     Lipid Profile     ALT     Follow-Up with Cardiac Advanced Practice Provider     Follow-Up with Cardiologist     24 Hour Blood Pressure Monitor - Adult     Echocardiogram Complete       Orders Placed This Encounter   Medications     hydrALAZINE (APRESOLINE) 50 MG tablet     Sig: Take 1 tablet (50 mg) by mouth 3 times daily     Dispense:  270 tablet     Refill:  3     carvedilol (COREG) 25 MG tablet     Sig: Take 2 tablets (50 mg) by mouth 2 times daily     Dispense:  360 tablet     Refill:  3     furosemide (LASIX) 20 MG tablet     Si pills(40mg) on Tuesday and Thursday, one pill the other 5 days of the week and as directe     Dispense:  100 tablet     Refill:  3     irbesartan (AVAPRO) 300 MG tablet     Sig: Take 1 tablet (300 mg) by mouth daily     Dispense:  90 tablet     Refill:  3     potassium chloride ER (K-TAB/KLOR-CON) 10 MEQ CR tablet     Sig: Take 1 tablet (10 mEq) by mouth 2 times daily And as needed as directed.     Dispense:  180 tablet     Refill:  3     simvastatin (ZOCOR) 10 MG tablet     Sig: Take 1 tablet (10 mg) by mouth At Bedtime     Dispense:  90 tablet     Refill:  3     terazosin (HYTRIN) 5 MG capsule     Sig: Take 1 capsule (5 mg) by mouth At Bedtime     Dispense:  90 capsule     Refill:  3       Medications Discontinued During This Encounter   Medication Reason     hydrALAZINE (APRESOLINE) 25 MG tablet Reorder     carvedilol (COREG) 25 MG tablet Reorder     furosemide (LASIX) 20 MG tablet Reorder     irbesartan (AVAPRO) 300 MG tablet Reorder     potassium chloride ER (K-TAB/KLOR-CON) 10 MEQ CR tablet Reorder     simvastatin (ZOCOR) 10 MG tablet Reorder     terazosin (HYTRIN) 5 MG capsule Reorder         Encounter Diagnoses   Name Primary?     Essential hypertension Yes     Dilated cardiomyopathy (H)      Cardiomyopathy, unspecified type (H)      Coronary artery disease involving  native coronary artery of native heart without angina pectoris      Hypercholesterolemia      Essential hypertension, benign      Elevated blood pressure reading without diagnosis of hypertension      Benign essential hypertension      Pure hypercholesterolemia        CURRENT MEDICATIONS:  Current Outpatient Medications   Medication Sig Dispense Refill     Acetaminophen (TYLENOL PO) Take 500 mg by mouth every 6 hours as needed for mild pain or fever        carvedilol (COREG) 25 MG tablet Take 2 tablets (50 mg) by mouth 2 times daily 360 tablet 3     Cholecalciferol (VITAMIN D) 2000 UNITS tablet Take 1 tablet by mouth daily       furosemide (LASIX) 20 MG tablet 2 pills(40mg) on Tuesday and Thursday, one pill the other 5 days of the week and as directe 100 tablet 3     hydrALAZINE (APRESOLINE) 50 MG tablet Take 1 tablet (50 mg) by mouth 3 times daily 270 tablet 3     irbesartan (AVAPRO) 300 MG tablet Take 1 tablet (300 mg) by mouth daily 90 tablet 3     Omeprazole (PRILOSEC PO) Take 10 mg by mouth daily       potassium chloride ER (K-TAB/KLOR-CON) 10 MEQ CR tablet Take 1 tablet (10 mEq) by mouth 2 times daily And as needed as directed. 180 tablet 3     simvastatin (ZOCOR) 10 MG tablet Take 1 tablet (10 mg) by mouth At Bedtime 90 tablet 3     terazosin (HYTRIN) 5 MG capsule Take 1 capsule (5 mg) by mouth At Bedtime 90 capsule 3       ALLERGIES     Allergies   Allergen Reactions     Amlodipine      swelling     Aspirin Other (See Comments)     Bleeding              Bleeding, GI Lesion         Codeine Sulfate GI Disturbance       PAST MEDICAL HISTORY:  Past Medical History:   Diagnosis Date     Arthritis      Breast cancer (H)      Cardiomyopathy (H)     ideopathic     Coronary artery disease 9/25/2014     Hypercholesterolemia      Hypertension      Hypokalemia      Malignant neoplasm (H)      Shortness of breath      Shoulder pain        PAST SURGICAL HISTORY:  Past Surgical History:   Procedure Laterality Date     BACK  SURGERY       CHOLECYSTECTOMY       EYE SURGERY       partial masectomy         FAMILY HISTORY:  Family History   Problem Relation Age of Onset     Other Cancer Mother      Other Cancer Brother      No Known Problems Son      Heart Failure Daughter      Family History Negative No family hx of        SOCIAL HISTORY:  Social History     Socioeconomic History     Marital status: Single     Spouse name: None     Number of children: None     Years of education: None     Highest education level: None   Occupational History     None   Social Needs     Financial resource strain: None     Food insecurity     Worry: None     Inability: None     Transportation needs     Medical: None     Non-medical: None   Tobacco Use     Smoking status: Never Smoker     Smokeless tobacco: Never Used   Substance and Sexual Activity     Alcohol use: Yes     Comment: socially     Drug use: No     Sexual activity: Never   Lifestyle     Physical activity     Days per week: None     Minutes per session: None     Stress: None   Relationships     Social connections     Talks on phone: None     Gets together: None     Attends Judaism service: None     Active member of club or organization: None     Attends meetings of clubs or organizations: None     Relationship status: None     Intimate partner violence     Fear of current or ex partner: None     Emotionally abused: None     Physically abused: None     Forced sexual activity: None   Other Topics Concern     Parent/sibling w/ CABG, MI or angioplasty before 65F 55M? Not Asked      Service Not Asked     Blood Transfusions Not Asked     Caffeine Concern Not Asked     Occupational Exposure Not Asked     Hobby Hazards Not Asked     Sleep Concern Not Asked     Stress Concern Not Asked     Weight Concern Not Asked     Special Diet No     Back Care Not Asked     Exercise No     Bike Helmet Not Asked     Seat Belt Not Asked     Self-Exams Not Asked   Social History Narrative     None       Review  "of Systems:  Skin:  Negative pigmentation;rash     Eyes:  Positive for glasses    ENT:  Negative      Respiratory:  Positive for wheezing;dyspnea on exertion energy level.ZHU not all the time   Cardiovascular:  Negative Positive for;fatigue energy level is the same.not as motivated because son passed in November '19  Gastroenterology: Positive for heartburn;excessive gas or bloating    Genitourinary:  Negative      Musculoskeletal:  Positive for back pain;joint pain;arthritis Left shoulder pain.back discomfort  Neurologic:  Negative      Psychiatric:  Positive for anxiety;depression son passed in November '19  Heme/Lymph/Imm:  Negative allergies;weight loss    Endocrine:  Negative        Physical Exam:  Vitals: BP (!) 168/87   Pulse 85   Ht 1.499 m (4' 11\")   Wt 64.7 kg (142 lb 9.6 oz)   BMI 28.80 kg/m      Constitutional:  cooperative, alert and oriented, well developed, well nourished, in no acute distress overweight      Skin:  warm and dry to the touch, no apparent skin lesions or masses noted          Head:  normocephalic, no masses or lesions;normocephalic        Eyes:  pupils equal and round, conjunctivae and lids unremarkable, sclera white, no xanthalasma, EOMS intact, no nystagmus        Lymph:      ENT:  no pallor or cyanosis, dentition good        Neck:  carotid pulses are full and equal bilaterally, JVP normal, no carotid bruit        Respiratory:  normal breath sounds, clear to auscultation, normal A-P diameter, normal symmetry, normal respiratory excursion, no use of accessory muscles         Cardiac: regular rhythm;normal S1 and S2;no murmurs, gallops or rubs detected   S4            pulses full and equal                                        GI:  not assessed this visit        Extremities and Muscular Skeletal:  no edema   bilateral LE edema;1+          Neurological:  no gross motor deficits;affect appropriate        Psych:  Alert and Oriented x 3        CC  [facility]  [address]  [place] [address]

## 2020-03-13 NOTE — LETTER
3/13/2020      Malaika Rodriguez MD, MD  [place] [address]  [facility] [address]      RE: Larry Melendez       Dear Colleague,    I had the pleasure of seeing Larry Melendez in the [facility].    Service Date: 03/13/2020      REFERRING PHYSICIAN:  Dr. Malaika Rodriguez.      HISTORY OF PRESENT ILLNESS:  It was my pleasure to see your patient, Larry Melendez, in followup.  As you know, this is a 79-year-old patient with a history of idiopathic cardiomyopathy and essential hypertension.  Her blood pressure has become increasingly difficult to control.  Unfortunately, hydrochlorothiazide, which worked well for her, caused marked hyponatremia.  Her blood pressure today was significantly raised at 168/87 and on recheck it was 160/80.  The patient tells me, however, that when she checks her blood pressure at home that she is usually in the 120s to 130s, so it is difficult to know whether this patient truly has hypertension or not.  The ideal test is a 24-hour blood pressure monitor which tells us if the patient does have white-coat hypertension.  She does appear in the past that her insurance did not pay for a 24-hour blood pressure monitor, but she does have the classic indication, which is a patient that says that her blood pressure is normal, but when it is checked in the hospital it is abnormal.  Hopefully this time that they will check her blood pressure for her.      Her cardiomyopathy is still the same.  Her EF is in the 40%-45% range and unchanged from previously.  She did have a CT angiogram performed when her ejection fraction dropped to 40%-45%, but this showed only mild coronary artery disease, so the diagnosis of idiopathic cardiomyopathy was confirmed.  She is on multiple medications for blood pressure control as you can see below.  Her potassium was borderline low today at 3.4, probably due to the furosemide medication.      IMPRESSION:   1.   Cardiomyopathy.  The patient has mildly reduced left ventricular systolic function with a significant component of diastolic dysfunction, which is grade 3.  The echo findings are unchanged from previously.   2.  Essential hypertension.  It is unclear whether her blood pressure is well controlled or not.  She tells me that her blood pressure at home is well controlled, but we still see high blood pressures here, and hypertensive cardiomyopathy would certainly be in the differential diagnosis.   3.  Excellent lipid profile with an LDL of 43, HDL of 56 and triglycerides of 124 with a total cholesterol of 124.  Her liver function tests are normal with an ALT of less than 5.   4.  Under a significant amount of stress.  She lost her 57-year-old son to lung cancer.      PLAN:  We will attempt to get a 24-hour blood pressure monitor and determine if her blood pressure truly is well controlled or not.  I will increase the dose of hydralazine to 50 mg 3 times a day.  I will have the patient return in about 4-6 months with one of our nurse practitioners or physician assistants.  I will see the patient back again myself in a year's time.  At that stage, I will repeat her echocardiogram.  However, if her symptoms get worse or she gets increasingly short of breath or more fluid overloaded, she is to contact us immediately.  It has been my pleasure to be involved in the care of this very nice patient.      cc:   Malaika Rodriguez MD   [facility]   [address], [facility] Document Management [id number]   [place]  [address]         YADIRA PERKINS MD, [facility]             D: 2020   T: 2020   MT: OLGA      Name:     ELAYNE SALDAÑA   MRN:      [id number]        Account:      [id number]   :      1940           Service Date: 2020      Document: [id number]         Outpatient Encounter Medications as of 3/13/2020   Medication Sig Dispense Refill     Acetaminophen (TYLENOL PO)  Take 500 mg by mouth every 6 hours as needed for mild pain or fever        carvedilol (COREG) 25 MG tablet Take 2 tablets (50 mg) by mouth 2 times daily 360 tablet 3     Cholecalciferol (VITAMIN D) 2000 UNITS tablet Take 1 tablet by mouth daily       hydrALAZINE (APRESOLINE) 50 MG tablet Take 1 tablet (50 mg) by mouth 3 times daily 270 tablet 3     irbesartan (AVAPRO) 300 MG tablet Take 1 tablet (300 mg) by mouth daily 90 tablet 3     Omeprazole (PRILOSEC PO) Take 10 mg by mouth daily       potassium chloride ER (K-TAB/KLOR-CON) 10 MEQ CR tablet Take 1 tablet (10 mEq) by mouth 2 times daily And as needed as directed. 180 tablet 3     simvastatin (ZOCOR) 10 MG tablet Take 1 tablet (10 mg) by mouth At Bedtime 90 tablet 3     terazosin (HYTRIN) 5 MG capsule Take 1 capsule (5 mg) by mouth At Bedtime 90 capsule 3     [DISCONTINUED] furosemide (LASIX) 20 MG tablet 2 pills(40mg) on Tuesday and Thursday, one pill the other 5 days of the week and as directe 100 tablet 3     [DISCONTINUED] carvedilol (COREG) 25 MG tablet Take 2 tablets (50 mg) by mouth 2 times daily 360 tablet 0     [DISCONTINUED] furosemide (LASIX) 20 MG tablet 2 pills(40mg) on Tuesday and Thursday, one pill the other 5 days of the week and as directe 100 tablet 0     [DISCONTINUED] hydrALAZINE (APRESOLINE) 25 MG tablet Take 1 tablet (25 mg) by mouth 3 times daily 270 tablet 0     [DISCONTINUED] irbesartan (AVAPRO) 300 MG tablet Take 1 tablet (300 mg) by mouth daily 90 tablet 0     [DISCONTINUED] potassium chloride ER (K-TAB/KLOR-CON) 10 MEQ CR tablet Take 1 tablet (10 mEq) by mouth 2 times daily And as needed as directed. 180 tablet 0     [DISCONTINUED] simvastatin (ZOCOR) 10 MG tablet Take 1 tablet (10 mg) by mouth At Bedtime 90 tablet 0     [DISCONTINUED] terazosin (HYTRIN) 5 MG capsule Take 1 capsule (5 mg) by mouth At Bedtime 90 capsule 0     No facility-administered encounter medications on file as of 3/13/2020.        Again, thank you for allowing me  to participate in the care of your patient.      Sincerely,    Brett Smith MD, MD     [facility]

## 2020-03-13 NOTE — PROGRESS NOTES
Service Date: 03/13/2020      REFERRING PHYSICIAN:  Dr. Malaika Rodriguez.      HISTORY OF PRESENT ILLNESS:  It was my pleasure to see your patient, Larry Melendez, in followup.  As you know, this is a 79-year-old patient with a history of idiopathic cardiomyopathy and essential hypertension.  Her blood pressure has become increasingly difficult to control.  Unfortunately, hydrochlorothiazide, which worked well for her, caused marked hyponatremia.  Her blood pressure today was significantly raised at 168/87 and on recheck it was 160/80.  The patient tells me, however, that when she checks her blood pressure at home that she is usually in the 120s to 130s, so it is difficult to know whether this patient truly has hypertension or not.  The ideal test is a 24-hour blood pressure monitor which tells us if the patient does have white-coat hypertension.  She does appear in the past that her insurance did not pay for a 24-hour blood pressure monitor, but she does have the classic indication, which is a patient that says that her blood pressure is normal, but when it is checked in the hospital it is abnormal.  Hopefully this time that they will check her blood pressure for her.      Her cardiomyopathy is still the same.  Her EF is in the 40%-45% range and unchanged from previously.  She did have a CT angiogram performed when her ejection fraction dropped to 40%-45%, but this showed only mild coronary artery disease, so the diagnosis of idiopathic cardiomyopathy was confirmed.  She is on multiple medications for blood pressure control as you can see below.  Her potassium was borderline low today at 3.4, probably due to the furosemide medication.      IMPRESSION:   1.  Cardiomyopathy.  The patient has mildly reduced left ventricular systolic function with a significant component of diastolic dysfunction, which is grade 3.  The echo findings are unchanged from previously.   2.  Essential hypertension.  It is unclear  whether her blood pressure is well controlled or not.  She tells me that her blood pressure at home is well controlled, but we still see high blood pressures here, and hypertensive cardiomyopathy would certainly be in the differential diagnosis.   3.  Excellent lipid profile with an LDL of 43, HDL of 56 and triglycerides of 124 with a total cholesterol of 124.  Her liver function tests are normal with an ALT of less than 5.   4.  Under a significant amount of stress.  She lost her 57-year-old son to lung cancer.      PLAN:  We will attempt to get a 24-hour blood pressure monitor and determine if her blood pressure truly is well controlled or not.  I will increase the dose of hydralazine to 50 mg 3 times a day.  I will have the patient return in about 4-6 months with one of our nurse practitioners or physician assistants.  I will see the patient back again myself in a year's time.  At that stage, I will repeat her echocardiogram.  However, if her symptoms get worse or she gets increasingly short of breath or more fluid overloaded, she is to contact us immediately.  It has been my pleasure to be involved in the care of this very nice patient.      cc:   Malaika Rodriguez MD   [facility]   [address], [facility] [id number]   [place]  [address]         YADIRA PERKINS MD, [facility]             D: 2020   T: 2020   MT: OLGA      Name:     ELAYNE SALDAÑA   MRN:      [id number]        Account:      [id number]   :      1940           Service Date: 2020      Document: [id number]

## 2020-03-13 NOTE — LETTER
3/13/2020    Malaika Rodriguez MD, MD  [facility] [address]  [facility] [address]    RE: Larry Melendez       Dear Colleague,    I had the pleasure of seeing Larry Melendez in the [facility].    HPI and Plan:   See dictation    Orders Placed This Encounter   Procedures     Basic metabolic panel     Lipid Profile     ALT     Follow-Up with Cardiac Advanced Practice Provider     Follow-Up with Cardiologist     24 Hour Blood Pressure Monitor - Adult     Echocardiogram Complete       Orders Placed This Encounter   Medications     hydrALAZINE (APRESOLINE) 50 MG tablet     Sig: Take 1 tablet (50 mg) by mouth 3 times daily     Dispense:  270 tablet     Refill:  3     carvedilol (COREG) 25 MG tablet     Sig: Take 2 tablets (50 mg) by mouth 2 times daily     Dispense:  360 tablet     Refill:  3     furosemide (LASIX) 20 MG tablet     Si pills(40mg) on Tuesday and Thursday, one pill the other 5 days of the week and as directe     Dispense:  100 tablet     Refill:  3     irbesartan (AVAPRO) 300 MG tablet     Sig: Take 1 tablet (300 mg) by mouth daily     Dispense:  90 tablet     Refill:  3     potassium chloride ER (K-TAB/KLOR-CON) 10 MEQ CR tablet     Sig: Take 1 tablet (10 mEq) by mouth 2 times daily And as needed as directed.     Dispense:  180 tablet     Refill:  3     simvastatin (ZOCOR) 10 MG tablet     Sig: Take 1 tablet (10 mg) by mouth At Bedtime     Dispense:  90 tablet     Refill:  3     terazosin (HYTRIN) 5 MG capsule     Sig: Take 1 capsule (5 mg) by mouth At Bedtime     Dispense:  90 capsule     Refill:  3       Medications Discontinued During This Encounter   Medication Reason     hydrALAZINE (APRESOLINE) 25 MG tablet Reorder     carvedilol (COREG) 25 MG tablet Reorder     furosemide (LASIX) 20 MG tablet Reorder     irbesartan (AVAPRO) 300 MG tablet Reorder     potassium chloride ER (K-TAB/KLOR-CON) 10 MEQ CR tablet Reorder     simvastatin (ZOCOR) 10 MG  tablet Reorder     terazosin (HYTRIN) 5 MG capsule Reorder         Encounter Diagnoses   Name Primary?     Essential hypertension Yes     Dilated cardiomyopathy (H)      Cardiomyopathy, unspecified type (H)      Coronary artery disease involving native coronary artery of native heart without angina pectoris      Hypercholesterolemia      Essential hypertension, benign      Elevated blood pressure reading without diagnosis of hypertension      Benign essential hypertension      Pure hypercholesterolemia        CURRENT MEDICATIONS:  Current Outpatient Medications   Medication Sig Dispense Refill     Acetaminophen (TYLENOL PO) Take 500 mg by mouth every 6 hours as needed for mild pain or fever        carvedilol (COREG) 25 MG tablet Take 2 tablets (50 mg) by mouth 2 times daily 360 tablet 3     Cholecalciferol (VITAMIN D) 2000 UNITS tablet Take 1 tablet by mouth daily       furosemide (LASIX) 20 MG tablet 2 pills(40mg) on Tuesday and Thursday, one pill the other 5 days of the week and as directe 100 tablet 3     hydrALAZINE (APRESOLINE) 50 MG tablet Take 1 tablet (50 mg) by mouth 3 times daily 270 tablet 3     irbesartan (AVAPRO) 300 MG tablet Take 1 tablet (300 mg) by mouth daily 90 tablet 3     Omeprazole (PRILOSEC PO) Take 10 mg by mouth daily       potassium chloride ER (K-TAB/KLOR-CON) 10 MEQ CR tablet Take 1 tablet (10 mEq) by mouth 2 times daily And as needed as directed. 180 tablet 3     simvastatin (ZOCOR) 10 MG tablet Take 1 tablet (10 mg) by mouth At Bedtime 90 tablet 3     terazosin (HYTRIN) 5 MG capsule Take 1 capsule (5 mg) by mouth At Bedtime 90 capsule 3       ALLERGIES     Allergies   Allergen Reactions     Amlodipine      swelling     Aspirin Other (See Comments)     Bleeding              Bleeding, GI Lesion         Codeine Sulfate GI Disturbance       PAST MEDICAL HISTORY:  Past Medical History:   Diagnosis Date     Arthritis      Breast cancer (H)      Cardiomyopathy (H)     ideopathic     Coronary  artery disease 9/25/2014     Hypercholesterolemia      Hypertension      Hypokalemia      Malignant neoplasm (H)      Shortness of breath      Shoulder pain        PAST SURGICAL HISTORY:  Past Surgical History:   Procedure Laterality Date     BACK SURGERY       CHOLECYSTECTOMY       EYE SURGERY       partial masectomy         FAMILY HISTORY:  Family History   Problem Relation Age of Onset     Other Cancer Mother      Other Cancer Brother      No Known Problems Son      Heart Failure Daughter      Family History Negative No family hx of        SOCIAL HISTORY:  Social History     Socioeconomic History     Marital status: Single     Spouse name: None     Number of children: None     Years of education: None     Highest education level: None   Occupational History     None   Social Needs     Financial resource strain: None     Food insecurity     Worry: None     Inability: None     Transportation needs     Medical: None     Non-medical: None   Tobacco Use     Smoking status: Never Smoker     Smokeless tobacco: Never Used   Substance and Sexual Activity     Alcohol use: Yes     Comment: socially     Drug use: No     Sexual activity: Never   Lifestyle     Physical activity     Days per week: None     Minutes per session: None     Stress: None   Relationships     Social connections     Talks on phone: None     Gets together: None     Attends Bahai service: None     Active member of club or organization: None     Attends meetings of clubs or organizations: None     Relationship status: None     Intimate partner violence     Fear of current or ex partner: None     Emotionally abused: None     Physically abused: None     Forced sexual activity: None   Other Topics Concern     Parent/sibling w/ CABG, MI or angioplasty before 65F 55M? Not Asked      Service Not Asked     Blood Transfusions Not Asked     Caffeine Concern Not Asked     Occupational Exposure Not Asked     Hobby Hazards Not Asked     Sleep Concern Not  "Asked     Stress Concern Not Asked     Weight Concern Not Asked     Special Diet No     Back Care Not Asked     Exercise No     Bike Helmet Not Asked     Seat Belt Not Asked     Self-Exams Not Asked   Social History Narrative     None       Review of Systems:  Skin:  Negative pigmentation;rash     Eyes:  Positive for glasses    ENT:  Negative      Respiratory:  Positive for wheezing;dyspnea on exertion energy level.ZHU not all the time   Cardiovascular:  Negative Positive for;fatigue energy level is the same.not as motivated because son passed in November '19  Gastroenterology: Positive for heartburn;excessive gas or bloating    Genitourinary:  Negative      Musculoskeletal:  Positive for back pain;joint pain;arthritis Left shoulder pain.back discomfort  Neurologic:  Negative      Psychiatric:  Positive for anxiety;depression son passed in November '19  Heme/Lymph/Imm:  Negative allergies;weight loss    Endocrine:  Negative        Physical Exam:  Vitals: BP (!) 168/87   Pulse 85   Ht 1.499 m (4' 11\")   Wt 64.7 kg (142 lb 9.6 oz)   BMI 28.80 kg/m      Constitutional:  cooperative, alert and oriented, well developed, well nourished, in no acute distress overweight      Skin:  warm and dry to the touch, no apparent skin lesions or masses noted          Head:  normocephalic, no masses or lesions;normocephalic        Eyes:  pupils equal and round, conjunctivae and lids unremarkable, sclera white, no xanthalasma, EOMS intact, no nystagmus        Lymph:      ENT:  no pallor or cyanosis, dentition good        Neck:  carotid pulses are full and equal bilaterally, JVP normal, no carotid bruit        Respiratory:  normal breath sounds, clear to auscultation, normal A-P diameter, normal symmetry, normal respiratory excursion, no use of accessory muscles         Cardiac: regular rhythm;normal S1 and S2;no murmurs, gallops or rubs detected   S4            pulses full and equal                                        GI:  not " assessed this visit        Extremities and Muscular Skeletal:  no edema   bilateral LE edema;1+          Neurological:  no gross motor deficits;affect appropriate        Psych:  Alert and Oriented x 3        CC  Malaika Rodriguez MD  [facility] [address] [place] [address]                Thank you for allowing me to participate in the care of your patient.      Sincerely,     Brett Smith MD, MD     [facility]    cc:   Malaika Rodriguez MD  [facility] [address] [place], [place] [id number]

## 2020-03-16 DIAGNOSIS — I10 BENIGN ESSENTIAL HYPERTENSION: ICD-10-CM

## 2020-03-16 RX ORDER — FUROSEMIDE 20 MG
TABLET ORAL
Qty: 110 TABLET | Refills: 3 | Status: SHIPPED | OUTPATIENT
Start: 2020-03-16 | End: 2021-04-16

## 2020-07-29 NOTE — ED PROVIDER NOTES
History     Chief Complaint:  Leg Swelling     HPI   Larry Melendez is a 77 year old female with a history of R breast cancer in remission for several decades, hypertension and hyperlipidemia, status post left reverse total shoulder arthroplasty for rotator cuff arthropathy (1/3/2018) who presents to the Emergency Department for evaluation of leg swelling. Following the surgery the patient was admitted  from 1/3/18 through 1/6/18 for rehabilitation. At this time, IV's were placed in both her left and right feet because PIV's were contraindicated in both arms.  Since being discharged from the hospital she had mild swelling to her feet which resolved on its own. However, over the past month the patient notes more persistent swelling to her feet bilaterally as well as increased shortness of breath with exertion. The patient has been taking Lasix daily over the past ten days, having taken it only every few days previously.  She feels deconditioned from her recent surgery, though denies acute concerns about her shoulder. She denies any fever, cough or chest pain. No history of DVT. No history of kidney problems.  No new pain.  She went to clinic for evaluation of the symptoms today and was referred here for urgent rule out of DVT per her report.      PE/DVT Risk Factors:  History of smoking - no  Personal history of PE/DVT - no  Family history of PE/DVT - no  Recent travel - no  Recent surgery - yes  Other immobilizations - yes  Cancer - yes  Hormone therapy - no    Allergies:  Asprin  Codeine sulfate     Medications:    Furosemide (LASIX PO)  senna-docusate (SENOKOT-S;PERICOLACE) 8.6-50 MG per tablet  OXYCODONE HCL PO  potassium chloride (K-TAB,KLOR-CON) 10 MEQ tablet  Omeprazole (PRILOSEC PO)  olmesartan (BENICAR) 40 MG tablet  carvedilol (COREG) 25 MG tablet  amlodipine (NORVASC) 10 MG tablet  simvastatin (ZOCOR) 10 MG tablet  Acetaminophen (TYLENOL PO)  Cholecalciferol (VITAMIN D) 2000 UNITS tablet      Past Medical History:    Arthritis   Breast cancer (H)   Cardiomyopathy (H)   Hyperlipidemia  Hypertension   Hypokalemia   Malignant neoplasm (H)   Shortness of breath   Shoulder pain   Constipation  Coronary artery disease    Past Surgical History:    Back surgery  Cholecystectomy  Eye surgery  Partial mastectomy    Family History:    No past pertinent family history.    Social History:  Smoking Status: never smoker  Smokeless Tobacco: never used  Alcohol Use: yes  Originally from [facility].    Review of Systems   Constitutional: Negative for fever.   Respiratory: Positive for shortness of breath. Negative for cough.    Cardiovascular: Positive for leg swelling. Negative for chest pain.   All other systems reviewed and are negative.    Physical Exam   First Vitals:  BP: 164/75  Pulse: 102  Heart Rate: 102  Temp: 98.2  F (36.8  C)  Resp: 24  Height: 152.4 cm (5')  Weight: 62.6 kg (138 lb)  SpO2: 98 %  RA    Physical Exam  General: woman sitting upright in room 24  HENT: mucous membranes moist  CV: regular rate (no longer tachycardic), slightly irregular rhythm, mild-moderate lower extremity edema below knees, no JVD, palpable symmetric peripheral pulses, compartments soft in BLE, negative Liz's bilaterally  Resp: clear throughout, normal effort, no crackles or wheezing  GI: abdomen soft and non tender, no guarding  MSK: no bony tenderness to lower extremities  Skin: appropriately warm and dry, no erythema, no ecchymosis  Neuro: alert, clear speech, oriented   Psych: pleasant, cooperative    Emergency Department Course   ECG:  Indication: shortness of breath  Time: 1855  Vent. Rate 83 bpm. VA interval 252. QRS duration 114. QT/QTc 402/472. P-R-T axis 45 -44 11.   sinus rhythm with 1st degree AV block with occasional premature ventricular complexes. Left axis deviation. Right bundle branch block. Inferior infarct, age undetermined. Abnormal ECG.  Read time: 1904.    Imaging:  Radiographic findings were communicated  with the patient who voiced understanding of the findings.    Chest XR:  1. Fibrotic changes and probable pleural thickening right mid lung and  base. Prior right thoracotomy.  2. Nothing clearly acute.  As per radiology.     US Lower extremity venous duplex bilateral:  No evidence of deep venous thrombosis. As per radiology.     Laboratory:  CBC: WBC: 6.4, HGB: 12.7, PLT: 291  CMP: Glucose 110(H), o/w WNL (Creat 0.60)  BNP: 1542    Emergency Department Course:  Nursing notes and vitals reviewed. I performed an exam of the patient as documented above.     Blood drawn. This was sent to the lab for further testing, results above.    The patient was sent for a chest xray while here in the emergency department, findings above.    The patient was sent for a US lower extremity venous duplex bilateral  while here in the emergency department, findings above.    EKG obtained in the ED, see results above.     1951 I reassessed the patient.     2048 I reassessed the patient.     Findings and plan explained to the Patient. Patient discharged home with instructions regarding supportive care, medications, and reasons to return. The importance of close follow-up was reviewed.     Impression & Plan      Medical Decision Making:  Larry Melendez is a 77 year old female presents with subacute bilateral lower extremity swelling and specific concern for DVT which was ruled out by ultrasound. CHF was also considered, though she does not have orthopnea and her BNP is not particularly elevated, chest xray shows no acute findings. Her swelling is consistently worse later in the day and better after a period of elevation. I note that she is on Amlodipine and medication effect remains possible; this can be reconsidered in clinic.  No evidence of serious renal insufficiency or major hypoalbuminemia. She is eager for discharge home which I feel is very reasonable. I advised her to continue her oral diuretic and follow up soon through  clinic. I do not think he requires hospitalization at this time. She is welcome back for worsening.     Diagnosis:    ICD-10-CM    1. Leg swelling M79.89    2. History of shoulder surgery Z98.890      Disposition:  discharged to home    I, Zuleyma Alma Rdz, am serving as a scribe on 3/9/2018 at 6:32 PM to personally document services performed by Larry Chu, * based on my observations and the provider's statements to me.   Zuleyma Bridges  3/9/2018    EMERGENCY DEPARTMENT       Larry Chu MD  03/10/18 0921     normal...

## 2020-11-09 PROBLEM — K21.9 GASTROESOPHAGEAL REFLUX DISEASE WITHOUT ESOPHAGITIS: Status: ACTIVE | Noted: 2020-11-09

## 2020-11-09 PROBLEM — D64.9 NORMOCYTIC NORMOCHROMIC ANEMIA: Status: ACTIVE | Noted: 2020-11-09

## 2021-01-01 NOTE — TELEPHONE ENCOUNTER
Discussed with Dr. Smith, pt did take her meds before BP check. Dr. Smith recommends adding amlodipine 5 mg daily with BP check in 2 weeks. LPenfield RN    3.83

## 2021-03-09 DIAGNOSIS — I10 BENIGN ESSENTIAL HYPERTENSION: ICD-10-CM

## 2021-03-09 RX ORDER — TERAZOSIN 5 MG/1
5 CAPSULE ORAL AT BEDTIME
Qty: 90 CAPSULE | Refills: 0 | Status: SHIPPED | OUTPATIENT
Start: 2021-03-09 | End: 2021-05-13

## 2021-03-29 DIAGNOSIS — I10 ESSENTIAL HYPERTENSION, BENIGN: ICD-10-CM

## 2021-03-29 DIAGNOSIS — I10 BENIGN ESSENTIAL HYPERTENSION: ICD-10-CM

## 2021-03-29 RX ORDER — HYDRALAZINE HYDROCHLORIDE 50 MG/1
50 TABLET, FILM COATED ORAL 3 TIMES DAILY
Qty: 270 TABLET | Refills: 0 | Status: SHIPPED | OUTPATIENT
Start: 2021-03-29 | End: 2021-05-13

## 2021-03-29 RX ORDER — POTASSIUM CHLORIDE 750 MG/1
10 TABLET, EXTENDED RELEASE ORAL 2 TIMES DAILY
Qty: 180 TABLET | Refills: 0 | Status: SHIPPED | OUTPATIENT
Start: 2021-03-29 | End: 2021-05-13

## 2021-04-16 DIAGNOSIS — E78.00 PURE HYPERCHOLESTEROLEMIA: ICD-10-CM

## 2021-04-16 DIAGNOSIS — I10 BENIGN ESSENTIAL HYPERTENSION: ICD-10-CM

## 2021-04-16 RX ORDER — FUROSEMIDE 20 MG
TABLET ORAL
Qty: 110 TABLET | Refills: 0 | Status: SHIPPED | OUTPATIENT
Start: 2021-04-16 | End: 2021-05-13

## 2021-04-16 RX ORDER — SIMVASTATIN 10 MG
10 TABLET ORAL AT BEDTIME
Qty: 90 TABLET | Refills: 0 | Status: SHIPPED | OUTPATIENT
Start: 2021-04-16 | End: 2021-05-13

## 2021-04-19 DIAGNOSIS — I10 ESSENTIAL HYPERTENSION: ICD-10-CM

## 2021-04-19 RX ORDER — IRBESARTAN 300 MG/1
300 TABLET ORAL DAILY
Qty: 90 TABLET | Refills: 0 | Status: SHIPPED | OUTPATIENT
Start: 2021-04-19 | End: 2021-05-13

## 2021-05-06 ENCOUNTER — HOSPITAL ENCOUNTER (OUTPATIENT)
Dept: CARDIOLOGY | Facility: CLINIC | Age: 81
Discharge: HOME | End: 2021-05-06
Attending: INTERNAL MEDICINE | Admitting: INTERNAL MEDICINE
Payer: COMMERCIAL

## 2021-05-06 DIAGNOSIS — I10 ESSENTIAL HYPERTENSION: ICD-10-CM

## 2021-05-06 DIAGNOSIS — I42.0 DILATED CARDIOMYOPATHY (H): ICD-10-CM

## 2021-05-06 DIAGNOSIS — I25.10 CORONARY ARTERY DISEASE INVOLVING NATIVE CORONARY ARTERY OF NATIVE HEART WITHOUT ANGINA PECTORIS: ICD-10-CM

## 2021-05-06 LAB
ALT SERPL W P-5'-P-CCNC: 27 U/L (ref 0–50)
ANION GAP SERPL CALCULATED.3IONS-SCNC: 4 MMOL/L (ref 3–14)
BUN SERPL-MCNC: 15 MG/DL (ref 7–30)
CALCIUM SERPL-MCNC: 9.4 MG/DL (ref 8.5–10.1)
CHLORIDE SERPL-SCNC: 100 MMOL/L (ref 94–109)
CHOLEST SERPL-MCNC: 133 MG/DL
CO2 SERPL-SCNC: 31 MMOL/L (ref 20–32)
CREAT SERPL-MCNC: 0.74 MG/DL (ref 0.52–1.04)
GFR SERPL CREATININE-BSD FRML MDRD: 77 ML/MIN/{1.73_M2}
GLUCOSE SERPL-MCNC: 130 MG/DL (ref 70–99)
HDLC SERPL-MCNC: 59 MG/DL
LDLC SERPL CALC-MCNC: 54 MG/DL
NONHDLC SERPL-MCNC: 74 MG/DL
POTASSIUM SERPL-SCNC: 4.1 MMOL/L (ref 3.4–5.3)
SODIUM SERPL-SCNC: 135 MMOL/L (ref 133–144)
TRIGL SERPL-MCNC: 101 MG/DL

## 2021-05-06 PROCEDURE — 80061 LIPID PANEL: CPT | Performed by: INTERNAL MEDICINE

## 2021-05-06 PROCEDURE — 36415 COLL VENOUS BLD VENIPUNCTURE: CPT | Performed by: INTERNAL MEDICINE

## 2021-05-06 PROCEDURE — 84460 ALANINE AMINO (ALT) (SGPT): CPT | Performed by: INTERNAL MEDICINE

## 2021-05-06 PROCEDURE — 80048 BASIC METABOLIC PNL TOTAL CA: CPT | Performed by: INTERNAL MEDICINE

## 2021-05-06 PROCEDURE — 93306 TTE W/DOPPLER COMPLETE: CPT

## 2021-05-06 PROCEDURE — 93306 TTE W/DOPPLER COMPLETE: CPT | Mod: 26 | Performed by: INTERNAL MEDICINE

## 2021-05-13 ENCOUNTER — OFFICE VISIT (OUTPATIENT)
Dept: CARDIOLOGY | Facility: CLINIC | Age: 81
End: 2021-05-13
Payer: COMMERCIAL

## 2021-05-13 VITALS
SYSTOLIC BLOOD PRESSURE: 128 MMHG | OXYGEN SATURATION: 97 % | DIASTOLIC BLOOD PRESSURE: 60 MMHG | WEIGHT: 148 LBS | HEIGHT: 60 IN | BODY MASS INDEX: 29.06 KG/M2 | HEART RATE: 80 BPM

## 2021-05-13 DIAGNOSIS — E78.00 PURE HYPERCHOLESTEROLEMIA: ICD-10-CM

## 2021-05-13 DIAGNOSIS — I10 ESSENTIAL HYPERTENSION: ICD-10-CM

## 2021-05-13 DIAGNOSIS — R06.02 SHORTNESS OF BREATH: ICD-10-CM

## 2021-05-13 DIAGNOSIS — I42.9 CARDIOMYOPATHY, UNSPECIFIED TYPE (H): ICD-10-CM

## 2021-05-13 DIAGNOSIS — I25.10 CORONARY ARTERY DISEASE INVOLVING NATIVE CORONARY ARTERY OF NATIVE HEART WITHOUT ANGINA PECTORIS: ICD-10-CM

## 2021-05-13 DIAGNOSIS — I10 BENIGN ESSENTIAL HYPERTENSION: ICD-10-CM

## 2021-05-13 DIAGNOSIS — R03.0 ELEVATED BLOOD PRESSURE READING WITHOUT DIAGNOSIS OF HYPERTENSION: ICD-10-CM

## 2021-05-13 DIAGNOSIS — I10 ESSENTIAL HYPERTENSION, BENIGN: ICD-10-CM

## 2021-05-13 DIAGNOSIS — I42.0 DILATED CARDIOMYOPATHY (H): Primary | ICD-10-CM

## 2021-05-13 DIAGNOSIS — E78.00 HYPERCHOLESTEROLEMIA: ICD-10-CM

## 2021-05-13 PROCEDURE — 99214 OFFICE O/P EST MOD 30 MIN: CPT | Performed by: INTERNAL MEDICINE

## 2021-05-13 RX ORDER — UBIDECARENONE 75 MG
100 CAPSULE ORAL DAILY
COMMUNITY
End: 2022-11-04

## 2021-05-13 RX ORDER — SIMVASTATIN 10 MG
10 TABLET ORAL AT BEDTIME
Qty: 90 TABLET | Refills: 3 | Status: SHIPPED | OUTPATIENT
Start: 2021-05-13 | End: 2022-04-15

## 2021-05-13 RX ORDER — POTASSIUM CHLORIDE 750 MG/1
10 TABLET, EXTENDED RELEASE ORAL 2 TIMES DAILY
Qty: 180 TABLET | Refills: 3 | Status: SHIPPED | OUTPATIENT
Start: 2021-05-13 | End: 2022-06-20

## 2021-05-13 RX ORDER — OMEPRAZOLE 10 MG/1
10 CAPSULE, DELAYED RELEASE ORAL DAILY
COMMUNITY
End: 2022-11-04

## 2021-05-13 RX ORDER — HYDRALAZINE HYDROCHLORIDE 50 MG/1
50 TABLET, FILM COATED ORAL 3 TIMES DAILY
Qty: 270 TABLET | Refills: 3 | Status: SHIPPED | OUTPATIENT
Start: 2021-05-13 | End: 2022-06-24

## 2021-05-13 RX ORDER — IRBESARTAN 300 MG/1
300 TABLET ORAL DAILY
Qty: 90 TABLET | Refills: 3 | Status: SHIPPED | OUTPATIENT
Start: 2021-05-13 | End: 2022-06-24

## 2021-05-13 RX ORDER — TERAZOSIN 5 MG/1
5 CAPSULE ORAL AT BEDTIME
Qty: 90 CAPSULE | Refills: 3 | Status: SHIPPED | OUTPATIENT
Start: 2021-05-13 | End: 2022-05-17

## 2021-05-13 RX ORDER — CARVEDILOL 25 MG/1
50 TABLET ORAL 2 TIMES DAILY
Qty: 360 TABLET | Refills: 3 | Status: SHIPPED | OUTPATIENT
Start: 2021-05-13 | End: 2022-05-04

## 2021-05-13 RX ORDER — FUROSEMIDE 20 MG
TABLET ORAL
Qty: 110 TABLET | Refills: 3 | Status: SHIPPED | OUTPATIENT
Start: 2021-05-13 | End: 2022-05-26 | Stop reason: ALTCHOICE

## 2021-05-13 ASSESSMENT — MIFFLIN-ST. JEOR: SCORE: 1059.88

## 2021-05-13 NOTE — PROGRESS NOTES
Service Date: 05/13/2021    CARDIOLOGY FOLLOWUP VISIT    REFERRING PHYSICIAN:  Dr. Kip George at [facility]    HISTORY OF PRESENT ILLNESS:  It was my pleasure to see your patient, Nati Melendez, who is a 80-year-old patient with a history of quite resistant hypertension and idiopathic cardiomyopathy and hypercholesterolemia.  If you remember, in the past, we had a significant amount of difficulty controlling her blood pressure, as she developed marked hyponatremia with hydrochlorothiazide.  However, her blood pressure now appears to be quite well controlled on multiple medications.  Initially, her blood pressure was raised but her blood pressure normally is very well controlled.  Her blood pressure at home this morning was 128/60 and that corresponds with the blood pressures that she had at the time of her echocardiogram which was 5 days ago when it was 128/84, so this is probably her usual blood pressure.  She clearly has an element of white-coat hypertension.    Her echocardiogram was performed on 05/06, as I said.  The EF was felt to be in the 45%-50% range which is significantly better than previously.  I reviewed the echo myself and I would think it slightly higher, more in the 50%-55% range.  This represents a significant improvement from previously, probably because her blood pressure is now well controlled.  She does notice some tiredness in the morning time after she takes her medications, probably from the carvedilol and maybe she is getting a peak-dose effect.  I have told her to maybe switch the irbesartan to nighttime.  She is having no chest pains or chest pressure.  Her lipids are well controlled with an LDL of 54, HDL 59, triglycerides of 101 and a total cholesterol of 133.  Her basic metabolic profile is now normal with a sodium of 135, potassium of 4.1, BUN of 15 and creatinine of 0.74 with a GFR of 77.  Her liver function tests are normal with an ALT of 27.  Her ascending  aorta was mildly dilated on the echo at 3.9 cm which is better than previously but I think they were able to see more of the aorta on the most recent study done previously.    IMPRESSION:    1.  Idiopathic cardiomyopathy.  Her ejection fraction has improved and I suspect that this is due to better blood pressure control.  2.  Essential hypertension.  She clearly does have white-coat hypertension.  Her blood pressure is normally very well controlled at home.  3.  Mildly dilated ascending aorta, probably related to hypertension.  4.  Excellent lipid profile as described above.  5.  Normal basic metabolic profile.  6.  Fasting glucose levels were mildly raised at 130.    PLAN:  We will continue the patient on present medications.  I will see her back again in 1 year's time and I will repeat her echocardiogram in a year's time.  I have also told her to take her irbesartan at nighttime rather than in the morning and that might help her tiredness in the morning time if it is due to peak-dose effect.    It has been my pleasure to be involved the care of this very nice patient.    Brett Connors MD, FACC    cc:  Kip George MD  [facility]  [address] [place] [address]    Brett Connors MD, FACC        D: 2021   T: 2021   MT: mariposa    Name:     ELAYNE SALDAÑA  MRN:      [id number]        Account:      [id number]   :      1940           Service Date: 2021       Document: [id number]

## 2021-05-13 NOTE — LETTER
2021    Kip George MD  [facility] [address]  [facility] [address]    RE: Larry Melendez       Dear Colleague,    I had the pleasure of seeing Larry Melendez in the [facility].    HPI and Plan:   See dictation    Orders Placed This Encounter   Procedures     Lipid Profile     ALT     Basic metabolic panel     Follow-Up with Cardiologist     Echocardiogram Complete       Orders Placed This Encounter   Medications     omeprazole (PRILOSEC) 10 MG DR capsule     Sig: Take 10 mg by mouth daily     cholecalciferol (VITAMIN D3) 25 mcg (1000 units) capsule     Sig: Take 1 capsule by mouth daily     cyanocobalamin (VITAMIN B-12) 100 MCG tablet     Sig: Take 100 mcg by mouth daily     carvedilol (COREG) 25 MG tablet     Sig: Take 2 tablets (50 mg) by mouth 2 times daily     Dispense:  360 tablet     Refill:  3     furosemide (LASIX) 20 MG tablet     Si pills(40mg) on Tuesday and Thursday, one pill the other 5 days of the week and as directe     Dispense:  110 tablet     Refill:  3     hydrALAZINE (APRESOLINE) 50 MG tablet     Sig: Take 1 tablet (50 mg) by mouth 3 times daily     Dispense:  270 tablet     Refill:  3     irbesartan (AVAPRO) 300 MG tablet     Sig: Take 1 tablet (300 mg) by mouth daily     Dispense:  90 tablet     Refill:  3     potassium chloride ER (K-TAB/KLOR-CON) 10 MEQ CR tablet     Sig: Take 1 tablet (10 mEq) by mouth 2 times daily And as needed as directed.     Dispense:  180 tablet     Refill:  3     simvastatin (ZOCOR) 10 MG tablet     Sig: Take 1 tablet (10 mg) by mouth At Bedtime     Dispense:  90 tablet     Refill:  3     terazosin (HYTRIN) 5 MG capsule     Sig: Take 1 capsule (5 mg) by mouth At Bedtime     Dispense:  90 capsule     Refill:  3       Medications Discontinued During This Encounter   Medication Reason     Omeprazole (PRILOSEC PO) Alternate therapy     Cholecalciferol (VITAMIN D)   UNITS tablet Alternate therapy     carvedilol (COREG) 25 MG tablet Reorder     terazosin (HYTRIN) 5 MG capsule Reorder     hydrALAZINE (APRESOLINE) 50 MG tablet Reorder     potassium chloride ER (K-TAB/KLOR-CON) 10 MEQ CR tablet Reorder     simvastatin (ZOCOR) 10 MG tablet Reorder     furosemide (LASIX) 20 MG tablet Reorder     irbesartan (AVAPRO) 300 MG tablet Reorder         Encounter Diagnoses   Name Primary?     Dilated cardiomyopathy (H) Yes     Essential hypertension      Pure hypercholesterolemia      Benign essential hypertension      Essential hypertension, benign      Shortness of breath      Coronary artery disease involving native coronary artery of native heart without angina pectoris      Cardiomyopathy, unspecified type (H)      Hypercholesterolemia      Elevated blood pressure reading without diagnosis of hypertension        CURRENT MEDICATIONS:  Current Outpatient Medications   Medication Sig Dispense Refill     Acetaminophen (TYLENOL PO) Take 500 mg by mouth every 6 hours as needed for mild pain or fever        carvedilol (COREG) 25 MG tablet Take 2 tablets (50 mg) by mouth 2 times daily 360 tablet 3     cholecalciferol (VITAMIN D3) 25 mcg (1000 units) capsule Take 1 capsule by mouth daily       cyanocobalamin (VITAMIN B-12) 100 MCG tablet Take 100 mcg by mouth daily       furosemide (LASIX) 20 MG tablet 2 pills(40mg) on Tuesday and Thursday, one pill the other 5 days of the week and as directe 110 tablet 3     hydrALAZINE (APRESOLINE) 50 MG tablet Take 1 tablet (50 mg) by mouth 3 times daily 270 tablet 3     irbesartan (AVAPRO) 300 MG tablet Take 1 tablet (300 mg) by mouth daily 90 tablet 3     omeprazole (PRILOSEC) 10 MG DR capsule Take 10 mg by mouth daily       potassium chloride ER (K-TAB/KLOR-CON) 10 MEQ CR tablet Take 1 tablet (10 mEq) by mouth 2 times daily And as needed as directed. 180 tablet 3     simvastatin (ZOCOR) 10 MG tablet Take 1 tablet (10 mg) by mouth At Bedtime 90 tablet 3      terazosin (HYTRIN) 5 MG capsule Take 1 capsule (5 mg) by mouth At Bedtime 90 capsule 3       ALLERGIES     Allergies   Allergen Reactions     Amlodipine      swelling     Aspirin Other (See Comments)     Bleeding              Bleeding, GI Lesion         Codeine Sulfate GI Disturbance       PAST MEDICAL HISTORY:  Past Medical History:   Diagnosis Date     Arthritis      Breast cancer (H)      Cardiomyopathy (H)     ideopathic     Coronary artery disease 9/25/2014     Hypercholesterolemia      Hypertension      Hypokalemia      Malignant neoplasm (H)      Shortness of breath      Shoulder pain        PAST SURGICAL HISTORY:  Past Surgical History:   Procedure Laterality Date     BACK SURGERY       CHOLECYSTECTOMY       EYE SURGERY       partial masectomy         FAMILY HISTORY:  Family History   Problem Relation Age of Onset     Other Cancer Mother      Other Cancer Brother      No Known Problems Son      Heart Failure Daughter      Family History Negative No family hx of        SOCIAL HISTORY:  Social History     Socioeconomic History     Marital status: Single     Spouse name: None     Number of children: None     Years of education: None     Highest education level: None   Occupational History     None   Social Needs     Financial resource strain: None     Food insecurity     Worry: None     Inability: None     Transportation needs     Medical: None     Non-medical: None   Tobacco Use     Smoking status: Never Smoker     Smokeless tobacco: Never Used   Substance and Sexual Activity     Alcohol use: Yes     Comment: socially     Drug use: No     Sexual activity: Never   Lifestyle     Physical activity     Days per week: None     Minutes per session: None     Stress: None   Relationships     Social connections     Talks on phone: None     Gets together: None     Attends [employer] service: None     Active member of club or organization: None     Attends meetings of clubs or organizations: None     Relationship status:  "None     Intimate partner violence     Fear of current or ex partner: None     Emotionally abused: None     Physically abused: None     Forced sexual activity: None   Other Topics Concern     Parent/sibling w/ CABG, MI or angioplasty before 65F 55M? Not Asked      Service Not Asked     Blood Transfusions Not Asked     Caffeine Concern Not Asked     Occupational Exposure Not Asked     Hobby Hazards Not Asked     Sleep Concern Not Asked     Stress Concern Not Asked     Weight Concern Not Asked     Special Diet No     Back Care Not Asked     Exercise No     Bike Helmet Not Asked     Seat Belt Not Asked     Self-Exams Not Asked   Social History Narrative     None       Review of Systems:  Skin:  Negative pigmentation;rash     Eyes:  Positive for glasses    ENT:  Negative      Respiratory:  Positive for wheezing;dyspnea on exertion occas, ZHU   Cardiovascular:  Negative Positive for;fatigue energy level better  Gastroenterology: Positive for heartburn;excessive gas or bloating    Genitourinary:  Negative      Musculoskeletal:  Positive for joint pain;arthritis    Neurologic:  Negative      Psychiatric:  Positive for anxiety;depression son passed in November '19  Heme/Lymph/Imm:  Negative allergies;weight loss    Endocrine:  Negative        Physical Exam:  Vitals: /60   Pulse 80   Ht 1.511 m (4' 11.5\")   Wt 67.1 kg (148 lb)   SpO2 97%   BMI 29.39 kg/m      Constitutional:  cooperative, alert and oriented, well developed, well nourished, in no acute distress overweight      Skin:  warm and dry to the touch, no apparent skin lesions or masses noted          Head:  normocephalic, no masses or lesions;normocephalic        Eyes:  pupils equal and round, conjunctivae and lids unremarkable, sclera white, no xanthalasma, EOMS intact, no nystagmus        Lymph:      ENT:  no pallor or cyanosis, dentition good        Neck:  carotid pulses are full and equal bilaterally, JVP normal, no carotid bruit    "     Respiratory:  normal breath sounds, clear to auscultation, normal A-P diameter, normal symmetry, normal respiratory excursion, no use of accessory muscles         Cardiac: regular rhythm;normal S1 and S2;no murmurs, gallops or rubs detected   S4            pulses full and equal                                        GI:  not assessed this visit        Extremities and Muscular Skeletal:  no edema;no deformities, clubbing, cyanosis, erythema observed         Bruising over R lower extremity    Neurological:  no gross motor deficits;affect appropriate        Psych:  Alert and Oriented x 3        Thank you for allowing me to participate in the care of your patient.      Sincerely,     Brett Smith MD, MD     [facility]  cc:   No referring provider defined for this encounter.

## 2021-05-13 NOTE — PROGRESS NOTES
HPI and Plan:   See dictation    Orders Placed This Encounter   Procedures     Lipid Profile     ALT     Basic metabolic panel     Follow-Up with Cardiologist     Echocardiogram Complete       Orders Placed This Encounter   Medications     omeprazole (PRILOSEC) 10 MG DR capsule     Sig: Take 10 mg by mouth daily     cholecalciferol (VITAMIN D3) 25 mcg (1000 units) capsule     Sig: Take 1 capsule by mouth daily     cyanocobalamin (VITAMIN B-12) 100 MCG tablet     Sig: Take 100 mcg by mouth daily     carvedilol (COREG) 25 MG tablet     Sig: Take 2 tablets (50 mg) by mouth 2 times daily     Dispense:  360 tablet     Refill:  3     furosemide (LASIX) 20 MG tablet     Si pills(40mg) on Tuesday and Thursday, one pill the other 5 days of the week and as directe     Dispense:  110 tablet     Refill:  3     hydrALAZINE (APRESOLINE) 50 MG tablet     Sig: Take 1 tablet (50 mg) by mouth 3 times daily     Dispense:  270 tablet     Refill:  3     irbesartan (AVAPRO) 300 MG tablet     Sig: Take 1 tablet (300 mg) by mouth daily     Dispense:  90 tablet     Refill:  3     potassium chloride ER (K-TAB/KLOR-CON) 10 MEQ CR tablet     Sig: Take 1 tablet (10 mEq) by mouth 2 times daily And as needed as directed.     Dispense:  180 tablet     Refill:  3     simvastatin (ZOCOR) 10 MG tablet     Sig: Take 1 tablet (10 mg) by mouth At Bedtime     Dispense:  90 tablet     Refill:  3     terazosin (HYTRIN) 5 MG capsule     Sig: Take 1 capsule (5 mg) by mouth At Bedtime     Dispense:  90 capsule     Refill:  3       Medications Discontinued During This Encounter   Medication Reason     Omeprazole (PRILOSEC PO) Alternate therapy     Cholecalciferol (VITAMIN D) 2000 UNITS tablet Alternate therapy     carvedilol (COREG) 25 MG tablet Reorder     terazosin (HYTRIN) 5 MG capsule Reorder     hydrALAZINE (APRESOLINE) 50 MG tablet Reorder     potassium chloride ER (K-TAB/KLOR-CON) 10 MEQ CR tablet Reorder     simvastatin (ZOCOR) 10 MG tablet Reorder      furosemide (LASIX) 20 MG tablet Reorder     irbesartan (AVAPRO) 300 MG tablet Reorder         Encounter Diagnoses   Name Primary?     Dilated cardiomyopathy (H) Yes     Essential hypertension      Pure hypercholesterolemia      Benign essential hypertension      Essential hypertension, benign      Shortness of breath      Coronary artery disease involving native coronary artery of native heart without angina pectoris      Cardiomyopathy, unspecified type (H)      Hypercholesterolemia      Elevated blood pressure reading without diagnosis of hypertension        CURRENT MEDICATIONS:  Current Outpatient Medications   Medication Sig Dispense Refill     Acetaminophen (TYLENOL PO) Take 500 mg by mouth every 6 hours as needed for mild pain or fever        carvedilol (COREG) 25 MG tablet Take 2 tablets (50 mg) by mouth 2 times daily 360 tablet 3     cholecalciferol (VITAMIN D3) 25 mcg (1000 units) capsule Take 1 capsule by mouth daily       cyanocobalamin (VITAMIN B-12) 100 MCG tablet Take 100 mcg by mouth daily       furosemide (LASIX) 20 MG tablet 2 pills(40mg) on Tuesday and Thursday, one pill the other 5 days of the week and as directe 110 tablet 3     hydrALAZINE (APRESOLINE) 50 MG tablet Take 1 tablet (50 mg) by mouth 3 times daily 270 tablet 3     irbesartan (AVAPRO) 300 MG tablet Take 1 tablet (300 mg) by mouth daily 90 tablet 3     omeprazole (PRILOSEC) 10 MG DR capsule Take 10 mg by mouth daily       potassium chloride ER (K-TAB/KLOR-CON) 10 MEQ CR tablet Take 1 tablet (10 mEq) by mouth 2 times daily And as needed as directed. 180 tablet 3     simvastatin (ZOCOR) 10 MG tablet Take 1 tablet (10 mg) by mouth At Bedtime 90 tablet 3     terazosin (HYTRIN) 5 MG capsule Take 1 capsule (5 mg) by mouth At Bedtime 90 capsule 3       ALLERGIES     Allergies   Allergen Reactions     Amlodipine      swelling     Aspirin Other (See Comments)     Bleeding              Bleeding, GI Lesion         Codeine Sulfate GI  Disturbance       PAST MEDICAL HISTORY:  Past Medical History:   Diagnosis Date     Arthritis      Breast cancer (H)      Cardiomyopathy (H)     ideopathic     Coronary artery disease 9/25/2014     Hypercholesterolemia      Hypertension      Hypokalemia      Malignant neoplasm (H)      Shortness of breath      Shoulder pain        PAST SURGICAL HISTORY:  Past Surgical History:   Procedure Laterality Date     BACK SURGERY       CHOLECYSTECTOMY       EYE SURGERY       partial masectomy         FAMILY HISTORY:  Family History   Problem Relation Age of Onset     Other Cancer Mother      Other Cancer Brother      No Known Problems Son      Heart Failure Daughter      Family History Negative No family hx of        SOCIAL HISTORY:  Social History     Socioeconomic History     Marital status: Single     Spouse name: None     Number of children: None     Years of education: None     Highest education level: None   Occupational History     None   Social Needs     Financial resource strain: None     Food insecurity     Worry: None     Inability: None     Transportation needs     Medical: None     Non-medical: None   Tobacco Use     Smoking status: Never Smoker     Smokeless tobacco: Never Used   Substance and Sexual Activity     Alcohol use: Yes     Comment: socially     Drug use: No     Sexual activity: Never   Lifestyle     Physical activity     Days per week: None     Minutes per session: None     Stress: None   Relationships     Social connections     Talks on phone: None     Gets together: None     Attends [employer] service: None     Active member of club or organization: None     Attends meetings of clubs or organizations: None     Relationship status: None     Intimate partner violence     Fear of current or ex partner: None     Emotionally abused: None     Physically abused: None     Forced sexual activity: None   Other Topics Concern     Parent/sibling w/ CABG, MI or angioplasty before 65F 55M? Not Asked       "Service Not Asked     Blood Transfusions Not Asked     Caffeine Concern Not Asked     Occupational Exposure Not Asked     Hobby Hazards Not Asked     Sleep Concern Not Asked     Stress Concern Not Asked     Weight Concern Not Asked     Special Diet No     Back Care Not Asked     Exercise No     Bike Helmet Not Asked     Seat Belt Not Asked     Self-Exams Not Asked   Social History Narrative     None       Review of Systems:  Skin:  Negative pigmentation;rash     Eyes:  Positive for glasses    ENT:  Negative      Respiratory:  Positive for wheezing;dyspnea on exertion occas, ZHU   Cardiovascular:  Negative Positive for;fatigue energy level better  Gastroenterology: Positive for heartburn;excessive gas or bloating    Genitourinary:  Negative      Musculoskeletal:  Positive for joint pain;arthritis    Neurologic:  Negative      Psychiatric:  Positive for anxiety;depression son passed in November '19  Heme/Lymph/Imm:  Negative allergies;weight loss    Endocrine:  Negative        Physical Exam:  Vitals: /60   Pulse 80   Ht 1.511 m (4' 11.5\")   Wt 67.1 kg (148 lb)   SpO2 97%   BMI 29.39 kg/m      Constitutional:  cooperative, alert and oriented, well developed, well nourished, in no acute distress overweight      Skin:  warm and dry to the touch, no apparent skin lesions or masses noted          Head:  normocephalic, no masses or lesions;normocephalic        Eyes:  pupils equal and round, conjunctivae and lids unremarkable, sclera white, no xanthalasma, EOMS intact, no nystagmus        Lymph:      ENT:  no pallor or cyanosis, dentition good        Neck:  carotid pulses are full and equal bilaterally, JVP normal, no carotid bruit        Respiratory:  normal breath sounds, clear to auscultation, normal A-P diameter, normal symmetry, normal respiratory excursion, no use of accessory muscles         Cardiac: regular rhythm;normal S1 and S2;no murmurs, gallops or rubs detected   S4            pulses full and equal   "                                      GI:  not assessed this visit        Extremities and Muscular Skeletal:  no edema;no deformities, clubbing, cyanosis, erythema observed         Bruising over R lower extremity    Neurological:  no gross motor deficits;affect appropriate        Psych:  Alert and Oriented x 3        CC  No referring provider defined for this encounter.

## 2021-05-31 ENCOUNTER — HOSPITAL ENCOUNTER (EMERGENCY)
Facility: CLINIC | Age: 81
Discharge: HOME | End: 2021-05-31
Attending: EMERGENCY MEDICINE | Admitting: EMERGENCY MEDICINE
Payer: COMMERCIAL

## 2021-05-31 ENCOUNTER — APPOINTMENT (OUTPATIENT)
Dept: CT IMAGING | Facility: CLINIC | Age: 81
End: 2021-05-31
Attending: EMERGENCY MEDICINE
Payer: COMMERCIAL

## 2021-05-31 VITALS
HEART RATE: 74 BPM | DIASTOLIC BLOOD PRESSURE: 84 MMHG | SYSTOLIC BLOOD PRESSURE: 152 MMHG | TEMPERATURE: 97.6 F | WEIGHT: 143 LBS | RESPIRATION RATE: 22 BRPM | OXYGEN SATURATION: 95 % | HEIGHT: 59 IN | BODY MASS INDEX: 28.83 KG/M2

## 2021-05-31 DIAGNOSIS — K86.89 PANCREATIC MASS: ICD-10-CM

## 2021-05-31 LAB
ALBUMIN SERPL-MCNC: 4 G/DL (ref 3.4–5)
ALBUMIN UR-MCNC: NEGATIVE MG/DL
ALP SERPL-CCNC: 77 U/L (ref 40–150)
ALT SERPL W P-5'-P-CCNC: 26 U/L (ref 0–50)
ANION GAP SERPL CALCULATED.3IONS-SCNC: 7 MMOL/L (ref 3–14)
APPEARANCE UR: CLEAR
AST SERPL W P-5'-P-CCNC: 14 U/L (ref 0–45)
BASOPHILS # BLD AUTO: 0 10E9/L (ref 0–0.2)
BASOPHILS NFR BLD AUTO: 0.4 %
BILIRUB SERPL-MCNC: 0.6 MG/DL (ref 0.2–1.3)
BILIRUB UR QL STRIP: NEGATIVE
BUN SERPL-MCNC: 14 MG/DL (ref 7–30)
CALCIUM SERPL-MCNC: 9 MG/DL (ref 8.5–10.1)
CHLORIDE SERPL-SCNC: 99 MMOL/L (ref 94–109)
CO2 SERPL-SCNC: 31 MMOL/L (ref 20–32)
COLOR UR AUTO: ABNORMAL
CREAT SERPL-MCNC: 0.73 MG/DL (ref 0.52–1.04)
DIFFERENTIAL METHOD BLD: NORMAL
EOSINOPHIL # BLD AUTO: 0.1 10E9/L (ref 0–0.7)
EOSINOPHIL NFR BLD AUTO: 0.9 %
ERYTHROCYTE [DISTWIDTH] IN BLOOD BY AUTOMATED COUNT: 14 % (ref 10–15)
GFR SERPL CREATININE-BSD FRML MDRD: 78 ML/MIN/{1.73_M2}
GLUCOSE SERPL-MCNC: 126 MG/DL (ref 70–99)
GLUCOSE UR STRIP-MCNC: NEGATIVE MG/DL
HCT VFR BLD AUTO: 35.7 % (ref 35–47)
HGB BLD-MCNC: 11.9 G/DL (ref 11.7–15.7)
HGB UR QL STRIP: NEGATIVE
HYALINE CASTS #/AREA URNS LPF: 3 /LPF (ref 0–2)
IMM GRANULOCYTES # BLD: 0 10E9/L (ref 0–0.4)
IMM GRANULOCYTES NFR BLD: 0.5 %
INTERPRETATION ECG - MUSE: NORMAL
KETONES UR STRIP-MCNC: NEGATIVE MG/DL
LEUKOCYTE ESTERASE UR QL STRIP: ABNORMAL
LIPASE SERPL-CCNC: 69 U/L (ref 73–393)
LYMPHOCYTES # BLD AUTO: 1 10E9/L (ref 0.8–5.3)
LYMPHOCYTES NFR BLD AUTO: 12.3 %
MCH RBC QN AUTO: 30.4 PG (ref 26.5–33)
MCHC RBC AUTO-ENTMCNC: 33.3 G/DL (ref 31.5–36.5)
MCV RBC AUTO: 91 FL (ref 78–100)
MONOCYTES # BLD AUTO: 0.5 10E9/L (ref 0–1.3)
MONOCYTES NFR BLD AUTO: 6.4 %
MUCOUS THREADS #/AREA URNS LPF: PRESENT /LPF
NEUTROPHILS # BLD AUTO: 6.4 10E9/L (ref 1.6–8.3)
NEUTROPHILS NFR BLD AUTO: 79.5 %
NITRATE UR QL: NEGATIVE
NRBC # BLD AUTO: 0 10*3/UL
NRBC BLD AUTO-RTO: 0 /100
PH UR STRIP: 7 PH (ref 5–7)
PLATELET # BLD AUTO: 277 10E9/L (ref 150–450)
POTASSIUM SERPL-SCNC: 3.3 MMOL/L (ref 3.4–5.3)
PROT SERPL-MCNC: 7.5 G/DL (ref 6.8–8.8)
RBC # BLD AUTO: 3.92 10E12/L (ref 3.8–5.2)
RBC #/AREA URNS AUTO: 1 /HPF (ref 0–2)
SODIUM SERPL-SCNC: 137 MMOL/L (ref 133–144)
SOURCE: ABNORMAL
SP GR UR STRIP: 1.02 (ref 1–1.03)
SQUAMOUS #/AREA URNS AUTO: <1 /HPF (ref 0–1)
UROBILINOGEN UR STRIP-MCNC: 0 MG/DL (ref 0–2)
WBC # BLD AUTO: 8 10E9/L (ref 4–11)
WBC #/AREA URNS AUTO: <1 /HPF (ref 0–5)

## 2021-05-31 PROCEDURE — 83690 ASSAY OF LIPASE: CPT | Performed by: EMERGENCY MEDICINE

## 2021-05-31 PROCEDURE — 74177 CT ABD & PELVIS W/CONTRAST: CPT

## 2021-05-31 PROCEDURE — 99285 EMERGENCY DEPT VISIT HI MDM: CPT | Mod: 25

## 2021-05-31 PROCEDURE — 85025 COMPLETE CBC W/AUTO DIFF WBC: CPT | Performed by: EMERGENCY MEDICINE

## 2021-05-31 PROCEDURE — 250N000011 HC RX IP 250 OP 636: Performed by: EMERGENCY MEDICINE

## 2021-05-31 PROCEDURE — 81001 URINALYSIS AUTO W/SCOPE: CPT | Performed by: EMERGENCY MEDICINE

## 2021-05-31 PROCEDURE — 93005 ELECTROCARDIOGRAM TRACING: CPT

## 2021-05-31 PROCEDURE — 250N000009 HC RX 250: Performed by: EMERGENCY MEDICINE

## 2021-05-31 PROCEDURE — 250N000013 HC RX MED GY IP 250 OP 250 PS 637: Performed by: EMERGENCY MEDICINE

## 2021-05-31 PROCEDURE — 80053 COMPREHEN METABOLIC PANEL: CPT | Performed by: EMERGENCY MEDICINE

## 2021-05-31 RX ORDER — ACETAMINOPHEN 500 MG
1000 TABLET ORAL ONCE
Status: COMPLETED | OUTPATIENT
Start: 2021-05-31 | End: 2021-05-31

## 2021-05-31 RX ORDER — ONDANSETRON 4 MG/1
4 TABLET, ORALLY DISINTEGRATING ORAL EVERY 8 HOURS PRN
Qty: 15 TABLET | Refills: 0 | Status: SHIPPED | OUTPATIENT
Start: 2021-05-31 | End: 2022-11-04

## 2021-05-31 RX ORDER — IOPAMIDOL 755 MG/ML
72 INJECTION, SOLUTION INTRAVASCULAR ONCE
Status: COMPLETED | OUTPATIENT
Start: 2021-05-31 | End: 2021-05-31

## 2021-05-31 RX ADMIN — SODIUM CHLORIDE 61 ML: 9 INJECTION, SOLUTION INTRAVENOUS at 12:36

## 2021-05-31 RX ADMIN — ACETAMINOPHEN 1000 MG: 500 TABLET, FILM COATED ORAL at 14:52

## 2021-05-31 RX ADMIN — IOPAMIDOL 72 ML: 755 INJECTION, SOLUTION INTRAVENOUS at 12:36

## 2021-05-31 RX ADMIN — ACETAMINOPHEN 1000 MG: 500 TABLET, FILM COATED ORAL at 11:45

## 2021-05-31 ASSESSMENT — ENCOUNTER SYMPTOMS
VOMITING: 0
BACK PAIN: 1
DYSURIA: 0
HEMATURIA: 0
ABDOMINAL DISTENTION: 1
DIARRHEA: 0
ABDOMINAL PAIN: 1

## 2021-05-31 ASSESSMENT — MIFFLIN-ST. JEOR: SCORE: 1029.27

## 2021-05-31 NOTE — ED TRIAGE NOTES
Pt c/o lower chest radiating to back intermittent since Tuesday but more persistant past 3 days. Hurts to take deep breath and movement.

## 2021-05-31 NOTE — H&P (VIEW-ONLY)
History   Chief Complaint:  Back Pain     The history is provided by the patient.      Larry Melendez is a 80 year old female with history of arthritis, hypertension, and CAD who presents with back and abdominal pain. The patient reports that for about a week now, she has been experiencing back pain, exacerbated by movement and breathing, as well as some slight abdominal pain and distention and pressure like she is wearing a corsette. She reports feeling this pain previously, but it usually goes away. Larry has been taking tylenol for her pain. She denies any shortness of breath, fever, vomiting, diarrhea, dysuria, or hematuria. No recent falls or injuries. No weight changes. Appetite has been good. She denies any other symptoms.    Review of Systems   Gastrointestinal: Positive for abdominal distention and abdominal pain. Negative for diarrhea and vomiting.   Genitourinary: Negative for dysuria and hematuria.   Musculoskeletal: Positive for back pain.   All other systems reviewed and are negative.      Allergies:  Amlodipine  Aspirin  Codeine Sulfate    Medications:  Coreg  Lasix  Apresoline  Avapro  Prilosec  Potassium chloride   Zocor  Hytrin    Past Medical History:    Arthritis  Breast cancer  Cardiomyopathy  CAD  Hypercholesterolemia  Hypertension  Hypokalemia  Malignant neoplasm  GERD  Normocytic normochromic anemia  Rotator cuff arthropathy, left  Constipation  Acute anemia  Impingement syndrome of left shoulder     Past Surgical History:    Lower back surgery  Cholecystectomy  Eye surgery  Partial mastectomy  Removal of gallbladder  Lungs pleura procedure  Cataract extraction with intraocular lens implant      Family History:    Mother: leukemia  Brother: cancer  Daughter: heart failure    Social History:  The patient presents to the ED with a female .   The patient has never been a smoker.     Physical Exam     Patient Vitals for the past 24 hrs:   BP Temp Temp src Pulse Resp SpO2  "Height Weight   05/31/21 1400 (!) 152/84 -- -- 74 22 95 % -- --   05/31/21 1300 (!) 155/77 -- -- 79 27 97 % -- --   05/31/21 1100 (!) 148/67 -- -- 76 22 97 % -- --   05/31/21 1028 (!) 171/103 97.6  F (36.4  C) Oral 89 22 99 % 1.499 m (4' 11\") 64.9 kg (143 lb)       Physical Exam  General: Well appearing, nontoxic. Resting comfortably  Head:  Scalp, face, and head appear normal  Eyes:  Pupils are equal, round    Conjunctivae non-injected and sclerae white  ENT:    The external nose is normal    Pinnae are normal  Neck:  Normal range of motion    There is no rigidity noted    Trachea is in the midline  CV:  Regular rate and rhythm     Normal S1/S2, no S3/S4    No murmur or rub. Radial pulses 2+ bilaterally.  Resp:  Lungs are clear and equal bilaterally  There is no tachypnea    No increased work of breathing    No rales, wheezing, or rhonchi  GI:  Abdomen is soft, obese, no rigidity or guarding    No distension, or mass    No tenderness or rebound tenderness. No CVA tenderness    MS:  Normal muscular tone. T and L spine non-tender without stepoffs. Soft tissues of the thoracic and lumbar torso are normal, non tender to palpation without any overlying skin changes.     Symmetric motor strength    No lower extremity edema  Skin:  No rash or acute skin lesions noted  Neuro:  Awake and alert  Speech is normal and fluent  Moves all extremities spontaneously  Psych: Normal affect. Appropriate interactions.      Emergency Department Course   ECG  ECG taken at 1034, ECG read at 1043  Sinus rhythm with 1st degree AV block. Left axis deviation. Right bundle branch block. Inferior infarct, age undetermined. Anterior infarct, age undetermined. Abnormal ECG.   No significant change as compared to prior, dated 3/9/18.  Rate 84 bpm. HI interval 252 ms. QRS duration 114 ms. QT/QTc 404/477 ms. P-R-T axes 63 -57 -19.     Imaging:  CT abdomen pelvis with contrast  1.  No acute intra-abdominal or pelvic abnormality is present to  account " for patient's symptoms. No bowel obstruction, diverticulitis  or appendicitis. No urinary tract calculi or hydronephrosis.  2.  Cluster of cysts in the pancreatic head and uncinate process  possibly intraductal papillary mucinous neoplasm. A follow-up  nonemergent pancreas MRI could be performed for better evaluation. No  pancreatic ductal dilatation or peripancreatic inflammation.  3.  Low-attenuation liver lesions likely cysts. No specific follow-up  suggested.  As per radiology.    Laboratory:  CBC: WBC 8.0, HGB 11.9,      CMP: Potassium: 3.3(L), Glucose: 126(H) o/w WNL (Creatinine 0.73)     Lipase: 69(L)    UA with microscopic: Leukocyte Esterase: Trace, Mucous: Present, Hyaline Casts: 3(H) o/w WNL       Emergency Department Course:    Reviewed:  I reviewed nursing notes, vitals, past medical history and care everywhere    Assessments:  1114 I obtained history and examined the patient as noted above.     Interventions:  1145 Tylenol 1,000 mg PO    Disposition:  The patient was discharged to home.       Impression & Plan     Medical Decision Making:  Larry Melendez is a 80 year old female who presents for evaluation of squeezing abdominal pain that radiates to her back.  On my evaluation she is well-appearing, hemodynamically stable and afebrile.  Abdominal exam is benign without evidence of peritonitis or acute surgical emergency.  She has no focal musculoskeletal tenderness or bony tenderness to her back or spine.  No neurologic dysfunction.  A broad differential diagnosis is considered.  Ultimately work-up in the emergency department unfortunate reveals evidence of cystic masslike lesion in the head of the pancreas concerning for possible underlying malignancy.  Laboratory tests are otherwise reassuring.  Evidence of hepatitis, elevated bilirubin, pancreatitis or elevated lipase.  CBC is reassuring.  No evidence of acute spinal pathology.  Referral to [place] [facility] for close follow-up via  the fast pass protocol was done in the emergency department.  No evidence of any acute bowel pathology.  No evidence to suggest infection.  Symptoms may be related to the underlying pancreatic lesion.  I recommended supportive treatment with Tylenol, warm compresses good oral fluid intake and rest.  Close outpatient follow-up was stressed.  The pancreatic findings were discussed at length with the patient the importance of very close follow-up was stressed.  The patient was agreeable to plan of care.  Close return precautions were provided and she was discharged in stable condition.    Covid-19  Larry Melendez was evaluated during a global COVID-19 pandemic, which necessitated consideration that the patient might be at risk for infection with the SARS-CoV-2 virus that causes COVID-19.   Applicable protocols for evaluation were followed during the patient's care.     Diagnosis:    ICD-10-CM    1. Pancreatic mass  K86.89        Discharge Medications:  New Prescriptions    ONDANSETRON (ZOFRAN ODT) 4 MG ODT TAB    Take 1 tablet (4 mg) by mouth every 8 hours as needed for nausea or vomiting       Scribe Disclosure:  Julio Cesar JACKSON, am serving as a scribe at 11:13 AM on 5/31/2021 to document services personally performed by Israel Bernal MD based on my observations and the provider's statements to me.          Israel Bernal MD  05/31/21 9826

## 2021-05-31 NOTE — DISCHARGE INSTRUCTIONS
THE CT SCAN SHOWED A MASS ON THE PANCREAS. THIS NEEDS FURTHER EVALUATION AS IT MAY REPRESENT A POSSIBLE CANCER. FURTHER TESTING INCLUDING A BIOPSY IS NEEDED TO GET A FINAL DIAGNOSIS.      Discharge Instructions  Abdominal Pain    Abdominal pain (belly pain) can be caused by many things. Your evaluation today does not show the exact cause for your pain. Your provider today has decided that it is unlikely your pain is due to a life threatening problem, or a problem requiring surgery or hospital admission. Sometimes those problems cannot be found right away, so it is very important that you follow up as directed.  Sometimes only the changes which occur over time allow the cause of your pain to be found.    Generally, every Emergency Department visit should have a follow-up clinic visit with either a primary or a specialty clinic/provider. Please follow-up as instructed by your emergency provider today. With abdominal pain, we often recommend very close follow-up, such as the following day.    ADULTS:  Return to the Emergency Department right away if:    You get an oral temperature above 102oF or as directed by your provider.  You have blood in your stools. This may be bright red or appear as black, tarry stools.    You keep vomiting (throwing up) or cannot drink liquids.  You see blood when you vomit.   You cannot have a bowel movement or you cannot pass gas.  Your stomach gets bloated or bigger.  Your skin or the whites of your eyes look yellow.  You faint.  You have bloody, frequent or painful urination (peeing).  You have new symptoms or anything that worries you.    CHILDREN:  Return to the Emergency Department right away if your child has any of the above-listed symptoms or the following:    Pushes your hand away or screams/cries when his/her belly is touched.  You notice your child is very fussy or weak.  Your child is very tired and is too tired to eat or drink.  Your child is dehydrated.  Signs of dehydration  can be:  Significant change in the amount of wet diapers/urine.  Your infant or child starts to have dry mouth and lips, or no saliva (spit) or tears.    PREGNANT WOMEN:  Return to the Emergency Department right away if you have any of the above-listed symptoms or the following:    You have bleeding, leaking fluid or passing tissue from the vagina.  You have worse pain or cramping, or pain in your shoulder or back.  You have vomiting that will not stop.  You have a temperature of 100oF or more.  Your baby is not moving as much as usual.  You faint.  You get a bad headache with or without eye problems and abdominal pain.  You have a seizure.  You have unusual discharge from your vagina and abdominal pain.    Abdominal pain is pretty common during pregnancy.  Your pain may or may not be related to your pregnancy. You should follow-up closely with your OB provider so they can evaluate you and your baby.  Until you follow-up with your regular provider, do the following:     Avoid sex and do not put anything in your vagina.  Drink clear fluids.  Only take medications approved by your provider.    MORE INFORMATION:    Appendicitis:  A possible cause of abdominal pain in any person who still has their appendix is acute appendicitis. Appendicitis is often hard to diagnose.  Testing does not always rule out early appendicitis or other causes of abdominal pain. Close follow-up with your provider and re-evaluations may be needed to figure out the reason for your abdominal pain.    Follow-up:  It is very important that you make an appointment with your clinic and go to the appointment.  If you do not follow-up with your primary provider, it may result in missing an important development which could result in permanent injury or disability and/or lasting pain.  If there is any problem keeping your appointment, call your provider or return to the Emergency Department.    Medications:  Take your medications as directed by your  "provider today.  Before using over-the-counter medications, ask your provider and make sure to take the medications as directed.  If you have any questions about medications, ask your provider.    Diet:  Resume your normal diet as much as possible, but do not eat fried, fatty or spicy foods while you have pain.  Do not drink alcohol or have caffeine.  Do not smoke tobacco.    Probiotics: If you have been given an antibiotic, you may want to also take a probiotic pill or eat yogurt with live cultures. Probiotics have \"good bacteria\" to help your intestines stay healthy. Studies have shown that probiotics help prevent diarrhea (loose stools) and other intestine problems (including C. diff infection) when you take antibiotics. You can buy these without a prescription in the pharmacy section of the store.     If you were given a prescription for medicine here today, be sure to read all of the information (including the package insert) that comes with your prescription.  This will include important information about the medicine, its side effects, and any warnings that you need to know about.  The pharmacist who fills the prescription can provide more information and answer questions you may have about the medicine.  If you have questions or concerns that the pharmacist cannot address, please call or return to the Emergency Department.       Remember that you can always come back to the Emergency Department if you are not able to see your regular provider in the amount of time listed above, if you get any new symptoms, or if there is anything that worries you.    "

## 2021-05-31 NOTE — ED PROVIDER NOTES
History   Chief Complaint:  Back Pain     The history is provided by the patient.      Larry Melendez is a 80 year old female with history of arthritis, hypertension, and CAD who presents with back and abdominal pain. The patient reports that for about a week now, she has been experiencing back pain, exacerbated by movement and breathing, as well as some slight abdominal pain and distention and pressure like she is wearing a corsette. She reports feeling this pain previously, but it usually goes away. Larry has been taking tylenol for her pain. She denies any shortness of breath, fever, vomiting, diarrhea, dysuria, or hematuria. No recent falls or injuries. No weight changes. Appetite has been good. She denies any other symptoms.    Review of Systems   Gastrointestinal: Positive for abdominal distention and abdominal pain. Negative for diarrhea and vomiting.   Genitourinary: Negative for dysuria and hematuria.   Musculoskeletal: Positive for back pain.   All other systems reviewed and are negative.      Allergies:  Amlodipine  Aspirin  Codeine Sulfate    Medications:  Coreg  Lasix  Apresoline  Avapro  Prilosec  Potassium chloride   Zocor  Hytrin    Past Medical History:    Arthritis  Breast cancer  Cardiomyopathy  CAD  Hypercholesterolemia  Hypertension  Hypokalemia  Malignant neoplasm  GERD  Normocytic normochromic anemia  Rotator cuff arthropathy, left  Constipation  Acute anemia  Impingement syndrome of left shoulder     Past Surgical History:    Lower back surgery  Cholecystectomy  Eye surgery  Partial mastectomy  Removal of gallbladder  Lungs pleura procedure  Cataract extraction with intraocular lens implant      Family History:    Mother: leukemia  Brother: cancer  Daughter: heart failure    Social History:  The patient presents to the ED with a female .   The patient has never been a smoker.     Physical Exam     Patient Vitals for the past 24 hrs:   BP Temp Temp src Pulse Resp SpO2  "Height Weight   05/31/21 1400 (!) 152/84 -- -- 74 22 95 % -- --   05/31/21 1300 (!) 155/77 -- -- 79 27 97 % -- --   05/31/21 1100 (!) 148/67 -- -- 76 22 97 % -- --   05/31/21 1028 (!) 171/103 97.6  F (36.4  C) Oral 89 22 99 % 1.499 m (4' 11\") 64.9 kg (143 lb)       Physical Exam  General: Well appearing, nontoxic. Resting comfortably  Head:  Scalp, face, and head appear normal  Eyes:  Pupils are equal, round    Conjunctivae non-injected and sclerae white  ENT:    The external nose is normal    Pinnae are normal  Neck:  Normal range of motion    There is no rigidity noted    Trachea is in the midline  CV:  Regular rate and rhythm     Normal S1/S2, no S3/S4    No murmur or rub. Radial pulses 2+ bilaterally.  Resp:  Lungs are clear and equal bilaterally  There is no tachypnea    No increased work of breathing    No rales, wheezing, or rhonchi  GI:  Abdomen is soft, obese, no rigidity or guarding    No distension, or mass    No tenderness or rebound tenderness. No CVA tenderness    MS:  Normal muscular tone. T and L spine non-tender without stepoffs. Soft tissues of the thoracic and lumbar torso are normal, non tender to palpation without any overlying skin changes.     Symmetric motor strength    No lower extremity edema  Skin:  No rash or acute skin lesions noted  Neuro:  Awake and alert  Speech is normal and fluent  Moves all extremities spontaneously  Psych: Normal affect. Appropriate interactions.      Emergency Department Course   ECG  ECG taken at 1034, ECG read at 1043  Sinus rhythm with 1st degree AV block. Left axis deviation. Right bundle branch block. Inferior infarct, age undetermined. Anterior infarct, age undetermined. Abnormal ECG.   No significant change as compared to prior, dated 3/9/18.  Rate 84 bpm. MN interval 252 ms. QRS duration 114 ms. QT/QTc 404/477 ms. P-R-T axes 63 -57 -19.     Imaging:  CT abdomen pelvis with contrast  1.  No acute intra-abdominal or pelvic abnormality is present to  account " for patient's symptoms. No bowel obstruction, diverticulitis  or appendicitis. No urinary tract calculi or hydronephrosis.  2.  Cluster of cysts in the pancreatic head and uncinate process  possibly intraductal papillary mucinous neoplasm. A follow-up  nonemergent pancreas MRI could be performed for better evaluation. No  pancreatic ductal dilatation or peripancreatic inflammation.  3.  Low-attenuation liver lesions likely cysts. No specific follow-up  suggested.  As per radiology.    Laboratory:  CBC: WBC 8.0, HGB 11.9,      CMP: Potassium: 3.3(L), Glucose: 126(H) o/w WNL (Creatinine 0.73)     Lipase: 69(L)    UA with microscopic: Leukocyte Esterase: Trace, Mucous: Present, Hyaline Casts: 3(H) o/w WNL       Emergency Department Course:    Reviewed:  I reviewed nursing notes, vitals, past medical history and care everywhere    Assessments:  1114 I obtained history and examined the patient as noted above.     Interventions:  1145 Tylenol 1,000 mg PO    Disposition:  The patient was discharged to home.       Impression & Plan     Medical Decision Making:  Larry Melendez is a 80 year old female who presents for evaluation of squeezing abdominal pain that radiates to her back.  On my evaluation she is well-appearing, hemodynamically stable and afebrile.  Abdominal exam is benign without evidence of peritonitis or acute surgical emergency.  She has no focal musculoskeletal tenderness or bony tenderness to her back or spine.  No neurologic dysfunction.  A broad differential diagnosis is considered.  Ultimately work-up in the emergency department unfortunate reveals evidence of cystic masslike lesion in the head of the pancreas concerning for possible underlying malignancy.  Laboratory tests are otherwise reassuring.  Evidence of hepatitis, elevated bilirubin, pancreatitis or elevated lipase.  CBC is reassuring.  No evidence of acute spinal pathology.  Referral to [place] [facility] for close follow-up via  the fast pass protocol was done in the emergency department.  No evidence of any acute bowel pathology.  No evidence to suggest infection.  Symptoms may be related to the underlying pancreatic lesion.  I recommended supportive treatment with Tylenol, warm compresses good oral fluid intake and rest.  Close outpatient follow-up was stressed.  The pancreatic findings were discussed at length with the patient the importance of very close follow-up was stressed.  The patient was agreeable to plan of care.  Close return precautions were provided and she was discharged in stable condition.    Covid-19  Larry Melendez was evaluated during a global COVID-19 pandemic, which necessitated consideration that the patient might be at risk for infection with the SARS-CoV-2 virus that causes COVID-19.   Applicable protocols for evaluation were followed during the patient's care.     Diagnosis:    ICD-10-CM    1. Pancreatic mass  K86.89        Discharge Medications:  New Prescriptions    ONDANSETRON (ZOFRAN ODT) 4 MG ODT TAB    Take 1 tablet (4 mg) by mouth every 8 hours as needed for nausea or vomiting       Scribe Disclosure:  Julio Cesar JACKSON, am serving as a scribe at 11:13 AM on 5/31/2021 to document services personally performed by Israel Bernal MD based on my observations and the provider's statements to me.          Israel Bernla MD  05/31/21 0280

## 2021-06-10 DIAGNOSIS — Z11.59 ENCOUNTER FOR SCREENING FOR OTHER VIRAL DISEASES: ICD-10-CM

## 2021-06-17 ENCOUNTER — TRANSFERRED RECORDS (OUTPATIENT)
Dept: HEALTH INFORMATION MANAGEMENT | Facility: CLINIC | Age: 81
End: 2021-06-17

## 2021-06-18 DIAGNOSIS — Z11.59 ENCOUNTER FOR SCREENING FOR OTHER VIRAL DISEASES: ICD-10-CM

## 2021-06-18 LAB
LABORATORY COMMENT REPORT: NORMAL
SARS-COV-2 RNA RESP QL NAA+PROBE: NEGATIVE
SARS-COV-2 RNA RESP QL NAA+PROBE: NORMAL
SPECIMEN SOURCE: NORMAL
SPECIMEN SOURCE: NORMAL

## 2021-06-18 PROCEDURE — U0005 INFEC AGEN DETEC AMPLI PROBE: HCPCS | Performed by: INTERNAL MEDICINE

## 2021-06-18 PROCEDURE — U0003 INFECTIOUS AGENT DETECTION BY NUCLEIC ACID (DNA OR RNA); SEVERE ACUTE RESPIRATORY SYNDROME CORONAVIRUS 2 (SARS-COV-2) (CORONAVIRUS DISEASE [COVID-19]), AMPLIFIED PROBE TECHNIQUE, MAKING USE OF HIGH THROUGHPUT TECHNOLOGIES AS DESCRIBED BY CMS-2020-01-R: HCPCS | Performed by: INTERNAL MEDICINE

## 2021-06-22 ENCOUNTER — ANESTHESIA (OUTPATIENT)
Dept: GASTROENTEROLOGY | Facility: CLINIC | Age: 81
End: 2021-06-22
Payer: COMMERCIAL

## 2021-06-22 ENCOUNTER — ANESTHESIA EVENT (OUTPATIENT)
Dept: GASTROENTEROLOGY | Facility: CLINIC | Age: 81
End: 2021-06-22
Payer: COMMERCIAL

## 2021-06-22 ENCOUNTER — HOSPITAL ENCOUNTER (OUTPATIENT)
Facility: CLINIC | Age: 81
Discharge: HOME | End: 2021-06-22
Attending: INTERNAL MEDICINE | Admitting: INTERNAL MEDICINE
Payer: COMMERCIAL

## 2021-06-22 VITALS
SYSTOLIC BLOOD PRESSURE: 143 MMHG | OXYGEN SATURATION: 95 % | RESPIRATION RATE: 18 BRPM | HEART RATE: 77 BPM | DIASTOLIC BLOOD PRESSURE: 72 MMHG

## 2021-06-22 LAB — UPPER EUS: NORMAL

## 2021-06-22 PROCEDURE — 43259 EGD US EXAM DUODENUM/JEJUNUM: CPT | Performed by: INTERNAL MEDICINE

## 2021-06-22 PROCEDURE — 999N000010 HC STATISTIC ANES STAT CODE-CRNA PER MINUTE: Performed by: INTERNAL MEDICINE

## 2021-06-22 PROCEDURE — 250N000011 HC RX IP 250 OP 636

## 2021-06-22 PROCEDURE — 258N000003 HC RX IP 258 OP 636

## 2021-06-22 PROCEDURE — 250N000009 HC RX 250

## 2021-06-22 PROCEDURE — 370N000017 HC ANESTHESIA TECHNICAL FEE, PER MIN: Performed by: INTERNAL MEDICINE

## 2021-06-22 RX ORDER — ONDANSETRON 2 MG/ML
INJECTION INTRAMUSCULAR; INTRAVENOUS PRN
Status: DISCONTINUED | OUTPATIENT
Start: 2021-06-22 | End: 2021-06-22

## 2021-06-22 RX ORDER — NALOXONE HYDROCHLORIDE 0.4 MG/ML
0.2 INJECTION, SOLUTION INTRAMUSCULAR; INTRAVENOUS; SUBCUTANEOUS
Status: DISCONTINUED | OUTPATIENT
Start: 2021-06-22 | End: 2021-06-22 | Stop reason: HOSPADM

## 2021-06-22 RX ORDER — GLYCOPYRROLATE 0.2 MG/ML
INJECTION, SOLUTION INTRAMUSCULAR; INTRAVENOUS PRN
Status: DISCONTINUED | OUTPATIENT
Start: 2021-06-22 | End: 2021-06-22

## 2021-06-22 RX ORDER — SODIUM CHLORIDE, SODIUM LACTATE, POTASSIUM CHLORIDE, CALCIUM CHLORIDE 600; 310; 30; 20 MG/100ML; MG/100ML; MG/100ML; MG/100ML
INJECTION, SOLUTION INTRAVENOUS CONTINUOUS PRN
Status: DISCONTINUED | OUTPATIENT
Start: 2021-06-22 | End: 2021-06-22

## 2021-06-22 RX ORDER — LIDOCAINE HYDROCHLORIDE 20 MG/ML
INJECTION, SOLUTION INFILTRATION; PERINEURAL PRN
Status: DISCONTINUED | OUTPATIENT
Start: 2021-06-22 | End: 2021-06-22

## 2021-06-22 RX ORDER — FLUMAZENIL 0.1 MG/ML
0.2 INJECTION, SOLUTION INTRAVENOUS
Status: DISCONTINUED | OUTPATIENT
Start: 2021-06-22 | End: 2021-06-22 | Stop reason: HOSPADM

## 2021-06-22 RX ORDER — PROPOFOL 10 MG/ML
INJECTION, EMULSION INTRAVENOUS CONTINUOUS PRN
Status: DISCONTINUED | OUTPATIENT
Start: 2021-06-22 | End: 2021-06-22

## 2021-06-22 RX ORDER — NALOXONE HYDROCHLORIDE 0.4 MG/ML
0.4 INJECTION, SOLUTION INTRAMUSCULAR; INTRAVENOUS; SUBCUTANEOUS
Status: DISCONTINUED | OUTPATIENT
Start: 2021-06-22 | End: 2021-06-22 | Stop reason: HOSPADM

## 2021-06-22 RX ORDER — PROPOFOL 10 MG/ML
INJECTION, EMULSION INTRAVENOUS PRN
Status: DISCONTINUED | OUTPATIENT
Start: 2021-06-22 | End: 2021-06-22

## 2021-06-22 RX ADMIN — PROPOFOL 40 MG: 10 INJECTION, EMULSION INTRAVENOUS at 14:28

## 2021-06-22 RX ADMIN — ONDANSETRON 4 MG: 2 INJECTION INTRAMUSCULAR; INTRAVENOUS at 14:26

## 2021-06-22 RX ADMIN — SODIUM CHLORIDE, POTASSIUM CHLORIDE, SODIUM LACTATE AND CALCIUM CHLORIDE: 600; 310; 30; 20 INJECTION, SOLUTION INTRAVENOUS at 14:18

## 2021-06-22 RX ADMIN — PROPOFOL 40 MG: 10 INJECTION, EMULSION INTRAVENOUS at 14:22

## 2021-06-22 RX ADMIN — PROPOFOL 20 MG: 10 INJECTION, EMULSION INTRAVENOUS at 14:25

## 2021-06-22 RX ADMIN — LIDOCAINE HYDROCHLORIDE 60 MG: 20 INJECTION, SOLUTION INFILTRATION; PERINEURAL at 14:22

## 2021-06-22 RX ADMIN — DEXMEDETOMIDINE HYDROCHLORIDE 8 MCG: 100 INJECTION, SOLUTION INTRAVENOUS at 14:22

## 2021-06-22 RX ADMIN — GLYCOPYRROLATE 0.1 MG: 0.2 INJECTION, SOLUTION INTRAMUSCULAR; INTRAVENOUS at 14:21

## 2021-06-22 RX ADMIN — PROPOFOL 100 MCG/KG/MIN: 10 INJECTION, EMULSION INTRAVENOUS at 14:22

## 2021-06-22 NOTE — ANESTHESIA CARE TRANSFER NOTE
Patient: Janessa Melendez    Procedure(s):  ESOPHAGOGASTRODUODENOSCOPY, WITH ENDOSCOPIC US WITH FINE NEEDLE ASPIRATION    Diagnosis: Cyst of pancreas [K86.2]  Diagnosis Additional Information: No value filed.    Anesthesia Type:   MAC     Note:    Oropharynx: oropharynx clear of all foreign objects and spontaneously breathing  Level of Consciousness: awake  Oxygen Supplementation: room air    Independent Airway: airway patency satisfactory and stable  Dentition: dentition unchanged  Vital Signs Stable: post-procedure vital signs reviewed and stable  Report to RN Given: handoff report given  Destination: Endoscopy recovery area.   Comments: At end of procedure, spontaneous respirations, patient alert to voice, able to follow commands. Patient breathing room air at room air to PACU. SpO2, NiBP, and EKG monitors and alarms on and functioning, report on patient's clinical status given to PACU RN, RN questions answered.        Vitals: (Last set prior to Anesthesia Care Transfer)  CRNA VITALS  6/22/2021 1408 - 6/22/2021 1441      6/22/2021             Resp Rate (set):  10        Electronically Signed By: KATIE Palmer CRNA  June 22, 2021  2:41 PM

## 2021-06-22 NOTE — ANESTHESIA PREPROCEDURE EVALUATION
Anesthesia Pre-Procedure Evaluation    Patient: Janessa Saldaña   MRN: [id number] : 1940        Preoperative Diagnosis: Cyst of pancreas [K86.2]   Procedure : Procedure(s):  ESOPHAGOGASTRODUODENOSCOPY, WITH ENDOSCOPIC US WITH FINE NEEDLE ASPIRATION     Past Medical History:   Diagnosis Date     Arthritis      Breast cancer (H)      Cardiomyopathy (H)     ideopathic     Coronary artery disease 2014     Hypercholesterolemia      Hypertension      Hypokalemia      Malignant neoplasm (H)      Shortness of breath      Shoulder pain       Past Surgical History:   Procedure Laterality Date     BACK SURGERY       CARDIAC SURGERY      angiogram neg many yrs ago     CHOLECYSTECTOMY       EYE SURGERY       ORTHOPEDIC SURGERY      lt shoulder replaced, total     partial masectomy        Allergies   Allergen Reactions     Amlodipine      swelling     Aspirin Other (See Comments)     Bleeding              Bleeding, GI Lesion         Codeine Sulfate GI Disturbance      Social History     Tobacco Use     Smoking status: Never Smoker     Smokeless tobacco: Never Used   Substance Use Topics     Alcohol use: Yes     Comment: socially      Wt Readings from Last 1 Encounters:   21 64.9 kg (143 lb)        Anesthesia Evaluation   Pt has had prior anesthetic.     No history of anesthetic complications       ROS/MED HX  ENT/Pulmonary:    (-) sleep apnea   Neurologic: Comment: Chronic thoracic back pain       Cardiovascular: Comment: CM, labile BP    Name: ELAYNE SALDAÑA  MRN: [id number]  : 1940  Study Date: 2021 09:51 AM  Age: 80 yrs  Gender: Female  Patient Location: [facility]  Reason For Study: Dilated cardiomyopathy (H)  Ordering Physician: YADIRA MERRITT  Referring Physician: YADIRA MERRITT  Performed By: RIKKI Barreto     BSA: 1.6 m2  Height: 59 in  Weight: 142 lb  HR: 87  BP: 128/84  mmHg  ______________________________________________________________________________  Procedure  Complete Echo Adult.     ______________________________________________________________________________  Interpretation Summary     1. The left ventricle is normal in size. The visual ejection fraction is  estimated at 45-50%.  2. The right ventricle is normal in structure, function and size.  3. No valve disease.  4. The ascending aorta is Mildly dilated. 3.9cm.     Echo from 3/2020 showed EF 40-45%.  ______________________________________________________________________________    (+) Dyslipidemia hypertension--CAD ---    METS/Exercise Tolerance:     Hematologic:     (+) anemia,     Musculoskeletal: Comment: Left TSA  (+) arthritis,     GI/Hepatic:     (+) GERD, Asymptomatic on medication,     Renal/Genitourinary:       Endo:       Psychiatric/Substance Use:       Infectious Disease:       Malignancy:   (+) Malignancy, History of Breast.    Other:            Physical Exam    Airway        Mallampati: II   TM distance: > 3 FB   Neck ROM: full   Mouth opening: > 3 cm    Respiratory Devices and Support         Dental       (+) caps      Cardiovascular          Rhythm and rate: regular     Pulmonary           breath sounds clear to auscultation           OUTSIDE LABS:  CBC:   Lab Results   Component Value Date    WBC 8.0 05/31/2021    WBC 6.4 03/09/2018    HGB 11.9 05/31/2021    HGB 12.7 03/09/2018    HCT 35.7 05/31/2021    HCT 38.0 03/09/2018     05/31/2021     03/09/2018     BMP:   Lab Results   Component Value Date     05/31/2021     05/06/2021    POTASSIUM 3.3 (L) 05/31/2021    POTASSIUM 4.1 05/06/2021    CHLORIDE 99 05/31/2021    CHLORIDE 100 05/06/2021    CO2 31 05/31/2021    CO2 31 05/06/2021    BUN 14 05/31/2021    BUN 15 05/06/2021    CR 0.73 05/31/2021    CR 0.74 05/06/2021     (H) 05/31/2021     (H) 05/06/2021     COAGS:   Lab Results   Component Value Date    PTT 27  06/05/2009    INR 0.97 06/05/2009     POC: No results found for: BGM, HCG, HCGS  HEPATIC:   Lab Results   Component Value Date    ALBUMIN 4.0 05/31/2021    PROTTOTAL 7.5 05/31/2021    ALT 26 05/31/2021    AST 14 05/31/2021    ALKPHOS 77 05/31/2021    BILITOTAL 0.6 05/31/2021     OTHER:   Lab Results   Component Value Date    PENNY 9.0 05/31/2021    LIPASE 69 (L) 05/31/2021    TSH 0.54 08/29/2014    T4 1.3 08/29/2014     Prior to Admission medications    Medication Sig Start Date End Date Taking? Authorizing Provider   Acetaminophen (TYLENOL PO) Take 500 mg by mouth every 6 hours as needed for mild pain or fever    Yes Reported, Patient   carvedilol (COREG) 25 MG tablet Take 2 tablets (50 mg) by mouth 2 times daily 5/13/21  Yes Brett Smith MD   cholecalciferol (VITAMIN D3) 25 mcg (1000 units) capsule Take 1 capsule by mouth daily   Yes Reported, Patient   cyanocobalamin (VITAMIN B-12) 100 MCG tablet Take 100 mcg by mouth daily   Yes Reported, Patient   hydrALAZINE (APRESOLINE) 50 MG tablet Take 1 tablet (50 mg) by mouth 3 times daily 5/13/21  Yes Brett Smith MD   irbesartan (AVAPRO) 300 MG tablet Take 1 tablet (300 mg) by mouth daily 5/13/21  Yes Brett Smith MD   omeprazole (PRILOSEC) 10 MG DR capsule Take 10 mg by mouth daily   Yes Reported, Patient   potassium chloride ER (K-TAB/KLOR-CON) 10 MEQ CR tablet Take 1 tablet (10 mEq) by mouth 2 times daily And as needed as directed. 5/13/21  Yes Brett Smith MD   simvastatin (ZOCOR) 10 MG tablet Take 1 tablet (10 mg) by mouth At Bedtime 5/13/21  Yes Brett Smith MD   terazosin (HYTRIN) 5 MG capsule Take 1 capsule (5 mg) by mouth At Bedtime 5/13/21  Yes Brett Smith MD   furosemide (LASIX) 20 MG tablet 2 pills(40mg) on Tuesday and Thursday, one pill the other 5 days of the week and as directe 5/13/21   Brett Smith MD   ondansetron (ZOFRAN ODT) 4 MG ODT tab Take 1  tablet (4 mg) by mouth every 8 hours as needed for nausea or vomiting 5/31/21   Israel Bernal MD     No current Epic-ordered facility-administered medications on file.      No current [facility]-ordered outpatient medications on file.       No results for input(s): ABO, RH in the last 85913 hours.  No results for input(s): HCG in the last 11964 hours.  Recent Results (from the past 744 hour(s))   CT Abdomen Pelvis w Contrast    Narrative    CT ABDOMEN AND PELVIS WITH CONTRAST 5/31/2021 12:45 PM    CLINICAL HISTORY: Abdominal pain, acute, nonlocalized    TECHNIQUE: CT scan of the abdomen and pelvis was performed following  injection of IV contrast. Multiplanar reformats were obtained. Dose  reduction techniques were used.  CONTRAST: 72mL Isovue-370    COMPARISON: CT chest, abdomen and pelvis 9/16/2015.    FINDINGS:   LOWER CHEST: Atelectasis or scarring lung bases. No pleural fluid.  Right breast calcifications are noted.    HEPATOBILIARY: Small hypodense lesions are noted in the left hepatic  lobe. These are too small to characterize by CT, but are likely cysts.  Liver is otherwise unremarkable. Prior cholecystectomy.    PANCREAS: Conglomeration of cystic structures noted in the region of  the pancreatic head and uncinate process possibly indicating focal  area of intraductal papillary mucinous neoplasm on series 4, image 80.  This was barely evident on prior exam in retrospect. No peripancreatic  inflammation.    SPLEEN: Normal.    ADRENAL GLANDS: Normal.    KIDNEYS/BLADDER: Kidneys and bladder are unremarkable. No urinary  tract calculi or hydronephrosis.    BOWEL: No bowel obstruction, diverticulitis or appendicitis.    LYMPH NODES: No enlarged abdominal or pelvic lymph nodes.    VASCULATURE: Calcified plaque abdominal aorta and branch vessels  without aneurysm or dissection.    PELVIC ORGANS: The uterus, ovaries and rectum are unremarkable. No  free pelvic fluid.    ADDITIONAL FINDINGS: None.    MUSCULOSKELETAL:  Bones are osteopenic. Degenerative spine changes.      Impression    IMPRESSION:   1.  No acute intra-abdominal or pelvic abnormality is present to  account for patient's symptoms. No bowel obstruction, diverticulitis  or appendicitis. No urinary tract calculi or hydronephrosis.    2.  Cluster of cysts in the pancreatic head and uncinate process  possibly intraductal papillary mucinous neoplasm. A follow-up  nonemergent pancreas MRI could be performed for better evaluation. No  pancreatic ductal dilatation or peripancreatic inflammation.    3.  Low-attenuation liver lesions likely cysts. No specific follow-up  suggested.    DAVID MCFARLAND MD         Anesthesia Plan    ASA Status:  3      Anesthesia Type: MAC.              Consents    Anesthesia Plan(s) and associated risks, benefits, and realistic alternatives discussed. Questions answered and patient/representative(s) expressed understanding.     - Discussed with:  Patient         Postoperative Care            Comments:                Ceci Sarkar MD

## 2021-06-23 NOTE — ANESTHESIA POSTPROCEDURE EVALUATION
Patient: Janessa Melendez    Procedure(s):  ESOPHAGOGASTRODUODENOSCOPY, WITH ENDOSCOPIC US WITH FINE NEEDLE ASPIRATION    Diagnosis:Cyst of pancreas [K86.2]  Diagnosis Additional Information: No value filed.    Anesthesia Type:  MAC    Note:     Postop Pain Control: Uneventful            Sign Out: Well controlled pain   PONV: No   Neuro/Psych: Uneventful            Sign Out: Acceptable/Baseline neuro status   Airway/Respiratory: Uneventful            Sign Out: Acceptable/Baseline resp. status   CV/Hemodynamics: Uneventful            Sign Out: Acceptable CV status   Other NRE: NONE   DID A NON-ROUTINE EVENT OCCUR? No           Last vitals:  Vitals:    06/22/21 1450 06/22/21 1500 06/22/21 1508   BP: 126/64 (!) 140/69 (!) 143/72   Pulse: 76 75 77   Resp: 19 18 18   SpO2: 94% 96% 95%       Last vitals prior to Anesthesia Care Transfer:  CRNA VITALS  6/22/2021 1408 - 6/22/2021 1508      6/22/2021             Resp Rate (set):  10          Electronically Signed By: Ceci Sarkar MD  June 22, 2021  7:28 PM

## 2022-04-05 ENCOUNTER — LAB (OUTPATIENT)
Dept: LAB | Facility: CLINIC | Age: 82
End: 2022-04-05
Payer: COMMERCIAL

## 2022-04-05 DIAGNOSIS — E78.00 HYPERCHOLESTEROLEMIA: ICD-10-CM

## 2022-04-05 DIAGNOSIS — I10 ESSENTIAL HYPERTENSION: ICD-10-CM

## 2022-04-05 LAB
ALT SERPL W P-5'-P-CCNC: 24 U/L (ref 0–50)
ANION GAP SERPL CALCULATED.3IONS-SCNC: 6 MMOL/L (ref 3–14)
BUN SERPL-MCNC: 20 MG/DL (ref 7–30)
CALCIUM SERPL-MCNC: 8.9 MG/DL (ref 8.5–10.1)
CHLORIDE BLD-SCNC: 102 MMOL/L (ref 94–109)
CHOLEST SERPL-MCNC: 136 MG/DL
CO2 SERPL-SCNC: 30 MMOL/L (ref 20–32)
CREAT SERPL-MCNC: 0.82 MG/DL (ref 0.52–1.04)
FASTING STATUS PATIENT QL REPORTED: YES
GFR SERPL CREATININE-BSD FRML MDRD: 72 ML/MIN/1.73M2
GLUCOSE BLD-MCNC: 127 MG/DL (ref 70–99)
HDLC SERPL-MCNC: 55 MG/DL
LDLC SERPL CALC-MCNC: 54 MG/DL
NONHDLC SERPL-MCNC: 81 MG/DL
POTASSIUM BLD-SCNC: 4 MMOL/L (ref 3.4–5.3)
SODIUM SERPL-SCNC: 138 MMOL/L (ref 133–144)
TRIGL SERPL-MCNC: 134 MG/DL

## 2022-04-05 PROCEDURE — 84460 ALANINE AMINO (ALT) (SGPT): CPT | Performed by: INTERNAL MEDICINE

## 2022-04-05 PROCEDURE — 36415 COLL VENOUS BLD VENIPUNCTURE: CPT | Performed by: INTERNAL MEDICINE

## 2022-04-05 PROCEDURE — 80061 LIPID PANEL: CPT | Performed by: INTERNAL MEDICINE

## 2022-04-05 PROCEDURE — 80048 BASIC METABOLIC PNL TOTAL CA: CPT | Performed by: INTERNAL MEDICINE

## 2022-04-07 ENCOUNTER — HOSPITAL ENCOUNTER (OUTPATIENT)
Dept: CARDIOLOGY | Facility: CLINIC | Age: 82
Discharge: HOME | End: 2022-04-07
Attending: INTERNAL MEDICINE | Admitting: INTERNAL MEDICINE
Payer: COMMERCIAL

## 2022-04-07 ENCOUNTER — OFFICE VISIT (OUTPATIENT)
Dept: CARDIOLOGY | Facility: CLINIC | Age: 82
End: 2022-04-07
Attending: INTERNAL MEDICINE
Payer: COMMERCIAL

## 2022-04-07 VITALS
BODY MASS INDEX: 27.68 KG/M2 | DIASTOLIC BLOOD PRESSURE: 76 MMHG | OXYGEN SATURATION: 94 % | HEIGHT: 60 IN | SYSTOLIC BLOOD PRESSURE: 164 MMHG | HEART RATE: 86 BPM | WEIGHT: 141 LBS

## 2022-04-07 DIAGNOSIS — E78.00 PURE HYPERCHOLESTEROLEMIA: ICD-10-CM

## 2022-04-07 DIAGNOSIS — I10 ESSENTIAL HYPERTENSION, BENIGN: ICD-10-CM

## 2022-04-07 DIAGNOSIS — I10 ESSENTIAL HYPERTENSION: ICD-10-CM

## 2022-04-07 DIAGNOSIS — R06.02 SHORTNESS OF BREATH: ICD-10-CM

## 2022-04-07 DIAGNOSIS — I42.0 DILATED CARDIOMYOPATHY (H): ICD-10-CM

## 2022-04-07 DIAGNOSIS — R03.0 ELEVATED BLOOD PRESSURE READING WITHOUT DIAGNOSIS OF HYPERTENSION: ICD-10-CM

## 2022-04-07 DIAGNOSIS — I42.9 CARDIOMYOPATHY, UNSPECIFIED TYPE (H): ICD-10-CM

## 2022-04-07 DIAGNOSIS — I10 BENIGN ESSENTIAL HYPERTENSION: ICD-10-CM

## 2022-04-07 DIAGNOSIS — E78.00 HYPERCHOLESTEROLEMIA: ICD-10-CM

## 2022-04-07 DIAGNOSIS — I25.10 CORONARY ARTERY DISEASE INVOLVING NATIVE CORONARY ARTERY OF NATIVE HEART WITHOUT ANGINA PECTORIS: ICD-10-CM

## 2022-04-07 LAB — LVEF ECHO: NORMAL

## 2022-04-07 PROCEDURE — 255N000002 HC RX 255 OP 636: Performed by: INTERNAL MEDICINE

## 2022-04-07 PROCEDURE — 999N000208 ECHOCARDIOGRAM COMPLETE

## 2022-04-07 PROCEDURE — 93306 TTE W/DOPPLER COMPLETE: CPT | Mod: 26 | Performed by: INTERNAL MEDICINE

## 2022-04-07 PROCEDURE — 99214 OFFICE O/P EST MOD 30 MIN: CPT | Mod: 25 | Performed by: INTERNAL MEDICINE

## 2022-04-07 RX ORDER — AMOXICILLIN 500 MG/1
CAPSULE ORAL
COMMUNITY
Start: 2021-07-29

## 2022-04-07 RX ADMIN — HUMAN ALBUMIN MICROSPHERES AND PERFLUTREN 9 ML: 10; .22 INJECTION, SOLUTION INTRAVENOUS at 14:37

## 2022-04-07 NOTE — PROGRESS NOTES
HPI and Plan:   See dictation          Orders Placed This Encounter   Procedures     N terminal pro BNP outpatient     Basic metabolic panel     Lipid Profile     ALT     Follow-Up with Cardiology MARINO     Follow-Up with Cardiology     24 Hour Blood Pressure Monitor - Adult     General PFT Lab (Please always keep checked)     Pulmonary Function Test       Orders Placed This Encounter   Medications     amoxicillin (AMOXIL) 500 MG capsule     Sig: TAKE 4 CAPSULES BY MOUTH 1 HOUR BEFORE DENTAL APPOINTMENT       There are no discontinued medications.      Encounter Diagnoses   Name Primary?     Dilated cardiomyopathy (H)      Essential hypertension      Pure hypercholesterolemia      Benign essential hypertension      Essential hypertension, benign      Shortness of breath      Coronary artery disease involving native coronary artery of native heart without angina pectoris      Cardiomyopathy, unspecified type (H)      Hypercholesterolemia      Elevated blood pressure reading without diagnosis of hypertension        CURRENT MEDICATIONS:  Current Outpatient Medications   Medication Sig Dispense Refill     Acetaminophen (TYLENOL PO) Take 500 mg by mouth every 6 hours as needed for mild pain or fever        amoxicillin (AMOXIL) 500 MG capsule TAKE 4 CAPSULES BY MOUTH 1 HOUR BEFORE DENTAL APPOINTMENT       carvedilol (COREG) 25 MG tablet Take 2 tablets (50 mg) by mouth 2 times daily 360 tablet 3     cholecalciferol (VITAMIN D3) 25 mcg (1000 units) capsule Take 1 capsule by mouth daily       cyanocobalamin (VITAMIN B-12) 100 MCG tablet Take 100 mcg by mouth daily       furosemide (LASIX) 20 MG tablet 2 pills(40mg) on Tuesday and Thursday, one pill the other 5 days of the week and as directe 110 tablet 3     hydrALAZINE (APRESOLINE) 50 MG tablet Take 1 tablet (50 mg) by mouth 3 times daily (Patient taking differently: Take 50 mg by mouth 2 times daily ) 270 tablet 3     irbesartan (AVAPRO) 300 MG tablet Take 1 tablet (300 mg)  by mouth daily 90 tablet 3     omeprazole (PRILOSEC) 10 MG DR capsule Take 10 mg by mouth daily       ondansetron (ZOFRAN ODT) 4 MG ODT tab Take 1 tablet (4 mg) by mouth every 8 hours as needed for nausea or vomiting 15 tablet 0     potassium chloride ER (K-TAB/KLOR-CON) 10 MEQ CR tablet Take 1 tablet (10 mEq) by mouth 2 times daily And as needed as directed. 180 tablet 3     simvastatin (ZOCOR) 10 MG tablet Take 1 tablet (10 mg) by mouth At Bedtime 90 tablet 3     terazosin (HYTRIN) 5 MG capsule Take 1 capsule (5 mg) by mouth At Bedtime 90 capsule 3       ALLERGIES     Allergies   Allergen Reactions     Amlodipine      swelling     Aspirin Other (See Comments)     Bleeding              Bleeding, GI Lesion         Codeine Sulfate GI Disturbance       PAST MEDICAL HISTORY:  Past Medical History:   Diagnosis Date     Arthritis      Breast cancer (H)      Cardiomyopathy (H)     ideopathic     Coronary artery disease 9/25/2014     Hypercholesterolemia      Hypertension      Hypokalemia      Malignant neoplasm (H)      Shortness of breath      Shoulder pain        PAST SURGICAL HISTORY:  Past Surgical History:   Procedure Laterality Date     BACK SURGERY       CARDIAC SURGERY      angiogram neg many yrs ago     CHOLECYSTECTOMY       ESOPHAGOSCOPY, GASTROSCOPY, DUODENOSCOPY (EGD), COMBINED N/A 6/22/2021    Procedure: ESOPHAGOGASTRODUODENOSCOPY, WITH ENDOSCOPIC US WITH FINE NEEDLE ASPIRATION;  Surgeon: Ventura Mcgill MD;  Location:  GI     EYE SURGERY       ORTHOPEDIC SURGERY      lt shoulder replaced, total     partial masectomy         FAMILY HISTORY:  Family History   Problem Relation Age of Onset     Other Cancer Mother      Other Cancer Brother      No Known Problems Son      Heart Failure Daughter      Family History Negative No family hx of        SOCIAL HISTORY:  Social History     Socioeconomic History     Marital status: Single     Spouse name: None     Number of children: None     Years of  "education: None     Highest education level: None   Occupational History     None   Tobacco Use     Smoking status: Never Smoker     Smokeless tobacco: Never Used   Substance and Sexual Activity     Alcohol use: Yes     Comment: socially     Drug use: No     Sexual activity: Never   Other Topics Concern     Parent/sibling w/ CABG, MI or angioplasty before 65F 55M? Not Asked      Service Not Asked     Blood Transfusions Not Asked     Caffeine Concern Not Asked     Occupational Exposure Not Asked     Hobby Hazards Not Asked     Sleep Concern Not Asked     Stress Concern Not Asked     Weight Concern Not Asked     Special Diet No     Back Care Not Asked     Exercise No     Bike Helmet Not Asked     Seat Belt Not Asked     Self-Exams Not Asked   Social History Narrative     None     Social Determinants of Health     Financial Resource Strain: Not on file   Food Insecurity: Not on file   Transportation Needs: Not on file   Physical Activity: Not on file   Stress: Not on file   Social Connections: Not on file   Intimate Partner Violence: Not on file   Housing Stability: Not on file       Review of Systems:  Skin:  Negative       Eyes:  Positive for glasses    ENT:  Negative      Respiratory:  Positive for dyspnea on exertion stairs and walking   Cardiovascular:    Positive for;fatigue;palpitations occ. palpitations  Gastroenterology: Positive for heartburn;excessive gas or bloating;diarrhea    Genitourinary:  Negative      Musculoskeletal:  Positive for joint pain;arthritis    Neurologic:  Positive for numbness or tingling of hands right  Psychiatric:  Positive for anxiety;depression son passed in November '19  Heme/Lymph/Imm:  Negative      Endocrine:  Positive for hot flashes occ    Physical Exam:  Vitals: BP (!) 164/76   Pulse 86   Ht 1.511 m (4' 11.5\")   Wt 64 kg (141 lb)   SpO2 94%   BMI 28.00 kg/m      Constitutional:  cooperative, alert and oriented, well developed, well nourished, in no acute distress " overweight      Skin:  warm and dry to the touch, no apparent skin lesions or masses noted          Head:  normocephalic, no masses or lesions;normocephalic        Eyes:  pupils equal and round, conjunctivae and lids unremarkable, sclera white, no xanthalasma, EOMS intact, no nystagmus        Lymph:      ENT:  no pallor or cyanosis, dentition good        Neck:  carotid pulses are full and equal bilaterally, JVP normal, no carotid bruit        Respiratory:  normal breath sounds, clear to auscultation, normal A-P diameter, normal symmetry, normal respiratory excursion, no use of accessory muscles         Cardiac: regular rhythm;normal S1 and S2;no murmurs, gallops or rubs detected   S4            pulses full and equal                                        GI:  not assessed this visit        Extremities and Muscular Skeletal:  no edema;no deformities, clubbing, cyanosis, erythema observed   bilateral LE edema;trace     Bruising over R lower extremity    Neurological:  no gross motor deficits;affect appropriate        Psych:  Alert and Oriented x 3        CC  Brett Smith MD  [id number]  [place] [id number]

## 2022-04-07 NOTE — PROGRESS NOTES
Service Date: 04/07/2022    HISTORY OF PRESENT ILLNESS:  It was my pleasure to see your patient, Cece Melendez, who is a very pleasant 81-year-old patient with history of idiopathic cardiomyopathy, hypercholesterolemia and resistant hypertension.  Since I last saw the patient, she has been doing relatively well.  She does have some shortness of breath.  However, if she walks 200 yards carrying groceries, she becomes very short of breath and very tired.  Her ejection fraction typically has been in the 40%-45% range.  Last year, it was called slightly higher at 45%-50%.    Blood pressure today was raised at 164/76.  I also note on the echocardiogram, that her systolic blood pressure was in the 170 range.      This patient in the past has had some evidence of white coat hypertension.  In 05/2021, her blood pressure was normal at 128/60.  She is on multiple medications for blood pressure control.    Echocardiography was performed today and was available just at the end of our visit, and this showed that the EF was about 40%.  Again, that is more typical of the previous echocardiograms where the EF has been felt to be in the 40%-45% range.  In 2019, she had a CT angiogram, which showed only mild coronary artery disease and no flow limitation, confirming the diagnosis of idiopathic nonischemic cardiomyopathy.  The inferior vena cava on the echocardiogram was not enlarged, consistent with euvolemia and as you will see below, the physical examination also suggested that the patient was euvolemic.  Diastolic function was felt to be grade 1, which is the mildest form of diastolic dysfunction ubiquitous in an 81-year-old.    Her lipid profile was normal with an LDL of 54, HDL of 55 and triglycerides of 134 with a total cholesterol 136 and her renal function was also normal with a sodium 138, potassium of 4.0, BUN of 20 and a creatinine 0.82 and GFR 72.    She is not complaining of any chest pains or chest pressure apart from  twinges that last for a few seconds, which occurred at various parts in her shoulders, and chest.  She has no true exertional angina.    IMPRESSION:    1.  Essential hypertension.  Her blood pressure appears to be significantly raised, but it is unclear whether this is white coat hypertension or not.  2.  Shortness of breath on exertion.  The degree of shortness of breath appears to be similar to that she had in 2019 when she did have a CT angiogram, which did not show any evidence of flow-limiting coronary artery disease with only mild plaque, especially in the LAD.  3.  Excellent lipid profile.  4.  Normal renal function.    PLAN:    1.  We will obtain 24-hour blood pressure monitor to determine if this patient truly has hypertension or not, which is extremely important for us because this patient has cardiomyopathy and blood pressure control is of prime importance.  2.  We will obtain pulmonary function tests to determine if her shortness of breath has a pulmonary basis.  This patient never smoked, but she did live with somebody who smoked continuously so she was exposed to a lot of secondhand smoke.  3.  We will check a BNP to determine if there is any evidence of heart failure.      I will have the patient return to see Shannan Daley in about a month's time, who has seen her previously.  Patient otherwise will follow up with me in 1 year.    It has been my pleasure to be involved the care of this very nice patient.  I look forward to seeing her again.    cc:   Kip George MD  [facility]  [address]  [place] [address]     Brett Connors MD, [facility]        D: 2022   T: 2022   MT: CAYDEN    Name:     MAI SALDAÑA  MRN:      [id number]        Account:      [id number]   :      1940           Service Date: 2022       Document: [id number]

## 2022-04-07 NOTE — LETTER
4/7/2022    Kip George MD  [facility] [address]  [facility] [address]    RE: Janessa Melendez       Dear Colleague,     I had the pleasure of seeing Janessa Melendez in the [facility].  HPI and Plan:   See dictation          Orders Placed This Encounter   Procedures     N terminal pro BNP outpatient     Basic metabolic panel     Lipid Profile     ALT     Follow-Up with Cardiology MARINO     Follow-Up with Cardiology     24 Hour Blood Pressure Monitor - Adult     General PFT Lab (Please always keep checked)     Pulmonary Function Test       Orders Placed This Encounter   Medications     amoxicillin (AMOXIL) 500 MG capsule     Sig: TAKE 4 CAPSULES BY MOUTH 1 HOUR BEFORE DENTAL APPOINTMENT       There are no discontinued medications.      Encounter Diagnoses   Name Primary?     Dilated cardiomyopathy (H)      Essential hypertension      Pure hypercholesterolemia      Benign essential hypertension      Essential hypertension, benign      Shortness of breath      Coronary artery disease involving native coronary artery of native heart without angina pectoris      Cardiomyopathy, unspecified type (H)      Hypercholesterolemia      Elevated blood pressure reading without diagnosis of hypertension        CURRENT MEDICATIONS:  Current Outpatient Medications   Medication Sig Dispense Refill     Acetaminophen (TYLENOL PO) Take 500 mg by mouth every 6 hours as needed for mild pain or fever        amoxicillin (AMOXIL) 500 MG capsule TAKE 4 CAPSULES BY MOUTH 1 HOUR BEFORE DENTAL APPOINTMENT       carvedilol (COREG) 25 MG tablet Take 2 tablets (50 mg) by mouth 2 times daily 360 tablet 3     cholecalciferol (VITAMIN D3) 25 mcg (1000 units) capsule Take 1 capsule by mouth daily       cyanocobalamin (VITAMIN B-12) 100 MCG tablet Take 100 mcg by mouth daily       furosemide (LASIX) 20 MG tablet 2 pills(40mg) on Tuesday and Thursday, one pill the other 5 days of the week and as  directe 110 tablet 3     hydrALAZINE (APRESOLINE) 50 MG tablet Take 1 tablet (50 mg) by mouth 3 times daily (Patient taking differently: Take 50 mg by mouth 2 times daily ) 270 tablet 3     irbesartan (AVAPRO) 300 MG tablet Take 1 tablet (300 mg) by mouth daily 90 tablet 3     omeprazole (PRILOSEC) 10 MG DR capsule Take 10 mg by mouth daily       ondansetron (ZOFRAN ODT) 4 MG ODT tab Take 1 tablet (4 mg) by mouth every 8 hours as needed for nausea or vomiting 15 tablet 0     potassium chloride ER (K-TAB/KLOR-CON) 10 MEQ CR tablet Take 1 tablet (10 mEq) by mouth 2 times daily And as needed as directed. 180 tablet 3     simvastatin (ZOCOR) 10 MG tablet Take 1 tablet (10 mg) by mouth At Bedtime 90 tablet 3     terazosin (HYTRIN) 5 MG capsule Take 1 capsule (5 mg) by mouth At Bedtime 90 capsule 3       ALLERGIES     Allergies   Allergen Reactions     Amlodipine      swelling     Aspirin Other (See Comments)     Bleeding              Bleeding, GI Lesion         Codeine Sulfate GI Disturbance       PAST MEDICAL HISTORY:  Past Medical History:   Diagnosis Date     Arthritis      Breast cancer (H)      Cardiomyopathy (H)     ideopathic     Coronary artery disease 9/25/2014     Hypercholesterolemia      Hypertension      Hypokalemia      Malignant neoplasm (H)      Shortness of breath      Shoulder pain        PAST SURGICAL HISTORY:  Past Surgical History:   Procedure Laterality Date     BACK SURGERY       CARDIAC SURGERY      angiogram neg many yrs ago     CHOLECYSTECTOMY       ESOPHAGOSCOPY, GASTROSCOPY, DUODENOSCOPY (EGD), COMBINED N/A 6/22/2021    Procedure: ESOPHAGOGASTRODUODENOSCOPY, WITH ENDOSCOPIC US WITH FINE NEEDLE ASPIRATION;  Surgeon: Ventura Mcgill MD;  Location:  GI     EYE SURGERY       ORTHOPEDIC SURGERY      lt shoulder replaced, total     partial masectomy         FAMILY HISTORY:  Family History   Problem Relation Age of Onset     Other Cancer Mother      Other Cancer Brother      No Known  Problems Son      Heart Failure Daughter      Family History Negative No family hx of        SOCIAL HISTORY:  Social History     Socioeconomic History     Marital status: Single     Spouse name: None     Number of children: None     Years of education: None     Highest education level: None   Occupational History     None   Tobacco Use     Smoking status: Never Smoker     Smokeless tobacco: Never Used   Substance and Sexual Activity     Alcohol use: Yes     Comment: socially     Drug use: No     Sexual activity: Never   Other Topics Concern     Parent/sibling w/ CABG, MI or angioplasty before 65F 55M? Not Asked      Service Not Asked     Blood Transfusions Not Asked     Caffeine Concern Not Asked     Occupational Exposure Not Asked     Hobby Hazards Not Asked     Sleep Concern Not Asked     Stress Concern Not Asked     Weight Concern Not Asked     Special Diet No     Back Care Not Asked     Exercise No     Bike Helmet Not Asked     Seat Belt Not Asked     Self-Exams Not Asked   Social History Narrative     None     Social Determinants of Health     Financial Resource Strain: Not on file   Food Insecurity: Not on file   Transportation Needs: Not on file   Physical Activity: Not on file   Stress: Not on file   Social Connections: Not on file   Intimate Partner Violence: Not on file   Housing Stability: Not on file       Review of Systems:  Skin:  Negative       Eyes:  Positive for glasses    ENT:  Negative      Respiratory:  Positive for dyspnea on exertion stairs and walking   Cardiovascular:    Positive for;fatigue;palpitations occ. palpitations  Gastroenterology: Positive for heartburn;excessive gas or bloating;diarrhea    Genitourinary:  Negative      Musculoskeletal:  Positive for joint pain;arthritis    Neurologic:  Positive for numbness or tingling of hands right  Psychiatric:  Positive for anxiety;depression son passed in November '19  Heme/Lymph/Imm:  Negative      Endocrine:  Positive for hot flashes  "occ    Physical Exam:  Vitals: BP (!) 164/76   Pulse 86   Ht 1.511 m (4' 11.5\")   Wt 64 kg (141 lb)   SpO2 94%   BMI 28.00 kg/m      Constitutional:  cooperative, alert and oriented, well developed, well nourished, in no acute distress overweight      Skin:  warm and dry to the touch, no apparent skin lesions or masses noted          Head:  normocephalic, no masses or lesions;normocephalic        Eyes:  pupils equal and round, conjunctivae and lids unremarkable, sclera white, no xanthalasma, EOMS intact, no nystagmus        Lymph:      ENT:  no pallor or cyanosis, dentition good        Neck:  carotid pulses are full and equal bilaterally, JVP normal, no carotid bruit        Respiratory:  normal breath sounds, clear to auscultation, normal A-P diameter, normal symmetry, normal respiratory excursion, no use of accessory muscles         Cardiac: regular rhythm;normal S1 and S2;no murmurs, gallops or rubs detected   S4            pulses full and equal                                        GI:  not assessed this visit        Extremities and Muscular Skeletal:  no edema;no deformities, clubbing, cyanosis, erythema observed   bilateral LE edema;trace     Bruising over R lower extremity    Neurological:  no gross motor deficits;affect appropriate        Psych:  Alert and Oriented x 3        CC  Brett Smith MD  [address] [address]    Thank you for allowing me to participate in the care of your patient.      Sincerely,     Brett Smith MD, MD     [facility]  "

## 2022-04-15 DIAGNOSIS — E78.00 PURE HYPERCHOLESTEROLEMIA: ICD-10-CM

## 2022-04-15 RX ORDER — SIMVASTATIN 10 MG
10 TABLET ORAL AT BEDTIME
Qty: 90 TABLET | Refills: 3 | Status: SHIPPED | OUTPATIENT
Start: 2022-04-15 | End: 2022-06-24

## 2022-04-19 ENCOUNTER — LAB (OUTPATIENT)
Dept: LAB | Facility: CLINIC | Age: 82
End: 2022-04-19
Payer: COMMERCIAL

## 2022-04-19 ENCOUNTER — HOSPITAL ENCOUNTER (OUTPATIENT)
Dept: CARDIOLOGY | Facility: CLINIC | Age: 82
Discharge: HOME | End: 2022-04-19
Attending: INTERNAL MEDICINE | Admitting: INTERNAL MEDICINE
Payer: COMMERCIAL

## 2022-04-19 DIAGNOSIS — R03.0 ELEVATED BLOOD PRESSURE READING WITHOUT DIAGNOSIS OF HYPERTENSION: ICD-10-CM

## 2022-04-19 DIAGNOSIS — R06.02 SHORTNESS OF BREATH: ICD-10-CM

## 2022-04-19 LAB — NT-PROBNP SERPL-MCNC: 1453 PG/ML (ref 0–450)

## 2022-04-19 PROCEDURE — 93790 AMBL BP MNTR W/SW I&R: CPT | Performed by: INTERNAL MEDICINE

## 2022-04-19 PROCEDURE — 36415 COLL VENOUS BLD VENIPUNCTURE: CPT | Performed by: INTERNAL MEDICINE

## 2022-04-19 PROCEDURE — 83880 ASSAY OF NATRIURETIC PEPTIDE: CPT | Performed by: INTERNAL MEDICINE

## 2022-04-19 PROCEDURE — 93786 AMBL BP MNTR W/SW REC ONLY: CPT

## 2022-05-04 DIAGNOSIS — I10 BENIGN ESSENTIAL HYPERTENSION: ICD-10-CM

## 2022-05-04 DIAGNOSIS — I42.0 DILATED CARDIOMYOPATHY (H): ICD-10-CM

## 2022-05-04 RX ORDER — CARVEDILOL 25 MG/1
50 TABLET ORAL 2 TIMES DAILY
Qty: 360 TABLET | Refills: 3 | Status: SHIPPED | OUTPATIENT
Start: 2022-05-04 | End: 2022-06-24

## 2022-05-17 DIAGNOSIS — I10 BENIGN ESSENTIAL HYPERTENSION: ICD-10-CM

## 2022-05-17 RX ORDER — TERAZOSIN 5 MG/1
5 CAPSULE ORAL AT BEDTIME
Qty: 90 CAPSULE | Refills: 3 | Status: SHIPPED | OUTPATIENT
Start: 2022-05-17 | End: 2023-06-08

## 2022-05-25 ENCOUNTER — TELEPHONE (OUTPATIENT)
Dept: CARDIOLOGY | Facility: CLINIC | Age: 82
End: 2022-05-25
Payer: COMMERCIAL

## 2022-05-25 DIAGNOSIS — I42.9 CARDIOMYOPATHY, UNSPECIFIED TYPE (H): ICD-10-CM

## 2022-05-25 DIAGNOSIS — I42.0 DILATED CARDIOMYOPATHY (H): Primary | ICD-10-CM

## 2022-05-25 NOTE — TELEPHONE ENCOUNTER
"Reviewed BNP showing    Latest Reference Range & Units 04/19/22 12:12   N-Terminal Pro Bnp 0 - 450 pg/mL 1,453 (H) [1]   (H): Data is abnormally high  [1] Reference range shown and results flagged as abnormal are for the outpatient, non acute settings. Establishing a baseline value for each individual patient is useful for follow-up.      Suggested inpatient cut points for confirming diagnosis of CHF in an acute setting are:   >450 pg/mL (age 18 to less than 50)   >900 pg/mL (age 50 to less than 75)   >1800 pg/mL (75 yrs and older)      An inpatient or emergency department NT-proPBNP <300 pg/mL effectively rules out acute CHF, with 99% negative predictive value.      Reviewed BP monitor showing       Per office note dated 4/07/22, Dr. Smith recommended\"   1.  We will obtain 24-hour blood pressure monitor to determine if this patient truly has hypertension or not, which is extremely important for us because this patient has cardiomyopathy and blood pressure control is of prime importance.  2.  We will obtain pulmonary function tests to determine if her shortness of breath has a pulmonary basis.  This patient never smoked, but she did live with somebody who smoked continuously so she was exposed to a lot of secondhand smoke.  3.  We will check a BNP to determine if there is any evidence of heart failure.     Pt has PFTs scheduled 6/22/22 & sees Shannan Daley 7/15/22.   Will message Dr. Smith to review. Randy HALEY   "

## 2022-05-25 NOTE — TELEPHONE ENCOUNTER
BP is normal. Has white coat hypertension. BNP is technically within normal limits for an 81 year old. Would switch furosemide to torsemide 30 mg per day and have her follow up with MARINO in 2 weeks. Bobbi

## 2022-05-26 RX ORDER — TORSEMIDE 10 MG/1
30 TABLET ORAL DAILY
Qty: 90 TABLET | Refills: 2 | Status: SHIPPED | OUTPATIENT
Start: 2022-05-26 | End: 2022-05-31

## 2022-05-26 NOTE — TELEPHONE ENCOUNTER
Called pt with results of BP monitor & BNP & recommendations from Dr. Smith to stop furosemide & start torsemide 30 mg daily with MARINO visit in 2 weeks with BMP prior. Prescription escripted to [facility] & orders placed. Will message scheduling to call pt to arrange. Randy HALEY

## 2022-05-31 DIAGNOSIS — I42.9 CARDIOMYOPATHY, UNSPECIFIED TYPE (H): ICD-10-CM

## 2022-05-31 DIAGNOSIS — I42.0 DILATED CARDIOMYOPATHY (H): ICD-10-CM

## 2022-05-31 RX ORDER — TORSEMIDE 10 MG/1
30 TABLET ORAL DAILY
Qty: 270 TABLET | Refills: 2 | Status: SHIPPED | OUTPATIENT
Start: 2022-05-31 | End: 2022-06-24

## 2022-06-20 DIAGNOSIS — I10 BENIGN ESSENTIAL HYPERTENSION: ICD-10-CM

## 2022-06-20 RX ORDER — POTASSIUM CHLORIDE 750 MG/1
10 TABLET, EXTENDED RELEASE ORAL 2 TIMES DAILY
Qty: 180 TABLET | Refills: 3 | Status: SHIPPED | OUTPATIENT
Start: 2022-06-20 | End: 2023-02-21

## 2022-06-20 NOTE — TELEPHONE ENCOUNTER
[facility] Card[facility] Refill Guideline reviewed.  Medication meets criteria for refill.   Radha Mukherjee RN on 6/20/2022 at 2:56 PM

## 2022-06-22 ENCOUNTER — HOSPITAL ENCOUNTER (OUTPATIENT)
Dept: CARDIAC REHAB | Facility: CLINIC | Age: 82
Discharge: HOME | End: 2022-06-22
Attending: INTERNAL MEDICINE
Payer: COMMERCIAL

## 2022-06-22 DIAGNOSIS — R06.02 SHORTNESS OF BREATH: ICD-10-CM

## 2022-06-22 PROCEDURE — 94060 EVALUATION OF WHEEZING: CPT

## 2022-06-22 PROCEDURE — 94729 DIFFUSING CAPACITY: CPT

## 2022-06-22 PROCEDURE — 94375 RESPIRATORY FLOW VOLUME LOOP: CPT

## 2022-06-23 LAB
DLCOUNC-%PRED-PRE: 101 %
DLCOUNC-PRE: 14.76 ML/MIN/MMHG
DLCOUNC-PRED: 14.61 ML/MIN/MMHG
ERV-%PRED-PRE: 26 %
ERV-PRE: 0.06 L
ERV-PRED: 0.22 L
EXPTIME-PRE: 6.27 SEC
FEF2575-%PRED-POST: 70 %
FEF2575-%PRED-PRE: 66 %
FEF2575-POST: 0.92 L/SEC
FEF2575-PRE: 0.88 L/SEC
FEF2575-PRED: 1.32 L/SEC
FEFMAX-%PRED-PRE: 78 %
FEFMAX-PRE: 2.96 L/SEC
FEFMAX-PRED: 3.78 L/SEC
FEV1-%PRED-PRE: 61 %
FEV1-PRE: 0.93 L
FEV1FEV6-PRE: 82 %
FEV1FEV6-PRED: 78 %
FEV1FVC-PRE: 82 %
FEV1FVC-PRED: 78 %
FEV1SVC-PRE: 69 %
FEV1SVC-PRED: 77 %
FIFMAX-PRE: 2.1 L/SEC
FRCPLETH-%PRED-PRE: 126 %
FRCPLETH-PRE: 2.94 L
FRCPLETH-PRED: 2.32 L
FVC-%PRED-PRE: 57 %
FVC-PRE: 1.13 L
FVC-PRED: 1.96 L
IC-%PRED-PRE: 72 %
IC-PRE: 1.27 L
IC-PRED: 1.74 L
RVPLETH-%PRED-PRE: 151 %
RVPLETH-PRE: 2.87 L
RVPLETH-PRED: 1.9 L
TLCPLETH-%PRED-PRE: 111 %
TLCPLETH-PRE: 4.21 L
TLCPLETH-PRED: 3.77 L
VA-%PRED-PRE: 80 %
VA-PRE: 2.73 L
VC-%PRED-PRE: 68 %
VC-PRE: 1.34 L
VC-PRED: 1.96 L

## 2022-06-24 ENCOUNTER — OFFICE VISIT (OUTPATIENT)
Dept: CARDIOLOGY | Facility: CLINIC | Age: 82
End: 2022-06-24
Attending: INTERNAL MEDICINE
Payer: COMMERCIAL

## 2022-06-24 ENCOUNTER — LAB (OUTPATIENT)
Dept: LAB | Facility: CLINIC | Age: 82
End: 2022-06-24
Payer: COMMERCIAL

## 2022-06-24 VITALS
WEIGHT: 140.3 LBS | HEIGHT: 59 IN | BODY MASS INDEX: 28.28 KG/M2 | HEART RATE: 74 BPM | SYSTOLIC BLOOD PRESSURE: 150 MMHG | OXYGEN SATURATION: 97 % | DIASTOLIC BLOOD PRESSURE: 82 MMHG

## 2022-06-24 DIAGNOSIS — I42.9 CARDIOMYOPATHY, UNSPECIFIED TYPE (H): ICD-10-CM

## 2022-06-24 DIAGNOSIS — I10 ESSENTIAL HYPERTENSION, BENIGN: ICD-10-CM

## 2022-06-24 DIAGNOSIS — J44.9 MODERATE COPD (CHRONIC OBSTRUCTIVE PULMONARY DISEASE) (H): Primary | ICD-10-CM

## 2022-06-24 DIAGNOSIS — E78.00 PURE HYPERCHOLESTEROLEMIA: ICD-10-CM

## 2022-06-24 DIAGNOSIS — I42.0 DILATED CARDIOMYOPATHY (H): ICD-10-CM

## 2022-06-24 DIAGNOSIS — I10 ESSENTIAL HYPERTENSION: ICD-10-CM

## 2022-06-24 DIAGNOSIS — I10 BENIGN ESSENTIAL HYPERTENSION: ICD-10-CM

## 2022-06-24 LAB
ANION GAP SERPL CALCULATED.3IONS-SCNC: 6 MMOL/L (ref 3–14)
BUN SERPL-MCNC: 23 MG/DL (ref 7–30)
CALCIUM SERPL-MCNC: 9.3 MG/DL (ref 8.5–10.1)
CHLORIDE BLD-SCNC: 97 MMOL/L (ref 94–109)
CO2 SERPL-SCNC: 33 MMOL/L (ref 20–32)
CREAT SERPL-MCNC: 0.86 MG/DL (ref 0.52–1.04)
GFR SERPL CREATININE-BSD FRML MDRD: 68 ML/MIN/1.73M2
GLUCOSE BLD-MCNC: 124 MG/DL (ref 70–99)
POTASSIUM BLD-SCNC: 4.1 MMOL/L (ref 3.4–5.3)
SODIUM SERPL-SCNC: 136 MMOL/L (ref 133–144)

## 2022-06-24 PROCEDURE — 99214 OFFICE O/P EST MOD 30 MIN: CPT | Performed by: NURSE PRACTITIONER

## 2022-06-24 PROCEDURE — 36415 COLL VENOUS BLD VENIPUNCTURE: CPT | Performed by: INTERNAL MEDICINE

## 2022-06-24 PROCEDURE — 80048 BASIC METABOLIC PNL TOTAL CA: CPT | Performed by: INTERNAL MEDICINE

## 2022-06-24 RX ORDER — IRBESARTAN 300 MG/1
300 TABLET ORAL DAILY
Qty: 90 TABLET | Refills: 3 | Status: SHIPPED | OUTPATIENT
Start: 2022-06-24 | End: 2022-06-24

## 2022-06-24 RX ORDER — CARVEDILOL 25 MG/1
50 TABLET ORAL 2 TIMES DAILY
Qty: 360 TABLET | Refills: 3 | Status: SHIPPED | OUTPATIENT
Start: 2022-06-24 | End: 2022-06-24

## 2022-06-24 RX ORDER — IRBESARTAN 300 MG/1
300 TABLET ORAL DAILY
Qty: 90 TABLET | Refills: 3 | Status: SHIPPED | OUTPATIENT
Start: 2022-06-24 | End: 2023-02-21

## 2022-06-24 RX ORDER — CARVEDILOL 25 MG/1
50 TABLET ORAL 2 TIMES DAILY
Qty: 360 TABLET | Refills: 3 | COMMUNITY
Start: 2022-06-24 | End: 2023-02-21

## 2022-06-24 RX ORDER — SIMVASTATIN 10 MG
10 TABLET ORAL AT BEDTIME
Qty: 90 TABLET | Refills: 3 | Status: SHIPPED | OUTPATIENT
Start: 2022-06-24 | End: 2023-06-01

## 2022-06-24 RX ORDER — HYDRALAZINE HYDROCHLORIDE 50 MG/1
50 TABLET, FILM COATED ORAL 2 TIMES DAILY
Qty: 90 TABLET | Refills: 4 | Status: SHIPPED | OUTPATIENT
Start: 2022-06-24 | End: 2023-02-21

## 2022-06-24 RX ORDER — TORSEMIDE 10 MG/1
30 TABLET ORAL DAILY
Qty: 270 TABLET | Refills: 2 | Status: SHIPPED | OUTPATIENT
Start: 2022-06-24 | End: 2023-06-01

## 2022-06-24 RX ORDER — SIMVASTATIN 10 MG
10 TABLET ORAL AT BEDTIME
Qty: 90 TABLET | Refills: 3 | Status: SHIPPED | OUTPATIENT
Start: 2022-06-24 | End: 2022-06-24

## 2022-06-24 NOTE — PROGRESS NOTES
.Cardiology Clinic Progress Note  Janessa Melendez MRN# [id number]   YOB: 1940 Age: 81 year old   Primary Cardiologist: Dr. Smith Reason for visit: Shortness of breath            Assessment and Plan:       1.  Idiopathic cardiomyopathy  -Echocardiogram done May 2021 showed EF 45 to 50% mild aortic dilation 3.9 cm and no valvular disease.  This was unchanged from March 2020.  -Repeat echocardiogram done in April 2022 showed EF at 40%, moderate global hypokinesia of the left ventricle with no valvular disease.  -SOB improved with change to torsemide   -No current heart failure symptoms    2. Hypertension  -24-hour blood pressure monitor showed hourly average systolic overall of 113, diastolic 53, with a MAP of 74, heart rate average 73  -Continue irbesartan, hydralazine, carvedilol    3.  Shortness of breath  -CT coronary angiogram done in 2019 showed mild nonobstructive coronary artery disease  -April 2022 BNP 1453, Lasix was discontinued and changed to torsemide 30 mg daily  -PFTs June 2022 showed nonspecific spirometry pattern with normal lung volumes, suggesting moderate airflow obstruction, no significant bronchodilator response, normal diffusing capacity, no air trapping with hyperinflation.  -Improvement in shortness of breath noted with start of torsemide, continues to have wheezing at times  -Pulmonology referral placed with moderate airflow obstruction seen on PFTs, however patient politely declined appointment at this time and prefers to wait until after she has completed her oncology work-up    4.  Hypercholesteremia  -Lipid profile done in April 2022 shows good cholesterol control.  Total cholesterol 136, triglycerides 134, HDL 55, LDL 54  -Continue simvastatin at current dosing  -Check annually and goal LDL<70    Changes today: Pulmonology referral placed, no medication changes were made    Follow up plan: Follow-up with Dr. Albino Connors in April 2022 for her annual visit,  make pulmonology appointment following completing her oncology work-up in August        History of Presenting Illness:    Janessa Melendez is a very pleasant 81 year old female with a history of idiopathic cardiomyopathy, hypercholesterolemia, and resistant hypertension.     Patient is here today for follow-up after a 24-hour blood pressure monitor and pulmonary function testing.  She was seen by Dr. Albino Connors in April 2022 after experiencing increased shortness of breath.  A BNP was done which was resulted at 1453.  Her furosemide was changed to torsemide 30 mg daily, however her BNP was felt to be within normal for her age. Weight is down 1 lb since last visit.  A follow-up BMP was done today which was normal. She has been taking this since 6/6/22 and has noticed some improvement in her shortness of breath, however she continues to have wheezing at times.  She continues to have dyspnea on exertion typically when carrying things distances or upstairs.      We discussed her BN P result as well as her PFT results and advised that she see pulmonology for further work-up due to PFT showing moderate obstruction with history high amounts of secondhand smoke exposure.  She politely declined pursuing this in the near future as she is seeing oncologist Dr. Holder for an MRI on 8/3/22 as follow up for a pancreatic and liver cyst and would like that to be completed first.  Last CT scan done in 2021 showed low-attenuation liver lesions, likely cysts and cluster of cysts in the pancreatic head and uncinate process possibly intraductal papillary mucinous neoplasm and a follow-up MRI was recommended.    24-hour blood pressure monitor was also performed due to concerns for whitecoat hypertension and adequate blood pressure control.  The results of which were normal, showing adequate blood pressure control. She has been taking her hydralazine only twice daily at home. Results reviewed and discussed with patient today.      Patient reports feeling good. She says she gets sleepy often. Sleeps well at night, but does have some anxiety in the evenings. She worked until she was 76 at [employer] with the [employer] and is looking to find activities to fill her time.  I advise she follow-up with her PCP regarding her sleep concerns. No orthopnea.     Patient denies chest pain or chest tightness. Denies dizziness, lightheadedness or other presyncopal symptoms. Denies tachycardia or palpitations.     Labs today show grossly normal BMP results. Blood pressure 150/80 and HR 80 in clinic today        Recent Hospitalizations   None in 2022          Social History      Social History     Socioeconomic History     Marital status: Single     Spouse name: Not on file     Number of children: Not on file     Years of education: Not on file     Highest education level: Not on file   Occupational History     Not on file   Tobacco Use     Smoking status: Never Smoker     Smokeless tobacco: Never Used   Substance and Sexual Activity     Alcohol use: Yes     Comment: socially     Drug use: No     Sexual activity: Never   Other Topics Concern     Parent/sibling w/ CABG, MI or angioplasty before 65F 55M? Not Asked      Service Not Asked     Blood Transfusions Not Asked     Caffeine Concern Not Asked     Occupational Exposure Not Asked     Hobby Hazards Not Asked     Sleep Concern Not Asked     Stress Concern Not Asked     Weight Concern Not Asked     Special Diet No     Back Care Not Asked     Exercise No     Bike Helmet Not Asked     Seat Belt Not Asked     Self-Exams Not Asked   Social History Narrative     Not on file     Social Determinants of Health     Financial Resource Strain: Not on file   Food Insecurity: Not on file   Transportation Needs: Not on file   Physical Activity: Not on file   Stress: Not on file   Social Connections: Not on file   Intimate Partner Violence: Not on file   Housing Stability: Not on file            Review of  "Systems:   Skin:        Eyes:       ENT:       Respiratory:  Positive for dyspnea on exertion  Cardiovascular:  Negative;palpitations;chest pain;syncope or near-syncope;cyanosis;edema Positive for  Gastroenterology:      Genitourinary:       Musculoskeletal:       Neurologic:       Psychiatric:       Heme/Lymph/Imm:  Negative    Endocrine:              Physical Exam:   Vitals: BP (!) 150/82   Pulse 74   Ht 1.499 m (4' 11\")   Wt 63.6 kg (140 lb 4.8 oz)   SpO2 97%   BMI 28.34 kg/m     Wt Readings from Last 4 Encounters:   06/24/22 63.6 kg (140 lb 4.8 oz)   04/07/22 64 kg (141 lb)   05/31/21 64.9 kg (143 lb)   05/13/21 67.1 kg (148 lb)     GEN: well nourished, in no acute distress.  HEENT:  Pupils equal, round. Sclerae nonicteric.   NECK: Supple, no masses appreciated.   C/V:  Regular rate and rhythm, no murmur, rub or gallop.    RESP: Respirations are unlabored. Clear to auscultation bilaterally without wheezing, rales, or rhonchi.  GI: Abdomen soft, nontender.  EXTREM: No LE edema.  NEURO: Alert and oriented, cooperative.  SKIN: Warm and dry.        Data:       LIPID RESULTS:  Lab Results   Component Value Date    CHOL 136 04/05/2022    CHOL 133 05/06/2021    HDL 55 04/05/2022    HDL 59 05/06/2021    LDL 54 04/05/2022    LDL 54 05/06/2021    TRIG 134 04/05/2022    TRIG 101 05/06/2021    CHOLHDLRATIO 2.3 10/08/2015     LIVER ENZYME RESULTS:  Lab Results   Component Value Date    AST 14 05/31/2021    ALT 24 04/05/2022    ALT 26 05/31/2021     CBC RESULTS:  Lab Results   Component Value Date    WBC 8.0 05/31/2021    RBC 3.92 05/31/2021    HGB 11.9 05/31/2021    HCT 35.7 05/31/2021    MCV 91 05/31/2021    MCH 30.4 05/31/2021    MCHC 33.3 05/31/2021    RDW 14.0 05/31/2021     05/31/2021     BMP RESULTS:  Lab Results   Component Value Date     06/24/2022     05/31/2021    POTASSIUM 4.1 06/24/2022    POTASSIUM 3.3 (L) 05/31/2021    CHLORIDE 97 06/24/2022    CHLORIDE 99 05/31/2021    CO2 33 (H) " 06/24/2022    CO2 31 05/31/2021    ANIONGAP 6 06/24/2022    ANIONGAP 7 05/31/2021     (H) 06/24/2022     (H) 05/31/2021    BUN 23 06/24/2022    BUN 14 05/31/2021    CR 0.86 06/24/2022    CR 0.73 05/31/2021    GFRESTIMATED 68 06/24/2022    GFRESTIMATED 78 05/31/2021    GFRESTBLACK >90 05/31/2021    PENNY 9.3 06/24/2022    PENNY 9.0 05/31/2021      A1C RESULTS:  No results found for: A1C  INR RESULTS:  Lab Results   Component Value Date    INR 0.97 06/05/2009            Medications     Current Outpatient Medications   Medication Sig Dispense Refill     Acetaminophen (TYLENOL PO) Take 500 mg by mouth every 6 hours as needed for mild pain or fever        amoxicillin (AMOXIL) 500 MG capsule TAKE 4 CAPSULES BY MOUTH 1 HOUR BEFORE DENTAL APPOINTMENT       carvedilol (COREG) 25 MG tablet Take 2 tablets (50 mg) by mouth 2 times daily 360 tablet 3     carvedilol (COREG) 25 MG tablet Take 2 tablets (50 mg) by mouth 2 times daily 360 tablet 3     cholecalciferol (VITAMIN D3) 25 mcg (1000 units) capsule Take 1 capsule by mouth daily       cyanocobalamin (VITAMIN B-12) 100 MCG tablet Take 100 mcg by mouth daily       hydrALAZINE (APRESOLINE) 50 MG tablet Take 1 tablet (50 mg) by mouth 2 times daily 90 tablet 4     irbesartan (AVAPRO) 300 MG tablet Take 1 tablet (300 mg) by mouth daily 90 tablet 3     irbesartan (AVAPRO) 300 MG tablet Take 1 tablet (300 mg) by mouth daily 90 tablet 3     omeprazole (PRILOSEC) 10 MG DR capsule Take 10 mg by mouth daily       potassium chloride ER (K-TAB/KLOR-CON) 10 MEQ CR tablet Take 1 tablet (10 mEq) by mouth 2 times daily And as needed as directed. 180 tablet 3     simvastatin (ZOCOR) 10 MG tablet Take 1 tablet (10 mg) by mouth At Bedtime 90 tablet 3     simvastatin (ZOCOR) 10 MG tablet Take 1 tablet (10 mg) by mouth At Bedtime 90 tablet 3     terazosin (HYTRIN) 5 MG capsule Take 1 capsule (5 mg) by mouth At Bedtime 90 capsule 3     torsemide (DEMADEX) 10 MG tablet Take 3 tablets (30  mg) by mouth daily 270 tablet 2     ondansetron (ZOFRAN ODT) 4 MG ODT tab Take 1 tablet (4 mg) by mouth every 8 hours as needed for nausea or vomiting (Patient not taking: Reported on 6/24/2022) 15 tablet 0          Past Medical History     Past Medical History:   Diagnosis Date     Arthritis      Breast cancer (H)      Cardiomyopathy (H)     ideopathic     Coronary artery disease 9/25/2014     Hypercholesterolemia      Hypertension      Hypokalemia      Malignant neoplasm (H)      Shortness of breath      Shoulder pain      Past Surgical History:   Procedure Laterality Date     BACK SURGERY       CARDIAC SURGERY      angiogram neg many yrs ago     CHOLECYSTECTOMY       ESOPHAGOSCOPY, GASTROSCOPY, DUODENOSCOPY (EGD), COMBINED N/A 6/22/2021    Procedure: ESOPHAGOGASTRODUODENOSCOPY, WITH ENDOSCOPIC US WITH FINE NEEDLE ASPIRATION;  Surgeon: Ventura Mcgill MD;  Location:  GI     EYE SURGERY       ORTHOPEDIC SURGERY      lt shoulder replaced, total     partial masectomy       Family History   Problem Relation Age of Onset     Other Cancer Mother      Other Cancer Brother      No Known Problems Son      Heart Failure Daughter      Family History Negative No family hx of             Allergies   Amlodipine, Aspirin, and Codeine sulfate        Heidi Haque NP  [facility]  Pager: [phone]

## 2022-06-24 NOTE — LETTER
6/24/2022    [facility]  [address]  [facility] [address]    RE: Janessa Melendez       Dear Colleague,     I had the pleasure of seeing Janessa Melendez in the [facility].  .Cardiology Clinic Progress Note  Janessa Melendez MRN# [id number]   YOB: 1940 Age: 81 year old   Primary Cardiologist: Dr. Smith Reason for visit: Shortness of breath            Assessment and Plan:       1.  Idiopathic cardiomyopathy  -Echocardiogram done May 2021 showed EF 45 to 50% mild aortic dilation 3.9 cm and no valvular disease.  This was unchanged from March 2020.  -Repeat echocardiogram done in April 2022 showed EF at 40%, moderate global hypokinesia of the left ventricle with no valvular disease.  -SOB improved with change to torsemide   -No current heart failure symptoms    2. Hypertension  -24-hour blood pressure monitor showed hourly average systolic overall of 113, diastolic 53, with a MAP of 74, heart rate average 73  -Continue irbesartan, hydralazine, carvedilol    3.  Shortness of breath  -CT coronary angiogram done in 2019 showed mild nonobstructive coronary artery disease  -April 2022 BNP 1453, Lasix was discontinued and changed to torsemide 30 mg daily  -PFTs June 2022 showed nonspecific spirometry pattern with normal lung volumes, suggesting moderate airflow obstruction, no significant bronchodilator response, normal diffusing capacity, no air trapping with hyperinflation.  -Improvement in shortness of breath noted with start of torsemide, continues to have wheezing at times  -Pulmonology referral placed with moderate airflow obstruction seen on PFTs, however patient politely declined appointment at this time and prefers to wait until after she has completed her oncology work-up    4.  Hypercholesteremia  -Lipid profile done in April 2022 shows good cholesterol control.  Total cholesterol 136, triglycerides 134, HDL 55, LDL 54  -Continue  simvastatin at current dosing  -Check annually and goal LDL<70    Changes today: Pulmonology referral placed, no medication changes were made    Follow up plan: Follow-up with Dr. Albino Connors in April 2022 for her annual visit, make pulmonology appointment following completing her oncology work-up in August        History of Presenting Illness:    Janessa Melendez is a very pleasant 81 year old female with a history of idiopathic cardiomyopathy, hypercholesterolemia, and resistant hypertension.     Patient is here today for follow-up after a 24-hour blood pressure monitor and pulmonary function testing.  She was seen by Dr. Albino Connors in April 2022 after experiencing increased shortness of breath.  A BNP was done which was resulted at 1453.  Her furosemide was changed to torsemide 30 mg daily, however her BNP was felt to be within normal for her age. Weight is down 1 lb since last visit.  A follow-up BMP was done today which was normal. She has been taking this since 6/6/22 and has noticed some improvement in her shortness of breath, however she continues to have wheezing at times.  She continues to have dyspnea on exertion typically when carrying things distances or upstairs.      We discussed her BN P result as well as her PFT results and advised that she see pulmonology for further work-up due to PFT showing moderate obstruction with history high amounts of secondhand smoke exposure.  She politely declined pursuing this in the near future as she is seeing oncologist Dr. Holder for an MRI on 8/3/22 as follow up for a pancreatic and liver cyst and would like that to be completed first.  Last CT scan done in 2021 showed low-attenuation liver lesions, likely cysts and cluster of cysts in the pancreatic head and uncinate process possibly intraductal papillary mucinous neoplasm and a follow-up MRI was recommended.    24-hour blood pressure monitor was also performed due to concerns for whitecoat hypertension  and adequate blood pressure control.  The results of which were normal, showing adequate blood pressure control. She has been taking her hydralazine only twice daily at home. Results reviewed and discussed with patient today.     Patient reports feeling good. She says she gets sleepy often. Sleeps well at night, but does have some anxiety in the evenings. She worked until she was 76 at [employer] with the [employer] and is looking to find activities to fill her time.  I advise she follow-up with her PCP regarding her sleep concerns. No orthopnea.     Patient denies chest pain or chest tightness. Denies dizziness, lightheadedness or other presyncopal symptoms. Denies tachycardia or palpitations.     Labs today show grossly normal BMP results. Blood pressure 150/80 and HR 80 in clinic today        Recent Hospitalizations   None in 2022          Social History      Social History     Socioeconomic History     Marital status: Single     Spouse name: Not on file     Number of children: Not on file     Years of education: Not on file     Highest education level: Not on file   Occupational History     Not on file   Tobacco Use     Smoking status: Never Smoker     Smokeless tobacco: Never Used   Substance and Sexual Activity     Alcohol use: Yes     Comment: socially     Drug use: No     Sexual activity: Never   Other Topics Concern     Parent/sibling w/ CABG, MI or angioplasty before 65F 55M? Not Asked      Service Not Asked     Blood Transfusions Not Asked     Caffeine Concern Not Asked     Occupational Exposure Not Asked     Hobby Hazards Not Asked     Sleep Concern Not Asked     Stress Concern Not Asked     Weight Concern Not Asked     Special Diet No     Back Care Not Asked     Exercise No     Bike Helmet Not Asked     Seat Belt Not Asked     Self-Exams Not Asked   Social History Narrative     Not on file     Social Determinants of Health     Financial Resource Strain: Not on file   Food Insecurity: Not on  "file   Transportation Needs: Not on file   Physical Activity: Not on file   Stress: Not on file   Social Connections: Not on file   Intimate Partner Violence: Not on file   Housing Stability: Not on file            Review of Systems:   Skin:        Eyes:       ENT:       Respiratory:  Positive for dyspnea on exertion  Cardiovascular:  Negative;palpitations;chest pain;syncope or near-syncope;cyanosis;edema Positive for  Gastroenterology:      Genitourinary:       Musculoskeletal:       Neurologic:       Psychiatric:       Heme/Lymph/Imm:  Negative    Endocrine:              Physical Exam:   Vitals: BP (!) 150/82   Pulse 74   Ht 1.499 m (4' 11\")   Wt 63.6 kg (140 lb 4.8 oz)   SpO2 97%   BMI 28.34 kg/m     Wt Readings from Last 4 Encounters:   06/24/22 63.6 kg (140 lb 4.8 oz)   04/07/22 64 kg (141 lb)   05/31/21 64.9 kg (143 lb)   05/13/21 67.1 kg (148 lb)     GEN: well nourished, in no acute distress.  HEENT:  Pupils equal, round. Sclerae nonicteric.   NECK: Supple, no masses appreciated.   C/V:  Regular rate and rhythm, no murmur, rub or gallop.    RESP: Respirations are unlabored. Clear to auscultation bilaterally without wheezing, rales, or rhonchi.  GI: Abdomen soft, nontender.  EXTREM: No LE edema.  NEURO: Alert and oriented, cooperative.  SKIN: Warm and dry.        Data:       LIPID RESULTS:  Lab Results   Component Value Date    CHOL 136 04/05/2022    CHOL 133 05/06/2021    HDL 55 04/05/2022    HDL 59 05/06/2021    LDL 54 04/05/2022    LDL 54 05/06/2021    TRIG 134 04/05/2022    TRIG 101 05/06/2021    CHOLHDLRATIO 2.3 10/08/2015     LIVER ENZYME RESULTS:  Lab Results   Component Value Date    AST 14 05/31/2021    ALT 24 04/05/2022    ALT 26 05/31/2021     CBC RESULTS:  Lab Results   Component Value Date    WBC 8.0 05/31/2021    RBC 3.92 05/31/2021    HGB 11.9 05/31/2021    HCT 35.7 05/31/2021    MCV 91 05/31/2021    MCH 30.4 05/31/2021    MCHC 33.3 05/31/2021    RDW 14.0 05/31/2021     05/31/2021 "     BMP RESULTS:  Lab Results   Component Value Date     06/24/2022     05/31/2021    POTASSIUM 4.1 06/24/2022    POTASSIUM 3.3 (L) 05/31/2021    CHLORIDE 97 06/24/2022    CHLORIDE 99 05/31/2021    CO2 33 (H) 06/24/2022    CO2 31 05/31/2021    ANIONGAP 6 06/24/2022    ANIONGAP 7 05/31/2021     (H) 06/24/2022     (H) 05/31/2021    BUN 23 06/24/2022    BUN 14 05/31/2021    CR 0.86 06/24/2022    CR 0.73 05/31/2021    GFRESTIMATED 68 06/24/2022    GFRESTIMATED 78 05/31/2021    GFRESTBLACK >90 05/31/2021    PENNY 9.3 06/24/2022    PENNY 9.0 05/31/2021      A1C RESULTS:  No results found for: A1C  INR RESULTS:  Lab Results   Component Value Date    INR 0.97 06/05/2009            Medications     Current Outpatient Medications   Medication Sig Dispense Refill     Acetaminophen (TYLENOL PO) Take 500 mg by mouth every 6 hours as needed for mild pain or fever        amoxicillin (AMOXIL) 500 MG capsule TAKE 4 CAPSULES BY MOUTH 1 HOUR BEFORE DENTAL APPOINTMENT       carvedilol (COREG) 25 MG tablet Take 2 tablets (50 mg) by mouth 2 times daily 360 tablet 3     carvedilol (COREG) 25 MG tablet Take 2 tablets (50 mg) by mouth 2 times daily 360 tablet 3     cholecalciferol (VITAMIN D3) 25 mcg (1000 units) capsule Take 1 capsule by mouth daily       cyanocobalamin (VITAMIN B-12) 100 MCG tablet Take 100 mcg by mouth daily       hydrALAZINE (APRESOLINE) 50 MG tablet Take 1 tablet (50 mg) by mouth 2 times daily 90 tablet 4     irbesartan (AVAPRO) 300 MG tablet Take 1 tablet (300 mg) by mouth daily 90 tablet 3     irbesartan (AVAPRO) 300 MG tablet Take 1 tablet (300 mg) by mouth daily 90 tablet 3     omeprazole (PRILOSEC) 10 MG DR capsule Take 10 mg by mouth daily       potassium chloride ER (K-TAB/KLOR-CON) 10 MEQ CR tablet Take 1 tablet (10 mEq) by mouth 2 times daily And as needed as directed. 180 tablet 3     simvastatin (ZOCOR) 10 MG tablet Take 1 tablet (10 mg) by mouth At Bedtime 90 tablet 3     simvastatin  (ZOCOR) 10 MG tablet Take 1 tablet (10 mg) by mouth At Bedtime 90 tablet 3     terazosin (HYTRIN) 5 MG capsule Take 1 capsule (5 mg) by mouth At Bedtime 90 capsule 3     torsemide (DEMADEX) 10 MG tablet Take 3 tablets (30 mg) by mouth daily 270 tablet 2     ondansetron (ZOFRAN ODT) 4 MG ODT tab Take 1 tablet (4 mg) by mouth every 8 hours as needed for nausea or vomiting (Patient not taking: Reported on 6/24/2022) 15 tablet 0          Past Medical History     Past Medical History:   Diagnosis Date     Arthritis      Breast cancer (H)      Cardiomyopathy (H)     ideopathic     Coronary artery disease 9/25/2014     Hypercholesterolemia      Hypertension      Hypokalemia      Malignant neoplasm (H)      Shortness of breath      Shoulder pain      Past Surgical History:   Procedure Laterality Date     BACK SURGERY       CARDIAC SURGERY      angiogram neg many yrs ago     CHOLECYSTECTOMY       ESOPHAGOSCOPY, GASTROSCOPY, DUODENOSCOPY (EGD), COMBINED N/A 6/22/2021    Procedure: ESOPHAGOGASTRODUODENOSCOPY, WITH ENDOSCOPIC US WITH FINE NEEDLE ASPIRATION;  Surgeon: Ventura Mcgill MD;  Location:  GI     EYE SURGERY       ORTHOPEDIC SURGERY      lt shoulder replaced, total     partial masectomy       Family History   Problem Relation Age of Onset     Other Cancer Mother      Other Cancer Brother      No Known Problems Son      Heart Failure Daughter      Family History Negative No family hx of             Allergies   Amlodipine, Aspirin, and Codeine sulfate        Heidi Haque NP  [facility]  Pager: [phone]    Thank you for allowing me to participate in the care of your patient.      Sincerely,     Heidi Haque NP     [facility]  cc:   Brett Smith MD  [address] [address]

## 2022-06-24 NOTE — PATIENT INSTRUCTIONS
Today's Recommendations    A pulmonology referral was sent for you, you can schedule this when you are done with your oncology visits  Continue all medications without changes.  Please follow up with Dr. Smith in 1 year.    Please send a Lockbox message or call [phone] to the RN team with questions or concerns.     Scheduling number [phone]  KATIE Jacobo, CNP

## 2022-08-03 ENCOUNTER — ANCILLARY PROCEDURE (OUTPATIENT)
Dept: MRI IMAGING | Facility: CLINIC | Age: 82
End: 2022-08-03
Attending: INTERNAL MEDICINE
Payer: COMMERCIAL

## 2022-08-03 DIAGNOSIS — K86.2 PANCREATIC CYST: ICD-10-CM

## 2022-08-03 PROCEDURE — A9585 GADOBUTROL INJECTION: HCPCS | Performed by: INTERNAL MEDICINE

## 2022-08-03 PROCEDURE — 255N000002 HC RX 255 OP 636: Performed by: INTERNAL MEDICINE

## 2022-08-03 PROCEDURE — 74183 MRI ABD W/O CNTR FLWD CNTR: CPT

## 2022-08-03 RX ORDER — GADOBUTROL 604.72 MG/ML
6.5 INJECTION INTRAVENOUS ONCE
Status: COMPLETED | OUTPATIENT
Start: 2022-08-03 | End: 2022-08-03

## 2022-08-03 RX ADMIN — GADOBUTROL 6.5 ML: 604.72 INJECTION INTRAVENOUS at 10:41

## 2022-11-04 ENCOUNTER — APPOINTMENT (OUTPATIENT)
Dept: CT IMAGING | Facility: CLINIC | Age: 82
DRG: 062 | End: 2022-11-04
Attending: EMERGENCY MEDICINE
Payer: COMMERCIAL

## 2022-11-04 ENCOUNTER — HOSPITAL ENCOUNTER (INPATIENT)
Facility: CLINIC | Age: 82
LOS: 3 days | Discharge: HOME | DRG: 062 | End: 2022-11-07
Attending: EMERGENCY MEDICINE | Admitting: INTERNAL MEDICINE
Payer: COMMERCIAL

## 2022-11-04 DIAGNOSIS — R29.810 FACIAL DROOP DUE TO ACUTE STROKE (H): ICD-10-CM

## 2022-11-04 DIAGNOSIS — G47.19 EXCESSIVE DAYTIME SLEEPINESS: Primary | ICD-10-CM

## 2022-11-04 DIAGNOSIS — E04.1 THYROID NODULE: ICD-10-CM

## 2022-11-04 DIAGNOSIS — E87.6 HYPOKALEMIA: ICD-10-CM

## 2022-11-04 DIAGNOSIS — I63.9 FACIAL DROOP DUE TO ACUTE STROKE (H): ICD-10-CM

## 2022-11-04 DIAGNOSIS — I63.211 CEREBROVASCULAR ACCIDENT (CVA) DUE TO OCCLUSION OF RIGHT VERTEBRAL ARTERY (H): ICD-10-CM

## 2022-11-04 DIAGNOSIS — I48.91 NEW ONSET ATRIAL FIBRILLATION (H): ICD-10-CM

## 2022-11-04 DIAGNOSIS — I48.91 ATRIAL FIBRILLATION, UNSPECIFIED TYPE (H): ICD-10-CM

## 2022-11-04 LAB
ANION GAP SERPL CALCULATED.3IONS-SCNC: 7 MMOL/L (ref 3–14)
APTT PPP: 26 SECONDS (ref 22–38)
ATRIAL RATE - MUSE: NORMAL BPM
BASOPHILS # BLD AUTO: 0 10E3/UL (ref 0–0.2)
BASOPHILS NFR BLD AUTO: 1 %
BUN SERPL-MCNC: 30 MG/DL (ref 7–30)
CALCIUM SERPL-MCNC: 9.3 MG/DL (ref 8.5–10.1)
CHLORIDE BLD-SCNC: 99 MMOL/L (ref 94–109)
CHOLEST SERPL-MCNC: 129 MG/DL
CO2 SERPL-SCNC: 31 MMOL/L (ref 20–32)
CREAT SERPL-MCNC: 0.96 MG/DL (ref 0.52–1.04)
DIASTOLIC BLOOD PRESSURE - MUSE: NORMAL MMHG
EOSINOPHIL # BLD AUTO: 0.1 10E3/UL (ref 0–0.7)
EOSINOPHIL NFR BLD AUTO: 2 %
ERYTHROCYTE [DISTWIDTH] IN BLOOD BY AUTOMATED COUNT: 12.5 % (ref 10–15)
GFR SERPL CREATININE-BSD FRML MDRD: 60 ML/MIN/1.73M2
GLUCOSE BLD-MCNC: 139 MG/DL (ref 70–99)
GLUCOSE BLDC GLUCOMTR-MCNC: 104 MG/DL (ref 70–99)
HCT VFR BLD AUTO: 36.5 % (ref 35–47)
HDLC SERPL-MCNC: 56 MG/DL
HGB BLD-MCNC: 11.5 G/DL (ref 11.7–15.7)
HOLD SPECIMEN: NORMAL
IMM GRANULOCYTES # BLD: 0 10E3/UL
IMM GRANULOCYTES NFR BLD: 0 %
INR PPP: 1.03 (ref 0.85–1.15)
INTERPRETATION ECG - MUSE: NORMAL
LDLC SERPL CALC-MCNC: 39 MG/DL
LYMPHOCYTES # BLD AUTO: 1.5 10E3/UL (ref 0.8–5.3)
LYMPHOCYTES NFR BLD AUTO: 21 %
MCH RBC QN AUTO: 30.5 PG (ref 26.5–33)
MCHC RBC AUTO-ENTMCNC: 31.5 G/DL (ref 31.5–36.5)
MCV RBC AUTO: 97 FL (ref 78–100)
MONOCYTES # BLD AUTO: 0.5 10E3/UL (ref 0–1.3)
MONOCYTES NFR BLD AUTO: 7 %
NEUTROPHILS # BLD AUTO: 4.8 10E3/UL (ref 1.6–8.3)
NEUTROPHILS NFR BLD AUTO: 69 %
NONHDLC SERPL-MCNC: 73 MG/DL
NRBC # BLD AUTO: 0 10E3/UL
NRBC BLD AUTO-RTO: 0 /100
P AXIS - MUSE: NORMAL DEGREES
PLATELET # BLD AUTO: 222 10E3/UL (ref 150–450)
POTASSIUM BLD-SCNC: 3.3 MMOL/L (ref 3.4–5.3)
PR INTERVAL - MUSE: NORMAL MS
QRS DURATION - MUSE: 102 MS
QT - MUSE: 374 MS
QTC - MUSE: 455 MS
R AXIS - MUSE: -55 DEGREES
RBC # BLD AUTO: 3.77 10E6/UL (ref 3.8–5.2)
SARS-COV-2 RNA RESP QL NAA+PROBE: NEGATIVE
SODIUM SERPL-SCNC: 137 MMOL/L (ref 133–144)
SYSTOLIC BLOOD PRESSURE - MUSE: NORMAL MMHG
T AXIS - MUSE: 46 DEGREES
TRIGL SERPL-MCNC: 170 MG/DL
TROPONIN I SERPL HS-MCNC: 12 NG/L
TSH SERPL DL<=0.005 MIU/L-ACNC: 0.74 MU/L (ref 0.4–4)
VENTRICULAR RATE- MUSE: 89 BPM
WBC # BLD AUTO: 7 10E3/UL (ref 4–11)

## 2022-11-04 PROCEDURE — C9803 HOPD COVID-19 SPEC COLLECT: HCPCS

## 2022-11-04 PROCEDURE — 99292 CRITICAL CARE ADDL 30 MIN: CPT

## 2022-11-04 PROCEDURE — 250N000011 HC RX IP 250 OP 636: Performed by: PHYSICIAN ASSISTANT

## 2022-11-04 PROCEDURE — 99223 1ST HOSP IP/OBS HIGH 75: CPT | Mod: AI | Performed by: INTERNAL MEDICINE

## 2022-11-04 PROCEDURE — 80048 BASIC METABOLIC PNL TOTAL CA: CPT | Performed by: EMERGENCY MEDICINE

## 2022-11-04 PROCEDURE — 99292 CRITICAL CARE ADDL 30 MIN: CPT | Mod: FS | Performed by: PSYCHIATRY & NEUROLOGY

## 2022-11-04 PROCEDURE — 85610 PROTHROMBIN TIME: CPT | Performed by: EMERGENCY MEDICINE

## 2022-11-04 PROCEDURE — 70496 CT ANGIOGRAPHY HEAD: CPT

## 2022-11-04 PROCEDURE — 250N000011 HC RX IP 250 OP 636: Performed by: EMERGENCY MEDICINE

## 2022-11-04 PROCEDURE — 85730 THROMBOPLASTIN TIME PARTIAL: CPT | Performed by: EMERGENCY MEDICINE

## 2022-11-04 PROCEDURE — 70498 CT ANGIOGRAPHY NECK: CPT

## 2022-11-04 PROCEDURE — 99291 CRITICAL CARE FIRST HOUR: CPT | Mod: 25

## 2022-11-04 PROCEDURE — 99291 CRITICAL CARE FIRST HOUR: CPT | Mod: FS | Performed by: PSYCHIATRY & NEUROLOGY

## 2022-11-04 PROCEDURE — 3E03317 INTRODUCTION OF OTHER THROMBOLYTIC INTO PERIPHERAL VEIN, PERCUTANEOUS APPROACH: ICD-10-PCS | Performed by: PHYSICIAN ASSISTANT

## 2022-11-04 PROCEDURE — 200N000001 HC R&B ICU

## 2022-11-04 PROCEDURE — 85025 COMPLETE CBC W/AUTO DIFF WBC: CPT | Performed by: EMERGENCY MEDICINE

## 2022-11-04 PROCEDURE — 70450 CT HEAD/BRAIN W/O DYE: CPT

## 2022-11-04 PROCEDURE — 250N000009 HC RX 250: Performed by: EMERGENCY MEDICINE

## 2022-11-04 PROCEDURE — 83036 HEMOGLOBIN GLYCOSYLATED A1C: CPT | Performed by: INTERNAL MEDICINE

## 2022-11-04 PROCEDURE — U0003 INFECTIOUS AGENT DETECTION BY NUCLEIC ACID (DNA OR RNA); SEVERE ACUTE RESPIRATORY SYNDROME CORONAVIRUS 2 (SARS-COV-2) (CORONAVIRUS DISEASE [COVID-19]), AMPLIFIED PROBE TECHNIQUE, MAKING USE OF HIGH THROUGHPUT TECHNOLOGIES AS DESCRIBED BY CMS-2020-01-R: HCPCS | Performed by: EMERGENCY MEDICINE

## 2022-11-04 PROCEDURE — 84443 ASSAY THYROID STIM HORMONE: CPT | Performed by: EMERGENCY MEDICINE

## 2022-11-04 PROCEDURE — 80061 LIPID PANEL: CPT | Performed by: INTERNAL MEDICINE

## 2022-11-04 PROCEDURE — 37195 THROMBOLYTIC THERAPY STROKE: CPT

## 2022-11-04 PROCEDURE — 36415 COLL VENOUS BLD VENIPUNCTURE: CPT | Performed by: EMERGENCY MEDICINE

## 2022-11-04 PROCEDURE — 84484 ASSAY OF TROPONIN QUANT: CPT | Performed by: EMERGENCY MEDICINE

## 2022-11-04 RX ORDER — ATORVASTATIN CALCIUM 40 MG/1
40 TABLET, FILM COATED ORAL EVERY EVENING
Status: DISCONTINUED | OUTPATIENT
Start: 2022-11-05 | End: 2022-11-05

## 2022-11-04 RX ORDER — HYDRALAZINE HYDROCHLORIDE 20 MG/ML
10 INJECTION INTRAMUSCULAR; INTRAVENOUS EVERY 4 HOURS PRN
Status: DISCONTINUED | OUTPATIENT
Start: 2022-11-04 | End: 2022-11-07 | Stop reason: HOSPADM

## 2022-11-04 RX ORDER — HYDRALAZINE HYDROCHLORIDE 20 MG/ML
10 INJECTION INTRAMUSCULAR; INTRAVENOUS EVERY 10 MIN PRN
Status: DISCONTINUED | OUTPATIENT
Start: 2022-11-04 | End: 2022-11-04

## 2022-11-04 RX ORDER — LIDOCAINE 40 MG/G
CREAM TOPICAL
Status: DISCONTINUED | OUTPATIENT
Start: 2022-11-04 | End: 2022-11-07 | Stop reason: HOSPADM

## 2022-11-04 RX ORDER — LABETALOL HYDROCHLORIDE 5 MG/ML
10 INJECTION, SOLUTION INTRAVENOUS EVERY 10 MIN PRN
Status: DISCONTINUED | OUTPATIENT
Start: 2022-11-04 | End: 2022-11-07 | Stop reason: HOSPADM

## 2022-11-04 RX ORDER — SODIUM CHLORIDE 9 MG/ML
INJECTION, SOLUTION INTRAVENOUS CONTINUOUS PRN
Status: DISCONTINUED | OUTPATIENT
Start: 2022-11-04 | End: 2022-11-07 | Stop reason: HOSPADM

## 2022-11-04 RX ORDER — IOPAMIDOL 755 MG/ML
75 INJECTION, SOLUTION INTRAVASCULAR ONCE
Status: COMPLETED | OUTPATIENT
Start: 2022-11-04 | End: 2022-11-04

## 2022-11-04 RX ADMIN — SODIUM CHLORIDE 100 ML: 900 INJECTION INTRAVENOUS at 13:42

## 2022-11-04 RX ADMIN — Medication 17 MG: at 14:18

## 2022-11-04 RX ADMIN — IOPAMIDOL 75 ML: 755 INJECTION, SOLUTION INTRAVENOUS at 13:42

## 2022-11-04 ASSESSMENT — ENCOUNTER SYMPTOMS
WEAKNESS: 1
SPEECH DIFFICULTY: 1
SHORTNESS OF BREATH: 1
HEADACHES: 0
FACIAL ASYMMETRY: 1
CONFUSION: 0

## 2022-11-04 ASSESSMENT — ACTIVITIES OF DAILY LIVING (ADL)
WEAR_GLASSES_OR_BLIND: NO
TOILETING_ISSUES: NO
ADLS_ACUITY_SCORE: 35
DOING_ERRANDS_INDEPENDENTLY_DIFFICULTY: NO
CHANGE_IN_FUNCTIONAL_STATUS_SINCE_ONSET_OF_CURRENT_ILLNESS/INJURY: NO
ADLS_ACUITY_SCORE: 35
CONCENTRATING,_REMEMBERING_OR_MAKING_DECISIONS_DIFFICULTY: NO
ADLS_ACUITY_SCORE: 35
FALL_HISTORY_WITHIN_LAST_SIX_MONTHS: NO
DIFFICULTY_EATING/SWALLOWING: NO
ADLS_ACUITY_SCORE: 35
DRESSING/BATHING_DIFFICULTY: NO
WALKING_OR_CLIMBING_STAIRS_DIFFICULTY: NO
ADLS_ACUITY_SCORE: 35

## 2022-11-04 NOTE — ED NOTES
Bed: ST02  Expected date:   Expected time:   Means of arrival:   Comments:  518  81 F stroke sxs/70 kg/new onset a-fib with left sided weakness/facial droop  1326

## 2022-11-04 NOTE — PHARMACY-ADMISSION MEDICATION HISTORY
Pharmacy Medication History  Admission medication history interview status for the 11/4/2022  admission is complete. See EPIC admission navigator for prior to admission medications     Location of Interview: Patient room  Medication history sources: Patient and Surescripts    Significant changes made to the medication list:  Cycanocobalamin was removed.  Omeprazole dose was updated    In the past week, patient estimated taking medication this percent of the time: greater than 90%    Additional medication history information:   Janessa is prescribed omeprazole 40 mg daily, but she only takes 20 mg daily.  She did not have her torsemide yet today because she was going out and didn't want to have to use the restroom frequently while out.    Medication reconciliation completed by provider prior to medication history? No    Time spent in this activity: 20 minutes    Prior to Admission medications    Medication Sig Last Dose Taking? Auth Provider Long Term End Date   Acetaminophen (TYLENOL PO) Take 500 mg by mouth every 6 hours as needed for mild pain or fever  Past Week Yes Reported, Patient     carvedilol (COREG) 25 MG tablet Take 2 tablets (50 mg) by mouth 2 times daily 11/4/2022 at am Yes Heidi Haque NP Yes    cholecalciferol (VITAMIN D3) 25 mcg (1000 units) capsule Take 1 capsule by mouth daily 11/4/2022 at am Yes Reported, Patient     hydrALAZINE (APRESOLINE) 50 MG tablet Take 1 tablet (50 mg) by mouth 2 times daily 11/4/2022 at am Yes Heidi Haque NP Yes    irbesartan (AVAPRO) 300 MG tablet Take 1 tablet (300 mg) by mouth daily 11/3/2022 at hs Yes Heidi Haque NP Yes    omeprazole (PRILOSEC) 20 MG DR capsule Take 20 mg by mouth daily  Yes Unknown, Entered By History     simvastatin (ZOCOR) 10 MG tablet Take 1 tablet (10 mg) by mouth At Bedtime 11/4/2022 at am Yes Heidi Haque NP Yes    torsemide (DEMADEX) 10 MG tablet Take 3 tablets (30 mg) by mouth daily 11/3/2022 at am Yes Heidi Haque NP  Yes    amoxicillin (AMOXIL) 500 MG capsule TAKE 4 CAPSULES BY MOUTH 1 HOUR BEFORE DENTAL APPOINTMENT  at prn  Reported, Patient     potassium chloride ER (K-TAB/KLOR-CON) 10 MEQ CR tablet Take 1 tablet (10 mEq) by mouth 2 times daily And as needed as directed.   Brett Smith MD     terazosin (HYTRIN) 5 MG capsule Take 1 capsule (5 mg) by mouth At Bedtime   Brett Smith MD Yes        The information provided in this note is only as accurate as the sources available at the time of update(s)

## 2022-11-04 NOTE — ED TRIAGE NOTES
Per EMS, patient was out to lunch with friends and had sudden onset slurred speech at 1245, with obvious L sided facial droop. EMS was called, EMS noted facial droop, slurred speech, and profound L sided weakness in arm and leg. EMS reports patient was unable to ambulate due to L sided weakness. By 1325 in ambulance, patient's L sided weakness had improved significantly per EMS. EMS also reports Afib seen on EKG today, while patient denies any Afib hx and is not on any blood thinners. Some SOB w exertion today per EMS report. VSS en route. Patient alert and oriented x4 on arrival to ED, still showing L sided facial droop.      Triage Assessment     Row Name 11/04/22 [id number]       Triage Assessment (Adult)    Airway WDL WDL       Respiratory WDL    Respiratory WDL WDL       Peripheral/Neurovascular WDL    Peripheral Neurovascular WDL WDL       Cognitive/Neuro/Behavioral WDL    Cognitive/Neuro/Behavioral WDL X    Speech slurred

## 2022-11-04 NOTE — ED PROVIDER NOTES
History   Chief Complaint:  Stroke Symptoms       HPI   Janessa Melendez is a 81 year old female with history of CAD, COPD and hypertension who presents via EMS with concern for stroke. She was at lunch with her friends and had just finished eating a meal when at 1245 she started having slurred speech, left sided facial droop and left sided arm and leg weakness. Last know well is 1245 witnessed by friends. EMS states her symptoms have significantly improved in the last 2 minutes. She has history of COPD and her friends reports she had more trouble breathing with exertion and speech. No history of heart attack, stroke or diabetes. No use of blood thinners. Denies headache, confusion or vision changes.     Review of Systems   Eyes: Negative for visual disturbance.   Respiratory: Positive for shortness of breath.    Neurological: Positive for facial asymmetry, speech difficulty and weakness (left sided). Negative for headaches.   Psychiatric/Behavioral: Negative for confusion.   All other systems reviewed and are negative.      Allergies:  Amlodipine  Aspirin  Codeine Sulfate    Medications:  Amoxicillin  Coreg  Apresoline  Avapro  Prilosec  Zofran  Klor-Con  Zocor  Hytrin  Demadex    Past Medical History:     Breast cancer  Hypertension  Cardiomyopathy  Hypercholesterolemia  Arthritis  CAD  Rotator cuff arthropathy, left  Anemia  Complete rupture of rotator cuff  GERD    Past Surgical History:    Total shoulder arthroplasty, left  Reverse total shoulder arthroplasty, left  Cholecystectomy  Unspecified eye surgery  Partial mastectomy     Family History:    Mother: unspecified cancer  Brother: unspecified cancer  Daughter: heart failure    Social History:  The patient presents to the ED via EMS  PCP: Pattie Bowen     Physical Exam     Patient Vitals for the past 24 hrs:   BP Temp Temp src Pulse Resp SpO2 Weight   11/04/22 1530 135/84 -- -- 76 10 97 % --   11/04/22 1516 132/79 -- -- 91 19 -- --    11/04/22 1500 128/63 -- -- 89 10 98 % --   11/04/22 1445 128/59 -- -- 98 29 98 % --   11/04/22 1430 (!) 153/89 -- -- 111 21 97 % --   11/04/22 1418 (!) 146/74 -- -- -- -- -- --   11/04/22 1415 (!) 146/74 -- -- 97 19 100 % --   11/04/22 1408 -- -- -- -- -- -- 67.9 kg (149 lb 9.6 oz)   11/04/22 1403 -- 98.5  F (36.9  C) Temporal -- -- -- --   11/04/22 1400 (!) 150/85 -- -- 98 28 98 % --   11/04/22 1354 135/69 -- -- 91 23 97 % --   11/04/22 1339 (!) 150/74 -- -- 102 16 99 % --       Physical Exam  Nursing note and vitals reviewed.    Constitutional:  Appears comfortable.    HENT:    Nose normal.  No discharge.      Oral mucosa is moist.  Eyes:    Conjunctivae are normal without injection.  Pupils are equal.  Cardiovascular:  Normal rate, irregular rhythm with normal S1 and S2.      Normal heart sounds and peripheral pulses 2+ and equal.       No murmur or kyra.  Pulmonary:  Effort normal and breath sounds clear to auscultation bilaterally.       GI:    Soft. No distension and no mass. No tenderness.   Musculoskeletal:  Normal range of motion. No extremity deformity.     No edema and no tenderness.    Neurological:   GCS 15. NIH stroke scale score 1. O X 3.  No sensory deficit. Normal strength in all extremities except may be slight  strength difference in the left hand, a little weaker than the right.      Normal finger to nose. Normal heel-knee-shin. No hand drift. No leg drift.   Visual fields full. EOMs intact. No diplopia. Left sided facial droop and tongue deviation. No slurred speech.   Comprehension and expression of speech is normal. Mental status and memory normal.   Skin:    Skin is warm and dry. No rash noted. No diaphoresis.      No erythema. No pallor.  No lesions.  Psychiatric:   Behavior is normal. Appropriate mood and affect.     Judgment and thought content normal.     Emergency Department Course   ECG  ECG taken at 1358, ECG read at 1400  Atrial fibrillation. Left axis deviation. Low voltage  QRS. Incomplete right bundle branch block. Inferior infarct, age undetermined. Possible anterolateral infarct, age undetermined. Abnormal ECG  Changes as noted above as compared to prior, dated 05/31/21.  Rate 89 bpm. TX interval * ms. QRS duration 102 ms. QT/QTc 374/455 ms. P-R-T axes * -55 46.     Imaging:  CTA Head Neck w Contrast   Preliminary Result   IMPRESSION:     1.  Focal occlusion of the distal right M1 branch which also extends   into several proximal right M2 branches. The M2 branches re-opacify,   however, the MCA branches are overall decreased in number and caliber   compared to the left MCA branches. This is concerning for   thromboembolism.   2.  No other vascular cutoff of the proximal ACAs, left MCA, or PCAs.   3.  Patent arteries in the neck without evidence of dissection.   4.  Atherosclerotic disease at the carotid bifurcations bilaterally   without significant stenosis.   5.  Multiple thyroid nodules including a 2 cm nodule in the left   thyroid gland which contains dystrophic calcification. Recommend   further evaluation with thyroid ultrasound on a nonemergent basis if   this has not previously been performed.      Results discussed with Bre Morrison at 1:59 PM on 11/4/2022.       CT Head w/o Contrast   Final Result   IMPRESSION:      1. No evidence of acute intracranial hemorrhage, mass, or herniation.   2. Mild nonspecific white matter changes likely due to chronic   microvascular ischemic disease.         SUGEY GARCIAS MD            SYSTEM ID:  [id number]        Report per radiology    Laboratory:  Labs Ordered and Resulted from Time of ED Arrival to Time of ED Departure   BASIC METABOLIC PANEL - Abnormal       Result Value    Sodium 137      Potassium 3.3 (*)     Chloride 99      Carbon Dioxide (CO2) 31      Anion Gap 7      Urea Nitrogen 30      Creatinine 0.96      Calcium 9.3      Glucose 139 (*)     GFR Estimate 60 (*)    CBC WITH PLATELETS AND DIFFERENTIAL - Abnormal    WBC Count 7.0       RBC Count 3.77 (*)     Hemoglobin 11.5 (*)     Hematocrit 36.5      MCV 97      MCH 30.5      MCHC 31.5      RDW 12.5      Platelet Count 222      % Neutrophils 69      % Lymphocytes 21      % Monocytes 7      % Eosinophils 2      % Basophils 1      % Immature Granulocytes 0      NRBCs per 100 WBC 0      Absolute Neutrophils 4.8      Absolute Lymphocytes 1.5      Absolute Monocytes 0.5      Absolute Eosinophils 0.1      Absolute Basophils 0.0      Absolute Immature Granulocytes 0.0      Absolute NRBCs 0.0     COVID-19 VIRUS (CORONAVIRUS) BY PCR - Normal    SARS CoV2 PCR Negative     INR - Normal    INR 1.03     PARTIAL THROMBOPLASTIN TIME - Normal    aPTT 26     TROPONIN I - Normal    Troponin I High Sensitivity 12     GLUCOSE MONITOR NURSING POCT   TSH WITH FREE T4 REFLEX            Emergency Department Course:       Reviewed:  I reviewed nursing notes, vitals, past medical history and Care Everywhere    Assessments:  1331 I obtained history and examined the patient as noted above.   1336 Tier one code stroke      Consults:  1343 I spoke to BERNADETTE Yoo with stroke nuerology, regarding the patient.  1358 I spoke with radiology regarding the CT findings    Interventions:  1418 TNKase 17 mg IV    Disposition:  The patient was admitted to the hospital under the care of Dr. Moore.     Impression & Plan     CMS Diagnoses: The patient has stroke symptoms:         ED Stroke specific documentation           NIHSS PDF     Patient last known well time: 1245  ED Provider first to bedside at: 1334  CT Results received at: 1358    Thrombolytics:   Risks (including potential for bleeding and death), benefits, and alternatives to thrombolytic therapy were discussed with Patient. Tenecteplase (TNK) administered without delay.    If treating with thrombolytics: Ensure SBP<180 and DBP<105 prior to treatment with thrombolytics.  Administering thrombolytics after treatment with IV labetalol, hydralazine, or nicardipine is  reasonable once BP control is established.    Endovascular Retrieval:  low NIH score    National Institutes of Health Stroke Scale (Baseline)  Time Performed: 1338     Score    Level of consciousness: (0)   Alert, keenly responsive    LOC questions: (0)   Answers both questions correctly    LOC commands: (0)   Performs both tasks correctly    Best gaze: (0)   Normal    Visual: (0)   No visual loss    Facial palsy: (2)   Partial paralysis (total/near total of lower face)    Motor arm (left): (0)   No drift    Motor arm (right): (0)   No drift    Motor leg (left): (0)   No drift    Motor leg (right): (0)   No drift    Limb ataxia: (0)   Absent    Sensory: (0)   Normal- no sensory loss    Best language: (0)   Normal- no aphasia    Dysarthria: (0)   Normal    Extinction and inattention: (0)   No abnormality        Total Score:  2        Stroke Mimics were considered (including migraine headache, seizure disorder, hypoglycemia (or hyperglycemia), head or spinal trauma, CNS infection, Toxin ingestion and shock state (e.g. sepsis) .      Medical Decision Making:  Patient presents to the emergency room by EMS after developing left-sided stroke symptoms with facial droop arm and leg weakness.  Her speech was difficult as well but had improved dramatically by time she arrived here.  Her NIH score upon presentation was 2 with obvious left facial droop.  She may have had slight decrease  strength on the left but finger-to-nose was normal and there was no hand drift.  I called a tier 1 code stroke and she went to CT.  There is a right M1 branch occlusion.  Stroke neuro talked with the family and after looking at the improvement with a low score of 2, it was elected to have her get TNKase.  It was given.  Her blood pressure is in the target range and she was observed very closely in the emergency.  An ICU bed request was placed and she will be watched closely in the stabilization room.  She had a mildly low potassium at 3.3  but otherwise labs appear good.  She has new onset atrial fibrillation and last EKG was in the last couple of years was normal sinus rhythm.  Her rate is now under 100 and she will need to see cardiology and will end up being on blood thinners at some point.  She will need an echo.  She did not develop a severe headache or worsening symptoms while in the emergency room.  I thought her facial droop improved slightly and her tongue deviation improved as well.  She will continue to be watched closely and awaiting an ICU bed.  Incidentally CT scan did show multiple thyroid nodules I have added a TSH on.    Critical Care Time: was 45 minutes for this patient excluding procedures    Diagnosis:    ICD-10-CM    1. 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 (H)  I63.211       2. Facial droop due to acute stroke (H)  I63.9     R29.810       3. New onset atrial fibrillation (H)  I48.91       4. Thyroid nodule  E04.1       5. Hypokalemia  E87.6           Discharge Medications:  New Prescriptions    No medications on file       Scribe Disclosure:  I, Linn Bardales, am serving as a scribe at 1:33 PM on 11/4/2022 to document services personally performed by Bre Morrison MD based on my observations and the provider's statements to me.            Bre Morrison MD  11/04/22 [id number]

## 2022-11-04 NOTE — CONSULTS
[facility]    Stroke Consult Note    Reason for Consult: Stroke Code     Chief Complaint: Stroke Symptoms      HPI  Janessa Melendez is a 81 year old female with pertinent past medical history of breast cancer, CAD, cardiomyopathy, hyperlipidemia, hypertension.  Not on PTA aspirin due to history of GERD with GI bleed.  On PTA simvastatin 10 mg daily.    She presented to [facility] emergency department 11/4/2022 due to acute onset 12:45 PM today of dysarthria, left facial droop, and left arm and leg weakness.  On arrival to the ED her symptoms improved significantly however she was found to have persistent left facial droop, mild dysarthria, NIHSS 3 and although no drift to her left arm or leg there was weakness compared to the right and CT/CTA showed distal R M1 branch occlusion extending to several proximal R M2 branches, concerning for thromboembolism. Risks and benefits and potential contraindications of TNK were discussed with Dr. Stanton and patient and her family. Patient consented. /74. . TNK ordered at 1415. Pushed at 1419. Neuro IR contacted and reviewed case and deemed not candidate for thrombectomy at this time.    Imaging Findings  CTH  1. No evidence of acute intracranial hemorrhage, mass, or herniation.  2. Mild nonspecific white matter changes likely due to chronic  microvascular ischemic disease.     CTA head and neck  1.  Focal occlusion of the distal right M1 branch which also extends  into several proximal right M2 branches. The M2 branches re-opacify,  however, the MCA branches are overall decreased in number and caliber  compared to the left MCA branches. This is concerning for  thromboembolism.  2.  No other vascular cutoff of the proximal ACAs, left MCA, or PCAs.  3.  Patent arteries in the neck without evidence of dissection.  4.  Atherosclerotic disease at the carotid bifurcations bilaterally  without significant stenosis.  5.  Multiple  "thyroid nodules including a 2 cm nodule in the left  thyroid gland which contains dystrophic calcification. Recommend  further evaluation with thyroid ultrasound on a nonemergent basis if  this has not previously been performed.    Intravenous Thrombolysis  Risks (including potential for bleeding and death), benefits, and alternatives to thrombolytic therapy were discussed with Patient and Family. Tenecteplase (TNK) administered without delay.    Endovascular Treatment  Proximal vessel occlusion present, but endovascular treatment not initiated due to low NIHSS after review with Neuro IR    Impression   R MCA ischemic stroke due to R M1 likely thromboembolic occlusion s/p TNK 11/4/22, in setting of new atrial fibrillation    Recommendations  Stroke admission (s/p TNK):  - Use orderset: \"Ischemic Stroke Post-Thrombolytics/Thrombectomy ICU Admission\"  -Discussed with vascular neurology attending, Dr. Stanton  -Admit to ICU for close monitoring   -neuro checks and vital signs per post-TNK orderset protocol  -Hold all antiplatelets/anticoagulants/NSAIDs, and pharmacologic VTE prophylaxis for 24 hrs post-thrombolytic   -will need to determine timing of when it will be safe to start anticoagulation for Afib based on stroke territory  -STAT CTH/CTA/CTP for any headache or worsening symptoms and if symptoms should suddenly worsen within 24 hours of stroke will need to consider further discussion with neuro endovascular team for possible thrombectomy  -BP <180/105 x 24 hrs s/p TNK   -repeat CTH in 24 hours post-TNK   -MRI brain w/wo contrast (routinely)  -TTE (with bubble study if 60 yrs or less)   -Smoking screen, depression screen, sleep apnea screen  -PT/OT/SPT, Dysphagia screen  -ECG/troponins x 3, telemetry  -blood glucose monitoring, check Hgb A1c (goal <7%)  -Lipitor 40 mg daily, check Lipid panel (titrate to goal LDL 40-70), <40 increases risk of ICH  -Euthermia, euglycemia, eunatremia   -Stroke Education  -Stroke " "Class per [facility] ([facility])      Patient Follow-up     - final recommendation pending work-up    Thank you for this consult.      Emilie Denson PA-C  Vascular Neurology  To page me or covering stroke neurology team member, click here: [facility]   Choose \"On Call\" tab at top, then search dropdown box for \"Neurology Adult\", select location, press Enter, then look for stroke/neuro ICU/telestroke.    ______________________________________________________    Clinically Significant Risk Factors Present on Admission      Past Medical History   Past Medical History:   Diagnosis Date     Arthritis      Breast cancer (H)      Cardiomyopathy (H)     ideopathic     Coronary artery disease 9/25/2014     Hypercholesterolemia      Hypertension      Hypokalemia      Malignant neoplasm (H)      Shortness of breath      Shoulder pain      Past Surgical History   Past Surgical History:   Procedure Laterality Date     BACK SURGERY       CARDIAC SURGERY      angiogram neg many yrs ago     CHOLECYSTECTOMY       ESOPHAGOSCOPY, GASTROSCOPY, DUODENOSCOPY (EGD), COMBINED N/A 6/22/2021    Procedure: ESOPHAGOGASTRODUODENOSCOPY, WITH ENDOSCOPIC US WITH FINE NEEDLE ASPIRATION;  Surgeon: Ventura Mcgill MD;  Location:  GI     EYE SURGERY       ORTHOPEDIC SURGERY      lt shoulder replaced, total     partial masectomy       Medications   Home Meds  Prior to Admission medications    Medication Sig Start Date End Date Taking? Authorizing Provider   Acetaminophen (TYLENOL PO) Take 500 mg by mouth every 6 hours as needed for mild pain or fever     Reported, Patient   amoxicillin (AMOXIL) 500 MG capsule TAKE 4 CAPSULES BY MOUTH 1 HOUR BEFORE DENTAL APPOINTMENT 7/29/21   Reported, Patient   carvedilol (COREG) 25 MG tablet Take 2 tablets (50 mg) by mouth 2 times daily 6/24/22   Heidi Haque NP   cholecalciferol (VITAMIN D3) 25 mcg (1000 units) capsule Take 1 capsule by mouth daily    Reported, Patient   cyanocobalamin (VITAMIN " B-12) 100 MCG tablet Take 100 mcg by mouth daily    Reported, Patient   hydrALAZINE (APRESOLINE) 50 MG tablet Take 1 tablet (50 mg) by mouth 2 times daily 6/24/22   Heidi Haque NP   irbesartan (AVAPRO) 300 MG tablet Take 1 tablet (300 mg) by mouth daily 6/24/22   Heidi Haque NP   omeprazole (PRILOSEC) 10 MG DR capsule Take 10 mg by mouth daily    Reported, Patient   ondansetron (ZOFRAN ODT) 4 MG ODT tab Take 1 tablet (4 mg) by mouth every 8 hours as needed for nausea or vomiting  Patient not taking: Reported on 6/24/2022 5/31/21   Israel Bernal MD   potassium chloride ER (K-TAB/KLOR-CON) 10 MEQ CR tablet Take 1 tablet (10 mEq) by mouth 2 times daily And as needed as directed. 6/20/22   Brett Smith MD   simvastatin (ZOCOR) 10 MG tablet Take 1 tablet (10 mg) by mouth At Bedtime 6/24/22   Heidi Haque NP   terazosin (HYTRIN) 5 MG capsule Take 1 capsule (5 mg) by mouth At Bedtime 5/17/22   Brett Smith MD   torsemide (DEMADEX) 10 MG tablet Take 3 tablets (30 mg) by mouth daily 6/24/22   Heidi Haque NP       Scheduled Meds      Infusion Meds    niCARdipine       sodium chloride         PRN Meds      Allergies   Allergies   Allergen Reactions     Amlodipine      swelling     Aspirin Other (See Comments)     Bleeding              Bleeding, GI Lesion         Codeine Sulfate GI Disturbance     Family History   Family History   Problem Relation Age of Onset     Other Cancer Mother      Other Cancer Brother      No Known Problems Son      Heart Failure Daughter      Family History Negative No family hx of      Social History   Social History     Tobacco Use     Smoking status: Never     Smokeless tobacco: Never   Substance Use Topics     Alcohol use: Yes     Comment: socially     Drug use: No       Review of Systems   The 10 point Review of Systems is negative other than noted in the HPI or here.        PHYSICAL EXAMINATION  Temp:  [98.5  F (36.9  C)] 98.5  F (36.9   C)  Pulse:  [] 89  Resp:  [10-29] 10  BP: (128-153)/(59-89) 128/63  SpO2:  [97 %-100 %] 98 %     General Exam  General:  patient lying in bed without any acute distress    HEENT:  normocephalic/atraumatic  Pulmonary:  no respiratory distress    Neuro Exam  Mental Status:  alert, oriented x 3, follows commands, speech clear and fluent, naming and repetition normal  Cranial Nerves:  visual fields intact, PERRL, EOMI with normal smooth pursuit, facial sensation intact and symmetric, hearing not formally tested but intact to conversation, tongue protrusion midline, mild dysarthria, L lower facial droop  Motor:  No drift to any extremities, there is mild pronation of the LUE and 4/5 strength to L leg compared to 5/5 of the RLE  Reflexes:  toes down-going  Sensory:  light touch sensation intact and symmetric throughout upper and lower extremities, no extinction on double simultaneous stimulation    Coordination:  normal finger-to-nose and heel-to-shin bilaterally without dysmetria  Station/Gait:  deferred    Dysphagia Screen  Dysarthria or facial droop present - Maintain NPO, consult SLP    Stroke Scales    NIHSS  1a. Level of Consciousness 0-->Alert, keenly responsive   1b. LOC Questions 0-->Answers both questions correctly   1c. LOC Commands 0-->Performs both tasks correctly   2.   Best Gaze 0-->Normal   3.   Visual 0-->No visual loss   4.   Facial Palsy 2-->Partial paralysis (total or near-total paralysis of lower face)   5a. Motor Arm, Left (S) 0-->No drift, limb holds 90 (or 45) degrees for full 10 secs (does have slight pronation of L hand, good  strength)   5b. Motor Arm, Right 0-->No drift, limb holds 90 (or 45) degrees for full 10 secs   6a. Motor Leg, Left (S) 0-->No drift, leg holds 30 degree position for full 5 secs (4/5 strength L leg)   6b. Motor Leg, right 0-->No drift, leg holds 30 degree position for full 5 secs   7.   Limb Ataxia 0-->Absent   8.   Sensory 0-->Normal, no sensory loss   9.    Best Language 0-->No aphasia, normal   10. Dysarthria 1-->Mild-to-moderate dysarthria, patient slurs at least some words and, at worst, can be understood with some difficulty   11. Extinction and Inattention  0-->No abnormality   Total 3 (11/04/22 1503)       Modified Bhupinder Score (Pre-morbid)  3 - Moderate disability.  Requires some help, but able to walk unassisted.    Imaging  I personally reviewed all imaging; relevant findings per HPI.     Lab Results Data   CBC  Recent Labs   Lab 11/04/22  1348   WBC 7.0   RBC 3.77*   HGB 11.5*   HCT 36.5        Basic Metabolic Panel    Recent Labs   Lab 11/04/22  1348      POTASSIUM 3.3*   CHLORIDE 99   CO2 31   BUN 30   CR 0.96   *   PENNY 9.3     Liver Panel  No results for input(s): PROTTOTAL, ALBUMIN, BILITOTAL, ALKPHOS, AST, ALT, BILIDIRECT in the last 168 hours.  INR    Recent Labs   Lab Test 11/04/22  1348   INR 1.03      Lipid Profile    Recent Labs   Lab Test 04/05/22  1014 05/06/21  0926 03/10/20  0843 11/12/18  0905 10/08/15  0744 08/29/14  0000   CHOL 136 133 124   < > 135 157   HDL 55 59 56   < > 59 59   LDL 54 54 43   < > 52* 73   TRIG 134 101 124   < > 120 124   CHOLHDLRATIO  --   --   --   --  2.3 2.7    < > = values in this interval not displayed.     A1C  No lab results found.  Troponin    Recent Labs   Lab 11/04/22  1348   TROPONINIS 12          Stroke Code Data Data   Stroke Code Data  (for stroke code without tele)  Stroke code activated 11/04/22   1338   First stroke provider response 11/04/22   1345   Last known normal 11/04/22   1244   Time of discovery   (or onset of symptoms) 11/04/22   1245   Head CT read by Stroke Neuro Dr/Provider 11/04/22   1351   Was stroke code de-escalated? (S) No (TNK given)                Billing: I personally examined and evaluated the patient today. At the time of my evaluation and management the patient was critical condition today due to stroke code. I personally managed review of chart, medical record,  meds, imaging, history, exam, and discussion with attending regarding plan and documentation. I spent a total of 90 minutes providing critical care services, evaluating the patient, directing care and reviewing laboratory values and radiologic reports.

## 2022-11-04 NOTE — H&P
[facility]    History and Physical - Hospitalist Service       Date of Admission:  11/4/2022    Assessment & Plan      Janessa Melendez is a 81 year old female with a history of idiopathic cardiomyopathy, CAD, HTN, HLP who presented to the ED on 11/4/2022 with stroke symptoms.     Acute ischemic stroke in the distal right M1 territory   At approximately 12:45 today the patient was out to lunch with a friend when they noticed the patient had acute onset of slurred speech prompting them to call EMS.  Upon arrival here the patient still having some speech changes and was noted to have left facial droop and left sided weakness.  She was evaluated by neurology and after CT head and CTA head/neck decision was made to give tenecteplase.  On my evaluation patient's symptoms have improved significantly and family who was present at bedside noted no slurred speech or abnormalities but slight facial droop still present on exam   * CT Head:  1. No evidence of acute intracranial hemorrhage, mass, or herniation.  2. Mild nonspecific white matter changes likely due to chronic microvascular ischemic disease.  * CTA Head/Neck:  1.  Focal occlusion of the distal right M1 branch which also extends into several proximal right M2 branches. The M2 branches re-opacify, however, the MCA branches are overall decreased in number and caliber compared to the left MCA branches. This is concerning for thromboembolism.  2.  No other vascular cutoff of the proximal ACAs, left MCA, or PCAs.  3.  Patent arteries in the neck without evidence of dissection.  4.  Atherosclerotic disease at the carotid bifurcations bilaterally without significant stenosis.  - Admit to ICU post tPa  - Neuro checks and vitals per post tPa order set   - Hold antiplatelets and anticoagulants for now.  Defer to neurology when to start  - Goal BP <180/105 for 24 hours   - Repeat CT Head in 24 hours  - MRI brain ordered  - TTE ordered  -  Lipitor 40 mg    - PT/OT/Social work for disposition   - Neurology consulted and appreciate their recommendations     Newly found atrial fibrillation   H/o CAD  Idiopathic cardiomyopathy.  Not decompensated  HTN/HLP   Initially in the ED the patient was noted to be tachycardiac with heart rates in to the 120s and irregular.  EKG showed atrial fibrillation but rate much improved in to the 90s.  Since settling in patient  Last echocardiogram from 4/7/22 showed an EF of 40%.  On my evaluation patient does not appear to be fluid overloaded  - Monitor on telemetry  - PTA antihypertensives on hold for now given BP goals   - Will hold of on rate reducing agents at this time as well as rates have been controlled   - Echocardiogram as above  - Will hold off on cardiology evaluation for now   - Likely needs anticoagulation as etiology of patient's stroke could be cardioembolic but patient is hesitant as she has a remote history of GI bleeding related to ulcers when on ASA     Thyroid nodules  Multiple thyroid nodules including a 2 cm nodule in the left thyroid gland which contains dystrophic calcification.   - Defer to outpatient PCP.  Recommend further evaluation with thyroid ultrasound on a nonemergent basis if this has not previously been performed       Diet: NPO for Medical/Clinical Reasons Except for: No Exceptions    DVT Prophylaxis: Pneumatic Compression Devices  Davis Catheter: Not present  Central Lines: None  Cardiac Monitoring: None  Code Status:   Full code     Clinically Significant Risk Factors Present on Admission        # Hypokalemia: Lowest K = 3.3 mmol/L (Ref range: 3.4-5.3) in last 2 days, will replace as needed           # Hypertension: home medication list includes antihypertensive(s)             Disposition Plan      Expected Discharge Date: 11/06/2022                The patient's care was discussed with the Patient, Patient's Family and ED physician.    Donell Moore, DO  Hospitalist Service  M  [facility]  Securely message with the [employer] Web Console (learn more here)  Text page via [facility] Paging/Directory         ______________________________________________________________________    Chief Complaint   Stroke symptoms     History is obtained from the patient    History of Present Illness   Janessa Melnedez is a 81 year old female with a history of idiopathic cardiomyopathy, CAD, HTN, HLP who presented to the ED on 11/4/2022 with stroke symptoms. At approximately 12:45 today the patient was out to lunch with a friend when they noticed the patient had acute onset of slurred speech prompting them to call EMS.  Upon arrival here the patient still having some speech changes and was noted to have left facial droop and left sided weakness.  She was evaluated by neurology and after CT head and CTA head/neck decision was made to give tenecteplase.  On my evaluation patient's symptoms have improved significantly and family who was present at bedside noted no slurred speech or abnormalities but slight facial droop still present on exam.  Currently patient has no complaints.  No vision changes, weakness or numbness/tingling per patient.  Denies any recent fevers, chills, chest pain, SOB, abdominal pain, N/V/D or problems with urination.    Review of Systems    The 10 point Review of Systems is negative other than noted in the HPI    Past Medical History    I have reviewed this patient's medical history and updated it with pertinent information if needed.   Past Medical History:   Diagnosis Date     Arthritis      Breast cancer (H)      Cardiomyopathy (H)     ideopathic     Coronary artery disease 9/25/2014     Hypercholesterolemia      Hypertension      Hypokalemia      Malignant neoplasm (H)      Shortness of breath      Shoulder pain        Past Surgical History   I have reviewed this patient's surgical history and updated it with pertinent information if needed.  Past Surgical  History:   Procedure Laterality Date     BACK SURGERY       CARDIAC SURGERY      angiogram neg many yrs ago     CHOLECYSTECTOMY       ESOPHAGOSCOPY, GASTROSCOPY, DUODENOSCOPY (EGD), COMBINED N/A 6/22/2021    Procedure: ESOPHAGOGASTRODUODENOSCOPY, WITH ENDOSCOPIC US WITH FINE NEEDLE ASPIRATION;  Surgeon: Ventura Mcgill MD;  Location:  GI     EYE SURGERY       ORTHOPEDIC SURGERY      lt shoulder replaced, total     partial masectomy         Social History   I have reviewed this patient's social history and updated it with pertinent information if needed.  Social History     Tobacco Use     Smoking status: Never     Smokeless tobacco: Never   Substance Use Topics     Alcohol use: Yes     Comment: socially     Drug use: No       Family History   I have reviewed this patient's family history and updated it with pertinent information if needed.  Family History   Problem Relation Age of Onset     Other Cancer Mother      Other Cancer Brother      No Known Problems Son      Heart Failure Daughter      Family History Negative No family hx of        Prior to Admission Medications   Prior to Admission Medications   Prescriptions Last Dose Informant Patient Reported? Taking?   Acetaminophen (TYLENOL PO) Past Week  Yes Yes   Sig: Take 500 mg by mouth every 6 hours as needed for mild pain or fever    amoxicillin (AMOXIL) 500 MG capsule  at prn  Yes No   Sig: TAKE 4 CAPSULES BY MOUTH 1 HOUR BEFORE DENTAL APPOINTMENT   carvedilol (COREG) 25 MG tablet 11/4/2022 at am  Yes Yes   Sig: Take 2 tablets (50 mg) by mouth 2 times daily   cholecalciferol (VITAMIN D3) 25 mcg (1000 units) capsule 11/4/2022 at am  Yes Yes   Sig: Take 1 capsule by mouth daily   hydrALAZINE (APRESOLINE) 50 MG tablet 11/4/2022 at am  No Yes   Sig: Take 1 tablet (50 mg) by mouth 2 times daily   irbesartan (AVAPRO) 300 MG tablet 11/3/2022 at hs  No Yes   Sig: Take 1 tablet (300 mg) by mouth daily   omeprazole (PRILOSEC) 20 MG DR capsule   Yes Yes    Sig: Take 20 mg by mouth daily   potassium chloride ER (K-TAB/KLOR-CON) 10 MEQ CR tablet   No No   Sig: Take 1 tablet (10 mEq) by mouth 2 times daily And as needed as directed.   simvastatin (ZOCOR) 10 MG tablet 11/4/2022 at am  No Yes   Sig: Take 1 tablet (10 mg) by mouth At Bedtime   terazosin (HYTRIN) 5 MG capsule   No No   Sig: Take 1 capsule (5 mg) by mouth At Bedtime   torsemide (DEMADEX) 10 MG tablet 11/3/2022 at am  No Yes   Sig: Take 3 tablets (30 mg) by mouth daily      Facility-Administered Medications: None     Allergies   Allergies   Allergen Reactions     Amlodipine      swelling     Aspirin Other (See Comments)     Bleeding              Bleeding, GI Lesion         Codeine Sulfate GI Disturbance       Physical Exam   Vital Signs: Temp: 98.5  F (36.9  C) Temp src: Temporal BP: 135/84 Pulse: 76   Resp: 10 SpO2: 97 % O2 Device: None (Room air)    Weight: 149 lbs 9.6 oz    General Appearance: Resting comfortably. NAD   Eyes: EOMI.  Normal conjuctiva  HEENT: NC/AT.  Moist mucous membranes  Respiratory: Clear to auscultation.  No respiratory distress  Cardiovascular: Irregular rhythm.  No obvious murmurs  GI: Soft.  Non-distended   Skin: No obvious rashes or cyanosis to exposed skin  Musculoskeletal: No edema.  No bony abnormalities  Neurologic: Slight left facial droop noted.  Moving extremities grossly.  Sensation intact.  Speech normal per family  Psychiatric: Alert and oriented.  Pleasant     Data   Data reviewed today: I reviewed all medications, new labs and imaging results over the last 24 hours. I personally reviewed CT results as below  EKG:  Atrial fibrillation.      Recent Labs   Lab 11/04/22  1348   WBC 7.0   HGB 11.5*   MCV 97      INR 1.03      POTASSIUM 3.3*   CHLORIDE 99   CO2 31   BUN 30   CR 0.96   ANIONGAP 7   PENNY 9.3   *     Recent Results (from the past 24 hour(s))   CT Head w/o Contrast    Narrative    CT SCAN OF THE HEAD WITHOUT CONTRAST November 4, 2022 1:46 PM      HISTORY: Code stroke. Difficulty speaking.    TECHNIQUE: Axial images of the head and coronal reformations without  IV contrast material. Radiation dose for this scan was reduced using  automated exposure control, adjustment of the mA and/or kV according  to patient size, or iterative reconstruction technique.    COMPARISON: None.    FINDINGS: There is no evidence of intracranial hemorrhage, mass, acute  infarct or anomaly. The ventricles are normal in size, shape and  configuration. Mild patchy periventricular white matter hypodensities  which are nonspecific, but likely related to chronic microvascular  ischemic disease.     The visualized portions of the sinuses and mastoids appear normal. The  bony calvarium and bones of the skull base appear intact.       Impression    IMPRESSION:     1. No evidence of acute intracranial hemorrhage, mass, or herniation.  2. Mild nonspecific white matter changes likely due to chronic  microvascular ischemic disease.      SUGEY GARCIAS MD         SYSTEM ID:  [id number]   CTA Head Neck w Contrast    Narrative    CT ANGIOGRAM OF THE HEAD AND NECK WITH CONTRAST  11/4/2022 1:47 PM     HISTORY: Code stroke. Difficulty speaking.    TECHNIQUE: CT angiography with an injection of 75 mL [id number]-[id number] IV  with scans through the head and neck. Images were transferred to a  separate 3-D workstation where multiplanar reformations and 3-D images  were created. Estimates of carotid stenoses are made relative to the  distal internal carotid artery diameters except as noted. Radiation  dose for this scan was reduced using automated exposure control,  adjustment of the mA and/or kV according to patient size, or iterative  reconstruction technique.    COMPARISON: None.     CT HEAD FINDINGS: No contrast enhancing lesions. Cerebral blood flow  is grossly normal.     CT ANGIOGRAM HEAD FINDINGS:  Focal occlusion of the distal right M1  branch also involving several proximal M2 branches. More distal  M2  branches appear to be re-opacified, although they are overall  decreased in number and caliber compared to the left MCA.    No other vascular cutoff of the proximal ACAs, right MCA, or PCAs. No  significant intracranial stenosis or aneurysm.     CT ANGIOGRAM NECK FINDINGS:   Normal origin of the great vessels from the aortic arch.     Right carotid artery: The right common and internal carotid arteries  are patent. Atherosclerotic disease at the carotid bifurcation  extending into the external carotid artery. No significant stenosis of  the internal carotid artery.     Left carotid artery: The left common and internal carotid arteries are  patent. Mild atherosclerotic disease at the carotid bifurcation  without stenosis. 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    Vertebral arteries: Vertebral arteries are patent without evidence of  dissection. No significant stenosis.     Other findings: Marked multilevel degenerative changes in the spine.  Several thyroid gland nodules including a nodule in the left thyroid  gland measuring up to 2 cm with dystrophic calcifications.      Impression    IMPRESSION:    1.  Focal occlusion of the distal right M1 branch which also extends  into several proximal right M2 branches. The M2 branches re-opacify,  however, the MCA branches are overall decreased in number and caliber  compared to the left MCA branches. This is concerning for  thromboembolism.  2.  No other vascular cutoff of the proximal ACAs, left MCA, or PCAs.  3.  Patent arteries in the neck without evidence of dissection.  4.  Atherosclerotic disease at the carotid bifurcations bilaterally  without significant stenosis.  5.  Multiple thyroid nodules including a 2 cm nodule in the left  thyroid gland which contains dystrophic calcification. Recommend  further evaluation with thyroid ultrasound on a nonemergent basis if  this has not previously been performed.    Results discussed with Bre Morrison  at 1:59 PM on 11/4/2022.     SUGEY GARCIAS MD         SYSTEM ID:  [id number]

## 2022-11-05 ENCOUNTER — APPOINTMENT (OUTPATIENT)
Dept: CARDIOLOGY | Facility: CLINIC | Age: 82
DRG: 062 | End: 2022-11-05
Attending: INTERNAL MEDICINE
Payer: COMMERCIAL

## 2022-11-05 ENCOUNTER — APPOINTMENT (OUTPATIENT)
Dept: SPEECH THERAPY | Facility: CLINIC | Age: 82
DRG: 062 | End: 2022-11-05
Attending: INTERNAL MEDICINE
Payer: COMMERCIAL

## 2022-11-05 ENCOUNTER — APPOINTMENT (OUTPATIENT)
Dept: PHYSICAL THERAPY | Facility: CLINIC | Age: 82
DRG: 062 | End: 2022-11-05
Attending: INTERNAL MEDICINE
Payer: COMMERCIAL

## 2022-11-05 ENCOUNTER — APPOINTMENT (OUTPATIENT)
Dept: CT IMAGING | Facility: CLINIC | Age: 82
DRG: 062 | End: 2022-11-05
Attending: PSYCHIATRY & NEUROLOGY
Payer: COMMERCIAL

## 2022-11-05 ENCOUNTER — APPOINTMENT (OUTPATIENT)
Dept: MRI IMAGING | Facility: CLINIC | Age: 82
DRG: 062 | End: 2022-11-05
Attending: INTERNAL MEDICINE
Payer: COMMERCIAL

## 2022-11-05 LAB
ANION GAP SERPL CALCULATED.3IONS-SCNC: 7 MMOL/L (ref 3–14)
APTT PPP: 26 SECONDS (ref 22–38)
BUN SERPL-MCNC: 24 MG/DL (ref 7–30)
CALCIUM SERPL-MCNC: 9.1 MG/DL (ref 8.5–10.1)
CHLORIDE BLD-SCNC: 105 MMOL/L (ref 94–109)
CO2 SERPL-SCNC: 26 MMOL/L (ref 20–32)
CREAT SERPL-MCNC: 0.77 MG/DL (ref 0.52–1.04)
ERYTHROCYTE [DISTWIDTH] IN BLOOD BY AUTOMATED COUNT: 12.6 % (ref 10–15)
GFR SERPL CREATININE-BSD FRML MDRD: 78 ML/MIN/1.73M2
GLUCOSE BLD-MCNC: 115 MG/DL (ref 70–99)
HBA1C MFR BLD: 5.7 % (ref 0–5.6)
HCT VFR BLD AUTO: 33.7 % (ref 35–47)
HGB BLD-MCNC: 11.1 G/DL (ref 11.7–15.7)
INR PPP: 1.11 (ref 0.85–1.15)
LVEF ECHO: NORMAL
MAGNESIUM SERPL-MCNC: 1.5 MG/DL (ref 1.6–2.3)
MAGNESIUM SERPL-MCNC: 2.8 MG/DL (ref 1.6–2.3)
MCH RBC QN AUTO: 30.7 PG (ref 26.5–33)
MCHC RBC AUTO-ENTMCNC: 32.9 G/DL (ref 31.5–36.5)
MCV RBC AUTO: 93 FL (ref 78–100)
PLATELET # BLD AUTO: 195 10E3/UL (ref 150–450)
POTASSIUM BLD-SCNC: 3.4 MMOL/L (ref 3.4–5.3)
POTASSIUM BLD-SCNC: 4 MMOL/L (ref 3.4–5.3)
RBC # BLD AUTO: 3.61 10E6/UL (ref 3.8–5.2)
SODIUM SERPL-SCNC: 138 MMOL/L (ref 133–144)
WBC # BLD AUTO: 10 10E3/UL (ref 4–11)

## 2022-11-05 PROCEDURE — 250N000013 HC RX MED GY IP 250 OP 250 PS 637: Performed by: INTERNAL MEDICINE

## 2022-11-05 PROCEDURE — 255N000002 HC RX 255 OP 636: Performed by: INTERNAL MEDICINE

## 2022-11-05 PROCEDURE — 80048 BASIC METABOLIC PNL TOTAL CA: CPT | Performed by: INTERNAL MEDICINE

## 2022-11-05 PROCEDURE — 85730 THROMBOPLASTIN TIME PARTIAL: CPT | Performed by: INTERNAL MEDICINE

## 2022-11-05 PROCEDURE — 85610 PROTHROMBIN TIME: CPT | Performed by: INTERNAL MEDICINE

## 2022-11-05 PROCEDURE — 70551 MRI BRAIN STEM W/O DYE: CPT

## 2022-11-05 PROCEDURE — 84132 ASSAY OF SERUM POTASSIUM: CPT | Performed by: INTERNAL MEDICINE

## 2022-11-05 PROCEDURE — 99291 CRITICAL CARE FIRST HOUR: CPT | Mod: FS | Performed by: PSYCHIATRY & NEUROLOGY

## 2022-11-05 PROCEDURE — 99292 CRITICAL CARE ADDL 30 MIN: CPT | Mod: FS | Performed by: PSYCHIATRY & NEUROLOGY

## 2022-11-05 PROCEDURE — 83735 ASSAY OF MAGNESIUM: CPT | Performed by: HOSPITALIST

## 2022-11-05 PROCEDURE — 85027 COMPLETE CBC AUTOMATED: CPT | Performed by: INTERNAL MEDICINE

## 2022-11-05 PROCEDURE — 97116 GAIT TRAINING THERAPY: CPT | Mod: GP

## 2022-11-05 PROCEDURE — 999N000208 ECHOCARDIOGRAM COMPLETE

## 2022-11-05 PROCEDURE — 36415 COLL VENOUS BLD VENIPUNCTURE: CPT | Performed by: INTERNAL MEDICINE

## 2022-11-05 PROCEDURE — 999N000128 HC STATISTIC PERIPHERAL IV START W/O US GUIDANCE

## 2022-11-05 PROCEDURE — 99232 SBSQ HOSP IP/OBS MODERATE 35: CPT | Performed by: INTERNAL MEDICINE

## 2022-11-05 PROCEDURE — 93306 TTE W/DOPPLER COMPLETE: CPT | Mod: 26 | Performed by: INTERNAL MEDICINE

## 2022-11-05 PROCEDURE — 250N000013 HC RX MED GY IP 250 OP 250 PS 637: Performed by: HOSPITALIST

## 2022-11-05 PROCEDURE — 92610 EVALUATE SWALLOWING FUNCTION: CPT | Mod: GN

## 2022-11-05 PROCEDURE — 83735 ASSAY OF MAGNESIUM: CPT | Performed by: INTERNAL MEDICINE

## 2022-11-05 PROCEDURE — 70450 CT HEAD/BRAIN W/O DYE: CPT

## 2022-11-05 PROCEDURE — 250N000011 HC RX IP 250 OP 636: Performed by: INTERNAL MEDICINE

## 2022-11-05 PROCEDURE — 97161 PT EVAL LOW COMPLEX 20 MIN: CPT | Mod: GP

## 2022-11-05 PROCEDURE — 120N000001 HC R&B MED SURG/OB

## 2022-11-05 PROCEDURE — 97530 THERAPEUTIC ACTIVITIES: CPT | Mod: GP

## 2022-11-05 RX ORDER — MAGNESIUM SULFATE HEPTAHYDRATE 40 MG/ML
4 INJECTION, SOLUTION INTRAVENOUS ONCE
Status: COMPLETED | OUTPATIENT
Start: 2022-11-05 | End: 2022-11-05

## 2022-11-05 RX ORDER — POTASSIUM CHLORIDE 1.5 G/1.58G
40 POWDER, FOR SOLUTION ORAL ONCE
Status: COMPLETED | OUTPATIENT
Start: 2022-11-05 | End: 2022-11-05

## 2022-11-05 RX ORDER — CARVEDILOL 25 MG/1
25 TABLET ORAL 2 TIMES DAILY WITH MEALS
Status: DISCONTINUED | OUTPATIENT
Start: 2022-11-05 | End: 2022-11-06

## 2022-11-05 RX ORDER — PANTOPRAZOLE SODIUM 40 MG/1
40 TABLET, DELAYED RELEASE ORAL DAILY
Status: DISCONTINUED | OUTPATIENT
Start: 2022-11-05 | End: 2022-11-07 | Stop reason: HOSPADM

## 2022-11-05 RX ORDER — SIMVASTATIN 10 MG
10 TABLET ORAL AT BEDTIME
Status: DISCONTINUED | OUTPATIENT
Start: 2022-11-05 | End: 2022-11-07 | Stop reason: HOSPADM

## 2022-11-05 RX ADMIN — CARVEDILOL 25 MG: 25 TABLET, FILM COATED ORAL at 12:06

## 2022-11-05 RX ADMIN — PANTOPRAZOLE SODIUM 40 MG: 40 TABLET, DELAYED RELEASE ORAL at 12:06

## 2022-11-05 RX ADMIN — CARVEDILOL 25 MG: 25 TABLET, FILM COATED ORAL at 17:11

## 2022-11-05 RX ADMIN — POTASSIUM CHLORIDE 40 MEQ: 1.5 POWDER, FOR SOLUTION ORAL at 06:04

## 2022-11-05 RX ADMIN — SIMVASTATIN 10 MG: 10 TABLET, FILM COATED ORAL at 21:18

## 2022-11-05 RX ADMIN — HUMAN ALBUMIN MICROSPHERES AND PERFLUTREN 3 ML: 10; .22 INJECTION, SOLUTION INTRAVENOUS at 09:19

## 2022-11-05 RX ADMIN — ATORVASTATIN CALCIUM 40 MG: 40 TABLET, FILM COATED ORAL at 00:06

## 2022-11-05 RX ADMIN — MAGNESIUM SULFATE HEPTAHYDRATE 4 G: 40 INJECTION, SOLUTION INTRAVENOUS at 15:34

## 2022-11-05 ASSESSMENT — ACTIVITIES OF DAILY LIVING (ADL)
ADLS_ACUITY_SCORE: 18

## 2022-11-05 NOTE — CONSULTS
[facility]    Stroke Consult Note    Reason for Consult:  Stroke    Chief Complaint: Stroke Symptoms       HPI  Janessa Melendez is a 81 year old female with pertinent past medical history of breast cancer, CAD, cardiomyopathy, hyperlipidemia, hypertension.  Not on PTA aspirin due to history of GERD with GI bleed >1 year ago.  On PTA simvastatin 10 mg daily.    She presented to [facility] emergency department 11/4/2022 due to acute onset dysarthria, left facial droop, and left arm and leg weakness.  CT/CTA showed distal R M1 branch occlusion extending to several proximal R M2 branches, concerning for thromboembolism. She received TNK and discussed with neuro IR , deemed not a candidate for thrombectomy. She was found to be in new atrial fibrillation. Admitted to ICU for post-TNK monitoring. MRI confirmed R MCA territory infarcts.     Today her weakness has resolved and she is feeling completely back to baseline. Discussed plan to repeat CTH after 24 hours post-TNK and discussed risks and benefits of long term anticoagulation. Tentative plan to start anticoagulation tomorrow if remains stable. Discussed need for her to monitor closely for any signs of bleeding.    Stroke Evaluation Summarized    MRI/Head CT Repeat CTH 24 H post-TNK: pending  MRI: patchy acute/subacute R frontal operculum/insula and tiny b/l parietal ischemic infarcts, small cluster of chronic hemosiderin L parietal white matter new since 2021  CTH: negative for acute pathology, chronic SVID   Intracranial Vasculature CTA head: distal R M1 branch occlusion extending to several proximal R M2 branches, concerning for thromboembolism   Cervical Vasculature CTA neck: atherosclerotic disease b/l carotid bifurcations without significant stenosis, multiple thyroid nodules including 2 cm L thyroid nodule with dystrophic calcification     Echocardiogram TTE: pending   EKG/Telemetry Atrial fibrillation, LAD, low voltage  QRS, incomplete RBBB, inferior infarct on or before 3/9/2018   Other Testing Not Applicable      LDL  11/4/2022: 39 mg/dL   A1C  11/4/2022: 5.7 %   Troponin 11/4/2022: 12 ng/L       Impression  R frontal operculum/insula and b/l parietal ischemic infarcts in setting of R M1 occlusion suspect cardioembolic from new atrial fibrillation, [id number] Score: 7    Multiple thyroid nodules including 2 cm L thyroid nodule with dystrophic calcification    History of GI bleeding > 1 year ago    Recommendations  -Discussed with vascular neurology attending, Dr. Stanton  -Neuro checks and vitals per post-TNK protocol  - BP <180/105 x 24 hrs s/p TNK   -long term outpatient blood pressure goal <130/80, recommend home monitoring twice daily in AM and PM, keep log and bring to f/u with PCP   -Hold all antiplatelets/anticoagulants/NSAIDs, and pharmacologic VTE prophylaxis for 24 hrs post-thrombolytic then okay to start ASA 81 mg daily if remains stable, placed pharmacy liaison consult for DOAC co-pay, if remains stable and no evidence of bleeding on repeat CTH at 24H post-TNK then plan tomorrow to stop ASA and switch to DOAC (preference to Eliquis), will need to monitor closely for any signs of bleeding  -LDL 39, continue PTA simvastatin 10 mg daily, follow-up with PCP for titration to goal LDL 40-70, <40 increases risk of Intracranial hemorrhage  -Blood glucose monitoring, Hgb A1c 5.7%, prediabetes (at goal <7% for secondary stroke prevention), follow-up with PCP  -telemetry  -PT/OT/SPT   -Smoking screen: never  -Sleep Apnea screen: no known snoring or history of sleep apnea  -Euthermia, euglycemia, eunatremia   -Stroke Education  -Stroke Class per [facility]  -defer to primary/outpatient PCP for further work-up of thyroid nodules    Patient Follow-up    -f/u with PCP in 1-2 weeks  -f/u with neurology team in 6-8 weeks (ordered)    Thank you for this consult. We will continue to follow.     Emilie Denson,  "PA-C  Vascular Neurology  To page me or covering stroke neurology team member, click here: [facility]   Choose \"On Call\" tab at top, then search dropdown box for \"Neurology Adult\", select location, press Enter, then look for stroke/neuro ICU/telestroke.    _____________________________________________________    Clinically Significant Risk Factors Present on Admission        # Hypokalemia: Lowest K = 3.3 mmol/L (Ref range: 3.4-5.3) in last 2 days, will replace as needed               Past Medical History   Past Medical History:   Diagnosis Date     Arthritis      Breast cancer (H)      Cardiomyopathy (H)     ideopathic     Coronary artery disease 9/25/2014     Hypercholesterolemia      Hypertension      Hypokalemia      Malignant neoplasm (H)      Shortness of breath      Shoulder pain      Past Surgical History   Past Surgical History:   Procedure Laterality Date     BACK SURGERY       CARDIAC SURGERY      angiogram neg many yrs ago     CHOLECYSTECTOMY       ESOPHAGOSCOPY, GASTROSCOPY, DUODENOSCOPY (EGD), COMBINED N/A 6/22/2021    Procedure: ESOPHAGOGASTRODUODENOSCOPY, WITH ENDOSCOPIC US WITH FINE NEEDLE ASPIRATION;  Surgeon: Ventura Mcgill MD;  Location:  GI     EYE SURGERY       ORTHOPEDIC SURGERY      lt shoulder replaced, total     partial masectomy       Medications   Home Meds  Prior to Admission medications    Medication Sig Start Date End Date Taking? Authorizing Provider   Acetaminophen (TYLENOL PO) Take 500 mg by mouth every 6 hours as needed for mild pain or fever    Yes Reported, Patient   carvedilol (COREG) 25 MG tablet Take 2 tablets (50 mg) by mouth 2 times daily 6/24/22  Yes Heidi Haqeu NP   cholecalciferol (VITAMIN D3) 25 mcg (1000 units) capsule Take 1 capsule by mouth daily   Yes Reported, Patient   hydrALAZINE (APRESOLINE) 50 MG tablet Take 1 tablet (50 mg) by mouth 2 times daily 6/24/22  Yes Heidi Haque NP   irbesartan (AVAPRO) 300 MG tablet Take 1 tablet (300 mg) by mouth " daily 6/24/22  Yes Heidi Haque NP   omeprazole (PRILOSEC) 20 MG DR capsule Take 20 mg by mouth daily   Yes Unknown, Entered By History   simvastatin (ZOCOR) 10 MG tablet Take 1 tablet (10 mg) by mouth At Bedtime 6/24/22  Yes Heidi Haque NP   torsemide (DEMADEX) 10 MG tablet Take 3 tablets (30 mg) by mouth daily 6/24/22  Yes Heidi Haque NP   amoxicillin (AMOXIL) 500 MG capsule TAKE 4 CAPSULES BY MOUTH 1 HOUR BEFORE DENTAL APPOINTMENT 7/29/21   Reported, Patient   potassium chloride ER (K-TAB/KLOR-CON) 10 MEQ CR tablet Take 1 tablet (10 mEq) by mouth 2 times daily And as needed as directed. 6/20/22   Brett Smith MD   terazosin (HYTRIN) 5 MG capsule Take 1 capsule (5 mg) by mouth At Bedtime 5/17/22   Brett Smith MD       Scheduled Meds    simvastatin  10 mg Oral At Bedtime     sodium chloride (PF)  3 mL Intracatheter Q8H       Infusion Meds    - MEDICATION INSTRUCTIONS -       niCARdipine       sodium chloride         PRN Meds  hydrALAZINE, labetalol **OR** [DISCONTINUED] hydrALAZINE, lidocaine 4%, lidocaine (buffered or not buffered), - MEDICATION INSTRUCTIONS -, niCARdipine, sodium chloride (PF), sodium chloride    Allergies   Allergies   Allergen Reactions     Amlodipine      swelling     Aspirin Other (See Comments)     Bleeding              Bleeding, GI Lesion         Codeine Sulfate GI Disturbance     Family History   Family History   Problem Relation Age of Onset     Other Cancer Mother      Other Cancer Brother      No Known Problems Son      Heart Failure Daughter      Family History Negative No family hx of      Social History   Social History     Tobacco Use     Smoking status: Never     Smokeless tobacco: Never   Substance Use Topics     Alcohol use: Yes     Comment: socially     Drug use: No       Review of Systems   The 10 point Review of Systems is negative other than noted in the HPI or here.        PHYSICAL EXAMINATION   Temp:  [98  F (36.7  C)-98.5  F  (36.9  C)] 98.3  F (36.8  C)  Pulse:  [] 64  Resp:  [10-63] 28  BP: (128-172)/() 142/71  SpO2:  [92 %-100 %] 96 %    General Exam  General:  patient lying in bed without any acute distress    HEENT:  normocephalic/atraumatic  Pulmonary:  no respiratory distress    Neuro Exam  Mental Status:  alert, oriented x 3, follows commands, speech clear and fluent, naming and repetition normal  Cranial Nerves:  visual fields intact, PERRL, EOMI with normal smooth pursuit, facial sensation intact and symmetric, facial movements symmetric, hearing not formally tested but intact to conversation, tongue protrusion midline, mild dysarthria at her baseline per patient and daughter-in-law  Motor:  normal muscle tone and bulk, no abnormal movements, able to move all limbs spontaneously, strength 5/5 throughout upper and lower extremities, no pronator drift  Reflexes:  toes down-going  Sensory:  light touch sensation intact and symmetric throughout upper and lower extremities, no extinction on double simultaneous stimulation   Coordination:  normal finger-to-nose and heel-to-shin bilaterally without dysmetria  Station/Gait:  deferred    Dysphagia Screen  Per SPT    Stroke Scales    NIHSS  1a. Level of Consciousness 0-->Alert, keenly responsive   1b. LOC Questions 0-->Answers both questions correctly   1c. LOC Commands 0-->Performs both tasks correctly   2.   Best Gaze 0-->Normal   3.   Visual 0-->No visual loss   4.   Facial Palsy 0-->Normal symmetrical movements   5a. Motor Arm, Left 0-->No drift, limb holds 90 (or 45) degrees for full 10 secs   5b. Motor Arm, Right 0-->No drift, limb holds 90 (or 45) degrees for full 10 secs   6a. Motor Leg, Left 0-->No drift, leg holds 30 degree position for full 5 secs   6b. Motor Leg, right 0-->No drift, leg holds 30 degree position for full 5 secs   7.   Limb Ataxia 0-->Absent   8.   Sensory 0-->Normal, no sensory loss   9.   Best Language 0-->No aphasia, normal   10. Dysarthria (S)  1-->Mild-to-moderate dysarthria, patient slurs at least some words and, at worst, can be understood with some difficulty (per patient and family she is at baseline)   11. Extinction and Inattention  0-->No abnormality   Total 1 (11/05/22 1036)       Modified Bhupinder Score (Pre-morbid)  2 - Slight disability.  Able to look after own affairs without assistance, but unable to carry out all previous activities.     Imaging  I personally reviewed all imaging; relevant findings per HPI.    Labs Data   CBC  Recent Labs   Lab 11/05/22  0503 11/04/22  1348   WBC 10.0 7.0   RBC 3.61* 3.77*   HGB 11.1* 11.5*   HCT 33.7* 36.5    222     Basic Metabolic Panel   Recent Labs   Lab 11/05/22  0503 11/04/22  2257 11/04/22  1348     --  137   POTASSIUM 3.4  --  3.3*   CHLORIDE 105  --  99   CO2 26  --  31   BUN 24  --  30   CR 0.77  --  0.96   * 104* 139*   PENNY 9.1  --  9.3     Liver Panel  No results for input(s): PROTTOTAL, ALBUMIN, BILITOTAL, ALKPHOS, AST, ALT, BILIDIRECT in the last 168 hours.  INR    Recent Labs   Lab Test 11/05/22  0503 11/04/22  1348   INR 1.11 1.03      Lipid Profile    Recent Labs   Lab Test 11/04/22  1348 04/05/22  1014 05/06/21  0926 11/12/18  0905 10/08/15  0744 08/29/14  0000   CHOL 129 136 133   < > 135 157   HDL 56 55 59   < > 59 59   LDL 39 54 54   < > 52* 73   TRIG 170* 134 101   < > 120 124   CHOLHDLRATIO  --   --   --   --  2.3 2.7    < > = values in this interval not displayed.     A1C    Recent Labs   Lab Test 11/04/22  1348   A1C 5.7*     Troponin    Recent Labs   Lab 11/04/22  1348   TROPONINIS 12          Stroke Consult Data Data   This was a non-emergent, non-telestroke consult.  Billing: I personally examined and evaluated the patient today. At the time of my evaluation and management the patient was critical condition today due to stroke s/p TNK. I personally managed review of chart, medical record, meds, imaging, history, exam, and discussion with attending regarding plan  and documentation. I spent a total of 70 minutes providing critical care services, evaluating the patient, directing care and reviewing laboratory values and radiologic reports.

## 2022-11-05 NOTE — PROVIDER NOTIFICATION
Notified Emilie and Dr. Vega regarding transfer out of ICU, neuro exam stable. Neuro crit okay with transferring out and neuro exams q4 hrs.

## 2022-11-05 NOTE — PROGRESS NOTES
"SPIRITUAL HEALTH SERVICES Progress Note  [facility]  ICU    Visited with PT Janessa per admission request for  services. Step-daughter present at bedside. Provided emotional support, affirmation of goals of care, and prayer for recovery.    Janessa states that she is feeling much better and expects to transfer from ICU to floor today. She indicates that she feels that she was \"jessica\" to have experienced stroke in a public place where she was able to get help immediately rather than when alone at home.    Janessa accepted offer of prayer. She expressed no other spiritual or Gnosticism sources of distress.    PT states that she appreciates visits from  for conversation and prayer and welcomes additional visits while in hospital.    Steven Murillo  Associate   "

## 2022-11-05 NOTE — PROGRESS NOTES
11/05/2022 9050-0648  Pt here for CVA. Received TNK. Transferred to unit from ICU. VSS on RA. Ex ZHU. A&Ox4. neuros intact ex slight left facial droop with movement. K replaced in ICU. Mag currently being replaced. SBA/IND. Denies pain ex lower legs, baseline per pt. Continent. Ambulating to BR. Had one episode of diarrhea this am. Plan to start anticoagulants tomorrow. Tele: a fib CVR. Provider aware. Tolerating regular diet. Fair appetite. Plan for pt to go to CT tonight. CT will call.

## 2022-11-05 NOTE — PROGRESS NOTES
Pt A/Ox4, pleasant. Slight left facial droop with smile otherwise symmetrical. Controled a fib. Ambulated up to bathroom x2. ZHU. Up to chair once. No appetite.

## 2022-11-05 NOTE — PROGRESS NOTES
"   11/05/22 1100   Appointment Info   Signing Clinician's Name / Credentials (SLP) Sherry Smyth MA CCC-SLP   General Information   Onset of Illness/Injury or Date of Surgery 11/04/22   Referring Physician Pilar Vega MD   Patient/Family Therapy Goal Statement (SLP) The patient   Pertinent History of Current Problem Per MD note: \"Janessa Melendez is a 81 year old female with pertinent past medical history of breast cancer, CAD, cardiomyopathy, hyperlipidemia, hypertension. She presented to [facility] emergency department 11/4/2022 due to acute onset dysarthria, left facial droop, and left arm and leg weakness.  CT/CTA showed distal R M1 branch occlusion extending to several proximal R M2 branches, concerning for thromboembolism. She received TNK and discussed with neuro IR , deemed not a candidate for thrombectomy. She was found to be in new atrial fibrillation. Admitted to ICU for post-TNK monitoring. MRI confirmed R MCA territory infarcts.\" SLP completed bedside swallow eval at 11:00 AM on 11/5/22. Pt reports symptoms have resolved in her opinion.   General Observations alert, cooperative, motivated. Pt sat up in the chair for this eval. Family present, reporting the pt seems to be at baseline.   Type of Evaluation   Type of Evaluation Swallow Evaluation   Oral Motor   Oral Musculature anomalies present   Mucosal Quality good   Facial Symmetry (Oral Motor)   Facial Symmetry (Oral Motor) left side impairment  (Mild left facial droop noted at rest, not impacting overall function)   Left Side Facial Asymmetry minimal impairment   Lip Function (Oral Motor)   Lip Range of Motion (Oral Motor) WNL   Tongue Function (Oral Motor)   Tongue ROM (Oral Motor) WNL   Jaw Function (Oral Motor)   Jaw Function (Oral Motor) WNL   Cough/Swallow/Gag Reflex (Oral Motor)   Soft Palate/Velum (Oral Motor) WNL   Vocal Quality/Secretion Management (Oral Motor)   Vocal Quality (Oral Motor) WFL   General Swallowing " Observations   Past History of Dysphagia no prior hx of speech or swallow impairment or SLP intervention   Respiratory Support (General Swallowing Observations) none   Current Diet/Method of Nutritional Intake (General Swallowing Observations, NIS) regular diet;thin liquids (level 0)   Swallowing Evaluation Clinical swallow evaluation   Clinical Swallow Evaluation   Feeding Assistance no assistance needed   Clinical Swallow Evaluation Textures Trialed thin liquids;pureed;solid foods   Clinical Swallow Eval: Thin Liquid Texture Trial   Mode of Presentation, Thin Liquids cup;straw;self-fed   Volume of Liquid or Food Presented 6 oz thin H20   Oral Phase of Swallow WFL   Pharyngeal Phase of Swallow intact   Diagnostic Statement WNL with H20   Clinical Swallow Evaluation: Puree Solid Texture Trial   Mode of Presentation, Puree spoon;self-fed   Volume of Puree Presented 100% pudding cup   Oral Phase, Puree WFL   Pharyngeal Phase, Puree intact   Diagnostic Statement WNL with puree   Clinical Swallow Evaluation: Solid Food Texture Trial   Mode of Presentation self-fed   Volume Presented regular elliott cracker   Oral Phase WFL   Oral Residue   (none)   Pharyngeal Phase intact   Diagnostic Statement WNL with regular texture   Esophageal Phase of Swallow   Patient reports or presents with symptoms of esophageal dysphagia No   Swallowing Recommendations   Diet Consistency Recommendations regular diet;thin liquids (level 0)   Supervision Level for Intake patient independent   Medication Administration Recommendations, Swallowing (SLP) ok for pills with H20   Instrumental Assessment Recommendations instrumental evaluation not recommended at this time   Clinical Impression   Criteria for Skilled Therapeutic Interventions Met (SLP Eval) Yes, treatment indicated   SLP Diagnosis oropharyngeal dysphagia   Problem List (SLP) none from SLP standpoint   Functional Limitations Related to Problem List (SLP) none from SLP standpoint   Risks  & Benefits of therapy have been explained evaluation/treatment results reviewed;care plan/treatment goals reviewed;risks/benefits reviewed;participants voiced agreement with care plan;participants included;patient   Clinical Impression Comments Clinical dysphagia exam completed per MD order. The patient was alert, cooperative and oriented for this eval. She communicated effectively and independent without any concern for dysarthria.     Oral musculature notable for slight facial droop on the left side at rest, though this did not appear to impact function as overall strength and coordination is good and WNL.  She fed herself PO trials for this eval with good bolus control, timely/strong laryngeal elevation and no overt signs/symptoms of aspiration. Recommend a regular texture diet with thin liquids, pills ok with H20.     Pt/family both report speech/language skills are intact without any concern for decline. SLP will sign off, swallow eval only, no need for formal speech-language intervention at this time.   SLP Total Evaluation Time   Eval: oral/pharyngeal swallow function, clinical swallow Minutes (16496) 15   SLP Goals   Therapy Frequency (SLP Eval)   (eval only)   SLP Discharge Planning   SLP Plan Eval only 11/5/22, SLP is signing off   SLP Discharge Recommendation   (defer to MD, OT/PT)   SLP Rationale for DC Rec Speech and swallow skills have returned to baseline, no further SLP intervention is required at this time   SLP Brief overview of current status  Recommend a regular texture diet and thin liquids. Ok for pills with H20.   Total Session Time   Total Session Time (sum of timed and untimed services) 15

## 2022-11-05 NOTE — PLAN OF CARE
OT: Orders received. Chart reviewed and discussed with care team.  OT not indicated due to IP needs being met by PT, pt has returned to baseline for ADL performance.  Defer discharge recommendations to PT and medical team.  Will complete orders.

## 2022-11-05 NOTE — PROGRESS NOTES
End of Shift Summary:  Pt up to unit from ED @2300    Neuro-q1 hour neuro checks, post TNK protocol. Neuro intact, A/ox4, slight facial droop, GUILLERMO equal 5/5, no deficits NIH-1 for slight facial droop almost resolved    Resp- RA, LS clear     Cardiac- VSS, New onset afib found in ED, rate controlled 70-80s at rest, 100s with movement. Afebrile     GI- No BM pt reports BM early AM 11/4. Passed bedside sallow eval. Blood glucose WNL     - Purewick in place, adequate UOP    Integ/Msk- WNL, x1 assist pt steady on feet    Lines/Gtts- 2 Bilateral PIVs in AC    Labs- K-3.4 replacement ordered     WCTM

## 2022-11-05 NOTE — PROGRESS NOTES
[facility]    Medicine Progress Note - Hospitalist Service    Date of Admission:  11/4/2022    Assessment & Plan   Janessa Melendez is a 81 year old female with a history of idiopathic cardiomyopathy, CAD, HTN, HLP who presented to the ED on 11/4/2022 with stroke symptoms.      Acute ischemic stroke in the distal right M1 territory   At approximately 12:45 today the patient was out to lunch with a friend when they noticed the patient had acute onset of slurred speech prompting them to call EMS.  Upon arrival here the patient still having some speech changes and was noted to have left facial droop and left sided weakness.  She was evaluated by neurology and after CT head and CTA head/neck decision was made to give tenecteplase.  On my evaluation patient's symptoms have improved significantly and family who was present at bedside noted no slurred speech or abnormalities but slight facial droop still present on exam   * CT Head:  1. No evidence of acute intracranial hemorrhage, mass, or herniation.  2. Mild nonspecific white matter changes likely due to chronic microvascular ischemic disease.  * CTA Head/Neck:  1.  Focal occlusion of the distal right M1 branch which also extends into several proximal right M2 branches. The M2 branches re-opacify, however, the MCA branches are overall decreased in number and caliber compared to the left MCA branches. This is concerning for thromboembolism.  2.  No other vascular cutoff of the proximal ACAs, left MCA, or PCAs.  3.  Patent arteries in the neck without evidence of dissection.  4.  Atherosclerotic disease at the carotid bifurcations bilaterally without significant stenosis.  MRI brain- Patchy areas of diffusion restriction/acute to early subacute ischemia in the right frontal operculum and insula. Additional tiny foci in each parietal lobe. No acute hemorrhage or mass effect. 2. Small cluster of chronic hemosiderin within the left parietal  white matter has developed since 2021.  - A1c 5.7, LDL 39  - Hold antiplatelets and anticoagulants for now.  Defer to neurology when to start  - TTE completed results pending  - PTA simvastatin 10mg daily continued per neurology recommendations  - PT/OT/SLP  - appreciate neurology assistance  - Hold antiplatelets and anticoagulants for 24 hrs following thrombolytics, per neuro plan to resume ASA  if repeat head CT at 24 hrs post TNK remains stable and no bleed, then plan to switch to DOAC possibly tomrorow  - Repeat CT Head in 24 hours post TNK  - PT/OT/Social work for disposition   - Neurology consulted,  appreciate their recommendations   - Transfer to neuro floor with q4h neuro checks, ok from neurology stand point     Newly found atrial fibrillation   H/o CAD  Idiopathic cardiomyopathy.  Not decompensated  HTN/HLP   Initially in the ED the patient was noted to be tachycardiac with heart rates in to the 120s and irregular.  EKG showed atrial fibrillation but rate much improved in to the 90s.  Since settling in patient  Last echocardiogram from 4/7/22 showed an EF of 40%.  On my evaluation patient does not appear to be fluid overloaded  - echo result is currently pending  - restart Coreg at 25mg BID and increase to PTA dose 50mg BID as tolerated  - hold hydralazine, Avapro not to lower bp significantly at this time  - Will hold off on cardiology evaluation for now, consider one   - as above plan for anticoagulation if stable CT, patient agrees to this plan     Thyroid nodules  Multiple thyroid nodules including a 2 cm nodule in the left thyroid gland which contains dystrophic calcification.   - Defer to outpatient PCP.  Recommend further evaluation with thyroid ultrasound on a nonemergent basis if this has not previously been performed       Diet: Regular Diet Adult    DVT Prophylaxis: Pneumatic Compression Devices  Davis Catheter: Not present  Central Lines: None  Cardiac Monitoring: ACTIVE order. Indication:  ICU  Code Status: Full Code      Disposition Plan     Expected Discharge Date: 11/06/2022      Destination: home          The patient's care was discussed with the Bedside Nurse, Patient and Patient's Family.    Pilar Vega MD  Hospitalist Service  [facility]  Securely message with the Vocera Web Console (learn more here)  Text page via [employer] Paging/Directory         Clinically Significant Risk Factors Present on Admission           ______________________________________________________________________    Interval History   Denies headache, n/v or abdominal pain. States she has chronic sob with activity with no recent change.     Data reviewed today: I reviewed all medications, new labs and imaging results over the last 24 hours. I personally reviewed the MRI brain image(s) showing as above.    Physical Exam   Vital Signs: Temp: 98.3  F (36.8  C) Temp src: Oral BP: (!) 147/85 (Pt in sitting EOB prior to activity) Pulse: (!) 129 (Post-activity, nursing was notified of a-fib and HR)   Resp: 27 SpO2: 92 % O2 Device: None (Room air)    Weight: 152 lbs 1.88 oz  General Appearance: Alert, awake and no apparent distress  Respiratory: clear to auscultation bilaterally, no wheezing  Cardiovascular: regular rate and rhythm  GI: soft and non-tender  Skin: warm and dry      Data   Recent Labs   Lab 11/05/22  0503 11/04/22  2257 11/04/22  1348   WBC 10.0  --  7.0   HGB 11.1*  --  11.5*   MCV 93  --  97     --  222   INR 1.11  --  1.03     --  137   POTASSIUM 3.4  --  3.3*   CHLORIDE 105  --  99   CO2 26  --  31   BUN 24  --  30   CR 0.77  --  0.96   ANIONGAP 7  --  7   PENNY 9.1  --  9.3   * 104* 139*     Recent Results (from the past 24 hour(s))   CT Head w/o Contrast    Narrative    CT SCAN OF THE HEAD WITHOUT CONTRAST November 4, 2022 1:46 PM     HISTORY: Code stroke. Difficulty speaking.    TECHNIQUE: Axial images of the head and coronal reformations without  IV contrast  material. Radiation dose for this scan was reduced using  automated exposure control, adjustment of the mA and/or kV according  to patient size, or iterative reconstruction technique.    COMPARISON: None.    FINDINGS: There is no evidence of intracranial hemorrhage, mass, acute  infarct or anomaly. The ventricles are normal in size, shape and  configuration. Mild patchy periventricular white matter hypodensities  which are nonspecific, but likely related to chronic microvascular  ischemic disease.     The visualized portions of the sinuses and mastoids appear normal. The  bony calvarium and bones of the skull base appear intact.       Impression    IMPRESSION:     1. No evidence of acute intracranial hemorrhage, mass, or herniation.  2. Mild nonspecific white matter changes likely due to chronic  microvascular ischemic disease.      SUGEY GARCIAS MD         SYSTEM ID:  [id number]   CTA Head Neck w Contrast    Narrative    CT ANGIOGRAM OF THE HEAD AND NECK WITH CONTRAST  11/4/2022 1:47 PM     HISTORY: Code stroke. Difficulty speaking.    TECHNIQUE: CT angiography with an injection of 75 mL [id number]-370 IV  with scans through the head and neck. Images were transferred to a  separate 3-D workstation where multiplanar reformations and 3-D images  were created. Estimates of carotid stenoses are made relative to the  distal internal carotid artery diameters except as noted. Radiation  dose for this scan was reduced using automated exposure control,  adjustment of the mA and/or kV according to patient size, or iterative  reconstruction technique.    COMPARISON: None.     CT HEAD FINDINGS: No contrast enhancing lesions. Cerebral blood flow  is grossly normal.     CT ANGIOGRAM HEAD FINDINGS:  Focal occlusion of the distal right M1  branch also involving several proximal M2 branches. More distal M2  branches appear to be re-opacified, although they are overall  decreased in number and caliber compared to the left MCA.    No other  vascular cutoff of the proximal ACAs, right MCA, or PCAs. No  significant intracranial stenosis or aneurysm.     CT ANGIOGRAM NECK FINDINGS:   Normal origin of the great vessels from the aortic arch.     Right carotid artery: The right common and internal carotid arteries  are patent. Atherosclerotic disease at the carotid bifurcation  extending into the external carotid artery. No significant stenosis of  the internal carotid artery.     Left carotid artery: The left common and internal carotid arteries are  patent. Mild atherosclerotic disease at the carotid bifurcation  without stenosis. 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    Vertebral arteries: Vertebral arteries are patent without evidence of  dissection. No significant stenosis.     Other findings: Marked multilevel degenerative changes in the spine.  Several thyroid gland nodules including a nodule in the left thyroid  gland measuring up to 2 cm with dystrophic calcifications.      Impression    IMPRESSION:    1.  Focal occlusion of the distal right M1 branch which also extends  into several proximal right M2 branches. The M2 branches re-opacify,  however, the MCA branches are overall decreased in number and caliber  compared to the left MCA branches. This is concerning for  thromboembolism.  2.  No other vascular cutoff of the proximal ACAs, left MCA, or PCAs.  3.  Patent arteries in the neck without evidence of dissection.  4.  Atherosclerotic disease at the carotid bifurcations bilaterally  without significant stenosis.  5.  Multiple thyroid nodules including a 2 cm nodule in the left  thyroid gland which contains dystrophic calcification. Recommend  further evaluation with thyroid ultrasound on a nonemergent basis if  this has not previously been performed.    Results discussed with Bre Morrison at 1:59 PM on 11/4/2022.     SUGEY GARCIAS MD         SYSTEM ID:  [id number]   MR Brain w/o Contrast    Narrative    EXAM: MR BRAIN WITHOUT  CONTRAST  LOCATION: [facility]  DATE/TIME: 11/05/2022, 4:35 AM    INDICATION: Acute ischemic stroke.  COMPARISON: 11/04/2022 CT. Brain MRI 01/27/2021.  TECHNIQUE: Routine multiplanar multisequence head MRI without intravenous contrast.    FINDINGS:  INTRACRANIAL CONTENTS: Small foci of diffusion restriction, one each in the parietal lobes compatible with acute to early subacute ischemia. Patchy areas of cortical diffusion restriction in the right frontal operculum and insula with acute to early   subacute ischemia. No acute hemorrhage or mass effect. Small cluster of chronic hemosiderin within the left lobe has developed since 01/27/2021. No mass, acute hemorrhage, or extra-axial fluid collections. Normal position of the cerebellar tonsils.     SELLA: No abnormality accounting for technique.    OSSEOUS STRUCTURES/SOFT TISSUES: Normal marrow signal. The major intracranial vascular flow-voids are maintained.     ORBITS: No abnormality accounting for technique.     SINUSES/MASTOIDS: No paranasal sinus mucosal disease. No middle ear or mastoid effusion.       Impression    IMPRESSION:  1.  Patchy areas of diffusion restriction/acute to early subacute ischemia in the right frontal operculum and insula. Additional tiny foci in each parietal lobe. No acute hemorrhage or mass effect.    2.  Small cluster of chronic hemosiderin within the left parietal white matter has developed since 2021.       Medications     - MEDICATION INSTRUCTIONS -       niCARdipine       sodium chloride         carvedilol  25 mg Oral BID w/meals     pantoprazole  40 mg Oral Daily     simvastatin  10 mg Oral At Bedtime     sodium chloride (PF)  3 mL Intracatheter Q8H

## 2022-11-05 NOTE — PROGRESS NOTES
11/05/22 1000   Appointment Info   Signing Clinician's Name / Credentials (PT) TEO Hill   Student Supervision Direct supervision provided;Direct Patient Contact Provided   Rehab Comments (PT) 1 unit gait, 1 unit TA   Living Environment   People in Home alone   Current Living Arrangements condominium   Home Accessibility stairs to enter home   Number of Stairs, Main Entrance greater than 10 stairs  (Full flight of stairs)   Stair Railings, Main Entrance railings on both sides of stairs   Transportation Anticipated family or friend will provide   Self-Care   Usual Activity Tolerance excellent   Current Activity Tolerance moderate   Regular Exercise Yes   Activity/Exercise Type walking   Exercise Amount/Frequency 30 mins   Equipment Currently Used at Home none   Fall history within last six months no   General Information   Onset of Illness/Injury or Date of Surgery 11/04/22   Referring Physician Donell Moore,    Pertinent History of Current Problem (include personal factors and/or comorbidities that impact the POC) Pt is a 81 year old female with a history of idiopathic cardiomyopathy, CAD, HTN, HLP who presented to the ED on 11/4/2022 with stroke symptoms due to acute onset dysarthria, left facial droop, and left arm and leg weakness.   Existing Precautions/Restrictions no known precautions/restrictions   General Observations Pt reports going to OP PT to address R ankle discomfort, pt did not c/o discomfort during session   Cognition   Affect/Mental Status (Cognition) WFL   Orientation Status (Cognition) oriented x 4   Follows Commands (Cognition) WNL   Safety Deficit (Cognition) impulsivity;ability to follow commands   Cognitive Status Comments Pt cognition status is WNL, although pt is a bit impulsive w/ mobility due to feeling so good.   Pain Assessment   Patient Currently in Pain No   Integumentary/Edema   Integumentary/Edema no deficits were identifed   Posture    Posture Forward head  position   Range of Motion (ROM)   Range of Motion ROM is WFL   Strength (Manual Muscle Testing)   Strength (Manual Muscle Testing) strength is WFL;Able to perform R SLR;Able to perform L SLR;No deficits observed during functional mobility  (Pt had strong  strength as well.)   Bed Mobility   Bed Mobility rolling right;scooting/bridging;supine-sit   Rolling Right Haakon (Bed Mobility) independent   Scooting/Bridging Haakon (Bed Mobility) independent   Supine-Sit Haakon (Bed Mobility) independent   Comment, (Bed Mobility) Pt completed bed mobility I w/ cueing from therapist.   Transfers   Comment, (Transfers) SBA   Sit-Stand Transfer   Comment, (Sit-Stand Transfer) SBA no AD   Gait/Stairs (Locomotion)   Distance in Feet (Gait) 170 ft   Comment, (Gait/Stairs) 3' eval no AD, no LOB, short step length, CGA   Balance   Balance other (describe)   Sensory Examination   Sensory Perception WNL   Coordination   Coordination no deficits were identified   Muscle Tone   Muscle Tone no deficits were identified   Clinical Impression   Criteria for Skilled Therapeutic Intervention Yes, treatment indicated   PT Diagnosis (PT) Impaired gait   Influenced by the following impairments Decreased balance, decreased functional activity tolerance   Functional limitations due to impairments Decreased gait distance   Clinical Presentation (PT Evaluation Complexity) Stable/Uncomplicated   Clinical Presentation Rationale Pt's deficits from the stroke have resolved and pt is feeling good.   Clinical Decision Making (Complexity) low complexity   Planned Therapy Interventions (PT) balance training;gait training;stair training;strengthening;progressive activity/exercise;home exercise program;neuromuscular re-education;patient/family education;transfer training;home program guidelines;risk factor education   Risk & Benefits of therapy have been explained patient;daughter;evaluation/treatment results reviewed   PT Total  Evaluation Time   PT Eval, Low Complexity Minutes (38891) 15   Physical Therapy Goals   PT Frequency Daily   PT Predicted Duration/Target Date for Goal Attainment 11/12/22   PT Goals Transfers;Gait;Stairs   PT: Transfers Independent   PT: Gait Independent;Greater than 200 feet   PT: Stairs Independent;Greater than 10 stairs;Rail on both sides   Interventions   Interventions Quick Adds Gait Training;Neuromuscular Re-ed;Therapeutic Activity   Therapeutic Activity   Therapeutic Activities: dynamic activities to improve functional performance Minutes (06176) 10   Symptoms Noted During/After Treatment None   Treatment Detail/Skilled Intervention Pt approached in supine on bed, agreeable to therapy. RN approved session. Pt's VS were monitored throughout session and remained stable, pt in afib during session which per discussion with RN is new onset since stroke. see vitals flowsheet for details. Room setup for pt to be up to chair for improved lung function and overall health. Pt initiated bed mobility independetly from supine > sit w/o cueing from therapist. Sit > stand completed x 2 SBA-CGA w/ minimal cues needed for line management. Pt completed toliet transfer w/ supervision, was able to complete pericares I w/o A. At end of session, pt sitting in high-back chair w/ daughter present in room and chair alarm on. Nursing was notified of VS and pt mobility. Pt has been going to OP PT to address R ankle pain, no pain in R ankle during session, recommend pt return to OP PT at [place]   Gait Training   Gait Training Minutes (14949) 20   Symptoms Noted During/After Treatment (Gait Training) shortness of breath;fatigue   Treatment Detail/Skilled Intervention Pt ambulated ~170 ft w/o AD and CGA. Reports of SOB about longterm, pt took a standing rest break and VS were monitored, pt in afib HRs 70-80's at rest, 100's - 129 bpm during ambulation.  No reports of lightheadedness or dizziness. Pt demonstrated slight drift to R side when  ambulating, was able to correct w/o cueing. Pt did slow when turning around and had a narrow MAGNUS where her feet almost crossed at times but did not have any LOB. Pt was cued to swing her arms for a more normalized gait pattern and to complete head turns side to side while maintaining gait. Pt was able to complete this w/o any LOB.   [place] Level (Gait Training) contact guard   Gait Analysis Deviations decreased velocity of limb motion;decreased step length   Impairments (Gait Analysis/Training) balance impaired   PT Discharge Planning   PT Plan Stairs, increase hallway gait distance   PT Discharge Recommendation (DC Rec) home;home with outpatient physical therapy   PT Rationale for DC Rec Pt's LUE/LLE deficits seem to have resolved and pt ambulating well, slight LOB and decreased functional activity tolerance compared to baseline, but once these increase pt will be safe to return to home and continue w/ outpatient PT. Pt previously going to OP PT to address R ankle pain/weakness, no pain during session. Recommend follow up with OP PT to continue to address and to address impaired functional activity tolerance   PT Brief overview of current status Bed mobility I, STS CGA, Amb ~170 ft CGA   Total Session Time   Timed Code Treatment Minutes 30   Total Session Time (sum of timed and untimed services) 45

## 2022-11-06 ENCOUNTER — APPOINTMENT (OUTPATIENT)
Dept: CT IMAGING | Facility: CLINIC | Age: 82
DRG: 062 | End: 2022-11-06
Attending: PHYSICIAN ASSISTANT
Payer: COMMERCIAL

## 2022-11-06 ENCOUNTER — APPOINTMENT (OUTPATIENT)
Dept: PHYSICAL THERAPY | Facility: CLINIC | Age: 82
DRG: 062 | End: 2022-11-06
Payer: COMMERCIAL

## 2022-11-06 LAB
ANION GAP SERPL CALCULATED.3IONS-SCNC: 6 MMOL/L (ref 3–14)
BUN SERPL-MCNC: 14 MG/DL (ref 7–30)
CALCIUM SERPL-MCNC: 9.1 MG/DL (ref 8.5–10.1)
CHLORIDE BLD-SCNC: 102 MMOL/L (ref 94–109)
CO2 SERPL-SCNC: 27 MMOL/L (ref 20–32)
CREAT SERPL-MCNC: 0.77 MG/DL (ref 0.52–1.04)
GFR SERPL CREATININE-BSD FRML MDRD: 78 ML/MIN/1.73M2
GLUCOSE BLD-MCNC: 147 MG/DL (ref 70–99)
MAGNESIUM SERPL-MCNC: 2.3 MG/DL (ref 1.6–2.3)
POTASSIUM BLD-SCNC: 3.6 MMOL/L (ref 3.4–5.3)
SODIUM SERPL-SCNC: 135 MMOL/L (ref 133–144)

## 2022-11-06 PROCEDURE — 250N000013 HC RX MED GY IP 250 OP 250 PS 637: Performed by: INTERNAL MEDICINE

## 2022-11-06 PROCEDURE — 250N000013 HC RX MED GY IP 250 OP 250 PS 637: Performed by: STUDENT IN AN ORGANIZED HEALTH CARE EDUCATION/TRAINING PROGRAM

## 2022-11-06 PROCEDURE — G0008 ADMIN INFLUENZA VIRUS VAC: HCPCS | Performed by: INTERNAL MEDICINE

## 2022-11-06 PROCEDURE — 999N000127 HC STATISTIC PERIPHERAL IV START W US GUIDANCE

## 2022-11-06 PROCEDURE — 90662 IIV NO PRSV INCREASED AG IM: CPT | Performed by: INTERNAL MEDICINE

## 2022-11-06 PROCEDURE — 99232 SBSQ HOSP IP/OBS MODERATE 35: CPT | Mod: FS | Performed by: PSYCHIATRY & NEUROLOGY

## 2022-11-06 PROCEDURE — 36415 COLL VENOUS BLD VENIPUNCTURE: CPT | Performed by: INTERNAL MEDICINE

## 2022-11-06 PROCEDURE — 250N000011 HC RX IP 250 OP 636: Performed by: INTERNAL MEDICINE

## 2022-11-06 PROCEDURE — 70496 CT ANGIOGRAPHY HEAD: CPT

## 2022-11-06 PROCEDURE — 70498 CT ANGIOGRAPHY NECK: CPT

## 2022-11-06 PROCEDURE — 83735 ASSAY OF MAGNESIUM: CPT | Performed by: INTERNAL MEDICINE

## 2022-11-06 PROCEDURE — 80048 BASIC METABOLIC PNL TOTAL CA: CPT | Performed by: INTERNAL MEDICINE

## 2022-11-06 PROCEDURE — 97116 GAIT TRAINING THERAPY: CPT | Mod: GP

## 2022-11-06 PROCEDURE — 99232 SBSQ HOSP IP/OBS MODERATE 35: CPT | Performed by: INTERNAL MEDICINE

## 2022-11-06 PROCEDURE — 120N000001 HC R&B MED SURG/OB

## 2022-11-06 PROCEDURE — 250N000009 HC RX 250: Performed by: INTERNAL MEDICINE

## 2022-11-06 RX ORDER — IOPAMIDOL 755 MG/ML
75 INJECTION, SOLUTION INTRAVASCULAR ONCE
Status: COMPLETED | OUTPATIENT
Start: 2022-11-06 | End: 2022-11-06

## 2022-11-06 RX ORDER — IRBESARTAN 150 MG/1
300 TABLET ORAL DAILY
Status: DISCONTINUED | OUTPATIENT
Start: 2022-11-06 | End: 2022-11-07 | Stop reason: HOSPADM

## 2022-11-06 RX ORDER — IRBESARTAN 150 MG/1
150 TABLET ORAL DAILY
Status: DISCONTINUED | OUTPATIENT
Start: 2022-11-06 | End: 2022-11-06

## 2022-11-06 RX ORDER — ASPIRIN 81 MG/1
81 TABLET, CHEWABLE ORAL DAILY
Status: DISCONTINUED | OUTPATIENT
Start: 2022-11-06 | End: 2022-11-06

## 2022-11-06 RX ORDER — ASPIRIN 81 MG/1
81 TABLET ORAL ONCE
Status: COMPLETED | OUTPATIENT
Start: 2022-11-06 | End: 2022-11-06

## 2022-11-06 RX ORDER — CARVEDILOL 25 MG/1
50 TABLET ORAL 2 TIMES DAILY WITH MEALS
Status: DISCONTINUED | OUTPATIENT
Start: 2022-11-06 | End: 2022-11-07 | Stop reason: HOSPADM

## 2022-11-06 RX ORDER — TERAZOSIN 5 MG/1
5 CAPSULE ORAL AT BEDTIME
Status: DISCONTINUED | OUTPATIENT
Start: 2022-11-06 | End: 2022-11-07 | Stop reason: HOSPADM

## 2022-11-06 RX ADMIN — SIMVASTATIN 10 MG: 10 TABLET, FILM COATED ORAL at 20:56

## 2022-11-06 RX ADMIN — TERAZOSIN HYDROCHLORIDE 5 MG: 5 CAPSULE ORAL at 20:56

## 2022-11-06 RX ADMIN — PANTOPRAZOLE SODIUM 40 MG: 40 TABLET, DELAYED RELEASE ORAL at 08:07

## 2022-11-06 RX ADMIN — CARVEDILOL 25 MG: 25 TABLET, FILM COATED ORAL at 08:07

## 2022-11-06 RX ADMIN — SODIUM CHLORIDE 100 ML: 900 INJECTION INTRAVENOUS at 14:39

## 2022-11-06 RX ADMIN — IOPAMIDOL 75 ML: 755 INJECTION, SOLUTION INTRAVENOUS at 14:39

## 2022-11-06 RX ADMIN — IRBESARTAN 300 MG: 150 TABLET ORAL at 21:36

## 2022-11-06 RX ADMIN — CARVEDILOL 50 MG: 25 TABLET, FILM COATED ORAL at 17:51

## 2022-11-06 RX ADMIN — INFLUENZA A VIRUS A/VICTORIA/2570/2019 IVR-215 (H1N1) ANTIGEN (FORMALDEHYDE INACTIVATED), INFLUENZA A VIRUS A/DARWIN/9/2021 SAN-010 (H3N2) ANTIGEN (FORMALDEHYDE INACTIVATED), INFLUENZA B VIRUS B/PHUKET/3073/2013 ANTIGEN (FORMALDEHYDE INACTIVATED), AND INFLUENZA B VIRUS B/MICHIGAN/01/2021 ANTIGEN (FORMALDEHYDE INACTIVATED) 0.7 ML: 60; 60; 60; 60 INJECTION, SUSPENSION INTRAMUSCULAR at 11:22

## 2022-11-06 RX ADMIN — ASPIRIN 81 MG: 81 TABLET, COATED ORAL at 01:02

## 2022-11-06 ASSESSMENT — ACTIVITIES OF DAILY LIVING (ADL)
ADLS_ACUITY_SCORE: 18

## 2022-11-06 NOTE — CONSULTS
Care Management Initial Consult    General Information  Assessment completed with: Patient (Step Daughter Rafaela), patient and stepdaughter  Type of CM/SW Visit: CM Role Introduction    Primary Care Provider verified and updated as needed: Yes   Readmission within the last 30 days: no previous admission in last 30 days      Reason for Consult: discharge planning  Advance Care Planning:            Communication Assessment  Patient's communication style: spoken language (English or Bilingual)    Hearing Difficulty or Deaf: no   Wear Glasses or Blind: no    Cognitive  Cognitive/Neuro/Behavioral: WDL  Level of Consciousness: alert  Arousal Level: opens eyes spontaneously  Orientation: oriented x 4  Mood/Behavior: calm, cooperative  Best Language: 0 - No aphasia  Speech: clear, spontaneous, logical    Living Environment:   People in home: alone     Current living Arrangements: condominium      Able to return to prior arrangements: yes       Family/Social Support:  Care provided by: self  Provides care for: no one  Marital Status: Single  Children          Description of Support System: Supportive, Involved    Support Assessment: Adequate family and caregiver support, Adequate social supports    Current Resources:   Patient receiving home care services: No (outpatient PT [facility])     Community Resources: Other (see comment)  Equipment currently used at home: none  Supplies currently used at home:      Employment/Financial:  Employment Status: retired        Financial Concerns: other (see comments) (patient is worried about her new Eliquis)   Referral to Financial Worker: No       Lifestyle & Psychosocial Needs:  Social Determinants of Health     Tobacco Use: Low Risk      Smoking Tobacco Use: Never     Smokeless Tobacco Use: Never     Passive Exposure: Not on file   Alcohol Use: Not on file   Financial Resource Strain: Not on file   Food Insecurity: Not on file   Transportation Needs: Not on file   Physical Activity:  Not on file   Stress: Not on file   Social Connections: Not on file   Intimate Partner Violence: Not on file   Depression: Not on file   Housing Stability: Not on file       Functional Status:  Prior to admission patient needed assistance: independent              Mental Health Status:          Chemical Dependency Status:                Values/Beliefs:  Spiritual, Cultural Beliefs, Jehovah's witness Practices, Values that affect care:                 Additional Information:  Writer met with patient and family Rafaela at the bedside.  Writer went over role and scope of safe discharge planning. Patient is A+Ox4 and feels she is back at baseline.  Patient stated that Neuro just rounded and was ordering a test and then most likely she would be discharge to home.  Writer discussed the following with patient and family  1. Patient will be started on Eliquis writer went over the cost of the medication per discharge RX ~47.00/month. Patient was encouraged to work with her insurance during Medicare Enrollment to make sure she is on the right plan with new medication recommendations.  She was also encouraged to have further discussions with her PCP and Neurology if cost would be an issue.  2. Patient is already undergoing outpatient PT at [facility] for right ankle issues, she is aware that we will put an outpatient referral for PT regarding this new diagnosis and she can bring the paperwork with her to [facility] for further eval and approvals.  3. Patient has PCP at [facility], her provider retired and she has been there but does not have a PCP assigned to her. She is aware that she will need to have a follow up with them in 1-2  Weeks regarding this admission (Writer is not able to schedule for this provider on the weekend and will put in discharge instructions for recommendations).  Patient aware that a referral has been placed for Neurology follow up in 6-8 weeks, again information on the discharge instructions for  calls if further questions.  4. Patient did ask that the Eliquis and any other medications be filled at our discharge RX due to the issues she has with [facility] (in [address]). Writer informed the patient and family that if the MD does not get the RX down our pharmacy may not be able to fill and we would ask that the bedside RN call the [facility] to confirm they have all medications available to the patient. Writer updated Charge regarding this.    Patient and family have no further questions at this time.   Care Management Discharge Note    Discharge Date: 11/06/2022       Discharge Disposition: Outpatient Rehab (PT, OT, SLP, Cardiac or Pulmonary)    Discharge Services: None    Discharge DME:  n/a    Discharge Transportation: family or friend will provide    Private pay costs discussed: Not applicable    PAS Confirmation Code:  n/a  Patient/family educated on Medicare website which has current facility and service quality ratings:  no  Education Provided on the Discharge Plan:yes    Persons Notified of Discharge Plans: Patient, family Rafaela at bedside, Charge RN  Patient/Family in Agreement with the Plan: yes    Handoff Referral Completed: Yes    Additional Information:  Please see above for initial consult.  Patient most likely cleared for discharge to home with PCP and Neuro follow up.      Etelvina Hamilton RN, BSN, ACM   Care Transitions Specialist  [facility]  Care Transitions Specialist  [id number] [id number]  [email or web address]  Office: [phone] Fax: [phone]  [facility]

## 2022-11-06 NOTE — PROGRESS NOTES
[facility]    Medicine Progress Note - Hospitalist Service    Date of Admission:  11/4/2022    Assessment & Plan   Janessa Melendez is a 81 year old female with a history of idiopathic cardiomyopathy, CAD, HTN, HLP who presented to the ED on 11/4/2022 with stroke symptoms.      Acute ischemic stroke in the distal right M1 territory   At approximately 12:45 today the patient was out to lunch with a friend when they noticed the patient had acute onset of slurred speech prompting them to call EMS.  Upon arrival here the patient still having some speech changes and was noted to have left facial droop and left sided weakness.  She was evaluated by neurology and after CT head and CTA head/neck decision was made to give tenecteplase.  On my evaluation patient's symptoms have improved significantly and family who was present at bedside noted no slurred speech or abnormalities but slight facial droop still present on exam   * CT Head:  1. No evidence of acute intracranial hemorrhage, mass, or herniation.  2. Mild nonspecific white matter changes likely due to chronic microvascular ischemic disease.  * CTA Head/Neck:  1.  Focal occlusion of the distal right M1 branch which also extends into several proximal right M2 branches. The M2 branches re-opacify, however, the MCA branches are overall decreased in number and caliber compared to the left MCA branches. This is concerning for thromboembolism.  2.  No other vascular cutoff of the proximal ACAs, left MCA, or PCAs.  3.  Patent arteries in the neck without evidence of dissection.  4.  Atherosclerotic disease at the carotid bifurcations bilaterally without significant stenosis.  MRI brain- Patchy areas of diffusion restriction/acute to early subacute ischemia in the right frontal operculum and insula. Additional tiny foci in each parietal lobe. No acute hemorrhage or mass effect. 2. Small cluster of chronic hemosiderin within the left parietal  white matter has developed since 2021.  - A1c 5.7, LDL 39  * f/u CTH- no hemorrhage, Multiple known acute infarcts scattered throughout the bilateral cerebral hemispheres. Volume loss and white matter hypoattenuation likely represent chronic small vessel ischemic change  * echo as below  - PTA simvastatin 10mg daily continued per neurology recommendations  - ASA started on 11/5-11/6, plan to start Apixaban 5 mg BID on 11/7,  pharmacy liaison consult placed  - CTA head and neck today per neurology to eval for interval improvement of R M1 occlusion  - f/u with neurology team in 6-8 weeks  - PT/OT/SLP--recommend OP PT  - appreciate neurology assistance       Newly found atrial fibrillation   H/o CAD  Idiopathic cardiomyopathy.  Not decompensated  HTN/HLP   Initially in the ED the patient was noted to be tachycardiac with heart rates in to the 120s and irregular.  EKG showed atrial fibrillation but rate much improved in to the 90s.  Since settling in patient  Last echocardiogram from 4/7/22 showed an EF of 40%.  On my evaluation patient does not appear to be fluid overloaded  * TTE- LVEF 40%, mild to moderate global hypokinesis of LV, no significant change from 4/7/22  - Increase Coreg back to  PTA dose 50mg BID   - restart PTA Avapro 300mg daily  - if stable bp resume diruetics and hydralazine  - will need cardiology follow up after discharge  - Apixaban being initiated as above     Thyroid nodules  Multiple thyroid nodules including a 2 cm nodule in the left thyroid gland which contains dystrophic calcification.   - Defer to outpatient PCP.  Recommend further evaluation with thyroid ultrasound on a nonemergent basis if this has not previously been performed       Diet: Regular Diet Adult    DVT Prophylaxis: Pneumatic Compression Devices  Davsi Catheter: Not present  Central Lines: None  Cardiac Monitoring: ACTIVE order. Indication: ICU  Code Status: Full Code      Disposition Plan      Expected Discharge Date:  11/06/2022      Destination: home          The patient's care was discussed with the Bedside Nurse, Patient and Patient's Family.    Pilar Vega MD  Hospitalist Service  [facility]  Securely message with the Vocera Web Console (learn more here)  Text page via upad Paging/Directory         Clinically Significant Risk Factors           ______________________________________________________________________    Interval History   Patient denies headache, nausea or vomiting. She reports she is doing fine. Afebrile.   States stable dyspnea with exertion    Data reviewed today: I reviewed all medications, new labs and imaging results over the last 24 hours. I personally reviewed the echo results and CT head results image(s) showing as above.    Physical Exam   Vital Signs: Temp: 98.3  F (36.8  C) Temp src: Oral BP: (!) 144/80 Pulse: 80   Resp: 18 SpO2: 96 % O2 Device: None (Room air)    Weight: 152 lbs 1.88 oz  General Appearance: Alert, awake and no apparent distress  Respiratory: clear to auscultation bilaterally, no wheezing  Cardiovascular: regular rate and rhythm  GI: soft and non-tender  Skin: warm and dry      Data   Recent Labs   Lab 11/06/22  0828 11/05/22  1133 11/05/22  0503 11/04/22  2257 11/04/22  1348   WBC  --   --  10.0  --  7.0   HGB  --   --  11.1*  --  11.5*   MCV  --   --  93  --  97   PLT  --   --  195  --  222   INR  --   --  1.11  --  1.03     --  138  --  137   POTASSIUM 3.6 4.0 3.4  --  3.3*   CHLORIDE 102  --  105  --  99   CO2 27  --  26  --  31   BUN 14  --  24  --  30   CR 0.77  --  0.77  --  0.96   ANIONGAP 6  --  7  --  7   PENNY 9.1  --  9.1  --  9.3   *  --  115* 104* 139*     Recent Results (from the past 24 hour(s))   CT Head w/o Contrast    Narrative    EXAM: CT HEAD W/O CONTRAST  LOCATION: [facility]  DATE/TIME: 11/5/2022 8:06 PM    INDICATION: Stroke. Follow up after IV thrombolysis.  COMPARISON: Head CT  11/04/2022.  TECHNIQUE: Routine CT Head without IV contrast. Multiplanar reformats. Dose reduction techniques were used.    IMPRESSION  No evidence of hemorrhage. Multiple known acute infarcts scattered throughout the bilateral cerebral hemispheres. Volume loss and white matter hypoattenuation likely represent chronic small vessel ischemic change. No acute osseous abnormality.     Medications     - MEDICATION INSTRUCTIONS -       sodium chloride         carvedilol  25 mg Oral BID w/meals     irbesartan  150 mg Oral Daily     pantoprazole  40 mg Oral Daily     simvastatin  10 mg Oral At Bedtime     sodium chloride (PF)  3 mL Intracatheter Q8H     terazosin  5 mg Oral At Bedtime

## 2022-11-06 NOTE — PLAN OF CARE
Reason for Admission: CVA- received TNK  Cognitive/Mentation: A/Ox 4  Neuros/CMS: Stable with generalized weakness. Slight L droop.   VS: VSS on RA, SBP goal <180.  Tele: Afib CVR  GI: BS audible, + flatus, no BM this shift. Continent.  : Voiding adequately. Continent.  Pulmonary: LS clear, dyspnea on exertion.   Pain: Denies  Drains/Lines: PIV SL  Skin: Intact  Activity: Up with SBA and GB  Diet: Regular diet with thin liquids. Takes pills whole.   Therapies recs: Pending  Discharge: Pending  Aggression Stoplight Tool: Green   End of shift summary: No changes this shift. CT done this shift. One time dose of aspirin given. Plan to start anticoagulants today.

## 2022-11-06 NOTE — PLAN OF CARE
Physical Therapy Discharge Summary    Reason for therapy discharge:    All goals and outcomes met, no further needs identified.    Progress towards therapy goal(s). See goals on Care Plan in [facility] electronic health record for goal details.  Goals met    Therapy recommendation(s):    Continued therapy is recommended.  Rationale/Recommendations:  Pt has met IP PT goals, recommend continue OP PT at DC. .

## 2022-11-06 NOTE — PROGRESS NOTES
[facility]    Stroke Progress Note    Interval EventsFeels symptom free today and has increased energy. Discussed that if she remains stable then can plan to start anticoagulation tomorrow.    HPI Summary  Janessa Melendez is a 81 year old female with pertinent past medical history of breast cancer, CAD, cardiomyopathy, hyperlipidemia, hypertension.  Not on PTA aspirin due to history of GERD with GI bleed >1 year ago.  On PTA simvastatin 10 mg daily.     She presented to [facility] emergency department 11/4/2022 due to acute onset dysarthria, left facial droop, and left arm and leg weakness.  CT/CTA showed distal R M1 branch occlusion extending to several proximal R M2 branches, concerning for thromboembolism. She received TNK and discussed with neuro IR , deemed not a candidate for thrombectomy. She was found to be in new atrial fibrillation. Admitted to ICU for post-TNK monitoring. MRI confirmed R MCA territory infarcts. She remained stable and follow-up 24 H CTH was stable without evidence of bleeding. She was started on ASA 81 mg daily on 11/5/22 and transferred out of ICU. Discussed risks/benefits of anticoagulation.       Stroke Evaluation Summarized     MRI/Head CT Repeat CTH 24 H post-TNK: no evidence of hemorrhage, no acute change  MRI: patchy acute/subacute R frontal operculum/insula and tiny b/l parietal ischemic infarcts, small cluster of chronic hemosiderin L parietal white matter new since 2021  CTH: negative for acute pathology, chronic SVID   Intracranial Vasculature CTA head: distal R M1 branch occlusion extending to several proximal R M2 branches, concerning for thromboembolism   Cervical Vasculature CTA neck: atherosclerotic disease b/l carotid bifurcations without significant stenosis, multiple thyroid nodules including 2 cm L thyroid nodule with dystrophic calcification      Echocardiogram TTE: mild LVH, EF 40%, mild-moderate global LV hypokinesis, LA  mod-severe dilation, RA normal   EKG/Telemetry Atrial fibrillation, LAD, low voltage QRS, incomplete RBBB, inferior infarct on or before 3/9/2018   Other Testing Not Applicable       LDL  11/4/2022: 39 mg/dL   A1C  11/4/2022: 5.7 %   Troponin 11/4/2022: 12 ng/L         Impression  R frontal operculum/insula and b/l parietal ischemic infarcts in setting of R M1 occlusion suspect cardioembolic from new atrial fibrillation, [id number] Score: 7     Multiple thyroid nodules including 2 cm L thyroid nodule with dystrophic calcification     History of GI bleeding > 1 year ago    Plan  -Discussed with vascular neurology attending, Maximino  -Neuro checks and vitals every 4 hours  -CTA head and neck today to look for interval improvement of R M1 occlusion  -blood pressure goal <130/80, recommend home monitoring twice daily in AM and PM, keep log and bring to f/u with PCP   -started ASA 81 mg daily on 11/5/22, placed pharmacy liaison consult for DOAC co-pay, if remains stable then plan to stop ASA tomorrow and switch to DOAC (preference to Eliquis 5 mg BID based on age 81, weight 69 kg, creatinine 0.77), will need to monitor closely for any signs of bleeding  -LDL 39, continue PTA simvastatin 10 mg daily, follow-up with PCP for titration to goal LDL 40-70, <40 increases risk of Intracranial hemorrhage  -Blood glucose monitoring, Hgb A1c 5.7%, prediabetes (at goal <7% for secondary stroke prevention), follow-up with PCP  -telemetry  -PT/OT/SPT   -Smoking screen: never  -Sleep Apnea screen: denies snoring, does feel excessive daytime sleepiness prior to stroke, recommended evaluation for sleep apnea  -Euthermia, euglycemia, eunatremia   -Stroke Education  -Stroke Class per [facility])  -defer to primary/outpatient PCP for further work-up of thyroid nodules     Patient Follow-up    -f/u with PCP in 1-2 weeks  -f/u with neurology team in 6-8 weeks (ordered)  -f/u with sleep medicine to evaluate for sleep apnea  "(ordered)    We will follow peripherally for results of CTA. If no concerns then we will sign off. From stroke standpoint then she could discharge today and stop ASA tomorrow and start Eliquis tomorrow as well. Would need to ensure affordable co-pay prior to starting. Please contact us with any concerns.    Emilie Denson PA-C  Vascular Neurology  To page me or covering stroke neurology team member, click here: [facility]   Choose \"On Call\" tab at top, then search dropdown box for \"Neurology Adult\", select location, press Enter, then look for stroke/neuro ICU/telestroke.    ______________________________________________________    Clinically Significant Risk Factors Present on Admission      Medications   Scheduled Meds    carvedilol  25 mg Oral BID w/meals     influenza vac high-dose quad  0.7 mL Intramuscular Prior to discharge     pantoprazole  40 mg Oral Daily     simvastatin  10 mg Oral At Bedtime     sodium chloride (PF)  3 mL Intracatheter Q8H       Infusion Meds    - MEDICATION INSTRUCTIONS -       sodium chloride         PRN Meds  hydrALAZINE, labetalol **OR** [DISCONTINUED] hydrALAZINE, lidocaine 4%, lidocaine (buffered or not buffered), - MEDICATION INSTRUCTIONS -, sodium chloride (PF), sodium chloride       PHYSICAL EXAMINATION  Temp:  [97.8  F (36.6  C)-98.9  F (37.2  C)] 98.8  F (37.1  C)  Pulse:  [] 64  Resp:  [11-34] 18  BP: ()/(62-88) 131/70  SpO2:  [90 %-97 %] 97 %      General Exam  General:  patient lying in bed without any acute distress    HEENT:  normocephalic/atraumatic  Pulmonary:  no respiratory distress    Neuro Exam  Mental Status:  alert, oriented x 3, follows commands, speech clear and fluent, naming and repetition normal  Cranial Nerves:  visual fields intact, PERRL, EOMI with normal smooth pursuit, facial sensation intact and symmetric, facial movements symmetric, hearing not formally tested but intact to conversation, tongue protrusion midline, mild dysarthria at " baseline  Motor:  normal muscle tone and bulk, no abnormal movements, able to move all limbs spontaneously, strength 5/5 throughout upper and lower extremities, no pronator drift  Reflexes:  toes down-going  Sensory:  light touch sensation intact and symmetric throughout upper and lower extremities, no extinction on double simultaneous stimulation   Coordination:  normal finger-to-nose and heel-to-shin bilaterally without dysmetria  Station/Gait:  deferred    Stroke Scales    NIHSS  1a. Level of Consciousness 0-->Alert, keenly responsive   1b. LOC Questions 0-->Answers both questions correctly   1c. LOC Commands 0-->Performs both tasks correctly   2.   Best Gaze 0-->Normal   3.   Visual 0-->No visual loss   4.   Facial Palsy 0-->Normal symmetrical movements   5a. Motor Arm, Left 0-->No drift, limb holds 90 (or 45) degrees for full 10 secs   5b. Motor Arm, Right 0-->No drift, limb holds 90 (or 45) degrees for full 10 secs   6a. Motor Leg, Left 0-->No drift, leg holds 30 degree position for full 5 secs   6b. Motor Leg, right 0-->No drift, leg holds 30 degree position for full 5 secs   7.   Limb Ataxia 0-->Absent   8.   Sensory 0-->Normal, no sensory loss   9.   Best Language 0-->No aphasia, normal   10. Dysarthria (S) 1-->Mild-to-moderate dysarthria, patient slurs at least some words and, at worst, can be understood with some difficulty (at her baseline)   11. Extinction and Inattention  0-->No abnormality   Total 1 (11/06/22 1233)       Modified [place] Score (Pre-morbid)  1 - No significant disability.  Able to carry out all usual activities, despite some symptoms.  Modified Bhupinder Score (Discharge)  (S) 2 (pending PT eval) - (S) Slight disability.  Able to look after own affairs without assistance, but unable to carry out all previous activities. (pending PT eval)    Imaging  I personally reviewed all imaging; relevant findings per HPI.     Lab Results Data   CBC  Recent Labs   Lab 11/05/22  7687 11/04/22  1305    WBC 10.0 7.0   RBC 3.61* 3.77*   HGB 11.1* 11.5*   HCT 33.7* 36.5    222     Basic Metabolic Panel    Recent Labs   Lab 11/05/22  1133 11/05/22  0503 11/04/22  2257 11/04/22  1348   NA  --  138  --  137   POTASSIUM 4.0 3.4  --  3.3*   CHLORIDE  --  105  --  99   CO2  --  26  --  31   BUN  --  24  --  30   CR  --  0.77  --  0.96   GLC  --  115* 104* 139*   PENNY  --  9.1  --  9.3     Liver Panel  No results for input(s): PROTTOTAL, ALBUMIN, BILITOTAL, ALKPHOS, AST, ALT, BILIDIRECT in the last 168 hours.  INR    Recent Labs   Lab Test 11/05/22  0503 11/04/22  1348   INR 1.11 1.03      Lipid Profile    Recent Labs   Lab Test 11/04/22  1348 04/05/22  1014 05/06/21  0926 11/12/18  0905 10/08/15  0744 08/29/14  0000   CHOL 129 136 133   < > 135 157   HDL 56 55 59   < > 59 59   LDL 39 54 54   < > 52* 73   TRIG 170* 134 101   < > 120 124   CHOLHDLRATIO  --   --   --   --  2.3 2.7    < > = values in this interval not displayed.     A1C    Recent Labs   Lab Test 11/04/22  1348   A1C 5.7*     Troponin    Recent Labs   Lab 11/04/22  1348   TROPONINIS 12       Billing: I have personally spent a total of 35 minutes providing care today, time spent in reviewing medical records and reviewing tests, examining the patient and obtaining history, coordination of care, and discussion with the patient and/or family regarding diagnostic results, prognosis, symptom management, risks and benefits of management options, and development of plan of care. Greater than 50% was spent in counseling and coordination of care.      nontender.../soft

## 2022-11-06 NOTE — PLAN OF CARE
Goal Outcome Evaluation:      Plan of Care Reviewed With: patient, child    Overall Patient Progress: improvingOverall Patient Progress: improving    Reason for Admission: R CVA- received TNK    Cognitive/Mentation: A/Ox 4.  Neuros/CMS: Intact ex slight L facial droop, generalized weakness.  VS: VSS. SBP goal<180.  Tele: ST w/ AFIB RVR.  GI: BS active, + flatus, last BM 11/6/2022. Continent.  : WDL. Continent.  Pulmonary: LS clear.  Pain: none.     Drains/Lines: 2x L PIV S.L.  Skin: Intact.  Activity: SBA with GB.  Diet: Regular with thin liquids. Takes pills whole.     Therapies recs: Home w/ outpatient PT 11/7  Discharge: Home 11/7    Aggression Stoplight Tool: GREEN    End of shift summary: No changes this shift. CT w/ contrast pending, please page neurostroke when complete.

## 2022-11-07 VITALS
WEIGHT: 152.12 LBS | HEART RATE: 101 BPM | DIASTOLIC BLOOD PRESSURE: 56 MMHG | BODY MASS INDEX: 30.67 KG/M2 | HEIGHT: 59 IN | OXYGEN SATURATION: 96 % | SYSTOLIC BLOOD PRESSURE: 121 MMHG | RESPIRATION RATE: 16 BRPM | TEMPERATURE: 98.5 F

## 2022-11-07 LAB
GLUCOSE BLDC GLUCOMTR-MCNC: 125 MG/DL (ref 70–99)
GLUCOSE BLDC GLUCOMTR-MCNC: 127 MG/DL (ref 70–99)

## 2022-11-07 PROCEDURE — 99239 HOSP IP/OBS DSCHRG MGMT >30: CPT | Performed by: INTERNAL MEDICINE

## 2022-11-07 PROCEDURE — 250N000013 HC RX MED GY IP 250 OP 250 PS 637: Performed by: INTERNAL MEDICINE

## 2022-11-07 RX ORDER — TORSEMIDE 10 MG/1
30 TABLET ORAL DAILY
Status: DISCONTINUED | OUTPATIENT
Start: 2022-11-07 | End: 2022-11-07 | Stop reason: HOSPADM

## 2022-11-07 RX ORDER — POTASSIUM CHLORIDE 750 MG/1
10 TABLET, EXTENDED RELEASE ORAL 2 TIMES DAILY
Status: DISCONTINUED | OUTPATIENT
Start: 2022-11-07 | End: 2022-11-07 | Stop reason: HOSPADM

## 2022-11-07 RX ORDER — POTASSIUM CHLORIDE 750 MG/1
10 CAPSULE, EXTENDED RELEASE ORAL 2 TIMES DAILY
Status: DISCONTINUED | OUTPATIENT
Start: 2022-11-07 | End: 2022-11-07 | Stop reason: RX

## 2022-11-07 RX ADMIN — PANTOPRAZOLE SODIUM 40 MG: 40 TABLET, DELAYED RELEASE ORAL at 09:22

## 2022-11-07 RX ADMIN — CARVEDILOL 50 MG: 25 TABLET, FILM COATED ORAL at 09:21

## 2022-11-07 RX ADMIN — APIXABAN 5 MG: 5 TABLET, FILM COATED ORAL at 09:22

## 2022-11-07 RX ADMIN — POTASSIUM CHLORIDE 10 MEQ: 750 TABLET, EXTENDED RELEASE ORAL at 12:54

## 2022-11-07 RX ADMIN — TORSEMIDE 30 MG: 10 TABLET ORAL at 12:54

## 2022-11-07 ASSESSMENT — ACTIVITIES OF DAILY LIVING (ADL)
ADLS_ACUITY_SCORE: 18

## 2022-11-07 NOTE — PROGRESS NOTES
Reason for Admission: CVA- received TNK  Cognitive/Mentation: A/Ox 4  Neuros/CMS: Stable with generalized weakness. Slight L droop.   VS: VSS on RA, SBP goal <180.  Tele: Afib CVR  GI: BS audible, + flatus, no BM this shift. Continent.  : Voiding adequately. Continent.  Pulmonary: LS clear, dyspnea on exertion.   Pain: Denies  Drains/Lines: PIV SL  Skin: Intact  Activity: Up with SBA and GB  Diet: Regular diet with thin liquids. Takes pills whole.   Therapies recs: Home  Discharge: Plan to discharge home today.   Aggression Stoplight Tool: Green   End of shift summary: No changes this shift. Stroke education planned for 1400.

## 2022-11-07 NOTE — PLAN OF CARE
Goal Outcome Evaluation:      Plan of Care Reviewed With: patient    Reason for Admission: Slurred speech, L weakness, stroke in the M1 territory    Cognitive/Mentation: A/Ox4  Neuros/CMS: Intact ex slight L facial droop  VS: Stable on RA  Tele: Afib/CVR  GI: BS active, no BM this shift  : Continent, adequate urine output  Pulmonary: LS clear - dyspnea with exertion  Pain: Denies    Drains/Lines: PIVx2, SL  Skin: Intact  Activity: SBA  Diet: Regular/thin    Therapies recs: Home with outpatient PT  Discharge: Home tomorrow 11/7    Aggression Stoplight Tool: Green    End of shift summary: Stable this shift - no changes. Pt will discharge home tomorrow.

## 2022-11-07 NOTE — CONSULTS
Patient has Medicare Advantage through [employer]    Xarelto/Eliquis  --Upon receipt of RX, Discharge Pharmacy can provide 1 mo free.  --Subsequent fills will be $47/mo.  This price will continue into 2023, until patient enters the coverage gap, at which point cost will increase to a 25% coinsurance.    Chio Holman  Pharmacy Technician/Liaison, Discharge Pharmacy   [phone] (voice or text)  [email or web address]

## 2022-11-07 NOTE — PROGRESS NOTES
CTA head and neck showed resolution of prior R M1/2 branch occlusions without significant residual stenoses. Further supporting thromboemboli. CTA again notes multinodular thyroid gland and also shows a R level 2A 1.7 cm lymphadenopathy, need to rule out metastatic disease. Could consider CT c/a/p while still inpatient and then referral for outpatient follow-up as needed. No changes to recommendations as outlined in stroke sign off note yesterday. She has been started on Eliquis. Please call with any additional questions.

## 2022-11-07 NOTE — PROVIDER NOTIFICATION
Paged Hospitalist re: lab work.  Due for potassium and mg recheck per protocol, will order for 1500 and await advisement on any other labs needed.

## 2022-11-07 NOTE — PROVIDER NOTIFICATION
Paged Hospitalist re: iv access.  loss of iv access, currently on telemetry, do you want tele continued, will need new iv placed, patient hesitant, will await advisement and plan.     MD paged right back, holding off for now on another IV, MD waiting to hear back from oncology.     Ok to discontinue telemetry per MD text page verified.

## 2022-11-07 NOTE — DISCHARGE SUMMARY
[facility]  Hospitalist Discharge Summary      Date of Admission:  11/4/2022  Date of Discharge:  11/7/2022  Discharging Provider: Pilar Vega MD  Discharge Service: Hospitalist Service    Discharge Diagnoses   See below    Follow-ups Needed After Discharge   Follow-up Appointments     Follow-up and recommended labs and tests       Follow up with primary care provider, [facility], within   7-14 days for hospital follow- up.  No follow up labs or test are needed.  Follow up with Cardiologist regarding new diagnosis of atrial   fibrillation.   Follow up with Dr. Holder regarding the thyroid and lymph node in the neck.   His office will call you to arrange appointment for next week.             Unresulted Labs Ordered in the Past 30 Days of this Admission     No orders found from 10/5/2022 to 11/5/2022.          Discharge Disposition   Discharged to home  Condition at discharge: Stable  Patient ready to discharge to a skilled nursing facility as soon as possible in order to create capacity for patients related to the COVID-19 pandemic.    Hospital Course   Janessa Melendez is a 81 year old female with a history of idiopathic cardiomyopathy, CAD, HTN, HLP who presented to the ED on 11/4/2022 with stroke symptoms.      Acute ischemic stroke in the distal right M1 territory   At approximately 12:45 today the patient was out to lunch with a friend when they noticed the patient had acute onset of slurred speech prompting them to call EMS.  Upon arrival here the patient still having some speech changes and was noted to have left facial droop and left sided weakness.  She was evaluated by neurology and after CT head and CTA head/neck decision was made to give tenecteplase.  On my evaluation patient's symptoms have improved significantly and family who was present at bedside noted no slurred speech or abnormalities but slight facial droop still present on exam   * CT Head:   1. No evidence of acute intracranial hemorrhage, mass, or herniation.  2. Mild nonspecific white matter changes likely due to chronic microvascular ischemic disease.  * CTA Head/Neck:  1.  Focal occlusion of the distal right M1 branch which also extends into several proximal right M2 branches. The M2 branches re-opacify, however, the MCA branches are overall decreased in number and caliber compared to the left MCA branches. This is concerning for thromboembolism.  2.  No other vascular cutoff of the proximal ACAs, left MCA, or PCAs.  3.  Patent arteries in the neck without evidence of dissection.  4.  Atherosclerotic disease at the carotid bifurcations bilaterally without significant stenosis.  MRI brain- Patchy areas of diffusion restriction/acute to early subacute ischemia in the right frontal operculum and insula. Additional tiny foci in each parietal lobe. No acute hemorrhage or mass effect. 2. Small cluster of chronic hemosiderin within the left parietal white matter has developed since 2021.  - A1c 5.7, LDL 39  * f/u CTH- no hemorrhage, Multiple known acute infarcts scattered throughout the bilateral cerebral hemispheres. Volume loss and white matter hypoattenuation likely represent chronic small vessel ischemic change  * echo as below  * repeat CTA head/neck-  Resolution of multiple right-sided M1/M2 branch occlusions without significant residual thromboemboli or stenoses identified. 2.  No evidence of large vessel occlusion or high-grade stenosis. See below regarding incidental finding on CTA neck    - PTA simvastatin 10mg daily continued per neurology recommendations  - ASA started on 11/5-11/6, transitioned to Apixaban 5 mg BID on 11/7, will continue at discharge  - f/u with neurology team in 6-8 weeks and neurology also made referral for sleep study  - PT/OT/SLP--recommend OP PT  - BP medications as bleow  - discharge plan discussed with patient and daughter at bedside.        Newly found atrial  fibrillation   H/o CAD  Idiopathic cardiomyopathy.  Not decompensated  HTN/HLP   Initially in the ED the patient was noted to be tachycardiac with heart rates in to the 120s and irregular.  EKG showed atrial fibrillation but rate much improved in to the 90s.  Since settling in patient  Last echocardiogram from 4/7/22 showed an EF of 40%.  On my evaluation patient does not appear to be fluid overloaded  * TTE- LVEF 40%, mild to moderate global hypokinesis of LV, no significant change from 4/7/22  - continue with PTA Coreg 50mg BID, Avapro 300mg daily and Torsemide with K supplement  - PTA Hydralazine resumed at discharge  - Cardiology clinic referral for [facility] follow up made given new A.germán. Patient follows up with Dr. Kumar Connors (last seen in clinic 4/2022)  - Apixaban initiated in hospital as above     Incidental findings  Thyroid nodules  Enlarged lymphnode  CTA on 11/4- Multiple thyroid nodules including a 2 cm nodule in the left thyroid gland which contains dystrophic calcification.   CTA on 11/6- 2. Heterogeneous multinodular thyroid gland as previously described. 3.  Abnormal heterogeneous right level 2A lymph node measuring up to 1.7 cm. This could be related to metastatic lymphadenopathy or granulomatous process. Further workup/follow-up would be typically recommended.  - discussed above finding with patient and her daughter at bedside. Per request, I reached out to [facility]-f/u with Dr. Holder. Dr. Holder not in office today. Discussed with [facility] Portillo who also discussed with rounding attending. Patient initially wanted to do imaging while in hospital since she already had an IV access, however while discussion was ongoing, she lost IV access. Discussed with pt/dtr regarding establishing another IV access and obtain CT c/a/p prior to discharge then f/u with Dr. Holder next week vs have the imaging/follow up as outpatient with Dr. Holder next week. Since it is not clear what time CT could be completed today  after establishing IV access, she elected to do as OP next week.   - notified Lily [facility], the clinic will arrange follow up imaging and clinic visit with Dr. Holder for next week        Consultations This Hospital Stay   PATIENT [facility] CENTER IP CONSULT  NEUROLOGY IP STROKE CONSULT  SPEECH LANGUAGE PATH ADULT IP CONSULT  PHARMACY IP CONSULT  PHARMACY IP CONSULT  PHARMACY IP CONSULT  PHYSICAL THERAPY ADULT IP CONSULT  OCCUPATIONAL THERAPY ADULT IP CONSULT  REHAB ADMISSIONS LIAISON IP CONSULT  CARE MANAGEMENT / SOCIAL WORK IP CONSULT  PHARMACY LIAISON FOR MEDICATION COVERAGE CONSULT  VASCULAR ACCESS ADULT IP CONSULT  VASCULAR ACCESS ADULT IP CONSULT  SMOKING CESSATION PROGRAM IP CONSULT    Code Status   Full Code    Time Spent on this Encounter   I, Pilar Vega MD, personally saw the patient today and spent 40 minutes discharging this patient.       Pilar Vega MD  [facility] NEUROSCIENCE UNIT  [address] [place] [address]  Phone: [phone]  ______________________________________________________________________    Physical Exam   Vital Signs: Temp: 98.5  F (36.9  C) Temp src: Oral BP: 121/56 Pulse: 101   Resp: 16 SpO2: 96 % O2 Device: None (Room air)    Weight: 152 lbs 1.88 oz  General Appearance: Alert, awake and no apparent distress  Respiratory: CTAB, no wheezing  Cardiovascular: irregularly irregular  GI: soft and non-tender  Skin: warm and dry         Primary Care Physician   [facility]    Discharge Orders      Adult Sleep Eval & Management  Referral      Physical Therapy Referral      Follow-Up with Cardiology MAR[facility]      Reason for your hospital stay    You were admitted to the hospital for stroke.     Follow-up and recommended labs and tests     Follow up with primary care provider, [facility], within 7-14 days for hospital follow- up.  No follow up labs or test are needed.  Follow up with Cardiologist regarding new diagnosis of  atrial fibrillation.   Follow up with Dr. Holder regarding the thyroid and lymph node in the neck. His office will call you to arrange appointment for next week.     Activity    Your activity upon discharge: activity as tolerated     Diet    Follow this diet upon discharge: Orders Placed This Encounter      Regular Diet Adult     Stroke Hospital Follow [facility]    [facility] as prescribed by your provider. If you don t hear from a representative within 2 business days, please call [phone].         Significant Results and Procedures   Results for orders placed or performed during the hospital encounter of 11/04/22   CT Head w/o Contrast    Narrative    CT SCAN OF THE HEAD WITHOUT CONTRAST November 4, 2022 1:46 PM     HISTORY: Code stroke. Difficulty speaking.    TECHNIQUE: Axial images of the head and coronal reformations without  IV contrast material. Radiation dose for this scan was reduced using  automated exposure control, adjustment of the mA and/or kV according  to patient size, or iterative reconstruction technique.    COMPARISON: None.    FINDINGS: There is no evidence of intracranial hemorrhage, mass, acute  infarct or anomaly. The ventricles are normal in size, shape and  configuration. Mild patchy periventricular white matter hypodensities  which are nonspecific, but likely related to chronic microvascular  ischemic disease.     The visualized portions of the sinuses and mastoids appear normal. The  bony calvarium and bones of the skull base appear intact.       Impression    IMPRESSION:     1. No evidence of acute intracranial hemorrhage, mass, or herniation.  2. Mild nonspecific white matter changes likely due to chronic  microvascular ischemic disease.      SUGEY GARCIAS MD         SYSTEM ID:  [id number]   CTA Head Neck w Contrast    Narrative    CT ANGIOGRAM OF THE HEAD AND NECK WITH CONTRAST  11/4/2022 1:47 PM     HISTORY: Code stroke. Difficulty  speaking.    TECHNIQUE: CT angiography with an injection of 75 mL Isovue-370 IV  with scans through the head and neck. Images were transferred to a  separate 3-D workstation where multiplanar reformations and 3-D images  were created. Estimates of carotid stenoses are made relative to the  distal internal carotid artery diameters except as noted. Radiation  dose for this scan was reduced using automated exposure control,  adjustment of the mA and/or kV according to patient size, or iterative  reconstruction technique.    COMPARISON: None.     CT HEAD FINDINGS: No contrast enhancing lesions. Cerebral blood flow  is grossly normal.     CT ANGIOGRAM HEAD FINDINGS:  Focal occlusion of the distal right M1  branch also involving several proximal M2 branches. More distal M2  branches appear to be re-opacified, although they are overall  decreased in number and caliber compared to the left MCA.    No other vascular cutoff of the proximal ACAs, right MCA, or PCAs. No  significant intracranial stenosis or aneurysm.     CT ANGIOGRAM NECK FINDINGS:   Normal origin of the great vessels from the aortic arch.     Right carotid artery: The right common and internal carotid arteries  are patent. Atherosclerotic disease at the carotid bifurcation  extending into the external carotid artery. No significant stenosis of  the internal carotid artery.     Left carotid artery: The left common and internal carotid arteries are  patent. Mild atherosclerotic disease at the carotid bifurcation  without stenosis. 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    Vertebral arteries: Vertebral arteries are patent without evidence of  dissection. No significant stenosis.     Other findings: Marked multilevel degenerative changes in the spine.  Several thyroid gland nodules including a nodule in the left thyroid  gland measuring up to 2 cm with dystrophic calcifications.      Impression    IMPRESSION:    1.  Focal occlusion of the  distal right M1 branch which also extends  into several proximal right M2 branches. The M2 branches re-opacify,  however, the MCA branches are overall decreased in number and caliber  compared to the left MCA branches. This is concerning for  thromboembolism.  2.  No other vascular cutoff of the proximal ACAs, left MCA, or PCAs.  3.  Patent arteries in the neck without evidence of dissection.  4.  Atherosclerotic disease at the carotid bifurcations bilaterally  without significant stenosis.  5.  Multiple thyroid nodules including a 2 cm nodule in the left  thyroid gland which contains dystrophic calcification. Recommend  further evaluation with thyroid ultrasound on a nonemergent basis if  this has not previously been performed.    Results discussed with Bre Morrison at 1:59 PM on 11/4/2022.     SUGEY GARCIAS MD         SYSTEM ID:  [id number]   MR Brain w/o Contrast    Narrative    EXAM: MR BRAIN WITHOUT CONTRAST  LOCATION: [facility]  DATE/TIME: 11/05/2022, 4:35 AM    INDICATION: Acute ischemic stroke.  COMPARISON: 11/04/2022 CT. Brain MRI 01/27/2021.  TECHNIQUE: Routine multiplanar multisequence head MRI without intravenous contrast.    FINDINGS:  INTRACRANIAL CONTENTS: Small foci of diffusion restriction, one each in the parietal lobes compatible with acute to early subacute ischemia. Patchy areas of cortical diffusion restriction in the right frontal operculum and insula with acute to early   subacute ischemia. No acute hemorrhage or mass effect. Small cluster of chronic hemosiderin within the left lobe has developed since 01/27/2021. No mass, acute hemorrhage, or extra-axial fluid collections. Normal position of the cerebellar tonsils.     SELLA: No abnormality accounting for technique.    OSSEOUS STRUCTURES/SOFT TISSUES: Normal marrow signal. The major intracranial vascular flow-voids are maintained.     ORBITS: No abnormality accounting for technique.     SINUSES/MASTOIDS: No paranasal  sinus mucosal disease. No middle ear or mastoid effusion.       Impression    IMPRESSION:  1.  Patchy areas of diffusion restriction/acute to early subacute ischemia in the right frontal operculum and insula. Additional tiny foci in each parietal lobe. No acute hemorrhage or mass effect.    2.  Small cluster of chronic hemosiderin within the left parietal white matter has developed since 2021.     CT Head w/o Contrast    Narrative    EXAM: CT HEAD W/O CONTRAST  LOCATION: [facility]  DATE/TIME: 11/5/2022 8:06 PM    INDICATION: Stroke. Follow up after IV thrombolysis.  COMPARISON: Head CT 11/04/2022.  TECHNIQUE: Routine CT Head without IV contrast. Multiplanar reformats. Dose reduction techniques were used.    IMPRESSION  No evidence of hemorrhage. Multiple known acute infarcts scattered throughout the bilateral cerebral hemispheres. Volume loss and white matter hypoattenuation likely represent chronic small vessel ischemic change. No acute osseous abnormality.   CTA Head Neck with Contrast    Narrative    EXAM: CTA HEAD NECK W CONTRAST  LOCATION: [facility]  DATE/TIME: 11/6/2022 2:53 PM    INDICATION: Evaluate for interval improvement of right MCA occlusion.  COMPARISON: CTA of the head and neck 11/04/2022.  CONTRAST: 75 mL Isovue 370.      TECHNIQUE: Axial helical CT images of the head and neck vessels obtained during the arterial phase of intravenous contrast administration. Axial 2D reconstructed images and multiplanar 3D MIP reconstructed images of the head and neck vessels were   performed by the technologist. Dose reduction techniques were used. All stenosis measurements made according to NASCET criteria unless otherwise specified.    FINDINGS:     HEAD CTA:  Resolution of multiple right-sided M1/M2 branch occlusions without significant residual thromboemboli or stenoses identified. No evidence of large vessel occlusion or high-grade stenosis. No evidence of  aneurysm or vascular malformation. Dural venous   sinuses are unremarkable.    NECK CTA:  No evidence of large vessel occlusion, high-grade stenosis, or dissection.    NONVASCULAR STRUCTURES:   Cervical spine degenerative changes with multiple stenoses. Heterogeneous multinodular thyroid gland as previously described. Multiple nonspecific apical pulmonary nodules/opacities. Abnormal heterogeneously attenuating right level 2A lymph node   measuring up to 1.7 cm, unchanged. Incidental right paratracheal air cyst.      Impression    IMPRESSION:     HEAD CTA:   1.  Resolution of multiple right-sided M1/M2 branch occlusions without significant residual thromboemboli or stenoses identified.  2.  No evidence of large vessel occlusion or high-grade stenosis.    NECK CTA:  1.  No evidence of large vessel occlusion or high-grade stenosis.  2.  Heterogeneous multinodular thyroid gland as previously described.  3.  Abnormal heterogeneous right level 2A lymph node measuring up to 1.7 cm. This could be related to metastatic lymphadenopathy or granulomatous process. Further workup/follow-up would be typically recommended.   Echocardiogram Complete     Value    LVEF  40%    Narrative    [id number]  [id number]  [id number]  [id number]^HAILEY^ARELY^KRYSTIAN     [facility]  Echocardiography Laboratory  [facility]  [place] [address]  Phone [phone]  Fax [phone]     Name: MAI SALDAÑA  MRN: [id number]  : 1940  Study Date: 2022 09:03 AM  Age: 81 yrs  Gender: Female  Patient Location: [facility]  Reason For Study: CVA  Ordering Physician: ARELY HART  Referring Physician: ARELY HART  Performed By: WALDO Barreto     BSA: 1.6 m2  Height: 59 in  Weight: 152 lb  HR: 85  BP: 142/71 mmHg  ______________________________________________________________________________  Procedure  Complete Portable Echo Adult. Optison  (NDC #[id number]) given intravenously.     ______________________________________________________________________________  Interpretation Summary     Left ventricular systolic function is moderately reduced.The visual ejection  fraction is estimated at 40%.There is mild-moderate global hypokinesia of the  left ventricle.  The right ventricular systolic function is normal.  The left atrium is moderate to severely dilated.  No significant valvular stenosis or regurgitation on doppler interrogation.     On direct comparison to echo images dated 04/07/2022 no significant changes.  ______________________________________________________________________________  Left Ventricle  The left ventricle is normal in size. There is mild concentric left  ventricular hypertrophy. Diastolic Doppler findings (E/E' ratio and/or other  parameters) suggest left ventricular filling pressures are increased. Left  ventricular systolic function is moderately reduced. The visual ejection  fraction is estimated at 40%. There is mild-moderate global hypokinesia of the  left ventricle.     Right Ventricle  The right ventricle is normal size. The right ventricular systolic function is  normal.     Atria  The left atrium is moderate to severely dilated. Right atrial size is normal.     Mitral Valve  There is trace mitral regurgitation.     Tricuspid Valve  There is trace tricuspid regurgitation. Right ventricular systolic pressure  could not be approximated due to inadequate tricuspid regurgitation.     Aortic Valve  The aortic valve is trileaflet. There is trace aortic regurgitation. No aortic  stenosis is present.     Pulmonic Valve  There is trace pulmonic valvular regurgitation.     Vessels  The aortic root is normal size. Normal size ascending aorta. Dilation of the  inferior vena cava is present with normal respiratory variation in diameter.     Pericardium  There is no pericardial effusion.     Rhythm  The rhythm was atrial fibrillation.      ______________________________________________________________________________  MMode/2D Measurements & Calculations  IVSd: 1.1 cm  LVIDd: 4.4 cm  LVIDs: 3.8 cm  LVPWd: 1.0 cm  FS: 14.7 %  LV mass(C)d: 161.6 grams  LV mass(C)dI: 98.5 grams/m2  Ao root diam: 3.0 cm  LA dimension: 4.9 cm     asc Aorta Diam: 3.4 cm  LA/Ao: 1.7  LVOT diam: 1.8 cm  LVOT area: 2.5 cm2  LA Volume (BP): 80.0 ml  LA Volume Index (BP): 48.8 ml/m2     LA Volume Indexed (AL/bp): 50.6 ml/m2  RWT: 0.46     Doppler Measurements & Calculations  MV E max dom: 82.7 cm/sec  MV dec time: 0.20 sec  PA acc time: 0.07 sec  E/E' av.3  Lateral E/e': 12.2  Medial E/e': 14.4     ______________________________________________________________________________  Report approved by: Maria Isabel Gerber 2022 04:09 PM               Discharge Medications   Current Discharge Medication List      START taking these medications    Details   apixaban ANTICOAGULANT (ELIQUIS) 5 MG tablet Take 1 tablet (5 mg) by mouth 2 times daily for 30 days  Qty: 60 tablet, Refills: 0    Comments: Future refills by PCP Dr. [facility] with phone number None.  Associated Diagnoses: Atrial fibrillation, unspecified type (H)         CONTINUE these medications which have NOT CHANGED    Details   Acetaminophen (TYLENOL PO) Take 500 mg by mouth every 6 hours as needed for mild pain or fever       carvedilol (COREG) 25 MG tablet Take 2 tablets (50 mg) by mouth 2 times daily  Qty: 360 tablet, Refills: 3    Associated Diagnoses: Dilated cardiomyopathy (H); Benign essential hypertension      cholecalciferol (VITAMIN D3) 25 mcg (1000 units) capsule Take 1 capsule by mouth daily      hydrALAZINE (APRESOLINE) 50 MG tablet Take 1 tablet (50 mg) by mouth 2 times daily  Qty: 90 tablet, Refills: 4    Associated Diagnoses: Essential hypertension, benign      irbesartan (AVAPRO) 300 MG tablet Take 1 tablet (300 mg) by mouth daily  Qty: 90 tablet, Refills: 3    Associated Diagnoses:  Essential hypertension      omeprazole (PRILOSEC) 20 MG DR capsule Take 20 mg by mouth daily      simvastatin (ZOCOR) 10 MG tablet Take 1 tablet (10 mg) by mouth At Bedtime  Qty: 90 tablet, Refills: 3    Associated Diagnoses: Pure hypercholesterolemia      torsemide (DEMADEX) 10 MG tablet Take 3 tablets (30 mg) by mouth daily  Qty: 270 tablet, Refills: 2    Associated Diagnoses: Dilated cardiomyopathy (H); Cardiomyopathy, unspecified type (H)      amoxicillin (AMOXIL) 500 MG capsule TAKE 4 CAPSULES BY MOUTH 1 HOUR BEFORE DENTAL APPOINTMENT      potassium chloride ER (K-TAB/KLOR-CON) 10 MEQ CR tablet Take 1 tablet (10 mEq) by mouth 2 times daily And as needed as directed.  Qty: 180 tablet, Refills: 3    Associated Diagnoses: Benign essential hypertension      terazosin (HYTRIN) 5 MG capsule Take 1 capsule (5 mg) by mouth At Bedtime  Qty: 90 capsule, Refills: 3    Associated Diagnoses: Benign essential hypertension           Allergies   Allergies   Allergen Reactions     Amlodipine      swelling     Aspirin Other (See Comments)     Bleeding              Bleeding, GI Lesion         Codeine Sulfate GI Disturbance

## 2022-11-07 NOTE — PLAN OF CARE
Goal Outcome Evaluation:         Stroke, status post TNK/thyroid nodules needing followup. Neuros/CMS - alert & oriented x 4, following commands, letting her needs be known, generalized weakness; left facial droop improved/slight. VSS, RA oxygenation. No dizziness, baseline shortness of breath at rest/no cough noted. Tele discontinued. Regular diet/meds whole with water/fair appetite. Continent of bladder/bm today. Denies pain. Pt scoring green on the Aggression Stop Light Tool. Discharging home this afternoon via daughter assist.  Patient and daughter agree with plan.  RX filled here at [facility] and locked in med cabinet. AVS will be reviewed & printed for patient.  Stroke education set up at 1400 via I-pad. Awaiting discharge orders.

## 2022-11-07 NOTE — CONSULTS
Stroke Education Note    The following information has been reviewed with the patient and family:    1. Warning signs of stroke    2. Calling 911 if having warning signs of stroke    3. All modifiable risk factors: hypertension, CAD, atrial fib, diabetes, hypercholesterolemia, smoking, substance abuse, diet, physical inactivity, obesity, sleep apnea.    4. Patient's risk factors for stroke which include: CAD, cardiomyopathy, HLD, HTN, new dx a. fib    5. Follow-up plan for after discharge    6. Discharge medications which include: Eliquis, simvastatin, carvedilol, irbesartan, torsemide, terazosin    In addition, the above information was given to the patient and family in writing as a part of the [facility] Stroke Class Handout.    Learner's response to risk factors / lifestyle modification education: Activation     Ladan Lopez RN

## 2022-11-21 ENCOUNTER — HOSPITAL ENCOUNTER (OUTPATIENT)
Dept: CT IMAGING | Facility: CLINIC | Age: 82
Discharge: HOME | End: 2022-11-21
Attending: NURSE PRACTITIONER | Admitting: NURSE PRACTITIONER
Payer: COMMERCIAL

## 2022-11-21 DIAGNOSIS — D37.8 NEOPLASM OF UNCERTAIN BEHAVIOR OF STOMACH, INTESTINES, AND RECTUM: ICD-10-CM

## 2022-11-21 DIAGNOSIS — D37.5 NEOPLASM OF UNCERTAIN BEHAVIOR OF STOMACH, INTESTINES, AND RECTUM: ICD-10-CM

## 2022-11-21 DIAGNOSIS — D37.1 NEOPLASM OF UNCERTAIN BEHAVIOR OF STOMACH, INTESTINES, AND RECTUM: ICD-10-CM

## 2022-11-21 PROCEDURE — 250N000009 HC RX 250: Performed by: NURSE PRACTITIONER

## 2022-11-21 PROCEDURE — 74177 CT ABD & PELVIS W/CONTRAST: CPT

## 2022-11-21 PROCEDURE — 250N000011 HC RX IP 250 OP 636: Performed by: NURSE PRACTITIONER

## 2022-11-21 RX ORDER — IOPAMIDOL 755 MG/ML
75 INJECTION, SOLUTION INTRAVASCULAR ONCE
Status: COMPLETED | OUTPATIENT
Start: 2022-11-21 | End: 2022-11-21

## 2022-11-21 RX ADMIN — IOPAMIDOL 75 ML: 755 INJECTION, SOLUTION INTRAVENOUS at 11:20

## 2022-11-21 RX ADMIN — SODIUM CHLORIDE 61 ML: 9 INJECTION, SOLUTION INTRAVENOUS at 11:20

## 2022-12-19 ENCOUNTER — ANCILLARY PROCEDURE (OUTPATIENT)
Dept: ULTRASOUND IMAGING | Facility: CLINIC | Age: 82
End: 2022-12-19
Attending: INTERNAL MEDICINE
Payer: COMMERCIAL

## 2022-12-19 DIAGNOSIS — E04.2 MULTINODULAR GOITER: ICD-10-CM

## 2022-12-19 PROCEDURE — 76536 US EXAM OF HEAD AND NECK: CPT

## 2023-01-23 ENCOUNTER — PATIENT OUTREACH (OUTPATIENT)
Dept: NEUROLOGY | Facility: CLINIC | Age: 83
End: 2023-01-23
Payer: COMMERCIAL

## 2023-01-23 NOTE — PROGRESS NOTES
Stroke RN Care Coordination - 90 Day Modified Montrose Note     SITUATION     Janessa Melendez is a 82 year old female who is receiving support for:  Stroke (90 Day mRS)    BACKGROUND     Janessa Melendez is a 81 year old female with pertinent past medical history of breast cancer, CAD, cardiomyopathy, hyperlipidemia, hypertension.  Not on PTA aspirin due to history of GERD with GI bleed >1 year ago.  On PTA simvastatin 10 mg daily.     She presented to [facility] emergency department 11/4/2022 due to acute onset dysarthria, left facial droop, and left arm and leg weakness.  CT/CTA showed distal R M1 branch occlusion extending to several proximal R M2 branches, concerning for thromboembolism. She received TNK and discussed with neuro IR , deemed not a candidate for thrombectomy. She was found to be in new atrial fibrillation. Admitted to ICU for post-TNK monitoring. MRI confirmed R MCA territory infarcts. She remained stable and follow-up 24 H CTH was stable without evidence of bleeding. She was started on ASA 81 mg daily on 11/5/22 and transferred out of ICU. Discussed risks/benefits of anticoagulation.    Last Recorded Modified [place]rose Scale  Interval: Discharge  Score: 2-Slight disability; unable to carry out all previous activities, but able to look after own affairs    ASSESSMENT     Spoke with pt directly and she states that she's doing well since her stroke. She does not have any residual stroke related deficits per her report.    Current Modified Bhupinder Scale (90 Day)  Modified [place]rose Score - 90 Day: 0 (1/23/2023  1:42 PM)     01/23/23 1342   Simplified Modified [place] Scale Questionnaire (smRSq)   Could you live alone without help from another person?  This means being able to bathe, use the toilet, shop, prepare or get meals and manage finances. Yes   Can you do everything you were doing right before your stroke, even if slower and not as much? Yes   Are you completely back to the way you  were right before your stroke? Yes, score is 0   Modified [place] Score - 90 Day 0     PLAN     Follow-up plan:  No further planned outreach at this time.    Mely Patten BS, RN, SCRN  RN Stroke Neurology Care Coordinator  [facility] Neuroscience Service Line

## 2023-02-20 ENCOUNTER — LAB (OUTPATIENT)
Dept: LAB | Facility: CLINIC | Age: 83
End: 2023-02-20
Payer: COMMERCIAL

## 2023-02-20 DIAGNOSIS — I25.10 CORONARY ARTERY DISEASE INVOLVING NATIVE CORONARY ARTERY OF NATIVE HEART WITHOUT ANGINA PECTORIS: ICD-10-CM

## 2023-02-20 DIAGNOSIS — I10 ESSENTIAL HYPERTENSION, BENIGN: ICD-10-CM

## 2023-02-20 LAB
ALT SERPL W P-5'-P-CCNC: 19 U/L (ref 10–35)
ANION GAP SERPL CALCULATED.3IONS-SCNC: 11 MMOL/L (ref 7–15)
BUN SERPL-MCNC: 21.6 MG/DL (ref 8–23)
CALCIUM SERPL-MCNC: 10.1 MG/DL (ref 8.8–10.2)
CHLORIDE SERPL-SCNC: 100 MMOL/L (ref 98–107)
CHOLEST SERPL-MCNC: 118 MG/DL
CREAT SERPL-MCNC: 1.04 MG/DL (ref 0.51–0.95)
DEPRECATED HCO3 PLAS-SCNC: 33 MMOL/L (ref 22–29)
GFR SERPL CREATININE-BSD FRML MDRD: 54 ML/MIN/1.73M2
GLUCOSE SERPL-MCNC: 134 MG/DL (ref 70–99)
HDLC SERPL-MCNC: 50 MG/DL
LDLC SERPL CALC-MCNC: 47 MG/DL
NONHDLC SERPL-MCNC: 68 MG/DL
POTASSIUM SERPL-SCNC: 4.8 MMOL/L (ref 3.4–5.3)
SODIUM SERPL-SCNC: 144 MMOL/L (ref 136–145)
TRIGL SERPL-MCNC: 105 MG/DL

## 2023-02-20 PROCEDURE — 84460 ALANINE AMINO (ALT) (SGPT): CPT | Performed by: INTERNAL MEDICINE

## 2023-02-20 PROCEDURE — 36415 COLL VENOUS BLD VENIPUNCTURE: CPT | Performed by: INTERNAL MEDICINE

## 2023-02-20 PROCEDURE — 80048 BASIC METABOLIC PNL TOTAL CA: CPT | Performed by: INTERNAL MEDICINE

## 2023-02-20 PROCEDURE — 80061 LIPID PANEL: CPT | Performed by: INTERNAL MEDICINE

## 2023-02-21 ENCOUNTER — OFFICE VISIT (OUTPATIENT)
Dept: CARDIOLOGY | Facility: CLINIC | Age: 83
End: 2023-02-21
Attending: INTERNAL MEDICINE
Payer: COMMERCIAL

## 2023-02-21 VITALS
SYSTOLIC BLOOD PRESSURE: 144 MMHG | OXYGEN SATURATION: 98 % | WEIGHT: 140 LBS | HEIGHT: 59 IN | BODY MASS INDEX: 28.22 KG/M2 | HEART RATE: 73 BPM | DIASTOLIC BLOOD PRESSURE: 70 MMHG

## 2023-02-21 DIAGNOSIS — J44.9 MODERATE COPD (CHRONIC OBSTRUCTIVE PULMONARY DISEASE) (H): ICD-10-CM

## 2023-02-21 DIAGNOSIS — I42.0 DILATED CARDIOMYOPATHY (H): ICD-10-CM

## 2023-02-21 DIAGNOSIS — I25.10 CORONARY ARTERY DISEASE INVOLVING NATIVE CORONARY ARTERY OF NATIVE HEART WITHOUT ANGINA PECTORIS: ICD-10-CM

## 2023-02-21 DIAGNOSIS — I10 BENIGN ESSENTIAL HYPERTENSION: ICD-10-CM

## 2023-02-21 DIAGNOSIS — E78.00 PURE HYPERCHOLESTEROLEMIA: ICD-10-CM

## 2023-02-21 DIAGNOSIS — I10 ESSENTIAL HYPERTENSION: ICD-10-CM

## 2023-02-21 DIAGNOSIS — R06.02 SHORTNESS OF BREATH: ICD-10-CM

## 2023-02-21 DIAGNOSIS — I10 ESSENTIAL HYPERTENSION, BENIGN: ICD-10-CM

## 2023-02-21 DIAGNOSIS — I48.91 ATRIAL FIBRILLATION, UNSPECIFIED TYPE (H): ICD-10-CM

## 2023-02-21 DIAGNOSIS — E78.00 HYPERCHOLESTEROLEMIA: ICD-10-CM

## 2023-02-21 DIAGNOSIS — R03.0 ELEVATED BLOOD PRESSURE READING WITHOUT DIAGNOSIS OF HYPERTENSION: ICD-10-CM

## 2023-02-21 DIAGNOSIS — I42.9 CARDIOMYOPATHY, UNSPECIFIED TYPE (H): ICD-10-CM

## 2023-02-21 PROCEDURE — 99214 OFFICE O/P EST MOD 30 MIN: CPT | Performed by: INTERNAL MEDICINE

## 2023-02-21 RX ORDER — DIGOXIN 125 MCG
125 TABLET ORAL DAILY
Qty: 90 TABLET | Refills: 3 | Status: SHIPPED | OUTPATIENT
Start: 2023-02-21 | End: 2023-06-20

## 2023-02-21 RX ORDER — IRBESARTAN 300 MG/1
300 TABLET ORAL DAILY
Qty: 90 TABLET | Refills: 3 | Status: SHIPPED | OUTPATIENT
Start: 2023-02-21 | End: 2023-06-27 | Stop reason: ALTCHOICE

## 2023-02-21 RX ORDER — CARVEDILOL 25 MG/1
50 TABLET ORAL 2 TIMES DAILY
Qty: 360 TABLET | Refills: 3 | Status: SHIPPED | OUTPATIENT
Start: 2023-02-21 | End: 2023-03-31

## 2023-02-21 RX ORDER — HYDRALAZINE HYDROCHLORIDE 50 MG/1
50 TABLET, FILM COATED ORAL 2 TIMES DAILY
Qty: 180 TABLET | Refills: 3 | Status: ON HOLD | OUTPATIENT
Start: 2023-02-21 | End: 2023-05-05

## 2023-02-21 RX ORDER — POTASSIUM CHLORIDE 750 MG/1
10 TABLET, EXTENDED RELEASE ORAL 2 TIMES DAILY
Qty: 180 TABLET | Refills: 3 | Status: SHIPPED | OUTPATIENT
Start: 2023-02-21 | End: 2024-04-01

## 2023-02-21 NOTE — LETTER
2/21/2023    [facility]  [address]  [facility] [id number]    RE: Janessa Melendez       Dear Colleague,     I had the pleasure of seeing Janessa Melendez in the [facility].  HPI and Plan:   See dictation          Orders Placed This Encounter   Procedures     Digoxin level     Follow-Up with Cardiology MARINO     Follow-Up with Cardiology     24 Hour Blood Pressure Monitor - Adult     Holter Monitor 24 hour Adult Pediatric     Echocardiogram Limited       Orders Placed This Encounter   Medications     DISCONTD: apixaban ANTICOAGULANT (ELIQUIS) 5 MG tablet     Sig: Take 5 mg by mouth 2 times daily     digoxin (LANOXIN) 125 MCG tablet     Sig: Take 1 tablet (125 mcg) by mouth daily     Dispense:  90 tablet     Refill:  3     hydrALAZINE (APRESOLINE) 50 MG tablet     Sig: Take 1 tablet (50 mg) by mouth 2 times daily     Dispense:  180 tablet     Refill:  3     irbesartan (AVAPRO) 300 MG tablet     Sig: Take 1 tablet (300 mg) by mouth daily     Dispense:  90 tablet     Refill:  3     potassium chloride ER (K-TAB/KLOR-CON) 10 MEQ CR tablet     Sig: Take 1 tablet (10 mEq) by mouth 2 times daily And as needed as directed.     Dispense:  180 tablet     Refill:  3     carvedilol (COREG) 25 MG tablet     Sig: Take 2 tablets (50 mg) by mouth 2 times daily     Dispense:  360 tablet     Refill:  3     apixaban ANTICOAGULANT (ELIQUIS) 5 MG tablet     Sig: Take 1 tablet (5 mg) by mouth 2 times daily     Dispense:  180 tablet     Refill:  3       Medications Discontinued During This Encounter   Medication Reason     potassium chloride ER (K-TAB/KLOR-CON) 10 MEQ CR tablet Reorder (No AVS / No eCancel)     hydrALAZINE (APRESOLINE) 50 MG tablet Reorder (No AVS / No eCancel)     irbesartan (AVAPRO) 300 MG tablet Reorder (No AVS / No eCancel)     carvedilol (COREG) 25 MG tablet Reorder (No AVS / No eCancel)     apixaban ANTICOAGULANT (ELIQUIS) 5 MG tablet Reorder (No AVS / No eCancel)          Encounter Diagnoses   Name Primary?     Dilated cardiomyopathy (H)      Essential hypertension      Pure hypercholesterolemia      Benign essential hypertension      Essential hypertension, benign      Shortness of breath      Coronary artery disease involving native coronary artery of native heart without angina pectoris      Cardiomyopathy, unspecified type (H)      Hypercholesterolemia      Elevated blood pressure reading without diagnosis of hypertension      Moderate COPD (chronic obstructive pulmonary disease) (H)      Atrial fibrillation, unspecified type (H)        CURRENT MEDICATIONS:  Current Outpatient Medications   Medication Sig Dispense Refill     Acetaminophen (TYLENOL PO) Take 500 mg by mouth every 6 hours as needed for mild pain or fever        amoxicillin (AMOXIL) 500 MG capsule TAKE 4 CAPSULES BY MOUTH 1 HOUR BEFORE DENTAL APPOINTMENT       apixaban ANTICOAGULANT (ELIQUIS) 5 MG tablet Take 1 tablet (5 mg) by mouth 2 times daily 180 tablet 3     carvedilol (COREG) 25 MG tablet Take 2 tablets (50 mg) by mouth 2 times daily 360 tablet 3     cholecalciferol (VITAMIN D3) 25 mcg (1000 units) capsule Take 1 capsule by mouth daily       digoxin (LANOXIN) 125 MCG tablet Take 1 tablet (125 mcg) by mouth daily 90 tablet 3     hydrALAZINE (APRESOLINE) 50 MG tablet Take 1 tablet (50 mg) by mouth 2 times daily 180 tablet 3     irbesartan (AVAPRO) 300 MG tablet Take 1 tablet (300 mg) by mouth daily 90 tablet 3     omeprazole (PRILOSEC) 20 MG DR capsule Take 20 mg by mouth daily       potassium chloride ER (K-TAB/KLOR-CON) 10 MEQ CR tablet Take 1 tablet (10 mEq) by mouth 2 times daily And as needed as directed. 180 tablet 3     simvastatin (ZOCOR) 10 MG tablet Take 1 tablet (10 mg) by mouth At Bedtime 90 tablet 3     terazosin (HYTRIN) 5 MG capsule Take 1 capsule (5 mg) by mouth At Bedtime 90 capsule 3     torsemide (DEMADEX) 10 MG tablet Take 3 tablets (30 mg) by mouth daily 270 tablet 2        ALLERGIES     Allergies   Allergen Reactions     Amlodipine      swelling     Aspirin Other (See Comments)     Bleeding              Bleeding, GI Lesion         Codeine Sulfate GI Disturbance       PAST MEDICAL HISTORY:  Past Medical History:   Diagnosis Date     Arthritis      Breast cancer (H)      Cardiomyopathy (H)     ideopathic     Coronary artery disease 9/25/2014     Hypercholesterolemia      Hypertension      Hypokalemia      Malignant neoplasm (H)      Shortness of breath      Shoulder pain        PAST SURGICAL HISTORY:  Past Surgical History:   Procedure Laterality Date     BACK SURGERY       CARDIAC SURGERY      angiogram neg many yrs ago     CHOLECYSTECTOMY       ESOPHAGOSCOPY, GASTROSCOPY, DUODENOSCOPY (EGD), COMBINED N/A 6/22/2021    Procedure: ESOPHAGOGASTRODUODENOSCOPY, WITH ENDOSCOPIC US WITH FINE NEEDLE ASPIRATION;  Surgeon: Ventura Mcgill MD;  Location:  GI     EYE SURGERY       ORTHOPEDIC SURGERY      lt shoulder replaced, total     partial masectomy         FAMILY HISTORY:  Family History   Problem Relation Age of Onset     Other Cancer Mother      Other Cancer Brother      No Known Problems Son      Heart Failure Daughter      Family History Negative No family hx of        SOCIAL HISTORY:  Social History     Socioeconomic History     Marital status: Single     Spouse name: None     Number of children: None     Years of education: None     Highest education level: None   Tobacco Use     Smoking status: Never     Smokeless tobacco: Never   Substance and Sexual Activity     Alcohol use: Yes     Comment: socially     Drug use: No     Sexual activity: Never   Other Topics Concern     Special Diet No     Exercise No       Review of Systems:  Skin:          Eyes:         ENT:         Respiratory:          Cardiovascular:         Gastroenterology:        Genitourinary:         Musculoskeletal:         Neurologic:         Psychiatric:         Heme/Lymph/Imm:         Endocrine:        "    Physical Exam:  Vitals: BP (!) 144/70   Pulse 73   Ht 1.499 m (4' 11\")   Wt 63.5 kg (140 lb)   SpO2 98%   BMI 28.28 kg/m      Constitutional:  cooperative, alert and oriented, well developed, well nourished, in no acute distress overweight      Skin:  warm and dry to the touch, no apparent skin lesions or masses noted          Head:  normocephalic, no masses or lesions;normocephalic        Eyes:  pupils equal and round, conjunctivae and lids unremarkable, sclera white, no xanthalasma, EOMS intact, no nystagmus        Lymph:      ENT:  no pallor or cyanosis, dentition good        Neck:  carotid pulses are full and equal bilaterally, JVP normal, no carotid bruit        Respiratory:  normal breath sounds, clear to auscultation, normal A-P diameter, normal symmetry, normal respiratory excursion, no use of accessory muscles         Cardiac: regular rhythm;normal S1 and S2;no murmurs, gallops or rubs detected   S4            pulses full and equal                                        GI:  not assessed this visit        Extremities and Muscular Skeletal:  no edema;no deformities, clubbing, cyanosis, erythema observed   bilateral LE edema;trace     Bruising over R lower extremity    Neurological:  no gross motor deficits;affect appropriate        Psych:  Alert and Oriented x 3        CC  Brett Smith MD  [address]  [place] [address]                Service Date: 02/21/2023    HISTORY OF PRESENT ILLNESS:  It was my pleasure to see your patient, Cece Melendez, who is a very pleasant 82-year-old patient who I have followed for some time.  This is a patient with a history of idiopathic cardiomyopathy, quite resistant hypertension and hypercholesterolemia.  Ejection fraction has varied between 40%-45% range to the 45%-50% range.      This patient also has a history of white coat hypertension.  We did perform a 24-hour blood pressure monitor on last year, which is about 10 months ago, which " showed that her blood pressure was very well controlled.  Today, her blood pressure is on the higher side at 144/70.      The major change since I last saw her was that on 11/04/2022, she was admitted with an acute stroke and she was found to be in atrial fibrillation at that time, so this was an embolic stroke from atrial fibrillation.  The patient was placed on apixaban and she is rate controlled with carvedilol.      The 12-lead electrocardiogram at that time on 11/04/2022 showed that the ventricular rate was approximately 89 beats per minute.  The atrial fibrillation appeared to be a coarse type atrial fibrillation.      She is having no problems with the apixaban medication.  However, her ventricular rate at the apex was more around 100 beats per minute and I do not think her ventricular rate is optimally controlled, even though the echocardiogram which was performed on 11/05/2022 suggested there were no significant changes.  Her EF was estimated at 40%.  At her prior echocardiogram in April, it was in the 45%-50% range, so that to me would suggest to be a significant drop.  The left atrium was severely moderately severely enlarged, which is the substrate for atrial fibrillation, probably related to her longstanding hypertension.      Her lipid profile, which was drawn yesterday, was excellent.  Her LDL was 47, HDL 50 and triglycerides 105 with a total cholesterol of 118.  Her renal function is mildly reduced with creatinine 1.04 and GFR 54 and a BUN of 21.6.  She is on 30 mg of torsemide for diastolic heart failure.  She is on multiple medications for blood pressure control as you can see below.    IMPRESSION:  1.  Recent embolic stroke from new onset atrial fibrillation.  She is asymptomatic with the atrial fibrillation.  She is anticoagulated with apixaban, but her ventricular rate at the apex.  does not appear to be optimally controlled.  2.  Idiopathic cardiomyopathy.  Ejection fraction was 40% on the most  recent echocardiogram in November, which represents a drop from previously when the EF was 45%-50%, even though it was reported as unchanged from previously.  3.  Excellent lipid profile.  4.  Borderline to mild renal insufficiency.  5.  Appears euvolemic.    PLAN:    1.  We will obtain a 24-hour blood pressure monitor to be definite that her blood pressure is well controlled, especially given the fact that she had a recent stroke.  2.  We will start the patient on digoxin 0.125 mg per day.  In 2 weeks' time, we will have the patient wear a Holter monitor and we will also check a trough digoxin level at that time.  3.  I will have the patient follow up in a month's time with one of our nurse practitioners.  The patient will follow up with me in approximately 4 months' time and I will repeat a limited echocardiogram at that stage to see if her ejection fraction improves on better rate control.      It was my pleasure to be involved the care of this nice patient.    Brett Connors MD    cc:    [facility]  [address]  [place] [address]     Brett Connors MD, [facility]        D: 2023   T: 2023   MT: CAYDEN    Name:     MAI SALDAÑA  MRN:      [id number]        Account:      [id number]   :      1940           Service Date: 2023       Document: [id number]      Thank you for allowing me to participate in the care of your patient.      Sincerely,   Brett Smiht MD, MD   [facility]  cc:   Brett Smith MD  [address]  [place] [address]

## 2023-02-21 NOTE — PROGRESS NOTES
Service Date: 02/21/2023    HISTORY OF PRESENT ILLNESS:  It was my pleasure to see your patient, Cece Melendez, who is a very pleasant 82-year-old patient who I have followed for some time.  This is a patient with a history of idiopathic cardiomyopathy, quite resistant hypertension and hypercholesterolemia.  Ejection fraction has varied between 40%-45% range to the 45%-50% range.      This patient also has a history of white coat hypertension.  We did perform a 24-hour blood pressure monitor on last year, which is about 10 months ago, which showed that her blood pressure was very well controlled.  Today, her blood pressure is on the higher side at 144/70.      The major change since I last saw her was that on 11/04/2022, she was admitted with an acute stroke and she was found to be in atrial fibrillation at that time, so this was an embolic stroke from atrial fibrillation.  The patient was placed on apixaban and she is rate controlled with carvedilol.      The 12-lead electrocardiogram at that time on 11/04/2022 showed that the ventricular rate was approximately 89 beats per minute.  The atrial fibrillation appeared to be a coarse type atrial fibrillation.      She is having no problems with the apixaban medication.  However, her ventricular rate at the apex was more around 100 beats per minute and I do not think her ventricular rate is optimally controlled, even though the echocardiogram which was performed on 11/05/2022 suggested there were no significant changes.  Her EF was estimated at 40%.  At her prior echocardiogram in April, it was in the 45%-50% range, so that to me would suggest to be a significant drop.  The left atrium was severely moderately severely enlarged, which is the substrate for atrial fibrillation, probably related to her longstanding hypertension.      Her lipid profile, which was drawn yesterday, was excellent.  Her LDL was 47, HDL 50 and triglycerides 105 with a total cholesterol of 118.   Her renal function is mildly reduced with creatinine 1.04 and GFR 54 and a BUN of 21.6.  She is on 30 mg of torsemide for diastolic heart failure.  She is on multiple medications for blood pressure control as you can see below.    IMPRESSION:  1.  Recent embolic stroke from new onset atrial fibrillation.  She is asymptomatic with the atrial fibrillation.  She is anticoagulated with apixaban, but her ventricular rate at the apex.  does not appear to be optimally controlled.  2.  Idiopathic cardiomyopathy.  Ejection fraction was 40% on the most recent echocardiogram in November, which represents a drop from previously when the EF was 45%-50%, even though it was reported as unchanged from previously.  3.  Excellent lipid profile.  4.  Borderline to mild renal insufficiency.  5.  Appears euvolemic.    PLAN:    1.  We will obtain a 24-hour blood pressure monitor to be definite that her blood pressure is well controlled, especially given the fact that she had a recent stroke.  2.  We will start the patient on digoxin 0.125 mg per day.  In 2 weeks' time, we will have the patient wear a Holter monitor and we will also check a trough digoxin level at that time.  3.  I will have the patient follow up in a month's time with one of our nurse practitioners.  The patient will follow up with me in approximately 4 months' time and I will repeat a limited echocardiogram at that stage to see if her ejection fraction improves on better rate control.      It was my pleasure to be involved the care of this nice patient.    Brett Connors MD    cc:    [facility]  [address]  [place] [address]     Brett Connors MD, [facility]        D: 2023   T: 2023   MT: CAYDEN    Name:     MAI SALDAÑA  MRN:      [id number]        Account:      [id number]   :      1940           Service Date: 2023       Document: [id number]

## 2023-02-21 NOTE — PROGRESS NOTES
HPI and Plan:   See dictation          Orders Placed This Encounter   Procedures     Digoxin level     Follow-Up with Cardiology MARINO     Follow-Up with Cardiology     24 Hour Blood Pressure Monitor - Adult     Holter Monitor 24 hour Adult Pediatric     Echocardiogram Limited       Orders Placed This Encounter   Medications     DISCONTD: apixaban ANTICOAGULANT (ELIQUIS) 5 MG tablet     Sig: Take 5 mg by mouth 2 times daily     digoxin (LANOXIN) 125 MCG tablet     Sig: Take 1 tablet (125 mcg) by mouth daily     Dispense:  90 tablet     Refill:  3     hydrALAZINE (APRESOLINE) 50 MG tablet     Sig: Take 1 tablet (50 mg) by mouth 2 times daily     Dispense:  180 tablet     Refill:  3     irbesartan (AVAPRO) 300 MG tablet     Sig: Take 1 tablet (300 mg) by mouth daily     Dispense:  90 tablet     Refill:  3     potassium chloride ER (K-TAB/KLOR-CON) 10 MEQ CR tablet     Sig: Take 1 tablet (10 mEq) by mouth 2 times daily And as needed as directed.     Dispense:  180 tablet     Refill:  3     carvedilol (COREG) 25 MG tablet     Sig: Take 2 tablets (50 mg) by mouth 2 times daily     Dispense:  360 tablet     Refill:  3     apixaban ANTICOAGULANT (ELIQUIS) 5 MG tablet     Sig: Take 1 tablet (5 mg) by mouth 2 times daily     Dispense:  180 tablet     Refill:  3       Medications Discontinued During This Encounter   Medication Reason     potassium chloride ER (K-TAB/KLOR-CON) 10 MEQ CR tablet Reorder (No AVS / No eCancel)     hydrALAZINE (APRESOLINE) 50 MG tablet Reorder (No AVS / No eCancel)     irbesartan (AVAPRO) 300 MG tablet Reorder (No AVS / No eCancel)     carvedilol (COREG) 25 MG tablet Reorder (No AVS / No eCancel)     apixaban ANTICOAGULANT (ELIQUIS) 5 MG tablet Reorder (No AVS / No eCancel)         Encounter Diagnoses   Name Primary?     Dilated cardiomyopathy (H)      Essential hypertension      Pure hypercholesterolemia      Benign essential hypertension      Essential hypertension, benign      Shortness of breath       Coronary artery disease involving native coronary artery of native heart without angina pectoris      Cardiomyopathy, unspecified type (H)      Hypercholesterolemia      Elevated blood pressure reading without diagnosis of hypertension      Moderate COPD (chronic obstructive pulmonary disease) (H)      Atrial fibrillation, unspecified type (H)        CURRENT MEDICATIONS:  Current Outpatient Medications   Medication Sig Dispense Refill     Acetaminophen (TYLENOL PO) Take 500 mg by mouth every 6 hours as needed for mild pain or fever        amoxicillin (AMOXIL) 500 MG capsule TAKE 4 CAPSULES BY MOUTH 1 HOUR BEFORE DENTAL APPOINTMENT       apixaban ANTICOAGULANT (ELIQUIS) 5 MG tablet Take 1 tablet (5 mg) by mouth 2 times daily 180 tablet 3     carvedilol (COREG) 25 MG tablet Take 2 tablets (50 mg) by mouth 2 times daily 360 tablet 3     cholecalciferol (VITAMIN D3) 25 mcg (1000 units) capsule Take 1 capsule by mouth daily       digoxin (LANOXIN) 125 MCG tablet Take 1 tablet (125 mcg) by mouth daily 90 tablet 3     hydrALAZINE (APRESOLINE) 50 MG tablet Take 1 tablet (50 mg) by mouth 2 times daily 180 tablet 3     irbesartan (AVAPRO) 300 MG tablet Take 1 tablet (300 mg) by mouth daily 90 tablet 3     omeprazole (PRILOSEC) 20 MG DR capsule Take 20 mg by mouth daily       potassium chloride ER (K-TAB/KLOR-CON) 10 MEQ CR tablet Take 1 tablet (10 mEq) by mouth 2 times daily And as needed as directed. 180 tablet 3     simvastatin (ZOCOR) 10 MG tablet Take 1 tablet (10 mg) by mouth At Bedtime 90 tablet 3     terazosin (HYTRIN) 5 MG capsule Take 1 capsule (5 mg) by mouth At Bedtime 90 capsule 3     torsemide (DEMADEX) 10 MG tablet Take 3 tablets (30 mg) by mouth daily 270 tablet 2       ALLERGIES     Allergies   Allergen Reactions     Amlodipine      swelling     Aspirin Other (See Comments)     Bleeding              Bleeding, GI Lesion         Codeine Sulfate GI Disturbance       PAST MEDICAL HISTORY:  Past Medical  "History:   Diagnosis Date     Arthritis      Breast cancer (H)      Cardiomyopathy (H)     ideopathic     Coronary artery disease 9/25/2014     Hypercholesterolemia      Hypertension      Hypokalemia      Malignant neoplasm (H)      Shortness of breath      Shoulder pain        PAST SURGICAL HISTORY:  Past Surgical History:   Procedure Laterality Date     BACK SURGERY       CARDIAC SURGERY      angiogram neg many yrs ago     CHOLECYSTECTOMY       ESOPHAGOSCOPY, GASTROSCOPY, DUODENOSCOPY (EGD), COMBINED N/A 6/22/2021    Procedure: ESOPHAGOGASTRODUODENOSCOPY, WITH ENDOSCOPIC US WITH FINE NEEDLE ASPIRATION;  Surgeon: Ventura Mcgill MD;  Location:  GI     EYE SURGERY       ORTHOPEDIC SURGERY      lt shoulder replaced, total     partial masectomy         FAMILY HISTORY:  Family History   Problem Relation Age of Onset     Other Cancer Mother      Other Cancer Brother      No Known Problems Son      Heart Failure Daughter      Family History Negative No family hx of        SOCIAL HISTORY:  Social History     Socioeconomic History     Marital status: Single     Spouse name: None     Number of children: None     Years of education: None     Highest education level: None   Tobacco Use     Smoking status: Never     Smokeless tobacco: Never   Substance and Sexual Activity     Alcohol use: Yes     Comment: socially     Drug use: No     Sexual activity: Never   Other Topics Concern     Special Diet No     Exercise No       Review of Systems:  Skin:          Eyes:         ENT:         Respiratory:          Cardiovascular:         Gastroenterology:        Genitourinary:         Musculoskeletal:         Neurologic:         Psychiatric:         Heme/Lymph/Imm:         Endocrine:           Physical Exam:  Vitals: BP (!) 144/70   Pulse 73   Ht 1.499 m (4' 11\")   Wt 63.5 kg (140 lb)   SpO2 98%   BMI 28.28 kg/m      Constitutional:  cooperative, alert and oriented, well developed, well nourished, in no acute distress " overweight      Skin:  warm and dry to the touch, no apparent skin lesions or masses noted          Head:  normocephalic, no masses or lesions;normocephalic        Eyes:  pupils equal and round, conjunctivae and lids unremarkable, sclera white, no xanthalasma, EOMS intact, no nystagmus        Lymph:      ENT:  no pallor or cyanosis, dentition good        Neck:  carotid pulses are full and equal bilaterally, JVP normal, no carotid bruit        Respiratory:  normal breath sounds, clear to auscultation, normal A-P diameter, normal symmetry, normal respiratory excursion, no use of accessory muscles         Cardiac: regular rhythm;normal S1 and S2;no murmurs, gallops or rubs detected   S4            pulses full and equal                                        GI:  not assessed this visit        Extremities and Muscular Skeletal:  no edema;no deformities, clubbing, cyanosis, erythema observed   bilateral LE edema;trace     Bruising over R lower extremity    Neurological:  no gross motor deficits;affect appropriate        Psych:  Alert and Oriented x 3        CC  Brett Smith MD  [id number]  [place] [id number]

## 2023-03-01 ENCOUNTER — TELEPHONE (OUTPATIENT)
Dept: CARDIOLOGY | Facility: CLINIC | Age: 83
End: 2023-03-01
Payer: COMMERCIAL

## 2023-03-01 NOTE — TELEPHONE ENCOUNTER
[facility] Call Center    Phone Message    May a detailed message be left on voicemail: no     Reason for Call: Other: Janessa called to report she has been experiencing dizziness, hot flashes and a dry cough. She believes this may be a possible reaction to a new medication, digoxin (LANOXIN) 125 MCG tablet which she began taking on Saturday 2/25/23. Please reach out to her at [phone].      Action Taken: Other: [facility]    Travel Screening: Not Applicable

## 2023-03-01 NOTE — TELEPHONE ENCOUNTER
This does not sound particularly cardiac. I would have her see her PMD about this initially or if starts to get worse to go to ER. Bobbi

## 2023-03-01 NOTE — TELEPHONE ENCOUNTER
Patient states yesterday and today she has developed new symptoms of intermittent dizziness, flush feeling all over body that comes and goes, dry cough, fatigue and upset stomach.  Patient has no ability to check BP or HR at home.  Patient was last seen 2/21/23, per OV notes:    PLAN:    1.  We will obtain a 24-hour blood pressure monitor to be definite that her blood pressure is well controlled, especially given the fact that she had a recent stroke.  2.  We will start the patient on digoxin 0.125 mg per day.  In 2 weeks' time, we will have the patient wear a Holter monitor and we will also check a trough digoxin level at that time.  3.  I will have the patient follow up in a month's time with one of our nurse practitioners.  The patient will follow up with me in approximately 4 months' time and I will repeat a limited echocardiogram at that stage to see if her ejection fraction improves on better rate control.    Will route to provider to advise.  Radha Mukherjee RN on 3/1/2023 at 3:45 PM

## 2023-03-02 NOTE — TELEPHONE ENCOUNTER
Called patient with recommendations from Dr. Albino Connors that symptoms do not sound particularly cardiac and that she should see her PCP about this.  Patient states she is feeling better today but did hold the digoxin this morning but will resume taking it tomorrow morning.  Patient states she has come down with some cold like symptoms but not sure how she got that as she states she does not leave her house.  Patient will call back with further questions or concerns.  JONG Miramontes RN

## 2023-03-08 ENCOUNTER — APPOINTMENT (OUTPATIENT)
Dept: GENERAL RADIOLOGY | Facility: CLINIC | Age: 83
End: 2023-03-08
Attending: EMERGENCY MEDICINE
Payer: COMMERCIAL

## 2023-03-08 ENCOUNTER — HOSPITAL ENCOUNTER (OUTPATIENT)
Facility: CLINIC | Age: 83
Setting detail: OBSERVATION
Discharge: HOME | End: 2023-03-10
Attending: EMERGENCY MEDICINE | Admitting: INTERNAL MEDICINE
Payer: COMMERCIAL

## 2023-03-08 DIAGNOSIS — J20.9 ACUTE BRONCHITIS, UNSPECIFIED ORGANISM: Primary | ICD-10-CM

## 2023-03-08 DIAGNOSIS — J45.40 MODERATE PERSISTENT REACTIVE AIRWAY DISEASE WITHOUT COMPLICATION: ICD-10-CM

## 2023-03-08 DIAGNOSIS — R06.02 SHORTNESS OF BREATH: ICD-10-CM

## 2023-03-08 DIAGNOSIS — I50.9 HEART FAILURE, UNSPECIFIED HF CHRONICITY, UNSPECIFIED HEART FAILURE TYPE (H): ICD-10-CM

## 2023-03-08 LAB
ALBUMIN SERPL BCG-MCNC: 4.5 G/DL (ref 3.5–5.2)
ALP SERPL-CCNC: 69 U/L (ref 35–104)
ALT SERPL W P-5'-P-CCNC: 19 U/L (ref 10–35)
ANION GAP SERPL CALCULATED.3IONS-SCNC: 14 MMOL/L (ref 7–15)
AST SERPL W P-5'-P-CCNC: 21 U/L (ref 10–35)
ATRIAL RATE - MUSE: 300 BPM
ATRIAL RATE - MUSE: NORMAL BPM
BASOPHILS # BLD AUTO: 0 10E3/UL (ref 0–0.2)
BASOPHILS NFR BLD AUTO: 1 %
BILIRUB SERPL-MCNC: 0.4 MG/DL
BUN SERPL-MCNC: 30.6 MG/DL (ref 8–23)
CALCIUM SERPL-MCNC: 9.4 MG/DL (ref 8.8–10.2)
CHLORIDE SERPL-SCNC: 98 MMOL/L (ref 98–107)
CREAT SERPL-MCNC: 1.24 MG/DL (ref 0.51–0.95)
DEPRECATED HCO3 PLAS-SCNC: 30 MMOL/L (ref 22–29)
DIASTOLIC BLOOD PRESSURE - MUSE: NORMAL MMHG
DIASTOLIC BLOOD PRESSURE - MUSE: NORMAL MMHG
DIGOXIN SERPL-MCNC: 1.1 NG/ML (ref 0.6–2)
EOSINOPHIL # BLD AUTO: 0.1 10E3/UL (ref 0–0.7)
EOSINOPHIL NFR BLD AUTO: 2 %
ERYTHROCYTE [DISTWIDTH] IN BLOOD BY AUTOMATED COUNT: 14 % (ref 10–15)
FLUAV RNA SPEC QL NAA+PROBE: NEGATIVE
FLUBV RNA RESP QL NAA+PROBE: NEGATIVE
GFR SERPL CREATININE-BSD FRML MDRD: 44 ML/MIN/1.73M2
GLUCOSE SERPL-MCNC: 131 MG/DL (ref 70–99)
HCT VFR BLD AUTO: 36.8 % (ref 35–47)
HGB BLD-MCNC: 11.7 G/DL (ref 11.7–15.7)
HOLD SPECIMEN: NORMAL
IMM GRANULOCYTES # BLD: 0 10E3/UL
IMM GRANULOCYTES NFR BLD: 0 %
INTERPRETATION ECG - MUSE: NORMAL
INTERPRETATION ECG - MUSE: NORMAL
LYMPHOCYTES # BLD AUTO: 1.2 10E3/UL (ref 0.8–5.3)
LYMPHOCYTES NFR BLD AUTO: 18 %
MCH RBC QN AUTO: 29.5 PG (ref 26.5–33)
MCHC RBC AUTO-ENTMCNC: 31.8 G/DL (ref 31.5–36.5)
MCV RBC AUTO: 93 FL (ref 78–100)
MONOCYTES # BLD AUTO: 0.5 10E3/UL (ref 0–1.3)
MONOCYTES NFR BLD AUTO: 8 %
NEUTROPHILS # BLD AUTO: 4.7 10E3/UL (ref 1.6–8.3)
NEUTROPHILS NFR BLD AUTO: 71 %
NRBC # BLD AUTO: 0 10E3/UL
NRBC BLD AUTO-RTO: 0 /100
NT-PROBNP SERPL-MCNC: 5832 PG/ML (ref 0–1800)
P AXIS - MUSE: NORMAL DEGREES
P AXIS - MUSE: NORMAL DEGREES
PLATELET # BLD AUTO: 198 10E3/UL (ref 150–450)
POTASSIUM SERPL-SCNC: 4.1 MMOL/L (ref 3.4–5.3)
PR INTERVAL - MUSE: NORMAL MS
PR INTERVAL - MUSE: NORMAL MS
PROT SERPL-MCNC: 7.1 G/DL (ref 6.4–8.3)
QRS DURATION - MUSE: 112 MS
QRS DURATION - MUSE: 114 MS
QT - MUSE: 412 MS
QT - MUSE: 432 MS
QTC - MUSE: 418 MS
QTC - MUSE: 452 MS
R AXIS - MUSE: -43 DEGREES
R AXIS - MUSE: -48 DEGREES
RBC # BLD AUTO: 3.97 10E6/UL (ref 3.8–5.2)
RSV RNA SPEC NAA+PROBE: NEGATIVE
SARS-COV-2 RNA RESP QL NAA+PROBE: NEGATIVE
SODIUM SERPL-SCNC: 142 MMOL/L (ref 136–145)
SYSTOLIC BLOOD PRESSURE - MUSE: NORMAL MMHG
SYSTOLIC BLOOD PRESSURE - MUSE: NORMAL MMHG
T AXIS - MUSE: -12 DEGREES
T AXIS - MUSE: 16 DEGREES
TROPONIN T SERPL HS-MCNC: 31 NG/L
TROPONIN T SERPL HS-MCNC: 32 NG/L
VENTRICULAR RATE- MUSE: 62 BPM
VENTRICULAR RATE- MUSE: 66 BPM
WBC # BLD AUTO: 6.6 10E3/UL (ref 4–11)

## 2023-03-08 PROCEDURE — C9803 HOPD COVID-19 SPEC COLLECT: HCPCS

## 2023-03-08 PROCEDURE — 93005 ELECTROCARDIOGRAM TRACING: CPT

## 2023-03-08 PROCEDURE — 250N000009 HC RX 250: Performed by: INTERNAL MEDICINE

## 2023-03-08 PROCEDURE — 84155 ASSAY OF PROTEIN SERUM: CPT | Performed by: EMERGENCY MEDICINE

## 2023-03-08 PROCEDURE — 85014 HEMATOCRIT: CPT | Performed by: EMERGENCY MEDICINE

## 2023-03-08 PROCEDURE — G0378 HOSPITAL OBSERVATION PER HR: HCPCS

## 2023-03-08 PROCEDURE — 250N000013 HC RX MED GY IP 250 OP 250 PS 637: Performed by: INTERNAL MEDICINE

## 2023-03-08 PROCEDURE — 83880 ASSAY OF NATRIURETIC PEPTIDE: CPT | Performed by: EMERGENCY MEDICINE

## 2023-03-08 PROCEDURE — 96375 TX/PRO/DX INJ NEW DRUG ADDON: CPT

## 2023-03-08 PROCEDURE — 87637 SARSCOV2&INF A&B&RSV AMP PRB: CPT | Performed by: EMERGENCY MEDICINE

## 2023-03-08 PROCEDURE — 36415 COLL VENOUS BLD VENIPUNCTURE: CPT | Performed by: EMERGENCY MEDICINE

## 2023-03-08 PROCEDURE — 96376 TX/PRO/DX INJ SAME DRUG ADON: CPT

## 2023-03-08 PROCEDURE — 99285 EMERGENCY DEPT VISIT HI MDM: CPT | Mod: 25,CS

## 2023-03-08 PROCEDURE — 250N000011 HC RX IP 250 OP 636: Performed by: INTERNAL MEDICINE

## 2023-03-08 PROCEDURE — 99223 1ST HOSP IP/OBS HIGH 75: CPT | Mod: AI | Performed by: INTERNAL MEDICINE

## 2023-03-08 PROCEDURE — 250N000009 HC RX 250: Performed by: EMERGENCY MEDICINE

## 2023-03-08 PROCEDURE — 71046 X-RAY EXAM CHEST 2 VIEWS: CPT

## 2023-03-08 PROCEDURE — 80162 ASSAY OF DIGOXIN TOTAL: CPT | Performed by: INTERNAL MEDICINE

## 2023-03-08 PROCEDURE — 84484 ASSAY OF TROPONIN QUANT: CPT | Performed by: EMERGENCY MEDICINE

## 2023-03-08 PROCEDURE — 96365 THER/PROPH/DIAG IV INF INIT: CPT

## 2023-03-08 PROCEDURE — 80053 COMPREHEN METABOLIC PANEL: CPT | Performed by: EMERGENCY MEDICINE

## 2023-03-08 PROCEDURE — 94640 AIRWAY INHALATION TREATMENT: CPT

## 2023-03-08 PROCEDURE — 250N000011 HC RX IP 250 OP 636: Performed by: EMERGENCY MEDICINE

## 2023-03-08 RX ORDER — PANTOPRAZOLE SODIUM 40 MG/1
40 TABLET, DELAYED RELEASE ORAL
Status: DISCONTINUED | OUTPATIENT
Start: 2023-03-09 | End: 2023-03-10 | Stop reason: HOSPADM

## 2023-03-08 RX ORDER — CARVEDILOL 25 MG/1
50 TABLET ORAL 2 TIMES DAILY
Status: DISCONTINUED | OUTPATIENT
Start: 2023-03-08 | End: 2023-03-10 | Stop reason: HOSPADM

## 2023-03-08 RX ORDER — IPRATROPIUM BROMIDE AND ALBUTEROL SULFATE 2.5; .5 MG/3ML; MG/3ML
3 SOLUTION RESPIRATORY (INHALATION)
Status: DISCONTINUED | OUTPATIENT
Start: 2023-03-08 | End: 2023-03-10 | Stop reason: HOSPADM

## 2023-03-08 RX ORDER — PROCHLORPERAZINE MALEATE 5 MG
5 TABLET ORAL EVERY 6 HOURS PRN
Status: DISCONTINUED | OUTPATIENT
Start: 2023-03-08 | End: 2023-03-10 | Stop reason: HOSPADM

## 2023-03-08 RX ORDER — DOXYCYCLINE 100 MG/1
100 CAPSULE ORAL EVERY 12 HOURS SCHEDULED
Status: DISCONTINUED | OUTPATIENT
Start: 2023-03-09 | End: 2023-03-10 | Stop reason: HOSPADM

## 2023-03-08 RX ORDER — ONDANSETRON 4 MG/1
4 TABLET, ORALLY DISINTEGRATING ORAL EVERY 6 HOURS PRN
Status: DISCONTINUED | OUTPATIENT
Start: 2023-03-08 | End: 2023-03-10 | Stop reason: HOSPADM

## 2023-03-08 RX ORDER — PREDNISONE 20 MG/1
40 TABLET ORAL DAILY
Status: DISCONTINUED | OUTPATIENT
Start: 2023-03-09 | End: 2023-03-10 | Stop reason: HOSPADM

## 2023-03-08 RX ORDER — GUAIFENESIN/DEXTROMETHORPHAN 100-10MG/5
10 SYRUP ORAL EVERY 4 HOURS PRN
Status: DISCONTINUED | OUTPATIENT
Start: 2023-03-08 | End: 2023-03-10 | Stop reason: HOSPADM

## 2023-03-08 RX ORDER — ONDANSETRON 2 MG/ML
4 INJECTION INTRAMUSCULAR; INTRAVENOUS EVERY 6 HOURS PRN
Status: DISCONTINUED | OUTPATIENT
Start: 2023-03-08 | End: 2023-03-10 | Stop reason: HOSPADM

## 2023-03-08 RX ORDER — AZITHROMYCIN 500 MG/1
500 INJECTION, POWDER, LYOPHILIZED, FOR SOLUTION INTRAVENOUS ONCE
Status: DISCONTINUED | OUTPATIENT
Start: 2023-03-08 | End: 2023-03-08

## 2023-03-08 RX ORDER — MAGNESIUM SULFATE HEPTAHYDRATE 40 MG/ML
2 INJECTION, SOLUTION INTRAVENOUS ONCE
Status: COMPLETED | OUTPATIENT
Start: 2023-03-08 | End: 2023-03-08

## 2023-03-08 RX ORDER — IPRATROPIUM BROMIDE AND ALBUTEROL SULFATE 2.5; .5 MG/3ML; MG/3ML
3 SOLUTION RESPIRATORY (INHALATION) ONCE
Status: COMPLETED | OUTPATIENT
Start: 2023-03-08 | End: 2023-03-08

## 2023-03-08 RX ORDER — FUROSEMIDE 10 MG/ML
40 INJECTION INTRAMUSCULAR; INTRAVENOUS ONCE
Status: COMPLETED | OUTPATIENT
Start: 2023-03-08 | End: 2023-03-08

## 2023-03-08 RX ORDER — BENZONATATE 100 MG/1
100 CAPSULE ORAL 3 TIMES DAILY
Status: DISCONTINUED | OUTPATIENT
Start: 2023-03-08 | End: 2023-03-09

## 2023-03-08 RX ORDER — AMOXICILLIN 250 MG
2 CAPSULE ORAL 2 TIMES DAILY PRN
Status: DISCONTINUED | OUTPATIENT
Start: 2023-03-08 | End: 2023-03-10 | Stop reason: HOSPADM

## 2023-03-08 RX ORDER — ACETAMINOPHEN 325 MG/1
650 TABLET ORAL EVERY 6 HOURS PRN
Status: DISCONTINUED | OUTPATIENT
Start: 2023-03-08 | End: 2023-03-10 | Stop reason: HOSPADM

## 2023-03-08 RX ORDER — PROCHLORPERAZINE 25 MG
12.5 SUPPOSITORY, RECTAL RECTAL EVERY 12 HOURS PRN
Status: DISCONTINUED | OUTPATIENT
Start: 2023-03-08 | End: 2023-03-10 | Stop reason: HOSPADM

## 2023-03-08 RX ORDER — AMOXICILLIN 250 MG
1 CAPSULE ORAL 2 TIMES DAILY PRN
Status: DISCONTINUED | OUTPATIENT
Start: 2023-03-08 | End: 2023-03-10 | Stop reason: HOSPADM

## 2023-03-08 RX ORDER — LIDOCAINE 40 MG/G
CREAM TOPICAL
Status: DISCONTINUED | OUTPATIENT
Start: 2023-03-08 | End: 2023-03-10 | Stop reason: HOSPADM

## 2023-03-08 RX ORDER — FUROSEMIDE 10 MG/ML
40 INJECTION INTRAMUSCULAR; INTRAVENOUS ONCE
Status: DISCONTINUED | OUTPATIENT
Start: 2023-03-08 | End: 2023-03-08

## 2023-03-08 RX ORDER — DOXYCYCLINE 100 MG/1
100 CAPSULE ORAL ONCE
Status: COMPLETED | OUTPATIENT
Start: 2023-03-08 | End: 2023-03-08

## 2023-03-08 RX ORDER — TERAZOSIN 5 MG/1
5 CAPSULE ORAL AT BEDTIME
Status: DISCONTINUED | OUTPATIENT
Start: 2023-03-08 | End: 2023-03-10 | Stop reason: HOSPADM

## 2023-03-08 RX ORDER — HYDRALAZINE HYDROCHLORIDE 25 MG/1
50 TABLET, FILM COATED ORAL 2 TIMES DAILY
Status: DISCONTINUED | OUTPATIENT
Start: 2023-03-08 | End: 2023-03-10 | Stop reason: HOSPADM

## 2023-03-08 RX ORDER — METHYLPREDNISOLONE SODIUM SUCCINATE 125 MG/2ML
125 INJECTION, POWDER, LYOPHILIZED, FOR SOLUTION INTRAMUSCULAR; INTRAVENOUS ONCE
Status: COMPLETED | OUTPATIENT
Start: 2023-03-08 | End: 2023-03-08

## 2023-03-08 RX ORDER — METHIMAZOLE 5 MG/1
5 TABLET ORAL
COMMUNITY
End: 2024-07-25

## 2023-03-08 RX ORDER — ACETAMINOPHEN 650 MG/1
650 SUPPOSITORY RECTAL EVERY 6 HOURS PRN
Status: DISCONTINUED | OUTPATIENT
Start: 2023-03-08 | End: 2023-03-10 | Stop reason: HOSPADM

## 2023-03-08 RX ORDER — GUAIFENESIN 600 MG/1
600 TABLET, EXTENDED RELEASE ORAL 2 TIMES DAILY
Status: DISCONTINUED | OUTPATIENT
Start: 2023-03-08 | End: 2023-03-09

## 2023-03-08 RX ADMIN — METHYLPREDNISOLONE SODIUM SUCCINATE 125 MG: 125 INJECTION, POWDER, FOR SOLUTION INTRAMUSCULAR; INTRAVENOUS at 14:04

## 2023-03-08 RX ADMIN — FUROSEMIDE 40 MG: 10 INJECTION, SOLUTION INTRAMUSCULAR; INTRAVENOUS at 15:16

## 2023-03-08 RX ADMIN — IPRATROPIUM BROMIDE AND ALBUTEROL SULFATE 3 ML: 2.5; .5 SOLUTION RESPIRATORY (INHALATION) at 15:16

## 2023-03-08 RX ADMIN — APIXABAN 5 MG: 5 TABLET, FILM COATED ORAL at 20:51

## 2023-03-08 RX ADMIN — DOXYCYCLINE HYCLATE 100 MG: 100 CAPSULE ORAL at 18:07

## 2023-03-08 RX ADMIN — IPRATROPIUM BROMIDE AND ALBUTEROL SULFATE 3 ML: 2.5; .5 SOLUTION RESPIRATORY (INHALATION) at 13:40

## 2023-03-08 RX ADMIN — HYDRALAZINE HYDROCHLORIDE 50 MG: 50 TABLET, FILM COATED ORAL at 20:50

## 2023-03-08 RX ADMIN — GUAIFENESIN AND DEXTROMETHORPHAN 10 ML: 100; 10 SYRUP ORAL at 20:51

## 2023-03-08 RX ADMIN — MAGNESIUM SULFATE HEPTAHYDRATE 2 G: 40 INJECTION, SOLUTION INTRAVENOUS at 14:04

## 2023-03-08 RX ADMIN — CARVEDILOL 50 MG: 25 TABLET, FILM COATED ORAL at 20:51

## 2023-03-08 RX ADMIN — FUROSEMIDE 40 MG: 10 INJECTION, SOLUTION INTRAMUSCULAR; INTRAVENOUS at 21:47

## 2023-03-08 RX ADMIN — IPRATROPIUM BROMIDE AND ALBUTEROL SULFATE 3 ML: .5; 3 SOLUTION RESPIRATORY (INHALATION) at 20:59

## 2023-03-08 RX ADMIN — GUAIFENESIN 600 MG: 600 TABLET, EXTENDED RELEASE ORAL at 20:50

## 2023-03-08 RX ADMIN — IPRATROPIUM BROMIDE AND ALBUTEROL SULFATE 3 ML: 2.5; .5 SOLUTION RESPIRATORY (INHALATION) at 14:03

## 2023-03-08 RX ADMIN — BENZONATATE 100 MG: 100 CAPSULE ORAL at 20:51

## 2023-03-08 ASSESSMENT — ACTIVITIES OF DAILY LIVING (ADL)
ADLS_ACUITY_SCORE: 35
ADLS_ACUITY_SCORE: 35
ADLS_ACUITY_SCORE: 33
ADLS_ACUITY_SCORE: 35
ADLS_ACUITY_SCORE: 33
ADLS_ACUITY_SCORE: 35

## 2023-03-08 NOTE — ED TRIAGE NOTES
Pt states for the past week she has been having cough, congestion and wheezing. She reports she was starting to feel better and then things got worse today. Pt is tachypnic and wheezing in triage. Pt denies lung hx and denies use of inhalers. She also reports intermittent dizziness- denies chest pain     Triage Assessment       Row Name 03/08/23 1247       Triage Assessment (Adult)    Airway WDL WDL       Respiratory WDL    Respiratory WDL X  cough, sob, wheezing       Skin Circulation/Temperature WDL    Skin Circulation/Temperature WDL WDL       Cardiac WDL    Cardiac WDL WDL       Peripheral/Neurovascular WDL    Peripheral Neurovascular WDL WDL       Cognitive/Neuro/Behavioral WDL    Cognitive/Neuro/Behavioral WDL WDL

## 2023-03-08 NOTE — ED PROVIDER NOTES
"  History     Chief Complaint:  Shortness of Breath       HPI   Janessa Melendez is a 82 year old female with a history of \"wheezing for a long time that they cannot figure out\" who presents with increased wheezing at home today.  Patient states that for the last week she had a cough congestion and worsening wheezing over her baseline, thinking that she had essentially a chest cold.  She was feeling better yesterday up until this morning where she actually felt significantly worse.  Daughter noted that she was tachypneic and wheezing a lot more than normal, states that it sounds \"like she swallowed a cat\" and that the wheezing was audible from across the room, which is not typical for her wheezing.    Patient denies any chest pain or abdominal pain, no vomiting, no diarrhea.  No palpitations.  Is not bringing up any sputum, this is a dry cough, and no hemoptysis.  Does take her Eliquis routinely.      Independent Historian:   Daughter is at bedside confirming the above history as well.    Review of External Notes: Yes, appreciate outpatient cardiac visits for dilated cardiomyopathy which is certainly germane to her presentation today    ROS:  Review of Systems   All other systems reviewed and are negative.      Allergies:  Amlodipine  Aspirin  Codeine Sulfate     Medications:    Acetaminophen (TYLENOL PO)  amoxicillin (AMOXIL) 500 MG capsule  apixaban ANTICOAGULANT (ELIQUIS) 5 MG tablet  carvedilol (COREG) 25 MG tablet  cholecalciferol (VITAMIN D3) 25 mcg (1000 units) capsule  digoxin (LANOXIN) 125 MCG tablet  hydrALAZINE (APRESOLINE) 50 MG tablet  irbesartan (AVAPRO) 300 MG tablet  omeprazole (PRILOSEC) 20 MG DR capsule  potassium chloride ER (K-TAB/KLOR-CON) 10 MEQ CR tablet  simvastatin (ZOCOR) 10 MG tablet  terazosin (HYTRIN) 5 MG capsule  torsemide (DEMADEX) 10 MG tablet        Past Medical History:    Past Medical History:   Diagnosis Date     Arthritis      Breast cancer (H)      Cardiomyopathy (H)  " "    Coronary artery disease 9/25/2014     Hypercholesterolemia      Hypertension      Hypokalemia      Malignant neoplasm (H)      Shortness of breath      Shoulder pain        Past Surgical History:    Past Surgical History:   Procedure Laterality Date     BACK SURGERY       CARDIAC SURGERY      angiogram neg many yrs ago     CHOLECYSTECTOMY       ESOPHAGOSCOPY, GASTROSCOPY, DUODENOSCOPY (EGD), COMBINED N/A 6/22/2021    Procedure: ESOPHAGOGASTRODUODENOSCOPY, WITH ENDOSCOPIC US WITH FINE NEEDLE ASPIRATION;  Surgeon: Ventura Mcgill MD;  Location:  GI     EYE SURGERY       ORTHOPEDIC SURGERY      lt shoulder replaced, total     partial masectomy          Family History:    family history includes Heart Failure in her daughter; No Known Problems in her son; Other Cancer in her brother and mother.    Social History:   reports that she has never smoked. She has never used smokeless tobacco. She reports current alcohol use. She reports that she does not use drugs.  PCP: Pattie Bowen (Inactive)     Physical Exam     Patient Vitals for the past 24 hrs:   BP Temp Temp src Pulse Resp SpO2 Height Weight   03/08/23 1350 -- -- -- 69 17 98 % -- --   03/08/23 1340 -- -- -- 64 22 97 % -- --   03/08/23 1330 (!) 143/73 -- -- 75 23 97 % -- --   03/08/23 1246 118/88 98.2  F (36.8  C) Oral 68 24 98 % 1.499 m (4' 11\") 61.7 kg (136 lb)        Physical Exam  Vitals: reviewed by me  General: Pt seen on [facility], cooperative, and alert to conversation  Eyes: Tracking well, clear conjunctiva BL  ENT: MMM, midline trachea.   Lungs: Slightly tachypneic, significant wheezing noted in bilateral lung fields.  Coughing occasionally, dry cough.  Speaking in full sentences however  CV: Rate as above  Abd: Soft, non tender, no guarding, no rebound. Non distended  MSK: no joint effusion.  No evidence of trauma  Skin: No rash  Neuro: Clear speech and no facial droop.  Psych: Not RIS, no e/o AH/VH      Emergency " Department Course     ECG results from 03/08/23   EKG 12 lead     Value    Systolic Blood Pressure     Diastolic Blood Pressure     Ventricular Rate 62    Atrial Rate 300    MO Interval     QRS Duration 114        QTc 418    P Axis     R AXIS -48    T Axis 16    Interpretation ECG      Undetermined rhythm  Left axis deviation  Low voltage QRS  Incomplete right bundle branch block  Inferior infarct (cited on or before 09-MAR-2018)  ST & T wave abnormality, consider anterior ischemia  Abnormal ECG  When compared with ECG of 04-NOV-2022 13:58,  Current undetermined rhythm precludes rhythm comparison, needs review  Borderline criteria for Anterior infarct are no longer Present  Borderline criteria for Anterolateral infarct are no longer Present  Confirmed by GENERATED REPORT, COMPUTER ([id number]),  MARIUSZ FLORES ([id number]) on 3/8/2023 2:10:02 PM           Imaging:  XR Chest 2 Views   Final Result   IMPRESSION: Stable enlargement of the cardiac silhouette. Right hilar   surgical clips are again noted. Persistent scarring in the right lower   lobe without new airspace consolidation, pleural effusion or   pneumothorax. No acute bony abnormality. Left shoulder arthroplasty is   again noted.      RAOUL ELLIS MD            SYSTEM ID:  [id number]         Report per radiology    Laboratory:  Labs Ordered and Resulted from Time of ED Arrival to Time of ED Departure   TROPONIN T, HIGH SENSITIVITY - Abnormal       Result Value    Troponin T, High Sensitivity 32 (*)    NT PROBNP INPATIENT - Abnormal    N terminal Pro BNP Inpatient 5,832 (*)    INFLUENZA A/B, RSV, & SARS-COV2 PCR - Normal    Influenza A PCR Negative      Influenza B PCR Negative      RSV PCR Negative      SARS CoV2 PCR Negative     CBC WITH PLATELETS AND DIFFERENTIAL    WBC Count 6.6      RBC Count 3.97      Hemoglobin 11.7      Hematocrit 36.8      MCV 93      MCH 29.5      MCHC 31.8      RDW 14.0      Platelet Count 198      % Neutrophils 71      %  Lymphocytes 18      % Monocytes 8      % Eosinophils 2      % Basophils 1      % Immature Granulocytes 0      NRBCs per 100 WBC 0      Absolute Neutrophils 4.7      Absolute Lymphocytes 1.2      Absolute Monocytes 0.5      Absolute Eosinophils 0.1      Absolute Basophils 0.0      Absolute Immature Granulocytes 0.0      Absolute NRBCs 0.0     COMPREHENSIVE METABOLIC PANEL      Emergency Department Course & Assessments:      Interventions:  Medications   ipratropium - albuterol 0.5 mg/2.5 mg/3 mL (DUONEB) neb solution 3 mL (3 mLs Nebulization $Given 3/8/23 1403)   magnesium sulfate 2 g in 50 mL sterile water intermittent infusion (2 g Intravenous $New Bag 3/8/23 1404)   ipratropium - albuterol 0.5 mg/2.5 mg/3 mL (DUONEB) neb solution 3 mL (3 mLs Nebulization $Given 3/8/23 1340)   methylPREDNISolone sodium succinate (solu-MEDROL) injection 125 mg (125 mg Intravenous $Given 3/8/23 1404)        Independent Interpretation (X-rays, CTs, rhythm strip):  X-ray independently reviewed, no obvious pneumothorax noted         Disposition:  The patient was admitted to the hospital under the care of Dr. Vega.     Impression & Plan      Medical Decision Making:  This is a pleasant 82-year-old female who presents the emergency room with what sounds like 1 week of a chest cold, with wheezing today.  She does have some significant wheezing on my exam today, and initially was concern for a postviral pneumonia given her worsening in the last 24 hours.  Her x-ray is unremarkable, and she does have a history of heart failure, which may cause some degree of her wheezing and shortness of breath as well.  She has no fever here, no white count, and with normal x-ray, I do not think that antibiotics are indicated at this point.  She is on a blood thinner effectively ruling out PE, and after discussing the case with the patient's family, we all agree as a team that the patient should come in for at least observation given how much shortness  of breath she had earlier.  She is responding here to nebulizers, and she is being treated for reactive airway disease, which again may be exacerbated by either bronchitis or CHF.  If she spikes a fever, would have a low threshold to give antibiotics.  We will plan for careful monitoring until next inpatient bed is available    Diagnosis:    ICD-10-CM    1. Moderate persistent reactive airway disease without complication  J45.40       2. Shortness of breath  R06.02       3. Heart failure, unspecified HF chronicity, unspecified heart failure type (H)  I50.9               3/8/2023   Maged Hernandez*        Maged Hernandez MD  03/08/23 1737

## 2023-03-08 NOTE — PHARMACY-ADMISSION MEDICATION HISTORY
Pharmacy Medication History  Admission medication history interview status for the 3/8/2023  admission is complete. See EPIC admission navigator for prior to admission medications     Location of Interview: Patient room  Medication history sources: Patient, Patient's family/friend (daughter), Surescripts and Care Everywhere    Significant changes made to the medication list:  Added: methimazole - patient was prescribed 3/3/23 but picked up the medication yesterday. She did not start taking the medication because she saw a side effect of bleeding.     In the past week, patient estimated taking medication this percent of the time: greater than 90%    Medication Affordability:  Not including over the counter (OTC) medications, was there a time in the past 12 months when you did not take your medications as prescribed because of cost?: Yes  Has this happened in the past 3 months?: No    Additional medication history information:   None    Medication reconciliation completed by provider prior to medication history? No    Time spent in this activity: 15 mins    Prior to Admission medications    Medication Sig Last Dose Taking? Auth Provider Long Term End Date   Acetaminophen (TYLENOL PO) Take 500 mg by mouth every 6 hours as needed for mild pain or fever  3/7/2023 Yes Reported, Patient     amoxicillin (AMOXIL) 500 MG capsule TAKE 4 CAPSULES BY MOUTH 1 HOUR BEFORE DENTAL APPOINTMENT  Yes Reported, Patient     apixaban ANTICOAGULANT (ELIQUIS) 5 MG tablet Take 1 tablet (5 mg) by mouth 2 times daily 3/8/2023 at am Yes Brett Smith MD     carvedilol (COREG) 25 MG tablet Take 2 tablets (50 mg) by mouth 2 times daily 3/8/2023 at am Yes Brett Smith MD Yes    cholecalciferol (VITAMIN D3) 25 mcg (1000 units) capsule Take 1 capsule by mouth daily 3/7/2023 Yes Reported, Patient     digoxin (LANOXIN) 125 MCG tablet Take 1 tablet (125 mcg) by mouth daily 3/8/2023 at am Yes Brett Smith MD  Yes    hydrALAZINE (APRESOLINE) 50 MG tablet Take 1 tablet (50 mg) by mouth 2 times daily 3/8/2023 at am Yes Brett Smith MD Yes    irbesartan (AVAPRO) 300 MG tablet Take 1 tablet (300 mg) by mouth daily 3/7/2023 at pm Yes Brett Smith MD Yes    methimazole (TAPAZOLE) 5 MG tablet Take 5 mg by mouth every other day  Yes Unknown, Entered By History Yes    omeprazole (PRILOSEC) 20 MG DR capsule Take 20 mg by mouth daily 3/8/2023 at am Yes Unknown, Entered By History     potassium chloride ER (K-TAB/KLOR-CON) 10 MEQ CR tablet Take 1 tablet (10 mEq) by mouth 2 times daily And as needed as directed. 3/8/2023 at am Yes Brett Smith MD     simvastatin (ZOCOR) 10 MG tablet Take 1 tablet (10 mg) by mouth At Bedtime 3/7/2023 at pm Yes Heidi Haque NP Yes    terazosin (HYTRIN) 5 MG capsule Take 1 capsule (5 mg) by mouth At Bedtime 3/7/2023 at pm Yes Brett Smith MD Yes    torsemide (DEMADEX) 10 MG tablet Take 3 tablets (30 mg) by mouth daily 3/8/2023 at am Yes Heidi Haque NP Yes        The information provided in this note is only as accurate as the sources available at the time of update(s)

## 2023-03-08 NOTE — ED NOTES
[facility]  ED Nurse Handoff Report    ED Chief complaint: Shortness of Breath      ED Diagnosis:   Final diagnoses:   None       Code Status: not addressed at this time    Allergies:   Allergies   Allergen Reactions     Amlodipine      swelling     Aspirin Other (See Comments)     Bleeding              Bleeding, GI Lesion         Codeine Sulfate GI Disturbance       Patient Story: Pt comes in for ~1 week of SOB, cough and wheezing. Feels dizzy at times. Had stroke back in November, denies asthma/COPD. Hx afib on thinners.  Focused Assessment:  A&Ox4. VSS on RA. Afib, rates 50-60s. Wheezing and productive cough. Tachypneic at times, and SOB especially with exertion. Denies pain. Afebrile.     Labs Ordered and Resulted from Time of ED Arrival to Time of ED Departure   COMPREHENSIVE METABOLIC PANEL - Abnormal       Result Value    Sodium 142      Potassium 4.1      Chloride 98      Carbon Dioxide (CO2) 30 (*)     Anion Gap 14      Urea Nitrogen 30.6 (*)     Creatinine 1.24 (*)     Calcium 9.4      Glucose 131 (*)     Alkaline Phosphatase 69      AST 21      ALT 19      Protein Total 7.1      Albumin 4.5      Bilirubin Total 0.4      GFR Estimate 44 (*)    TROPONIN T, HIGH SENSITIVITY - Abnormal    Troponin T, High Sensitivity 32 (*)    NT PROBNP INPATIENT - Abnormal    N terminal Pro BNP Inpatient 5,832 (*)    INFLUENZA A/B, RSV, & SARS-COV2 PCR - Normal    Influenza A PCR Negative      Influenza B PCR Negative      RSV PCR Negative      SARS CoV2 PCR Negative     CBC WITH PLATELETS AND DIFFERENTIAL    WBC Count 6.6      RBC Count 3.97      Hemoglobin 11.7      Hematocrit 36.8      MCV 93      MCH 29.5      MCHC 31.8      RDW 14.0      Platelet Count 198      % Neutrophils 71      % Lymphocytes 18      % Monocytes 8      % Eosinophils 2      % Basophils 1      % Immature Granulocytes 0      NRBCs per 100 WBC 0      Absolute Neutrophils 4.7      Absolute Lymphocytes 1.2      Absolute Monocytes 0.5       "Absolute Eosinophils 0.1      Absolute Basophils 0.0      Absolute Immature Granulocytes 0.0      Absolute NRBCs 0.0       XR Chest 2 Views   Final Result   IMPRESSION: Stable enlargement of the cardiac silhouette. Right hilar   surgical clips are again noted. Persistent scarring in the right lower   lobe without new airspace consolidation, pleural effusion or   pneumothorax. No acute bony abnormality. Left shoulder arthroplasty is   again noted.      RAOUL ELLIS MD            SYSTEM ID:  [id number]          Treatments and/or interventions provided: lasix, nebs x3, see MAR  Patient's response to treatments and/or interventions: improvement in SOB    To be done/followed up on inpatient unit:  cont with admitting MD orders    Does this patient have any cognitive concerns?: none    Activity level - Baseline/Home:  Independent   Activity Level - Current:   Independent    Patient's Preferred language: English   Needed?: No    Isolation: None  Infection: Not Applicable  Patient tested for COVID 19 prior to admission: YES  Bariatric?: No    Vital Signs:   Vitals:    03/08/23 1246 03/08/23 1330 03/08/23 1340 03/08/23 1350   BP: 118/88 (!) 143/73     Pulse: 68 75 64 69   Resp: 24 23 22 17   Temp: 98.2  F (36.8  C)      TempSrc: Oral      SpO2: 98% 97% 97% 98%   Weight: 61.7 kg (136 lb)      Height: 1.499 m (4' 11\")          Cardiac Rhythm:Cardiac Rhythm: Atrial fibrillation    Was the PSS-3 completed:   Yes  What interventions are required if any?               Family Comments: daughter at bedside       For the majority of the shift this patient's behavior was Green.   Behavioral interventions performed were none needed.    ED NURSE PHONE NUMBER: [phone]       "

## 2023-03-08 NOTE — H&P
[facility]    History and Physical - Hospitalist Service       Date of Admission:  3/8/2023    Assessment & Plan      Janessa Melendez is a 82 year old female with past medical history of idiopathic cardiomyopathy, most recent EF 40% in April 2022, hypertension, hyperlipidemia, ischemic stroke in November 2022, atrial fibrillation on Eliquis who is presenting for evaluation of shortness of breath and cough for the last 1 week, but seems to be worsening today.  She is being admitted under observation for acute bronchitis and possible mild acute on chronic heart failure.    Acute bronchitis  Patient with cough, shortness of breath and wheezing over the last 1 week.  This seems to have worsened today.  Influenza/COVID/RSV are negative here.  Chest x-ray does not show infiltrate.  She is not hypoxic.  She has received Solu-Medrol and DuoNebs in the emergency room.  No leukocytosis and afebrile.  -DuoNebs 4 times daily  -Prednisone 40 mg x 5 days starting tomorrow  -Scheduled Tessalon Perles and as needed Robitussin  -Mucinex twice daily  -Given symptom has been present for about a week, will add doxycycline twice daily x7 days.  Azithromycin not used due to concern for drug interaction with her PTA digoxin  - Monitor pulse oximetry    Acute on chronic heart failure with reduced ejection fraction  Suspect this is probably exacerbated by her underlying respiratory illness.  She has a history of idiopathic cardiomyopathy and follows with [facility] [place] with Dr. Albino Connors.  Last echocardiogram was 11/2022 which showed LVEF of 40% with global hypokinesis.  *N-terminal proBNP here is 5832.  Troponin is 32-31.  EKG shows A-fib with a heart rate of 66  *Has received Lasix 40 mg IV x1 in the ED.  -We will give Lasix 40 mg IV x1 more dose then likely to PTA p.o. torsemide in the next day  -Strict intake and output, daily weight  -She is not  "hypoxic    Atrial fibrillation  This was discovered during her hospital admission for stroke in 11/2022.  She is on Coreg and also apixaban.  She was initiated on digoxin 0.125 mg for better rate control after her visit with her cardiologist on 2/21/2023.  Their plan was to check digoxin trough level in 2 weeks.  Heart rate in the ED is 50 to 60s.  EKG A-fib with heart rate of 66.  -Check digoxin level  -Her creatinine is mildly increased, hold digoxin for now and follow digoxin level tomorrow morning.  If level in the therapeutic range, could resume PTA dose and also ensure creatinine has improved.  Level is checked today, however likely not throughout level.  -Resume Coreg with holding parameters  -Continue with apixaban 5 mg twice daily    History of CVA  This occurred in November 2022 and felt to be cardioembolic in the setting of new diagnosis of atrial fibrillation.   -Continue PTA statin.  Eliquis is resumed    Acute kidney injury  Baseline creatinine appears to be around 0.8 with GFR in the 70s.  -Creatinine on admission 1.24  -Holding ARB for now.  Receiving Lasix as above for CHF  -Monitor renal function with diuresis  -Follow-up BMP in the morning  - Avoid nephrotoxin agents       Diet:  Low-salt diet  DVT Prophylaxis: DOAC  Davis Catheter: Not present  Lines: None     Cardiac Monitoring: None  Code Status:  Full code, discussed with patient    Clinically Significant Risk Factors Present on Admission               # Drug Induced Coagulation Defect: home medication list includes an anticoagulant medication    # Hypertension: home medication list includes antihypertensive(s)      # Overweight: Estimated body mass index is 27.47 kg/m  as calculated from the following:    Height as of this encounter: 1.499 m (4' 11\").    Weight as of this encounter: 61.7 kg (136 lb).           Disposition Plan  Anticipate discharge in the next 24 to 48 hours if symptoms improving.     Expected Discharge Date: 03/09/2023       "            Pilar Vega MD  Hospitalist Service  [facility]  Securely message with [employer] (more info)  Text page via [facility] Paging/Directory     ______________________________________________________________________    Chief Complaint   Cough and shortness of breath    History is obtained from the patient, chart review and discussion with emergency room provider    History of Present Illness   Janessa Melendez is a 82 year old female with past medical history of idiopathic cardiomyopathy, most recent EF 40% in April 2022, hypertension, hyperlipidemia, ischemic stroke in November 2022, atrial fibrillation on Eliquis who is presenting for evaluation of shortness of breath and cough for the last 1 week.  Patient reports about a week ago, she developed cough, wheezing and some shortness of breath.  She also felt like her whole body was warm, but not sweaty.  She just thought she had chest cold.  By yesterday she was starting to feel better.  However, this morning she felt worse with her cough and worsening wheezing as well as shortness of breath.  She states no chest pain, but her whole body is sore from coughing.  Reports she feels very weak and dizzy today.  Not much appetite, but has been drinking more water.  No known history of asthma or other reactive airway disease.  No history of smoking.    In the ED, temperature is 98.2, heart rate 68, blood pressure 160/66 and saturating 100% on room air.  Laboratories notable for BUN of 30.6, creatinine of 1.24, N-terminal proBNP 5832, troponin of 32 that went down to 31 on recheck.  Influenza A/B, RSV and COVID test negative.  Chest x-ray showed stable enlargement of the cardiac silhouette, persistent scarring in the right lower lobe without new airspace consolidation, pleural effusion or pneumothorax.  Patient is given DuoNeb's, magnesium, methylprednisolone and furosemide, admission under observation requested for possible reactive  airway disease.        Past Medical History    Past Medical History:   Diagnosis Date     Arthritis      Breast cancer (H)      Cardiomyopathy (H)     ideopathic     Coronary artery disease 9/25/2014     Hypercholesterolemia      Hypertension      Hypokalemia      Malignant neoplasm (H)      Shortness of breath      Shoulder pain        Past Surgical History   Past Surgical History:   Procedure Laterality Date     BACK SURGERY       CARDIAC SURGERY      angiogram neg many yrs ago     CHOLECYSTECTOMY       ESOPHAGOSCOPY, GASTROSCOPY, DUODENOSCOPY (EGD), COMBINED N/A 6/22/2021    Procedure: ESOPHAGOGASTRODUODENOSCOPY, WITH ENDOSCOPIC US WITH FINE NEEDLE ASPIRATION;  Surgeon: Ventura Mcgill MD;  Location:  GI     EYE SURGERY       ORTHOPEDIC SURGERY      lt shoulder replaced, total     partial masectomy         Prior to Admission Medications   Prior to Admission Medications   Prescriptions Last Dose Informant Patient Reported? Taking?   Acetaminophen (TYLENOL PO) 3/7/2023 Self, Daughter Yes Yes   Sig: Take 500 mg by mouth every 6 hours as needed for mild pain or fever    amoxicillin (AMOXIL) 500 MG capsule  Self, Daughter Yes Yes   Sig: TAKE 4 CAPSULES BY MOUTH 1 HOUR BEFORE DENTAL APPOINTMENT   apixaban ANTICOAGULANT (ELIQUIS) 5 MG tablet 3/8/2023 at am Self, Daughter No Yes   Sig: Take 1 tablet (5 mg) by mouth 2 times daily   carvedilol (COREG) 25 MG tablet 3/8/2023 at am Self, Daughter No Yes   Sig: Take 2 tablets (50 mg) by mouth 2 times daily   cholecalciferol (VITAMIN D3) 25 mcg (1000 units) capsule 3/7/2023 Self, Daughter Yes Yes   Sig: Take 1 capsule by mouth daily   digoxin (LANOXIN) 125 MCG tablet 3/8/2023 at am Self, Daughter No Yes   Sig: Take 1 tablet (125 mcg) by mouth daily   hydrALAZINE (APRESOLINE) 50 MG tablet 3/8/2023 at am Self, Daughter No Yes   Sig: Take 1 tablet (50 mg) by mouth 2 times daily   irbesartan (AVAPRO) 300 MG tablet 3/7/2023 at pm Self, Daughter No Yes   Sig: Take 1  tablet (300 mg) by mouth daily   methimazole (TAPAZOLE) 5 MG tablet has not started Self, Daughter Yes No   Sig: Take 5 mg by mouth twice a week   omeprazole (PRILOSEC) 20 MG DR capsule 3/8/2023 at am Self, Daughter Yes Yes   Sig: Take 20 mg by mouth daily   potassium chloride ER (K-TAB/KLOR-CON) 10 MEQ CR tablet 3/8/2023 at am Self, Daughter No Yes   Sig: Take 1 tablet (10 mEq) by mouth 2 times daily And as needed as directed.   simvastatin (ZOCOR) 10 MG tablet 3/7/2023 at pm Self, Daughter No Yes   Sig: Take 1 tablet (10 mg) by mouth At Bedtime   terazosin (HYTRIN) 5 MG capsule 3/7/2023 at pm Self, Daughter No Yes   Sig: Take 1 capsule (5 mg) by mouth At Bedtime   torsemide (DEMADEX) 10 MG tablet 3/8/2023 at am Self, Daughter No Yes   Sig: Take 3 tablets (30 mg) by mouth daily      Facility-Administered Medications: None        Review of Systems    The 10 point Review of Systems is negative other than noted in the HPI    Social History   I have reviewed this patient's social history and updated it with pertinent information if needed.  Social History     Tobacco Use     Smoking status: Never     Smokeless tobacco: Never   Substance Use Topics     Alcohol use: Yes     Comment: socially     Drug use: No       Family History   I have reviewed this patient's family history and updated it with pertinent information if needed.  Family History   Problem Relation Age of Onset     Other Cancer Mother      Other Cancer Brother      No Known Problems Son      Heart Failure Daughter      Family History Negative No family hx of        Allergies   Allergies   Allergen Reactions     Amlodipine      swelling     Aspirin Other (See Comments)     Bleeding              Bleeding, GI Lesion         Codeine Sulfate GI Disturbance        Physical Exam   Vital Signs: Temp: 98.2  F (36.8  C) Temp src: Oral BP: (!) 160/66 Pulse: 56   Resp: 18 SpO2: 100 % O2 Device: None (Room air)    Weight: 136 lbs 0 oz    General Appearance: Alert, awake  and in no apparent distress  HEENT: Normocephalic, atraumatic, oral mucosa is moist, mild pharyngeal edema  Respiratory: Diffuse expiratory wheezing and rhonchi  Cardiovascular: Irregularly irregular, trace lower extremity edema bilaterally  GI: Soft and nontender  Skin: Warm and dry  Musculoskeletal: Normal muscle tone  Neurologic: Alert and oriented, moving all extremities equally  Psychiatric: Mood and affect are normal    Medical Decision Making       70 MINUTES SPENT BY ME on the date of service doing chart review, history, exam, documentation & further activities per the note.  MANAGEMENT DISCUSSED with the following over the past 24 hours: Patient and daughter   NOTE(S)/MEDICAL RECORDS REVIEWED over the past 24 hours: Hospital discharge from  November, outpatient cardiology follow-up note  Tests ORDERED & REVIEWED in the past 24 hours:  - BMP  - CBC  - BNP  - Troponin  - Digoxin level, influenza, RSV and COVID results  Tests personally interpreted in the past 24 hours:  - Chest x-ray and EKG showing As above      Data     I have personally reviewed the following data over the past 24 hrs:    6.6  \   11.7   / 198     142 98 30.6 (H) /  131 (H)   4.1 30 (H) 1.24 (H) \       ALT: 19 AST: 21 AP: 69 TBILI: 0.4   ALB: 4.5 TOT PROTEIN: 7.1 LIPASE: N/A       Trop: 31 (H) BNP: 5,832 (H)       Imaging results reviewed over the past 24 hrs:   Recent Results (from the past 24 hour(s))   XR Chest 2 Views    Narrative    XR CHEST 2 VIEWS   3/8/2023 2:23 PM     HISTORY: Shortness of breath     COMPARISON: CT 11/21/2022, radiograph 3/9/2018      Impression    IMPRESSION: Stable enlargement of the cardiac silhouette. Right hilar  surgical clips are again noted. Persistent scarring in the right lower  lobe without new airspace consolidation, pleural effusion or  pneumothorax. No acute bony abnormality. Left shoulder arthroplasty is  again noted.    RAOUL ELLIS MD         SYSTEM ID:  [id number]

## 2023-03-09 ENCOUNTER — TELEPHONE (OUTPATIENT)
Dept: CARDIOLOGY | Facility: CLINIC | Age: 83
End: 2023-03-09

## 2023-03-09 ENCOUNTER — APPOINTMENT (OUTPATIENT)
Dept: CARDIOLOGY | Facility: CLINIC | Age: 83
End: 2023-03-09
Attending: PHYSICIAN ASSISTANT
Payer: COMMERCIAL

## 2023-03-09 LAB
ANION GAP SERPL CALCULATED.3IONS-SCNC: 14 MMOL/L (ref 7–15)
BUN SERPL-MCNC: 27.7 MG/DL (ref 8–23)
CALCIUM SERPL-MCNC: 9.6 MG/DL (ref 8.8–10.2)
CHLORIDE SERPL-SCNC: 94 MMOL/L (ref 98–107)
CREAT SERPL-MCNC: 1.03 MG/DL (ref 0.51–0.95)
DEPRECATED HCO3 PLAS-SCNC: 33 MMOL/L (ref 22–29)
DIGOXIN SERPL-MCNC: 1 NG/ML (ref 0.6–2)
GFR SERPL CREATININE-BSD FRML MDRD: 54 ML/MIN/1.73M2
GLUCOSE SERPL-MCNC: 170 MG/DL (ref 70–99)
LVEF ECHO: NORMAL
POTASSIUM SERPL-SCNC: 3 MMOL/L (ref 3.4–5.3)
POTASSIUM SERPL-SCNC: 4.1 MMOL/L (ref 3.4–5.3)
SODIUM SERPL-SCNC: 141 MMOL/L (ref 136–145)
TROPONIN T SERPL HS-MCNC: 28 NG/L

## 2023-03-09 PROCEDURE — 250N000013 HC RX MED GY IP 250 OP 250 PS 637: Performed by: PHYSICIAN ASSISTANT

## 2023-03-09 PROCEDURE — 84132 ASSAY OF SERUM POTASSIUM: CPT | Performed by: INTERNAL MEDICINE

## 2023-03-09 PROCEDURE — 93306 TTE W/DOPPLER COMPLETE: CPT

## 2023-03-09 PROCEDURE — G0378 HOSPITAL OBSERVATION PER HR: HCPCS

## 2023-03-09 PROCEDURE — 93306 TTE W/DOPPLER COMPLETE: CPT | Mod: 26 | Performed by: INTERNAL MEDICINE

## 2023-03-09 PROCEDURE — 250N000009 HC RX 250: Performed by: INTERNAL MEDICINE

## 2023-03-09 PROCEDURE — 80162 ASSAY OF DIGOXIN TOTAL: CPT | Performed by: INTERNAL MEDICINE

## 2023-03-09 PROCEDURE — 250N000013 HC RX MED GY IP 250 OP 250 PS 637: Performed by: INTERNAL MEDICINE

## 2023-03-09 PROCEDURE — 250N000012 HC RX MED GY IP 250 OP 636 PS 637: Performed by: INTERNAL MEDICINE

## 2023-03-09 PROCEDURE — 99233 SBSQ HOSP IP/OBS HIGH 50: CPT | Performed by: PHYSICIAN ASSISTANT

## 2023-03-09 PROCEDURE — 36415 COLL VENOUS BLD VENIPUNCTURE: CPT | Performed by: INTERNAL MEDICINE

## 2023-03-09 PROCEDURE — 84484 ASSAY OF TROPONIN QUANT: CPT | Performed by: PHYSICIAN ASSISTANT

## 2023-03-09 PROCEDURE — 84132 ASSAY OF SERUM POTASSIUM: CPT | Performed by: PHYSICIAN ASSISTANT

## 2023-03-09 PROCEDURE — 36415 COLL VENOUS BLD VENIPUNCTURE: CPT | Performed by: PHYSICIAN ASSISTANT

## 2023-03-09 RX ORDER — BENZONATATE 100 MG/1
100 CAPSULE ORAL 3 TIMES DAILY PRN
Status: DISCONTINUED | OUTPATIENT
Start: 2023-03-09 | End: 2023-03-10 | Stop reason: HOSPADM

## 2023-03-09 RX ORDER — POTASSIUM CHLORIDE 1.5 G/1.58G
40 POWDER, FOR SOLUTION ORAL ONCE
Status: COMPLETED | OUTPATIENT
Start: 2023-03-09 | End: 2023-03-09

## 2023-03-09 RX ORDER — POTASSIUM CHLORIDE 1.5 G/1.58G
20 POWDER, FOR SOLUTION ORAL ONCE
Status: COMPLETED | OUTPATIENT
Start: 2023-03-09 | End: 2023-03-09

## 2023-03-09 RX ORDER — SALIVA STIMULANT COMB. NO.3
1 SPRAY, NON-AEROSOL (ML) MUCOUS MEMBRANE
Status: DISCONTINUED | OUTPATIENT
Start: 2023-03-09 | End: 2023-03-10 | Stop reason: HOSPADM

## 2023-03-09 RX ORDER — DIGOXIN 125 MCG
125 TABLET ORAL DAILY
Status: DISCONTINUED | OUTPATIENT
Start: 2023-03-09 | End: 2023-03-10 | Stop reason: HOSPADM

## 2023-03-09 RX ADMIN — DIGOXIN 125 MCG: 125 TABLET ORAL at 15:18

## 2023-03-09 RX ADMIN — IPRATROPIUM BROMIDE AND ALBUTEROL SULFATE 3 ML: .5; 3 SOLUTION RESPIRATORY (INHALATION) at 09:02

## 2023-03-09 RX ADMIN — TERAZOSIN HYDROCHLORIDE 5 MG: 5 CAPSULE ORAL at 21:23

## 2023-03-09 RX ADMIN — POTASSIUM CHLORIDE 20 MEQ: 1.5 POWDER, FOR SOLUTION ORAL at 10:17

## 2023-03-09 RX ADMIN — POTASSIUM CHLORIDE 40 MEQ: 1.5 POWDER, FOR SOLUTION ORAL at 07:53

## 2023-03-09 RX ADMIN — GUAIFENESIN 600 MG: 600 TABLET, EXTENDED RELEASE ORAL at 07:54

## 2023-03-09 RX ADMIN — DOXYCYCLINE HYCLATE 100 MG: 100 CAPSULE ORAL at 07:54

## 2023-03-09 RX ADMIN — TERAZOSIN HYDROCHLORIDE 5 MG: 5 CAPSULE ORAL at 02:00

## 2023-03-09 RX ADMIN — PREDNISONE 40 MG: 20 TABLET ORAL at 07:54

## 2023-03-09 RX ADMIN — BENZONATATE 100 MG: 100 CAPSULE ORAL at 07:54

## 2023-03-09 RX ADMIN — APIXABAN 5 MG: 5 TABLET, FILM COATED ORAL at 20:46

## 2023-03-09 RX ADMIN — CARVEDILOL 50 MG: 25 TABLET, FILM COATED ORAL at 07:54

## 2023-03-09 RX ADMIN — IPRATROPIUM BROMIDE AND ALBUTEROL SULFATE 3 ML: .5; 3 SOLUTION RESPIRATORY (INHALATION) at 11:46

## 2023-03-09 RX ADMIN — HYDRALAZINE HYDROCHLORIDE 50 MG: 50 TABLET, FILM COATED ORAL at 20:45

## 2023-03-09 RX ADMIN — APIXABAN 5 MG: 5 TABLET, FILM COATED ORAL at 07:54

## 2023-03-09 RX ADMIN — HYDRALAZINE HYDROCHLORIDE 50 MG: 50 TABLET, FILM COATED ORAL at 07:54

## 2023-03-09 RX ADMIN — GUAIFENESIN AND DEXTROMETHORPHAN 10 ML: 100; 10 SYRUP ORAL at 03:50

## 2023-03-09 RX ADMIN — PANTOPRAZOLE SODIUM 40 MG: 40 TABLET, DELAYED RELEASE ORAL at 06:35

## 2023-03-09 RX ADMIN — IPRATROPIUM BROMIDE AND ALBUTEROL SULFATE 3 ML: .5; 3 SOLUTION RESPIRATORY (INHALATION) at 20:46

## 2023-03-09 RX ADMIN — DOXYCYCLINE HYCLATE 100 MG: 100 CAPSULE ORAL at 20:45

## 2023-03-09 RX ADMIN — IPRATROPIUM BROMIDE AND ALBUTEROL SULFATE 3 ML: .5; 3 SOLUTION RESPIRATORY (INHALATION) at 15:18

## 2023-03-09 RX ADMIN — TORSEMIDE 30 MG: 20 TABLET ORAL at 11:46

## 2023-03-09 RX ADMIN — GUAIFENESIN AND DEXTROMETHORPHAN 10 ML: 100; 10 SYRUP ORAL at 07:53

## 2023-03-09 ASSESSMENT — ACTIVITIES OF DAILY LIVING (ADL)
ADLS_ACUITY_SCORE: 33
ADLS_ACUITY_SCORE: 31
ADLS_ACUITY_SCORE: 31
ADLS_ACUITY_SCORE: 33
ADLS_ACUITY_SCORE: 31
ADLS_ACUITY_SCORE: 33

## 2023-03-09 NOTE — PROGRESS NOTES
[facility]    Medicine Progress Note - Hospitalist Service    Date of Admission:  3/8/2023    Assessment & Plan   Janessa Melendez is a 82 year old female with past medical history of idiopathic cardiomyopathy, most recent EF 40% in April 2022, hypertension, hyperlipidemia, ischemic stroke in November 2022, atrial fibrillation on Eliquis who is presenting for evaluation of shortness of breath and cough for the last 1 week, but seems to be worsening today.  She is being admitted under observation for acute bronchitis and possible mild acute on chronic heart failure.     Acute bronchitis  Patient with cough, shortness of breath and wheezing over the last 1 week.  This seems to have worsened day of admission.  Influenza/COVID/RSV are negative here.  Chest x-ray does not show infiltrate.  She is not hypoxic.  She has received Solu-Medrol and DuoNebs in the emergency room.  No leukocytosis and afebrile.  Improved overnight with intervention, significant ongoing wheeze on exam.   -DuoNebs 4 times daily  -Prednisone 40 mg x 5 days starting 3/9  - as needed Robitussin and Tessalon Pearls   -stop scheduled mucinex per pt request   -Given symptom has been present for about a week, started on doxycycline twice daily x7 days.  Azithromycin not used due to concern for drug interaction with her PTA digoxin  - Monitor pulse oximetry     Acute on chronic heart failure with reduced ejection fraction  Elevated troponin, suspect demand ischemia   Suspect this is probably exacerbated by her underlying respiratory illness.  She has a history of idiopathic cardiomyopathy and follows with [facility] [place] with Dr. Albino Connors.  Last echocardiogram was 11/2022 which showed LVEF of 40% with global hypokinesis.  *N-terminal proBNP here is 5832.  Troponin is 32-31.  EKG shows A-fib with a heart rate of 66 wt 132 (140 at recent Cardiology visit)    --received lasix 40mg IV  "x2 thus far, plan to resume torsemide this am   -Strict intake and output, daily weight  --ECHO was planned outpatient, will obtain here in hospital   --low salt diet      Atrial fibrillation  This was discovered during her hospital admission for stroke in 11/2022.  She is on Coreg and also apixaban.  She was initiated on digoxin 0.125 mg for better rate control after her visit with her cardiologist on 2/21/2023.  Their plan was to check digoxin trough level in 2 weeks.  Heart rate in the ED is 50 to 60s.  EKG A-fib with heart rate of 66.  *digoxin in therapeutic range   -digoxin resumed today   -Resume Coreg with holding parameters  -Continue with apixaban 5 mg twice daily     History of CVA  This occurred in November 2022 and felt to be cardioembolic in the setting of new diagnosis of atrial fibrillation.   -resume PTA statin at discharge   -- Eliquis is resumed     Acute kidney injury, improving  Baseline creatinine appears to be around 0.8-0.9 with GFR in the 70s.  *Creatinine on admission 1.24>1.03  -hold ARB an additional day   - Avoid nephrotoxin agents  --BMP in am     Hypokalemia  K 3.0 today. Will order replacement protocol        Diet: Combination Diet 2 gm NA Diet    DVT Prophylaxis: DOAC  Davis Catheter: Not present  Lines: None     Cardiac Monitoring: ACTIVE order. Indication: Acute decompensated heart failure (48 hours)  Code Status: Full Code      Clinically Significant Risk Factors Present on Admission        # Hypokalemia: Lowest K = 3 mmol/L in last 2 days, will replace as needed        # Drug Induced Coagulation Defect: home medication list includes an anticoagulant medication    # Hypertension: home medication list includes antihypertensive(s)      # Overweight: Estimated body mass index is 26.82 kg/m  as calculated from the following:    Height as of this encounter: 1.499 m (4' 11\").    Weight as of this encounter: 60.2 kg (132 lb 12.8 oz).           Disposition Plan      Plan for discharge " 3/10 if continued improvement. Continues to have significant wheezing on exam. Monitor to ensure clinical stability.       The patient's care was discussed with Dr. Tejada who agrees with the above plan     Josefina Perez PA-C  Hospitalist Service  [facility]  Securely message with [employer] (more info)  Text page via [facility] Paging/Directory   ______________________________________________________________    Interval History   Pt reports she is feeling better today. Continues to feel more short of breath than baseline but better than yesterday. Ongoing cough, feels productive. Denies edema. Ambulating to bathroom without dyspnea (improved from yesterday) reports some transient light headedness this am, brief and has not recurred. This was happening frequently day of admission.     Physical Exam   Temp: 97.8  F (36.6  C) Temp src: Oral BP: 118/56 Pulse: 55   Resp: 18 SpO2: 94 % O2 Device: None (Room air)    Vitals:    03/08/23 1246 03/09/23 0514   Weight: 61.7 kg (136 lb) 60.2 kg (132 lb 12.8 oz)     Vital Signs with Ranges  Temp:  [97.8  F (36.6  C)-98.2  F (36.8  C)] 97.8  F (36.6  C)  Pulse:  [49-83] 55  Resp:  [15-29] 18  BP: (118-176)/() 118/56  SpO2:  [93 %-100 %] 94 %  No intake/output data recorded.    Constitutional: Alert and oriented, sitting up in bed. Appears comfortable and is appropriately conversant   ENT:  moist mucous membranes  Respiratory: Lungs with diffuse expiratory wheezing and rhonchi. Good air movement. No crackles. No increased work of breathing.   Cardiovascular: irregular rhythm with regular rate  Extremities:  Trace LE edema bilaterally (at baseline per pt)  GI: active bowel sounds, abdomen soft, non-tender  MSK:  Moves all four extremities. Normal tone  Neuro:  Speech is clear. Face symmetric. Follows commands         Medical Decision Making       70 MINUTES SPENT BY ME on the date of service doing chart review, history, exam, documentation & further  activities per the note.      Data     I have personally reviewed the following data over the past 24 hrs:    6.6  \   11.7   / 198     141 94 (L) 27.7 (H) /  170 (H)   3.0 (L) 33 (H) 1.03 (H) \       ALT: 19 AST: 21 AP: 69 TBILI: 0.4   ALB: 4.5 TOT PROTEIN: 7.1 LIPASE: N/A       Trop: 31 (H) BNP: 5,832 (H)       Imaging results reviewed over the past 24 hrs:   Recent Results (from the past 24 hour(s))   XR Chest 2 Views    Narrative    XR CHEST 2 VIEWS   3/8/2023 2:23 PM     HISTORY: Shortness of breath     COMPARISON: CT 11/21/2022, radiograph 3/9/2018      Impression    IMPRESSION: Stable enlargement of the cardiac silhouette. Right hilar  surgical clips are again noted. Persistent scarring in the right lower  lobe without new airspace consolidation, pleural effusion or  pneumothorax. No acute bony abnormality. Left shoulder arthroplasty is  again noted.    RAOUL ELLIS MD         SYSTEM ID:  [id number]

## 2023-03-09 NOTE — PROGRESS NOTES
Observation goals  PRIOR TO DISCHARGE       Comments: -diagnostic tests and consults completed and resulted: not met   -vital signs normal or at patient baseline: met  Nurse to notify provider when observation goals have been met and patient is ready for discharge.

## 2023-03-09 NOTE — PROGRESS NOTES
RECEIVING UNIT ED HANDOFF REVIEW    ED Nurse Handoff Report was reviewed by: Kiley Alamo RN on March 8, 2023 at 9:14 PM

## 2023-03-09 NOTE — PLAN OF CARE
Date & Time: 3/9/23 6901-9496  Orientation/Cognitive Concerns: A/O x4  Abnl VS/O2: VSS on RA  Tele: Afib CVR-SVR  Pain Management: denies  Abnl Labs/BG: K 3.0- replaced recheck 1430- 4.1  Behavior/Aggression Tool Color: green  Mobility: SBA  Diet: regular  Bowel/Bladder: continent  Skin: WDL  Drains/Devices: PIV SL  Test/Procedures: Echo pending  Anticipated DC date: 1-2 days

## 2023-03-09 NOTE — PLAN OF CARE
Observation goals:   -diagnostic tests and consults completed and resulted- met  -vital signs normal or at patient baseline- met

## 2023-03-09 NOTE — PLAN OF CARE
Goal Outcome Evaluation:    Orientation/Cognitive: A&Ox4  Observation Goals (Met/ Not Met): not met  Mobility Level/Assist Equipment: SBA  Fall Risk (Y/N): Y  Behavior Concerns: none  Pain Management: denies  Tele/VS/O2: VSS on RA ex HTN. Tele A fib CVR  ABNL Lab/BG: BUN 30.6, pro-BNP 5832, trop 32-->31  Diet: 2g NA  Bowel/Bladder: continent, urinary frequency dt IV lasix  Skin Concerns: none  Drains/Devices: PIV SL  Tests/Procedures for next shift: AM labs  Anticipated DC date & active delays: 1-2 days pending improvement  Patient Stated Goal for Today: rest    Observation goals  PRIOR TO DISCHARGE       Comments: -diagnostic tests and consults completed and resulted: not met   -vital signs normal or at patient baseline: met  Nurse to notify provider when observation goals have been met and patient is ready for discharge.

## 2023-03-09 NOTE — PROGRESS NOTES
Observation goals  PRIOR TO DISCHARGE       Comments: -diagnostic tests and consults completed and resulted: not met   -vital signs normal or at patient baseline: not met, HTN   Nurse to notify provider when observation goals have been met and patient is ready for discharge.

## 2023-03-09 NOTE — TELEPHONE ENCOUNTER
Health Call Center    Phone Message    May a detailed message be left on voicemail: yes     Reason for Call: Other: Rafaela called on Janessa's behalf because Janessa is currently in the hospital and is expected to be discharged tomorrow. Rafaela was informed by the nurses that Janessa's bronchitis might affect the results of the monitor and was wondering if Janessa should keep her appointment on Monday 3/13. Please reach out to Rafaela to discuss. Thank you!     Action Taken: Other: Cardiology    Travel Screening: Not Applicable     Thank you!  [facility]

## 2023-03-10 VITALS
HEART RATE: 75 BPM | HEIGHT: 59 IN | SYSTOLIC BLOOD PRESSURE: 123 MMHG | RESPIRATION RATE: 18 BRPM | TEMPERATURE: 97.7 F | WEIGHT: 132.8 LBS | BODY MASS INDEX: 26.77 KG/M2 | DIASTOLIC BLOOD PRESSURE: 56 MMHG | OXYGEN SATURATION: 93 %

## 2023-03-10 LAB
ANION GAP SERPL CALCULATED.3IONS-SCNC: 13 MMOL/L (ref 7–15)
BUN SERPL-MCNC: 32.9 MG/DL (ref 8–23)
CALCIUM SERPL-MCNC: 9.9 MG/DL (ref 8.8–10.2)
CHLORIDE SERPL-SCNC: 91 MMOL/L (ref 98–107)
CREAT SERPL-MCNC: 0.79 MG/DL (ref 0.51–0.95)
DEPRECATED HCO3 PLAS-SCNC: 31 MMOL/L (ref 22–29)
GFR SERPL CREATININE-BSD FRML MDRD: 75 ML/MIN/1.73M2
GLUCOSE SERPL-MCNC: 108 MG/DL (ref 70–99)
POTASSIUM SERPL-SCNC: 3.9 MMOL/L (ref 3.4–5.3)
SODIUM SERPL-SCNC: 135 MMOL/L (ref 136–145)

## 2023-03-10 PROCEDURE — 94640 AIRWAY INHALATION TREATMENT: CPT | Mod: 76

## 2023-03-10 PROCEDURE — 99239 HOSP IP/OBS DSCHRG MGMT >30: CPT | Performed by: PHYSICIAN ASSISTANT

## 2023-03-10 PROCEDURE — 94640 AIRWAY INHALATION TREATMENT: CPT

## 2023-03-10 PROCEDURE — 36415 COLL VENOUS BLD VENIPUNCTURE: CPT | Performed by: PHYSICIAN ASSISTANT

## 2023-03-10 PROCEDURE — 250N000013 HC RX MED GY IP 250 OP 250 PS 637: Performed by: PHYSICIAN ASSISTANT

## 2023-03-10 PROCEDURE — 250N000009 HC RX 250: Performed by: INTERNAL MEDICINE

## 2023-03-10 PROCEDURE — 80048 BASIC METABOLIC PNL TOTAL CA: CPT | Performed by: PHYSICIAN ASSISTANT

## 2023-03-10 PROCEDURE — G0378 HOSPITAL OBSERVATION PER HR: HCPCS

## 2023-03-10 PROCEDURE — 999N000157 HC STATISTIC RCP TIME EA 10 MIN

## 2023-03-10 PROCEDURE — 250N000012 HC RX MED GY IP 250 OP 636 PS 637: Performed by: INTERNAL MEDICINE

## 2023-03-10 PROCEDURE — 250N000013 HC RX MED GY IP 250 OP 250 PS 637: Performed by: INTERNAL MEDICINE

## 2023-03-10 RX ORDER — GUAIFENESIN/DEXTROMETHORPHAN 100-10MG/5
10 SYRUP ORAL EVERY 4 HOURS PRN
Qty: 118 ML | Refills: 0 | Status: SHIPPED | OUTPATIENT
Start: 2023-03-10 | End: 2024-01-23

## 2023-03-10 RX ORDER — PREDNISONE 20 MG/1
40 TABLET ORAL DAILY
Qty: 3 TABLET | Refills: 0 | Status: SHIPPED | OUTPATIENT
Start: 2023-03-11 | End: 2023-04-24

## 2023-03-10 RX ORDER — DOXYCYCLINE 100 MG/1
100 CAPSULE ORAL EVERY 12 HOURS
Qty: 10 CAPSULE | Refills: 0 | Status: SHIPPED | OUTPATIENT
Start: 2023-03-10 | End: 2023-03-15

## 2023-03-10 RX ORDER — IPRATROPIUM BROMIDE AND ALBUTEROL SULFATE 2.5; .5 MG/3ML; MG/3ML
3 SOLUTION RESPIRATORY (INHALATION) EVERY 4 HOURS PRN
Qty: 90 ML | Refills: 0 | Status: SHIPPED | OUTPATIENT
Start: 2023-03-10 | End: 2024-01-23

## 2023-03-10 RX ORDER — BENZONATATE 100 MG/1
100 CAPSULE ORAL 3 TIMES DAILY PRN
Qty: 30 CAPSULE | Refills: 0 | Status: SHIPPED | OUTPATIENT
Start: 2023-03-10 | End: 2024-01-23

## 2023-03-10 RX ADMIN — CARVEDILOL 50 MG: 25 TABLET, FILM COATED ORAL at 08:05

## 2023-03-10 RX ADMIN — IPRATROPIUM BROMIDE AND ALBUTEROL SULFATE 3 ML: .5; 3 SOLUTION RESPIRATORY (INHALATION) at 09:47

## 2023-03-10 RX ADMIN — TORSEMIDE 30 MG: 20 TABLET ORAL at 08:20

## 2023-03-10 RX ADMIN — HYDRALAZINE HYDROCHLORIDE 50 MG: 50 TABLET, FILM COATED ORAL at 08:07

## 2023-03-10 RX ADMIN — GUAIFENESIN AND DEXTROMETHORPHAN 10 ML: 100; 10 SYRUP ORAL at 03:49

## 2023-03-10 RX ADMIN — IPRATROPIUM BROMIDE AND ALBUTEROL SULFATE 3 ML: .5; 3 SOLUTION RESPIRATORY (INHALATION) at 15:41

## 2023-03-10 RX ADMIN — DOXYCYCLINE HYCLATE 100 MG: 100 CAPSULE ORAL at 08:07

## 2023-03-10 RX ADMIN — APIXABAN 5 MG: 5 TABLET, FILM COATED ORAL at 08:06

## 2023-03-10 RX ADMIN — DIGOXIN 125 MCG: 125 TABLET ORAL at 08:07

## 2023-03-10 RX ADMIN — PANTOPRAZOLE SODIUM 40 MG: 40 TABLET, DELAYED RELEASE ORAL at 06:34

## 2023-03-10 RX ADMIN — IPRATROPIUM BROMIDE AND ALBUTEROL SULFATE 3 ML: .5; 3 SOLUTION RESPIRATORY (INHALATION) at 12:02

## 2023-03-10 RX ADMIN — PREDNISONE 40 MG: 20 TABLET ORAL at 08:06

## 2023-03-10 ASSESSMENT — ACTIVITIES OF DAILY LIVING (ADL)
ADLS_ACUITY_SCORE: 33

## 2023-03-10 NOTE — PROGRESS NOTES
Observation goals  PRIOR TO DISCHARGE         Comments:   -diagnostic tests and consults completed and resulted: met  -vital signs normal or at patient baseline: met     Nurse to notify provider when observation goals have been met and patient is ready for discharge.    Patient has been cleared for discharge at this time. She will go home with a nebulizer and will be educated on how to use it.

## 2023-03-10 NOTE — PROGRESS NOTES
Observation goals  PRIOR TO DISCHARGE         Comments:   -diagnostic tests and consults completed and resulted: met  -vital signs normal or at patient baseline: met    Nurse to notify provider when observation goals have been met and patient is ready for discharge.

## 2023-03-10 NOTE — PLAN OF CARE
Goal Outcome Evaluation:    Patient has been cleared for discharge with a nebulizer.  Reviewed the AVS sheet and med list with the patient.  All questions were answered.  Discharge meds and all patient belongings were sent home with the patient.      Family member to transport patient home.      Due to the national shortage of Ipratropium, the patient will  the medication from a pharmacy in [place].

## 2023-03-10 NOTE — DISCHARGE SUMMARY
[facility]  Hospitalist Discharge Summary      Date of Admission:  3/8/2023  Date of Discharge:  3/10/2023  Discharging Provider: Josefina Perez PA-C  Discharge Service: Hospitalist Service    Discharge Diagnoses   Acute bronchitis   Acute on chronic heart failure with reduced ejection fraction  Elevated troponin, suspect demand ischemia   Acute kidney injury, resolved  Atrial fibrillation  History of CVA  Hypokalemia, resolved    Follow-ups Needed After Discharge   Follow-up Appointments     Follow-up and recommended labs and tests       Follow up with primary care provider next week for hospital follow up    Follow up with your heart monitor as planned. You can let your   Cardiologist know you already had an echocardiogram in the hospital.   Follow up with cardiology as planned             Discharge Disposition   Discharged to home with family   Condition at discharge: Stable    Hospital Course   Janessa Melendez is a 82 year old female with past medical history of idiopathic cardiomyopathy, most recent EF 40% in April 2022, hypertension, hyperlipidemia, ischemic stroke in November 2022, atrial fibrillation on Eliquis who is presenting for evaluation of shortness of breath and cough for the last 1 week, but seems to be worsening today.  She is being admitted under observation for acute bronchitis and possible mild acute on chronic heart failure.     Acute bronchitis  Patient with cough, shortness of breath and wheezing over the last 1 week.  This seems to have worsened significantly day of admission.  Influenza/COVID/RSV are negative here.  Chest x-ray does not show infiltrate.  She is not hypoxic.  She has received Solu-Medrol and DuoNebs in the emergency room.  No leukocytosis and afebrile.  -DuoNebs to continue to at home   -Prednisone 40 mg x 5 days starting 3/9  - as needed Robitussin and Tessalon Pearls   -stop scheduled mucinex per pt request   -Given symptom has been  present for about a week, started on doxycycline twice daily x7 days.  Azithromycin not used due to concern for drug interaction with her PTA digoxin  - close PCP follow up, advised to return with worsening symptoms       Acute on chronic heart failure with reduced ejection fraction  Elevated troponin, suspect demand ischemia   Suspect this is probably exacerbated by her underlying respiratory illness.  She has a history of idiopathic cardiomyopathy and follows with [facility] [place] with Dr. Albino Connors.  Last echocardiogram was 11/2022 which showed LVEF of 40% with global hypokinesis.  *N-terminal proBNP here is 5832.  Troponin is 32-31.  EKG shows A-fib with a heart rate of 66 wt 132 (140 at recent Cardiology visit)    --received lasix 40mg IV x2 thus far, torsemide resumed 3/9   --ECHO was planned outpatient, obtained during admission and showing mild improvement in EF 45-50%  --low salt diet   -close Cardiology follow up as planned      Atrial fibrillation  This was discovered during her hospital admission for stroke in 11/2022.  She is on Coreg and also apixaban.  She was initiated on digoxin 0.125 mg for better rate control after her visit with her cardiologist on 2/21/2023.  Their plan was to check digoxin trough level in 2 weeks.  Heart rate in the ED is 50 to 60s.  EKG A-fib with heart rate of 66.  *digoxin in therapeutic range   -continue Coreg and digoxin at discharge   -Continue with apixaban 5 mg twice daily     History of CVA  This occurred in November 2022 and felt to be cardioembolic in the setting of new diagnosis of atrial fibrillation.   -resume PTA statin at discharge   -- Eliquis is resumed     Acute kidney injury, resolved  Baseline creatinine appears to be around 0.8-0.9 with GFR in the 70s.  *Creatinine on admission 1.24>1.03>0.79  -resume ARB at discharge   - Avoid nephrotoxin agents     Hypokalemia  Replaced per protocol        Consultations This  Hospital Stay   None    Code Status   Full Code    Time Spent on this Encounter   I, Josefina Perez PA-C, personally saw the patient today and spent greater than 30 minutes discharging this patient.       Josefina Perez PA-C  [facility] EXTENDED RECOVERY AND SHORT STAY  [address] [facility] [address]  Phone: [phone]  ______________________________________________________________________    Physical Exam   Temp: 97.7  F (36.5  C) Temp src: Oral BP: 123/56 Pulse: 75   Resp: 18 SpO2: 93 % O2 Device: None (Room air)    Vitals:    03/08/23 1246 03/09/23 0514   Weight: 61.7 kg (136 lb) 60.2 kg (132 lb 12.8 oz)     Vital Signs with Ranges  Temp:  [97.7  F (36.5  C)-98.6  F (37  C)] 97.7  F (36.5  C)  Pulse:  [50-91] 75  Resp:  [18-22] 18  BP: (123-138)/(50-78) 123/56  SpO2:  [92 %-96 %] 93 %  I/O last 3 completed shifts:  In: 1080 [P.O.:1080]  Out: -     Constitutional: Alert and oriented, sitting up in chair. Appears comfortable and is appropriately conversant   ENT:  moist mucous membranes  Respiratory: Lungs with expiratory wheezing on the right, occasional on left.  Good air movement. No crackles. No increased work of breathing.   Cardiovascular: irregular rhythm with regular rate  Extremities:  Trace LE edema bilaterally (at baseline per pt)  GI: active bowel sounds, abdomen soft, non-tender  MSK:  Moves all four extremities. Normal tone  Neuro:  Speech is clear. Face symmetric. Follows commands          Primary Care Physician   [facility]    Discharge Orders      Reason for your hospital stay    You were here for evaluation of shortness of breath and wheezing likely due to bronchitis     Follow-up and recommended labs and tests     Follow up with primary care provider next week for hospital follow up    Follow up with your heart monitor as planned. You can let your Cardiologist know you already had an echocardiogram in the hospital. Follow up with  cardiology as planned     Activity    Your activity upon discharge: activity as tolerated     Discharge Instructions    New medications:  --prednisone for 3 more days (this is a steroid)  --doxycycline for 5 more days (this is an antibiotic)  --we will send you home with robitussin and tessalon which can both be used as needed for cough suppression   --we will send you home with a nebulizer machine to continue treatments at home as needed for wheezing. Do a treatment at home the night of discharge regardless of symptoms    Continue to use your incentive spirometer and flutter valve at home the next few days. Sitting up is better for your lungs than laying down all the time     When to contact your care team    You should be re evaluated if your symptoms worsen, your breathing becomes more labored, etc.     Nebulizer and Nebulizer Supplies    Nebulizer Documentation  I attest that I have seen and evaluated Janessa Melendez. She has a diagnosis of J40  - Bronchitis, not specified as acute or chronic and a nebulizer machine is needed to administer medication to improve breathing passages.     I, the undersigned, certify that the above prescribed supplies are medically necessary for this patient and is both reasonable and necessary in reference to accepted standards of medical and necessary in reference to accepted standards of medical practice in the treatment of this patient's condition and is not prescribed as a convenience.     Diet    Follow this diet upon discharge: Orders Placed This Encounter      Combination Diet 2 gm NA Diet       Significant Results and Procedures   Most Recent 3 CBC's:Recent Labs   Lab Test 03/08/23  1328 11/05/22  0503 11/04/22  1348   WBC 6.6 10.0 7.0   HGB 11.7 11.1* 11.5*   MCV 93 93 97    195 222     Most Recent 3 BMP's:Recent Labs   Lab Test 03/10/23  0714 03/09/23  1507 03/09/23  0600 03/08/23  1328   *  --  141 142   POTASSIUM 3.9 4.1 3.0* 4.1   CHLORIDE 91*  --   94* 98   CO2 31*  --  33* 30*   BUN 32.9*  --  27.7* 30.6*   CR 0.79  --  1.03* 1.24*   ANIONGAP 13  --  14 14   PENNY 9.9  --  9.6 9.4   *  --  170* 131*   ,   Results for orders placed or performed during the hospital encounter of 23   XR Chest 2 Views    Narrative    XR CHEST 2 VIEWS   3/8/2023 2:23 PM     HISTORY: Shortness of breath     COMPARISON: CT 2022, radiograph 3/9/2018      Impression    IMPRESSION: Stable enlargement of the cardiac silhouette. Right hilar  surgical clips are again noted. Persistent scarring in the right lower  lobe without new airspace consolidation, pleural effusion or  pneumothorax. No acute bony abnormality. Left shoulder arthroplasty is  again noted.    RAOUL ELLIS MD         SYSTEM ID:  [id number]   Echocardiogram Complete     Value    LVEF  45-50%    Narrative    [id number]  [id number]  [id number]  [id number]^WENDY^ÁNGEL^CARLOS ALBERTO     [facility]  Echocardiography Laboratory  [address] [place] [address]     Name: MAI SALDAÑA  MRN: [id number]  : 1940  Study Date: 2023 02:22 PM  Age: 82 yrs  Gender: Female  Patient Location: [facility]  Reason For Study: CHF  Ordering Physician: ÁNGEL NGUYEN  Referring Physician: ÁNGEL NGUYEN  Performed By: Edison Huerta     BSA: 1.5 m2  Height: 59 in  Weight: 132 lb  HR: 69  ______________________________________________________________________________  Procedure  Complete Echo Adult.  ______________________________________________________________________________  Interpretation Summary     Left ventricular systolic function is mildly reduced. The visual ejection  fraction is 45-50%. There is mild global hypokinesia of the left ventricle.  Right ventricular global function is normal.  No significant valvular abnormalities.  The inferior vena cava was normal in size with preserved respiratory  variability.     This study was compared to a TTE from 2022. Global LV  systolic function  has mildly improved.  ______________________________________________________________________________  Left Ventricle  The left ventricle is normal in size. There is normal left ventricular wall  thickness. Left ventricular systolic function is mildly reduced. The visual  ejection fraction is 45-50%. Left ventricular diastolic function is not  assessable. There is mild global hypokinesia of the left ventricle.     Right Ventricle  The right ventricle is normal in structure, function and size.     Atria  The left atrium is severely dilated. The right atrium is moderate to severely  dilated.     Mitral Valve  There is trace to mild mitral regurgitation.     Tricuspid Valve  The tricuspid valve is not well visualized, but is grossly normal. There is  trace tricuspid regurgitation. The right ventricular systolic pressure is  approximated at 30mmHg plus the right atrial pressure.     Aortic Valve  There is mild trileaflet aortic sclerosis. No aortic regurgitation is present.     Pulmonic Valve  The pulmonic valve is not well seen, but is grossly normal.     Vessels  The aortic root is normal size. Normal size ascending aorta. The inferior vena  cava was normal in size with preserved respiratory variability.     Pericardium  There is no pericardial effusion.     Rhythm  The rhythm was atrial fibrillation.  ______________________________________________________________________________  MMode/2D Measurements & Calculations     IVSd: 0.70 cm  LVIDd: 4.5 cm  LVIDs: 3.7 cm  LVPWd: 0.80 cm  FS: 17.6 %  LV mass(C)d: 104.6 grams  LV mass(C)dI: 67.7 grams/m2  Ao root diam: 2.9 cm  asc Aorta Diam: 3.7 cm  LVOT diam: 1.9 cm  LVOT area: 2.7 cm2  LA Volume (BP): 87.4 ml  LA Volume Index (BP): 56.4 ml/m2  RWT: 0.36     TAPSE: 1.6 cm     Doppler Measurements & Calculations  MV E max dom: 113.3 cm/sec  MV max P.1 mmHg  MV mean P.7 mmHg  MV V2 VTI: 28.2 cm  MVA(VTI): 1.5 cm2  MV dec slope: 800.1 cm/sec2  MV dec  time: 0.15 sec  Ao V2 max: 138.5 cm/sec  Ao max P.0 mmHg  SHIVANI(V,D): 1.5 cm2  LV V1 max P.5 mmHg  LV V1 max: 79.2 cm/sec  LV V1 VTI: 16.0 cm  SV(LVOT): 43.1 ml  SI(LVOT): 27.9 ml/m2  PA V2 max: 119.3 cm/sec  PA max P.7 mmHg  PA acc time: 0.07 sec  TR max reggie: 324.2 cm/sec  TR max P.2 mmHg  AV Reggie Ratio (DI): 0.57  Lateral E/e': 10.9     ______________________________________________________________________________  Report approved by: Maria Isabel Pressley 2023 04:04 PM               Discharge Medications   Current Discharge Medication List      START taking these medications    Details   benzonatate (TESSALON) 100 MG capsule Take 1 capsule (100 mg) by mouth 3 times daily as needed for cough  Qty: 30 capsule, Refills: 0    Associated Diagnoses: Acute bronchitis, unspecified organism      doxycycline hyclate (VIBRAMYCIN) 100 MG capsule Take 1 capsule (100 mg) by mouth every 12 hours for 5 days  Qty: 10 capsule, Refills: 0    Associated Diagnoses: Acute bronchitis, unspecified organism      guaiFENesin-dextromethorphan (ROBITUSSIN DM) 100-10 MG/5ML syrup Take 10 mLs by mouth every 4 hours as needed for cough  Qty: 118 mL, Refills: 0    Associated Diagnoses: Acute bronchitis, unspecified organism      ipratropium - albuterol 0.5 mg/2.5 mg/3 mL (DUONEB) 0.5-2.5 (3) MG/3ML neb solution Take 1 vial (3 mLs) by nebulization every 4 hours as needed for shortness of breath, wheezing or cough  Qty: 90 mL, Refills: 0    Comments: Refills per PCP  Associated Diagnoses: Acute bronchitis, unspecified organism      predniSONE (DELTASONE) 20 MG tablet Take 2 tablets (40 mg) by mouth daily  Qty: 3 tablet, Refills: 0    Associated Diagnoses: Acute bronchitis, unspecified organism         CONTINUE these medications which have NOT CHANGED    Details   Acetaminophen (TYLENOL PO) Take 500 mg by mouth every 6 hours as needed for mild pain or fever       amoxicillin (AMOXIL) 500 MG capsule TAKE 4 CAPSULES BY MOUTH 1 HOUR  BEFORE DENTAL APPOINTMENT      apixaban ANTICOAGULANT (ELIQUIS) 5 MG tablet Take 1 tablet (5 mg) by mouth 2 times daily  Qty: 180 tablet, Refills: 3    Associated Diagnoses: Atrial fibrillation, unspecified type (H)      carvedilol (COREG) 25 MG tablet Take 2 tablets (50 mg) by mouth 2 times daily  Qty: 360 tablet, Refills: 3    Associated Diagnoses: Dilated cardiomyopathy (H); Benign essential hypertension      cholecalciferol (VITAMIN D3) 25 mcg (1000 units) capsule Take 1 capsule by mouth daily      digoxin (LANOXIN) 125 MCG tablet Take 1 tablet (125 mcg) by mouth daily  Qty: 90 tablet, Refills: 3    Associated Diagnoses: Atrial fibrillation, unspecified type (H)      hydrALAZINE (APRESOLINE) 50 MG tablet Take 1 tablet (50 mg) by mouth 2 times daily  Qty: 180 tablet, Refills: 3    Associated Diagnoses: Essential hypertension, benign      irbesartan (AVAPRO) 300 MG tablet Take 1 tablet (300 mg) by mouth daily  Qty: 90 tablet, Refills: 3    Associated Diagnoses: Essential hypertension      omeprazole (PRILOSEC) 20 MG DR capsule Take 20 mg by mouth daily      potassium chloride ER (K-TAB/KLOR-CON) 10 MEQ CR tablet Take 1 tablet (10 mEq) by mouth 2 times daily And as needed as directed.  Qty: 180 tablet, Refills: 3    Associated Diagnoses: Benign essential hypertension      simvastatin (ZOCOR) 10 MG tablet Take 1 tablet (10 mg) by mouth At Bedtime  Qty: 90 tablet, Refills: 3    Associated Diagnoses: Pure hypercholesterolemia      terazosin (HYTRIN) 5 MG capsule Take 1 capsule (5 mg) by mouth At Bedtime  Qty: 90 capsule, Refills: 3    Associated Diagnoses: Benign essential hypertension      torsemide (DEMADEX) 10 MG tablet Take 3 tablets (30 mg) by mouth daily  Qty: 270 tablet, Refills: 2    Associated Diagnoses: Dilated cardiomyopathy (H); Cardiomyopathy, unspecified type (H)      methimazole (TAPAZOLE) 5 MG tablet Take 5 mg by mouth twice a week           Allergies   Allergies   Allergen Reactions     Amlodipine       swelling     Aspirin Other (See Comments)     Bleeding              Bleeding, GI Lesion         Codeine Sulfate GI Disturbance

## 2023-03-10 NOTE — PLAN OF CARE
Goal Outcome Evaluation:     Orientation/Cognitive: Alert and oriented x4.   Observation Goals (Met/ Not Met): not met  Mobility Level/Assist Equipment: SBA  Fall Risk (Y/N): Y  Behavior Concerns: green  Pain Management: denies  Tele/VS/O2: VSS on RA ex HTN. Tele A fib CVR/SVR  ABNL Lab/BG: BUN 30.6, pro-BNP 5832, troponin 31--> down to 28  Diet: 2g NA  Bowel/Bladder: continent of bowel and bladder   Skin Concerns: N/a  Drains/Devices: PIV SL  Tests/Procedures for next shift:   Anticipated DC date & active delays: 3/10     Echo done yesterday. Robitussin given one time for frequent cough. LS with inspiratory and expiratory wheezes. Nebs given as scheduled.      Observation goals  PRIOR TO DISCHARGE       Comments: -diagnostic tests and consults completed and resulted: met  -vital signs normal or at patient baseline: met  Nurse to notify provider when observation goals have been met and patient is ready for discharge.

## 2023-03-12 ENCOUNTER — PATIENT OUTREACH (OUTPATIENT)
Dept: CARE COORDINATION | Facility: CLINIC | Age: 83
End: 2023-03-12
Payer: COMMERCIAL

## 2023-03-12 NOTE — PROGRESS NOTES
Clinic Care Coordination Contact  [facility]/Voicemail       Clinical Data: Care Coordinator Outreach  Outreach attempted x 2.  Left message on patient's voicemail with call back information and requested return call.   Care Coordinator will do no further outreaches at this time.    Rekha Chinchilla  [phone]  Care

## 2023-03-28 ENCOUNTER — HOSPITAL ENCOUNTER (OUTPATIENT)
Dept: CARDIOLOGY | Facility: CLINIC | Age: 83
Discharge: HOME | End: 2023-03-28
Attending: INTERNAL MEDICINE
Payer: COMMERCIAL

## 2023-03-28 DIAGNOSIS — R03.0 ELEVATED BLOOD PRESSURE READING WITHOUT DIAGNOSIS OF HYPERTENSION: ICD-10-CM

## 2023-03-28 DIAGNOSIS — I48.91 ATRIAL FIBRILLATION, UNSPECIFIED TYPE (H): ICD-10-CM

## 2023-03-28 PROCEDURE — 93790 AMBL BP MNTR W/SW I&R: CPT | Performed by: INTERNAL MEDICINE

## 2023-03-28 PROCEDURE — 93226 XTRNL ECG REC<48 HR SCAN A/R: CPT

## 2023-03-28 PROCEDURE — 93227 XTRNL ECG REC<48 HR R&I: CPT | Performed by: INTERNAL MEDICINE

## 2023-03-28 PROCEDURE — 93786 AMBL BP MNTR W/SW REC ONLY: CPT

## 2023-03-31 ENCOUNTER — TELEPHONE (OUTPATIENT)
Dept: CARDIOLOGY | Facility: CLINIC | Age: 83
End: 2023-03-31
Payer: COMMERCIAL

## 2023-03-31 DIAGNOSIS — I47.29 NSVT (NONSUSTAINED VENTRICULAR TACHYCARDIA) (H): ICD-10-CM

## 2023-03-31 DIAGNOSIS — I10 BENIGN ESSENTIAL HYPERTENSION: ICD-10-CM

## 2023-03-31 DIAGNOSIS — E83.42 HYPOMAGNESEMIA: Primary | ICD-10-CM

## 2023-03-31 DIAGNOSIS — I42.0 DILATED CARDIOMYOPATHY (H): ICD-10-CM

## 2023-03-31 DIAGNOSIS — R00.1 BRADYCARDIA: ICD-10-CM

## 2023-03-31 DIAGNOSIS — I45.5 SINUS PAUSE: ICD-10-CM

## 2023-03-31 RX ORDER — CARVEDILOL 25 MG/1
37.5 TABLET ORAL 2 TIMES DAILY
Qty: 360 TABLET | Refills: 3 | Status: SHIPPED | OUTPATIENT
Start: 2023-03-31 | End: 2023-04-10

## 2023-03-31 NOTE — TELEPHONE ENCOUNTER
Drop the dose of carvedilol to 37.5 mg twice a day as getting pauses and patient clearly has white coat hypertension. In 2 weeks re check the holter monitor , 24 hours. thx

## 2023-03-31 NOTE — TELEPHONE ENCOUNTER
Called pt with results of Holter & BP monitor & recommendations from Dr. Smith. Pt states she has been getting dizzy in the mornings, better by the afternoon, states this has been going on since she was hospitalized with bronchitis, states she sometimes feels like she could pass out, denies feeling like she could pass out now. She states she gets enough water to drink. Pt advised to decrease carvedilol to 37.5 mg twice daily as Dr. Smith recommended and if she can check BP while feeling dizzy to do so & let me know what she is getting. Will let Dr. Smith know & will call her on Monday to follow up. Pt was given my direct number to call with further questions or concerns. Randy HALEY

## 2023-03-31 NOTE — TELEPHONE ENCOUNTER
Reviewed BP monitor showing       Reviewed Holter monitor showing       Per office note dated 2/21/23, Dr. Smith recommended:   1.  We will obtain a 24-hour blood pressure monitor to be definite that her blood pressure is well controlled, especially given the fact that she had a recent stroke.  2.  We will start the patient on digoxin 0.125 mg per day.  In 2 weeks' time, we will have the patient wear a Holter monitor and we will also check a trough digoxin level at that time.  3.  I will have the patient follow up in a month's time with one of our nurse practitioners.  The patient will follow up with me in approximately 4 months' time and I will repeat a limited echocardiogram at that stage to see if her ejection fraction improves on better rate control.     Pt has appt to check digoxin level & see Rosemarie Haque NP 4/24/23.   Will message Dr. Smith to review.   Randy HALEY

## 2023-04-03 NOTE — TELEPHONE ENCOUNTER
Received call from patient stating that she has tingling feeling in her face and hands, states this has been going on for couple of days.  Patient is concerned about a stroke as she had a recent stroke.  Patient denies any other weakness or neurodeficit.  Patient advised to reduce the carvedilol and get Holter monitor as Dr. Albino Connors recommended.  Patient advised to call back if tingling symptoms continue or worsen.  Patient verbalized understanding JONG Miramontes RN

## 2023-04-04 ENCOUNTER — APPOINTMENT (OUTPATIENT)
Dept: MRI IMAGING | Facility: CLINIC | Age: 83
End: 2023-04-04
Attending: EMERGENCY MEDICINE
Payer: COMMERCIAL

## 2023-04-04 ENCOUNTER — HOSPITAL ENCOUNTER (EMERGENCY)
Facility: CLINIC | Age: 83
Discharge: HOME | End: 2023-04-04
Attending: EMERGENCY MEDICINE | Admitting: EMERGENCY MEDICINE
Payer: COMMERCIAL

## 2023-04-04 VITALS
DIASTOLIC BLOOD PRESSURE: 95 MMHG | RESPIRATION RATE: 20 BRPM | TEMPERATURE: 98.7 F | SYSTOLIC BLOOD PRESSURE: 154 MMHG | OXYGEN SATURATION: 98 % | HEART RATE: 186 BPM

## 2023-04-04 DIAGNOSIS — R42 DIZZINESS: ICD-10-CM

## 2023-04-04 DIAGNOSIS — R20.2 PARESTHESIAS: ICD-10-CM

## 2023-04-04 DIAGNOSIS — E04.1 THYROID NODULE: ICD-10-CM

## 2023-04-04 DIAGNOSIS — N17.9 AKI (ACUTE KIDNEY INJURY) (H): ICD-10-CM

## 2023-04-04 DIAGNOSIS — F44.89 CONFUSIONAL STATE: ICD-10-CM

## 2023-04-04 LAB
ALBUMIN SERPL BCG-MCNC: 4.2 G/DL (ref 3.5–5.2)
ALP SERPL-CCNC: 77 U/L (ref 35–104)
ALT SERPL W P-5'-P-CCNC: 23 U/L (ref 10–35)
ANION GAP SERPL CALCULATED.3IONS-SCNC: 13 MMOL/L (ref 7–15)
AST SERPL W P-5'-P-CCNC: 18 U/L (ref 10–35)
ATRIAL RATE - MUSE: NORMAL BPM
BASOPHILS # BLD AUTO: 0 10E3/UL (ref 0–0.2)
BASOPHILS NFR BLD AUTO: 0 %
BILIRUB SERPL-MCNC: 0.5 MG/DL
BUN SERPL-MCNC: 24.6 MG/DL (ref 8–23)
CALCIUM SERPL-MCNC: 7.9 MG/DL (ref 8.8–10.2)
CHLORIDE SERPL-SCNC: 99 MMOL/L (ref 98–107)
CK SERPL-CCNC: 110 U/L (ref 26–192)
CREAT SERPL-MCNC: 1.49 MG/DL (ref 0.51–0.95)
DEPRECATED HCO3 PLAS-SCNC: 30 MMOL/L (ref 22–29)
DIASTOLIC BLOOD PRESSURE - MUSE: NORMAL MMHG
EOSINOPHIL # BLD AUTO: 0.1 10E3/UL (ref 0–0.7)
EOSINOPHIL NFR BLD AUTO: 2 %
ERYTHROCYTE [DISTWIDTH] IN BLOOD BY AUTOMATED COUNT: 13.7 % (ref 10–15)
ERYTHROCYTE [SEDIMENTATION RATE] IN BLOOD BY WESTERGREN METHOD: 18 MM/HR (ref 0–30)
GFR SERPL CREATININE-BSD FRML MDRD: 35 ML/MIN/1.73M2
GLUCOSE SERPL-MCNC: 123 MG/DL (ref 70–99)
HCT VFR BLD AUTO: 34.3 % (ref 35–47)
HGB BLD-MCNC: 11.5 G/DL (ref 11.7–15.7)
HOLD SPECIMEN: NORMAL
HOLD SPECIMEN: NORMAL
IMM GRANULOCYTES # BLD: 0 10E3/UL
IMM GRANULOCYTES NFR BLD: 1 %
INTERPRETATION ECG - MUSE: NORMAL
LYMPHOCYTES # BLD AUTO: 1 10E3/UL (ref 0.8–5.3)
LYMPHOCYTES NFR BLD AUTO: 17 %
MCH RBC QN AUTO: 30.3 PG (ref 26.5–33)
MCHC RBC AUTO-ENTMCNC: 33.5 G/DL (ref 31.5–36.5)
MCV RBC AUTO: 90 FL (ref 78–100)
MONOCYTES # BLD AUTO: 0.5 10E3/UL (ref 0–1.3)
MONOCYTES NFR BLD AUTO: 8 %
NEUTROPHILS # BLD AUTO: 4.2 10E3/UL (ref 1.6–8.3)
NEUTROPHILS NFR BLD AUTO: 72 %
NRBC # BLD AUTO: 0 10E3/UL
NRBC BLD AUTO-RTO: 0 /100
P AXIS - MUSE: NORMAL DEGREES
PLATELET # BLD AUTO: 216 10E3/UL (ref 150–450)
POTASSIUM SERPL-SCNC: 4 MMOL/L (ref 3.4–5.3)
PR INTERVAL - MUSE: NORMAL MS
PROT SERPL-MCNC: 6.7 G/DL (ref 6.4–8.3)
QRS DURATION - MUSE: 98 MS
QT - MUSE: 414 MS
QTC - MUSE: 409 MS
R AXIS - MUSE: -43 DEGREES
RBC # BLD AUTO: 3.8 10E6/UL (ref 3.8–5.2)
SODIUM SERPL-SCNC: 142 MMOL/L (ref 136–145)
SYSTOLIC BLOOD PRESSURE - MUSE: NORMAL MMHG
T AXIS - MUSE: -28 DEGREES
TROPONIN T SERPL HS-MCNC: 47 NG/L
TROPONIN T SERPL HS-MCNC: 55 NG/L
VENTRICULAR RATE- MUSE: 59 BPM
WBC # BLD AUTO: 5.8 10E3/UL (ref 4–11)

## 2023-04-04 PROCEDURE — 70544 MR ANGIOGRAPHY HEAD W/O DYE: CPT | Mod: XS

## 2023-04-04 PROCEDURE — 85652 RBC SED RATE AUTOMATED: CPT | Performed by: EMERGENCY MEDICINE

## 2023-04-04 PROCEDURE — 96361 HYDRATE IV INFUSION ADD-ON: CPT

## 2023-04-04 PROCEDURE — 70553 MRI BRAIN STEM W/O & W/DYE: CPT

## 2023-04-04 PROCEDURE — 93005 ELECTROCARDIOGRAM TRACING: CPT

## 2023-04-04 PROCEDURE — 80053 COMPREHEN METABOLIC PANEL: CPT | Performed by: EMERGENCY MEDICINE

## 2023-04-04 PROCEDURE — 70549 MR ANGIOGRAPH NECK W/O&W/DYE: CPT

## 2023-04-04 PROCEDURE — 250N000011 HC RX IP 250 OP 636: Performed by: EMERGENCY MEDICINE

## 2023-04-04 PROCEDURE — 82550 ASSAY OF CK (CPK): CPT | Performed by: EMERGENCY MEDICINE

## 2023-04-04 PROCEDURE — 255N000002 HC RX 255 OP 636: Performed by: EMERGENCY MEDICINE

## 2023-04-04 PROCEDURE — 84484 ASSAY OF TROPONIN QUANT: CPT | Performed by: EMERGENCY MEDICINE

## 2023-04-04 PROCEDURE — 84484 ASSAY OF TROPONIN QUANT: CPT | Mod: 91 | Performed by: EMERGENCY MEDICINE

## 2023-04-04 PROCEDURE — 36415 COLL VENOUS BLD VENIPUNCTURE: CPT | Performed by: EMERGENCY MEDICINE

## 2023-04-04 PROCEDURE — 85025 COMPLETE CBC W/AUTO DIFF WBC: CPT | Performed by: EMERGENCY MEDICINE

## 2023-04-04 PROCEDURE — 96374 THER/PROPH/DIAG INJ IV PUSH: CPT | Mod: 59

## 2023-04-04 PROCEDURE — A9585 GADOBUTROL INJECTION: HCPCS | Performed by: EMERGENCY MEDICINE

## 2023-04-04 PROCEDURE — 99285 EMERGENCY DEPT VISIT HI MDM: CPT | Mod: 25

## 2023-04-04 PROCEDURE — 258N000003 HC RX IP 258 OP 636: Performed by: EMERGENCY MEDICINE

## 2023-04-04 RX ORDER — GADOBUTROL 604.72 MG/ML
10 INJECTION INTRAVENOUS ONCE
Status: COMPLETED | OUTPATIENT
Start: 2023-04-04 | End: 2023-04-04

## 2023-04-04 RX ORDER — LORAZEPAM 2 MG/ML
1 INJECTION INTRAMUSCULAR ONCE
Status: COMPLETED | OUTPATIENT
Start: 2023-04-04 | End: 2023-04-04

## 2023-04-04 RX ADMIN — LORAZEPAM 1 MG: 2 INJECTION INTRAMUSCULAR at 13:04

## 2023-04-04 RX ADMIN — GADOBUTROL 10 ML: 604.72 INJECTION INTRAVENOUS at 15:18

## 2023-04-04 RX ADMIN — SODIUM CHLORIDE 500 ML: 9 INJECTION, SOLUTION INTRAVENOUS at 13:04

## 2023-04-04 ASSESSMENT — ACTIVITIES OF DAILY LIVING (ADL)
ADLS_ACUITY_SCORE: 35
ADLS_ACUITY_SCORE: 33
ADLS_ACUITY_SCORE: 35

## 2023-04-04 NOTE — ED PROVIDER NOTES
History     Chief Complaint:  Tingling       HPI   Janessa Melendez is a 82 year old female with a history of idiopathic cardiomyopathy, EF 40%, hypertension, hyperlipidemia, ischemic stroke 11/22, A-fib on Eliquis who presents with son for evaluation of episodic dizziness, confusional state this morning, generalized weakness, paresthesias.  Patient reports about 1 week of intermittent dizzy spells lasting from seconds to minutes.  They also seem to be associated with flashes of light in both visual fields in a zigzag pattern.  Patient reports history of these visual disturbances and saw ophthalmology for them previously about a year ago without abnormality detected.  This morning, she had mild headache and felt confused and had associated paresthesias in the bilateral feet and right forearm.  She called her son to come to her residence.  Symptoms currently have resolved.  She still has some lingering sensation of weakness in her legs and mild headache.  This is not the worst headache of her life but she does not typically get headaches.  No recent fever or vomiting or diarrhea.  She does feel that cough and breathing are related to bronchitis from recent hospitalization has been improving.       Review of External Notes: 3/10/23 discharge summary    ROS:  Review of Systems  A 10 point ROS was obtained and negative except as noted here and in HPI    Allergies:  Amlodipine  Aspirin  Codeine Sulfate     Medications:    Acetaminophen (TYLENOL PO)  amoxicillin (AMOXIL) 500 MG capsule  apixaban ANTICOAGULANT (ELIQUIS) 5 MG tablet  benzonatate (TESSALON) 100 MG capsule  carvedilol (COREG) 25 MG tablet  cholecalciferol (VITAMIN D3) 25 mcg (1000 units) capsule  digoxin (LANOXIN) 125 MCG tablet  guaiFENesin-dextromethorphan (ROBITUSSIN DM) 100-10 MG/5ML syrup  hydrALAZINE (APRESOLINE) 50 MG tablet  ipratropium - albuterol 0.5 mg/2.5 mg/3 mL (DUONEB) 0.5-2.5 (3) MG/3ML neb solution  irbesartan (AVAPRO) 300 MG  tablet  methimazole (TAPAZOLE) 5 MG tablet  omeprazole (PRILOSEC) 20 MG DR capsule  potassium chloride ER (K-TAB/KLOR-CON) 10 MEQ CR tablet  predniSONE (DELTASONE) 20 MG tablet  simvastatin (ZOCOR) 10 MG tablet  terazosin (HYTRIN) 5 MG capsule  torsemide (DEMADEX) 10 MG tablet        Past Medical History:    Past Medical History:   Diagnosis Date     Arthritis      Breast cancer (H)      Cardiomyopathy (H)      Coronary artery disease 9/25/2014     Hypercholesterolemia      Hypertension      Hypokalemia      Malignant neoplasm (H)      Shortness of breath      Shoulder pain        Past Surgical History:    Past Surgical History:   Procedure Laterality Date     BACK SURGERY       CARDIAC SURGERY      angiogram neg many yrs ago     CHOLECYSTECTOMY       ESOPHAGOSCOPY, GASTROSCOPY, DUODENOSCOPY (EGD), COMBINED N/A 6/22/2021    Procedure: ESOPHAGOGASTRODUODENOSCOPY, WITH ENDOSCOPIC US WITH FINE NEEDLE ASPIRATION;  Surgeon: Ventura Mcgill MD;  Location:  GI     EYE SURGERY       ORTHOPEDIC SURGERY      lt shoulder replaced, total     partial masectomy          Family History:    family history includes Heart Failure in her daughter; No Known Problems in her son; Other Cancer in her brother and mother.    Social History:   reports that she has never smoked. She has never used smokeless tobacco. She reports current alcohol use. She reports that she does not use drugs.  PCP: [facility], [facility]     Physical Exam     Patient Vitals for the past 24 hrs:   BP Temp Temp src Pulse Resp SpO2   04/04/23 1530 (!) 154/95 -- -- (!) 186 -- 98 %   04/04/23 1157 (!) 146/49 98.7  F (37.1  C) Temporal 67 20 99 %    *Heart rate of 186 was erroneously validated after discussion with patient's nurse.    Physical Exam  VS: Reviewed per above  HENT: Mucous membranes moist, no nuchal rigidity  EYES: sclera anicteric  CV: Rate as noted,  regular rhythm.   RESP: Effort normal. Breath sounds are normal bilaterally.  GI:  no tenderness/rebound/guarding, not distended.  NEURO: GCS 15, cranial nerves II through XII are intact, 5 out of 5 strength in all 4 extremities, sensation is intact light touch in all 4 extremities  MSK: No deformity of the extremities  SKIN: Warm and dry      Emergency Department Course     ECG results from 04/04/23   EKG 12 lead     Value    Systolic Blood Pressure     Diastolic Blood Pressure     Ventricular Rate 59    Atrial Rate     NC Interval     QRS Duration 98        QTc 409    P Axis     R AXIS -43    T Axis -28    Interpretation ECG      Atrial fibrillation with slow ventricular response with premature ventricular or aberrantly conducted complexes  Left axis deviation  Low voltage QRS  Incomplete right bundle branch block  Inferior infarct (cited on or before 09-MAR-2018)  Cannot rule out Anterior infarct , age undetermined  Abnormal ECG  When compared with ECG of 08-MAR-2023 17:34,  Minimal criteria for Anterior infarct are now Present  Confirmed by GENERATED REPORT, COMPUTER (),  MARIUSZ FLORES ([id number]) on 4/4/2023 1:11:53 PM         Imaging:  MRA Angiogram Neck w/o & w Contrast   Final Result   IMPRESSION:    1. Evaluation of the origin/proximal aspect of the right common   carotid artery is somewhat limited due to artifacts. Within that   limitation, no evidence for high-grade stenosis or occlusion of the   cervical carotid or vertebral arteries.   2. Heterogeneous thyroid gland with a left thyroid nodule that   measures over 1.5 cm, as seen on prior CTA. Multiple additional   nodules were seen in the thyroid gland on that prior CTA exam. If not   performed previously and if it would change the patient's clinical   management, follow-up thyroid ultrasound would typically be   recommended for further characterization.      MARIAM TERRY MD            SYSTEM ID:  [id number]      MR Brain w/o & w Contrast   Final Result   IMPRESSION:   1. No definite acute intracranial process.   2.  Interval expected evolution of presumed multiple small infarcts in   the supratentorial brain compared to the prior MRI.   3. Small focus of apparent enhancement in the left middle frontal   gyrus, nonspecific, but probably related to a developmental venous   anomaly or artifact. Other possibilities are thought to be less   likely. Follow-up can be considered, as clinically warranted.   4. Brain atrophy and presumed chronic small vessel ischemic changes,   as described.   5. Unchanged small clustered foci of susceptibility-related signal   loss in the left parietal white matter, which may be related to   chronic hemosiderin staining.      MARIAM TERRY MD            SYSTEM ID:  [id number]      MR Head w/o Contrast Angiogram   Final Result   IMPRESSION: Unremarkable MR angiogram of the [place].      MARIAM TERRY MD            SYSTEM ID:  [id number]         Report per radiology    Laboratory:  Labs Ordered and Resulted from Time of ED Arrival to Time of ED Departure   COMPREHENSIVE METABOLIC PANEL - Abnormal       Result Value    Sodium 142      Potassium 4.0      Chloride 99      Carbon Dioxide (CO2) 30 (*)     Anion Gap 13      Urea Nitrogen 24.6 (*)     Creatinine 1.49 (*)     Calcium 7.9 (*)     Glucose 123 (*)     Alkaline Phosphatase 77      AST 18      ALT 23      Protein Total 6.7      Albumin 4.2      Bilirubin Total 0.5      GFR Estimate 35 (*)    TROPONIN T, HIGH SENSITIVITY - Abnormal    Troponin T, High Sensitivity 55 (*)    CBC WITH PLATELETS AND DIFFERENTIAL - Abnormal    WBC Count 5.8      RBC Count 3.80      Hemoglobin 11.5 (*)     Hematocrit 34.3 (*)     MCV 90      MCH 30.3      MCHC 33.5      RDW 13.7      Platelet Count 216      % Neutrophils 72      % Lymphocytes 17      % Monocytes 8      % Eosinophils 2      % Basophils 0      % Immature Granulocytes 1      NRBCs per 100 WBC 0      Absolute Neutrophils 4.2      Absolute Lymphocytes 1.0      Absolute Monocytes 0.5      Absolute  Eosinophils 0.1      Absolute Basophils 0.0      Absolute Immature Granulocytes 0.0      Absolute NRBCs 0.0     TROPONIN T, HIGH SENSITIVITY - Abnormal    Troponin T, High Sensitivity 47 (*)    CK TOTAL - Normal         ERYTHROCYTE SEDIMENTATION RATE AUTO - Normal    Erythrocyte Sedimentation Rate 18     ROUTINE UA WITH MICROSCOPIC REFLEX TO CULTURE          Emergency Department Course & Assessments:             Interventions:  Medications   0.9% sodium chloride BOLUS (0 mLs Intravenous Stopped 4/4/23 1658)   LORazepam (ATIVAN) injection 1 mg (1 mg Intravenous $Given 4/4/23 1304)   gadobutrol (GADAVIST) injection 10 mL (10 mLs Intravenous $Given 4/4/23 1518)   sodium chloride (PF) 0.9% PF flush 100 mL (100 mLs Intravenous $Given 4/4/23 1518)        Consultations/Discussion of Management or Tests:    ED Course as of 04/04/23 1755   Tue Apr 04, 2023 1646 Discussed with stroke team         Disposition:  The patient was discharged to home.     Impression & Plan        Medical Decision Making:  Patient presents to the ER for evaluation of intermittent episodes of dizziness, paresthesias, possible confusional state this morning.  On arrival vital signs are reassuring.  There are no focal neuro-deficits or signs ongoing confusion.  She is feeling well.  ECG reveals atrial fibrillation, rate controlled.  MRI imaging of the brain as well as MRI of the head and neck obtained due to history of stroke and new neurologic symptoms.  Fortunately no acute pathology identified on the studies.  I spoke with stroke neurology and given recent reassuring echocardiogram without LV thrombus or new valvular pathology, already being on Eliquis, as well as low suspicion for TIA based on history, further inpatient evaluation of TIA seemed to be low yield. Pt was reassured by this and has upcoming neurology follow up in 1 week.  Labs did reveal NURY, which could be related to overzealous diuresis.  She was given saline bolus here in the  ER.  Patient did not wish to wait for urinalysis but denies any dysuria or hematuria tested suspect underlying intrarenal cause of her NURY.  History not supportive postobstructive cause of NURY such as ureterolithiasis  or urinary retention.  Patient has stable anemia.  There is intermediate troponin elevation which is stable on recheck, low suspicion for occlusion MI/ACS.  Encouraged ongoing primary care follow-up for ER visit/renal function recheck.  Patient and son did feel comfortable discharging home.  Return precautions discussed prior to discharge.    Diagnosis:    ICD-10-CM    1. Thyroid nodule  E04.1       2. Paresthesias  R20.2       3. Dizziness  R42       4. Confusional state  F44.89       5. NURY (acute kidney injury) (H)  N17.9            Discharge Medications:  Discharge Medication List as of 4/4/2023  4:59 PM              Jason Zuluaga MD  04/04/23 1752       Jason Zuluaga MD  04/04/23 1754

## 2023-04-04 NOTE — ED TRIAGE NOTES
Pt brought in by son. Pt has been having tingling around lips, in both hands and bilat feet for past two days. Pt also c/o dizziness, headache and confusion. Says it is worse this AM. No facial droop GUILLERMO. Pt has hx of stroke one month ago. On blood thinners.

## 2023-04-04 NOTE — DISCHARGE INSTRUCTIONS
Discharge Instructions  Dizziness (Lightheaded)  Today you were seen for dizziness.  Dizziness can be caused by many things and it can be very difficult to determine the cause of dizziness.  At this time, your provider has found no signs that your dizziness is due to a serious or life-threatening condition. However, sometimes there is a serious problem that does not show up right away, and it is important for you to follow up with your regular provider as instructed.  Generally, every Emergency Department visit should have a follow-up clinic visit with either a primary or a specialty clinic/provider. Please follow-up as instructed by your emergency provider today.      Return to the Emergency Department if:    You pass out (fainting or falling out), especially during exercise.    You develop chest pain, chest pressure or difficulty breathing.  Your feel an irregular heartbeat.  You have excessive vaginal bleeding, or blood in your stool or vomit (throw up).  You have a high fever.  Your symptoms get worse or more frequent.    If when you begin to feel dizzy or lightheaded, it is important to sit down or lay down immediately to prevent injury from falling.  If you were given a prescription for medicine here today, be sure to read all of the information (including the package insert) that comes with your prescription.  This will include important information about the medicine, its side effects, and any warnings that you need to know about.  The pharmacist who fills the prescription can provide more information and answer questions you may have about the medicine.  If you have questions or concerns that the pharmacist cannot address, please call or return to the Emergency Department.   Remember that you can always come back to the Emergency Department if you are not able to see your regular provider in the amount of time listed above, if you get any new symptoms, or if there is anything that worries you.

## 2023-04-05 ENCOUNTER — TELEPHONE (OUTPATIENT)
Dept: NEUROLOGY | Facility: CLINIC | Age: 83
End: 2023-04-05
Payer: COMMERCIAL

## 2023-04-05 NOTE — TELEPHONE ENCOUNTER
Health Call Center    Phone Message    May a detailed message be left on voicemail: yes     Reason for Call: Symptoms or Concerns     If patient has red-flag symptoms, warm transfer to triage line    Current symptom or concern: Ari is calling on behalf of patient and states patient has been in and out of ER a few times recently for dizziness, balance concerns, numbness and tingling in hands and feet. During these episodes patient is unable to speak and very disoriented. Patient was brought to ER on 4/4/23 and called 911 this am 4/5/23 and evaluated by EMS. Patient has an upcoming appt with Dr. Stoner on 4/11/23 and would like some recommendations before appt or if patient should be seen sooner.     Symptoms have been present for:  2 week(s)    Has patient previously been seen for this? No    Are there any new or worsening symptoms? Yes: see above    Please contact Ari to discuss at [phone]      Action Taken: Message routed to:  Other: [facility]    Travel Screening: Not Applicable

## 2023-04-05 NOTE — TELEPHONE ENCOUNTER
Called  back and let him know that we have no sooner appt, and that RN cannot give medical advise as he pt is not established with our clinic yet.  Advised him to reach out to their PCP.     He verbalized understanding.     Pina DE LA PAZ RN, BSN  [facility]

## 2023-04-07 ENCOUNTER — TELEPHONE (OUTPATIENT)
Dept: CARDIOLOGY | Facility: CLINIC | Age: 83
End: 2023-04-07
Payer: COMMERCIAL

## 2023-04-07 DIAGNOSIS — I10 BENIGN ESSENTIAL HYPERTENSION: ICD-10-CM

## 2023-04-07 DIAGNOSIS — I42.0 DILATED CARDIOMYOPATHY (H): ICD-10-CM

## 2023-04-07 NOTE — TELEPHONE ENCOUNTER
"Received call from Dr. Gusman asking about looking at pt's medications sooner than appt on 4/24/23.     Per office note dated 04/07/23, Dr. Gusman recommended, \"Hospital discharge follow-up: Unclear etiology of her ongoing dizziness. I do have some concerns given the timing of her episodes with her taking her morning blood pressure medications. She sees and follows with cardiology for these medications. There is been no significant change in them recently, however, she did have a recent illness may be more sensitive to episodes of hypotension. Reassuring that all of the imaging from the ER was within normal limits. Recommend she keep follow-up appointment with neurology and vascular.\"    Blood Pressure 102/64 04/07/2023 11:27 AM CDT     Pulse 52 04/07/2023 11:27 AM CDT        Will message Rosemarie Haque NP to review in Dr. Smith's absence. Pt has appt to see Rosemarie Haque NP 4/4/23. Randy HALEY   "

## 2023-04-10 RX ORDER — CARVEDILOL 25 MG/1
25 TABLET ORAL 2 TIMES DAILY
Qty: 180 TABLET | Refills: 3 | Status: SHIPPED | OUTPATIENT
Start: 2023-04-10 | End: 2024-02-29

## 2023-04-10 NOTE — TELEPHONE ENCOUNTER
"Received recommendations from Rosemarie Haque NP, \"I reviewed her ER visit, current medication list, previous 24 hour blood pressure monitor and holter from March. Given her ongoing dizziness, pauses seen on holter monitor, borderline low BP readings on her ambulatory blood pressure monitor, I would recommend reducing her carvedilol to 25mg BID. She has a repeat holter set up for next week, I would like her to have a BMP and digoxin level drawn that day as well as a nurse blood pressure/pulse check.\"      Called pt with recommendations from Rosemarie Haque NP to decrease carvedilol to 25 mg twice daily and get BMP/dig level/BP check  4/18/23. Orders in chart & will message scheduling to call pt to arrange. Randy HALEY   "

## 2023-04-11 ENCOUNTER — OFFICE VISIT (OUTPATIENT)
Dept: NEUROLOGY | Facility: CLINIC | Age: 83
End: 2023-04-11
Attending: PHYSICIAN ASSISTANT
Payer: COMMERCIAL

## 2023-04-11 VITALS — HEART RATE: 66 BPM | DIASTOLIC BLOOD PRESSURE: 66 MMHG | SYSTOLIC BLOOD PRESSURE: 131 MMHG | OXYGEN SATURATION: 98 %

## 2023-04-11 DIAGNOSIS — I48.91 NEW ONSET ATRIAL FIBRILLATION (H): ICD-10-CM

## 2023-04-11 DIAGNOSIS — I63.9 CEREBROVASCULAR ACCIDENT (CVA), UNSPECIFIED MECHANISM (H): ICD-10-CM

## 2023-04-11 DIAGNOSIS — R42 DIZZINESS: Primary | ICD-10-CM

## 2023-04-11 PROCEDURE — 99205 OFFICE O/P NEW HI 60 MIN: CPT | Performed by: STUDENT IN AN ORGANIZED HEALTH CARE EDUCATION/TRAINING PROGRAM

## 2023-04-11 ASSESSMENT — PATIENT HEALTH QUESTIONNAIRE - PHQ9
SUM OF ALL RESPONSES TO PHQ QUESTIONS 1-9: 14
SUM OF ALL RESPONSES TO PHQ QUESTIONS 1-9: 14
10. IF YOU CHECKED OFF ANY PROBLEMS, HOW DIFFICULT HAVE THESE PROBLEMS MADE IT FOR YOU TO DO YOUR WORK, TAKE CARE OF THINGS AT HOME, OR GET ALONG WITH OTHER PEOPLE: SOMEWHAT DIFFICULT

## 2023-04-11 NOTE — PROGRESS NOTES
[facility]  Section of General Neurology  New Patient Visit      Janessa Melendez MRN# [id number]   Age: 82 year old YOB: 1940              Assessment and Plan:   Assessment:  Janessa Melendez is a very pleasant 82 year old female who presents in consultation.  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  Recently she has had dizziness episodes that limit her.  They tend to occur in the morning/after her morning pills.  Her episodes sound more pre syncopal than anything regarding dizzy subtype.  Discussed to keep working on titrating her medications with her cardiology team.  No new stroke or stenosis seen on recent MRI/MRA imaging, good news.   New headaches of unclear etiology but with some migrainous features.  Positive visual phenomena could be from recent digoxin addition temporally also.    Discussed meclizine as a PRN option on the changes these are vertiginous but I think less likely so and she also is not interested in more medications especially in the setting of these symptoms potentially being related to some degree to medications.   Her neurological examination is within normal limits.  Mood as below: Seems more down by new limitations, hopeful will improve as she starts feeling better, no SI.  She will continue to follow with PCP in this regard.   Plan as below.  All questions answered.     Plan:  --Magnesium oxide 400 mg (see if cardiology OK with this, would presume they would be) Riboflavin (vitamin b2) 400 mg daily are good for headache prevention  --Tylenol +/- caffeine 3-4  as needed for headaches   --Discussed I would integrate some time in the morning of more relaxation, more fluids (drinking water), more sitting or laying, try sitting down during episodes  --Ciscussed Eliquis risk benefit.  I would continue this medication for stroke prevention  --To keep titrating medications with  "cardiology  --To reach out with issues questions or changes.  She has my card and I would be happy to see her back if so.           Pablo Stoner MD   of Neurology   [facility]      History of Presenting Symptoms:   Janessa Melendez is a 82 year old female who presents today for evaluation of dizziness, post stroke cares.      Had embolic appearing stroke in November 2022, reviewed with patient, re presented to ER with dizziness, without new stroke, reviewed.      Here with her daughter Rafaela  Lives alone in [place].  Does well with independence.  Originally from [place]  Mood --reviewed as below.  In good spirits overall but frustrated by her limitations.     Dizziness: Not apparent after stroke, started more so after bronchitis she suspects.   Dizzy: woozy/unsteady, pre syncopal.    Usually after standing up.      No history of migraine headaches, but is getting new headaches  +photophobia with headaches at times  Has not tried meclizine as needed  Notes she gets shaky when she has these episodes.    Recently lowered Coreg from 50 mg BID to 25 mg BID    Digoxin---noted visual changes after it, discussed this as a possibility     ED  Notes reviewed (4/2023)  HPI \"  Janessa Melendez is a 82 year old female with a history of idiopathic cardiomyopathy, EF 40%, hypertension, hyperlipidemia, ischemic stroke 11/22, A-fib on Eliquis who presents with son for evaluation of episodic dizziness, confusional state this morning, generalized weakness, paresthesias.  Patient reports about 1 week of intermittent dizzy spells lasting from seconds to minutes.  They also seem to be associated with flashes of light in both visual fields in a zigzag pattern.  Patient reports history of these visual disturbances and saw ophthalmology for them previously about a year ago without abnormality detected.  This morning, she had mild headache and felt confused and had associated " "paresthesias in the bilateral feet and right forearm.  She called her son to come to her residence.  Symptoms currently have resolved.  She still has some lingering sensation of weakness in her legs and mild headache.  This is not the worst headache of her life but she does not typically get headaches.  No recent fever or vomiting or diarrhea.  She does feel that cough and breathing are related to bronchitis from recent hospitalization has been improving.\"      11/2022 note reviewed from initial stroke hospitalization:  Acute ischemic stroke in the distal right M1 territory   At approximately 12:45 today the patient was out to lunch with a friend when they noticed the patient had acute onset of slurred speech prompting them to call EMS.  Upon arrival here the patient still having some speech changes and was noted to have left facial droop and left sided weakness.  She was evaluated by neurology and after CT head and CTA head/neck decision was made to give tenecteplase.  On my evaluation patient's symptoms have improved significantly and family who was present at bedside noted no slurred speech or abnormalities but slight facial droop still present on exam   * CT Head:  1. No evidence of acute intracranial hemorrhage, mass, or herniation.  2. Mild nonspecific white matter changes likely due to chronic microvascular ischemic disease.  * CTA Head/Neck:  1.  Focal occlusion of the distal right M1 branch which also extends into several proximal right M2 branches. The M2 branches re-opacify, however, the MCA branches are overall decreased in number and caliber compared to the left MCA branches. This is concerning for thromboembolism.  2.  No other vascular cutoff of the proximal ACAs, left MCA, or PCAs.  3.  Patent arteries in the neck without evidence of dissection.  4.  Atherosclerotic disease at the carotid bifurcations bilaterally without significant stenosis.  MRI brain- Patchy areas of diffusion restriction/acute to " early subacute ischemia in the right frontal operculum and insula. Additional tiny foci in each parietal lobe. No acute hemorrhage or mass effect. 2. Small cluster of chronic hemosiderin within the left parietal white matter has developed since 2021.  - A1c 5.7, LDL 39  * f/u CTH- no hemorrhage, Multiple known acute infarcts scattered throughout the bilateral cerebral hemispheres. Volume loss and white matter hypoattenuation likely represent chronic small vessel ischemic change  * echo as below  * repeat CTA head/neck-  Resolution of multiple right-sided M1/M2 branch occlusions without significant residual thromboemboli or stenoses identified. 2.  No evidence of large vessel occlusion or high-grade stenosis. See below regarding incidental finding on CTA neck     - PTA simvastatin 10mg daily continued per neurology recommendations  - ASA started on 11/5-11/6, transitioned to Apixaban 5 mg BID on 11/7, will continue at discharge  - f/u with neurology team in 6-8 weeks and neurology also made referral for sleep study  - PT/OT/SLP--recommend OP PT  - BP medications as bleow  - discharge plan discussed with patient and daughter at bedside.         Newly found atrial fibrillation   H/o CAD  Idiopathic cardiomyopathy.  Not decompensated  HTN/HLP   Initially in the ED the patient was noted to be tachycardiac with heart rates in to the 120s and irregular.  EKG showed atrial fibrillation but rate much improved in to the 90s.  Since settling in patient  Last echocardiogram from 4/7/22 showed an EF of 40%.  On my evaluation patient does not appear to be fluid overloaded  * TTE- LVEF 40%, mild to moderate global hypokinesis of LV, no significant change from 4/7/22  - continue with PTA Coreg 50mg BID, Avapro 300mg daily and Torsemide with K supplement  - PTA Hydralazine resumed at discharge  - Cardiology clinic referral for [facility] follow up made given new A.fib. Patient follows up with Dr. Kumar Connors (last seen in clinic  4/2022)  - Apixaban initiated in hospital as above     Incidental findings  Thyroid nodules  Enlarged lymphnode  CTA on 11/4- Multiple thyroid nodules including a 2 cm nodule in the left thyroid gland which contains dystrophic calcification.   CTA on 11/6- 2. Heterogeneous multinodular thyroid gland as previously described. 3.  Abnormal heterogeneous right level 2A lymph node measuring up to 1.7 cm. This could be related to metastatic lymphadenopathy or granulomatous process. Further workup/follow-up would be typically recommended.  - discussed above finding with patient and her daughter at bedside. Per request, I reached out to [facility]-f/u with Dr. Holder. Dr. Holder not in office today. Discussed with MARINO Portillo who also discussed with rounding attending. Patient initially wanted to do imaging while in hospital since she already had an IV access, however while discussion was ongoing, she lost IV access. Discussed with pt/dtr regarding establishing another IV access and obtain CT c/a/p prior to discharge then f/u with Dr. Holder next week vs have the imaging/follow up as outpatient with Dr. Holder next week. Since it is not clear what time CT could be completed today after establishing IV access, she elected to do as OP next week.   - notified Lily PICHARDO, the clinic will arrange follow up imaging and clinic visit with Dr. Holder for next week         Past Medical History:     Patient Active Problem List   Diagnosis     Shortness of breath     Breast cancer (H)     Hypertension     Shoulder pain     Cardiomyopathy (H)     Hypercholesterolemia     Arthritis     Coronary artery disease     Malignant neoplasm (H)     Physical deconditioning     Rotator cuff arthropathy, left     S/P reverse total shoulder arthroplasty, left     Slow transit constipation     Anemia due to blood loss, acute     Advanced directives, counseling/discussion     Complete rupture of rotator cuff     Gastroesophageal reflux disease without  esophagitis     Normocytic normochromic anemia     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 (H)     Facial droop due to acute stroke (H)     Past Medical History:   Diagnosis Date     Arthritis      Breast cancer (H)      Cardiomyopathy (H)     ideopathic     Coronary artery disease 9/25/2014     Hypercholesterolemia      Hypertension      Hypokalemia      Malignant neoplasm (H)      Shortness of breath      Shoulder pain         Past Surgical History:     Past Surgical History:   Procedure Laterality Date     BACK SURGERY       CARDIAC SURGERY      angiogram neg many yrs ago     CHOLECYSTECTOMY       ESOPHAGOSCOPY, GASTROSCOPY, DUODENOSCOPY (EGD), COMBINED N/A 6/22/2021    Procedure: ESOPHAGOGASTRODUODENOSCOPY, WITH ENDOSCOPIC US WITH FINE NEEDLE ASPIRATION;  Surgeon: Ventura Mcgill MD;  Location:  GI     EYE SURGERY       ORTHOPEDIC SURGERY      lt shoulder replaced, total     partial masectomy          Social History:     Social History     Tobacco Use     Smoking status: Never     Smokeless tobacco: Never   Substance Use Topics     Alcohol use: Yes     Comment: socially     Drug use: No        Family History:     Family History   Problem Relation Age of Onset     Other Cancer Mother      Other Cancer Brother      No Known Problems Son      Heart Failure Daughter      Family History Negative No family hx of         Medications:     Current Outpatient Medications   Medication Sig     Acetaminophen (TYLENOL PO) Take 500 mg by mouth every 6 hours as needed for mild pain or fever      amoxicillin (AMOXIL) 500 MG capsule TAKE 4 CAPSULES BY MOUTH 1 HOUR BEFORE DENTAL APPOINTMENT     apixaban ANTICOAGULANT (ELIQUIS) 5 MG tablet Take 1 tablet (5 mg) by mouth 2 times daily     benzonatate (TESSALON) 100 MG capsule Take 1 capsule (100 mg) by mouth 3 times daily as needed for cough     carvedilol (COREG) 25 MG tablet Take 1 tablet (25 mg) by mouth 2 times daily      cholecalciferol (VITAMIN D3) 25 mcg (1000 units) capsule Take 1 capsule by mouth daily     digoxin (LANOXIN) 125 MCG tablet Take 1 tablet (125 mcg) by mouth daily     guaiFENesin-dextromethorphan (ROBITUSSIN DM) 100-10 MG/5ML syrup Take 10 mLs by mouth every 4 hours as needed for cough     hydrALAZINE (APRESOLINE) 50 MG tablet Take 1 tablet (50 mg) by mouth 2 times daily     ipratropium - albuterol 0.5 mg/2.5 mg/3 mL (DUONEB) 0.5-2.5 (3) MG/3ML neb solution Take 1 vial (3 mLs) by nebulization every 4 hours as needed for shortness of breath, wheezing or cough     irbesartan (AVAPRO) 300 MG tablet Take 1 tablet (300 mg) by mouth daily     methimazole (TAPAZOLE) 5 MG tablet Take 5 mg by mouth twice a week     omeprazole (PRILOSEC) 20 MG DR capsule Take 20 mg by mouth daily     potassium chloride ER (K-TAB/KLOR-CON) 10 MEQ CR tablet Take 1 tablet (10 mEq) by mouth 2 times daily And as needed as directed.     predniSONE (DELTASONE) 20 MG tablet Take 2 tablets (40 mg) by mouth daily     simvastatin (ZOCOR) 10 MG tablet Take 1 tablet (10 mg) by mouth At Bedtime     terazosin (HYTRIN) 5 MG capsule Take 1 capsule (5 mg) by mouth At Bedtime     torsemide (DEMADEX) 10 MG tablet Take 3 tablets (30 mg) by mouth daily     No current facility-administered medications for this visit.        Allergies:     Allergies   Allergen Reactions     Amlodipine      swelling     Aspirin Other (See Comments)     Bleeding              Bleeding, GI Lesion         Codeine Sulfate GI Disturbance        Review of Systems:   As noted above     Physical Exam:   Vitals: /66   Pulse 66   SpO2 98%   CV: peripheral pulse appreciated  Lungs: breathing comfortably    Neuro:   General Appearance: No apparent distress, well-nourished, well-groomed, pleasant     Mental Status: Alert and oriented to person, place, and time. Speech fluent and comprehension intact. No dysarthria.    Cranial Nerves:   II: Visual fields: normal  III: Pupils: 3 mm, equal,  round, reactive to light   III,IV,VI: Extraocular Movements: intact   V: Facial sensation: intact to light touch  VII: Facial strength: intact without clear droop today  VIII: Hearing: intact grossly  IX: Palate: intact   XII: Tongue movement: normal     Motor Exam:   5/5 Diffusely  Sensory: intact to light touch    Coordination: no dysmetria with finger-to-nose bilaterally    Reflexes: biceps, triceps, brachioradialis, patellar and ankle jerks 1+ and symmetric.     Gait: normal casual gait, normal stride length. Good tandem walk         Data: Pertinent prior to visit   [id number] imaging  MRA Angiogram Neck w/o & w Contrast   Final Result   IMPRESSION:    1. Evaluation of the origin/proximal aspect of the right common   carotid artery is somewhat limited due to artifacts. Within that   limitation, no evidence for high-grade stenosis or occlusion of the   cervical carotid or vertebral arteries.   2. Heterogeneous thyroid gland with a left thyroid nodule that   measures over 1.5 cm, as seen on prior CTA. Multiple additional   nodules were seen in the thyroid gland on that prior CTA exam. If not   performed previously and if it would change the patient's clinical   management, follow-up thyroid ultrasound would typically be   recommended for further characterization.     MR Head w/o Contrast Angiogram   Final Result   IMPRESSION: Unremarkable MR angiogram of the [place].           MR Brain w/o & w Contrast   Final Result   IMPRESSION:   1. No definite acute intracranial process.   2. Interval expected evolution of presumed multiple small infarcts in   the supratentorial brain compared to the prior MRI.   3. Small focus of apparent enhancement in the left middle frontal   gyrus, nonspecific, but probably related to a developmental venous   anomaly or artifact. Other possibilities are thought to be less   likely. Follow-up can be considered, as clinically warranted.   4. Brain atrophy and presumed chronic  small vessel ischemic changes,   as described.   5. Unchanged small clustered foci of susceptibility-related signal   loss in the left parietal white matter, which may be related to   chronic hemosiderin staining.           Previous imaging:  EXAM: MR BRAIN WITHOUT CONTRAST  LOCATION: [facility]  DATE/TIME: 11/05/2022, 4:35 AM     INDICATION: Acute ischemic stroke.  COMPARISON: 11/04/2022 CT. Brain MRI 01/27/2021.  TECHNIQUE: Routine multiplanar multisequence head MRI without intravenous contrast.     FINDINGS:  INTRACRANIAL CONTENTS: Small foci of diffusion restriction, one each in the parietal lobes compatible with acute to early subacute ischemia. Patchy areas of cortical diffusion restriction in the right frontal operculum and insula with acute to early   subacute ischemia. No acute hemorrhage or mass effect. Small cluster of chronic hemosiderin within the left lobe has developed since 01/27/2021. No mass, acute hemorrhage, or extra-axial fluid collections. Normal position of the cerebellar tonsils.      SELLA: No abnormality accounting for technique.     OSSEOUS STRUCTURES/SOFT TISSUES: Normal marrow signal. The major intracranial vascular flow-voids are maintained.      ORBITS: No abnormality accounting for technique.      SINUSES/MASTOIDS: No paranasal sinus mucosal disease. No middle ear or mastoid effusion.                                                                       IMPRESSION:  1.  Patchy areas of diffusion restriction/acute to early subacute ischemia in the right frontal operculum and insula. Additional tiny foci in each parietal lobe. No acute hemorrhage or mass effect.     2.  Small cluster of chronic hemosiderin within the left parietal white matter has developed since 2021                   The total time of this encounter today amounted to 65 minutes. This time included time spent with the patient, prep work, ordering tests, and performing post visit  documentation.    Answers for HPI/ROS submitted by the patient on 4/11/2023  If you checked off any problems, how difficult have these problems made it for you to do your work, take care of things at home, or get along with other people?: Somewhat difficult  PHQ9 TOTAL SCORE: 14      Depression Screening Follow-up        4/11/2023     9:34 AM   PHQ   PHQ-9 Total Score 14   Q9: Thoughts of better off dead/self-harm past 2 weeks Not at all       Does the patient currently have a mental health provider?  No  Follow Up Actions Taken  Patient to follow up with PCP. Clinic staff to schedule appointment if able.      Sherwin Stoner MD

## 2023-04-11 NOTE — LETTER
4/11/2023         RE: Janessa Melendez  [address]  [place] [address]        Dear Colleague,    Thank you for referring your patient, Janessa Melendez, to the [facility]. Please see a copy of my visit note below.    [facility]  Section of General Neurology  New Patient Visit      Janessa Melendez MRN# [id number]   Age: 82 year old YOB: 1940              Assessment and Plan:   Assessment:  Janessa Melendez is a very pleasant 82 year old female who presents in consultation.  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  Recently she has had dizziness episodes that limit her.  They tend to occur in the morning/after her morning pills.  Her episodes sound more pre syncopal than anything regarding dizzy subtype.  Discussed to keep working on titrating her medications with her cardiology team.  No new stroke or stenosis seen on recent MRI/MRA imaging, good news.   New headaches of unclear etiology but with some migrainous features.  Positive visual phenomena could be from recent digoxin addition temporally also.    Discussed meclizine as a PRN option on the changes these are vertiginous but I think less likely so and she also is not interested in more medications especially in the setting of these symptoms potentially being related to some degree to medications.   Her neurological examination is within normal limits.  Mood as below: Seems more down by new limitations, hopeful will improve as she starts feeling better, no SI.  She will continue to follow with PCP in this regard.   Plan as below.  All questions answered.     Plan:  --Magnesium oxide 400 mg (see if cardiology OK with this, would presume they would be) Riboflavin (vitamin b2) 400 mg daily are good for headache prevention  --Tylenol +/- caffeine 3-4  as needed for headaches   --Discussed I  "would integrate some time in the morning of more relaxation, more fluids (drinking water), more sitting or laying, try sitting down during episodes  --Ciscussed Eliquis risk benefit.  I would continue this medication for stroke prevention  --To keep titrating medications with cardiology  --To reach out with issues questions or changes.  She has my card and I would be happy to see her back if so.           Pablo Stoner MD   of Neurology   [facility]      History of Presenting Symptoms:   Janessa Melendez is a 82 year old female who presents today for evaluation of dizziness, post stroke cares.      Had embolic appearing stroke in November 2022, reviewed with patient, re presented to ER with dizziness, without new stroke, reviewed.      Here with her daughter Rafaela  Lives alone in [place].  Does well with independence.  Originally from [place]  Mood --reviewed as below.  In good spirits overall but frustrated by her limitations.     Dizziness: Not apparent after stroke, started more so after bronchitis she suspects.   Dizzy: woozy/unsteady, pre syncopal.    Usually after standing up.      No history of migraine headaches, but is getting new headaches  +photophobia with headaches at times  Has not tried meclizine as needed  Notes she gets shaky when she has these episodes.    Recently lowered Coreg from 50 mg BID to 25 mg BID    Digoxin---noted visual changes after it, discussed this as a possibility     ED  Notes reviewed (4/2023)  HPI \"  Janessa Melendez is a 82 year old female with a history of idiopathic cardiomyopathy, EF 40%, hypertension, hyperlipidemia, ischemic stroke 11/22, A-fib on Eliquis who presents with son for evaluation of episodic dizziness, confusional state this morning, generalized weakness, paresthesias.  Patient reports about 1 week of intermittent dizzy spells lasting from seconds to minutes.  They also seem to be associated with " "flashes of light in both visual fields in a zigzag pattern.  Patient reports history of these visual disturbances and saw ophthalmology for them previously about a year ago without abnormality detected.  This morning, she had mild headache and felt confused and had associated paresthesias in the bilateral feet and right forearm.  She called her son to come to her residence.  Symptoms currently have resolved.  She still has some lingering sensation of weakness in her legs and mild headache.  This is not the worst headache of her life but she does not typically get headaches.  No recent fever or vomiting or diarrhea.  She does feel that cough and breathing are related to bronchitis from recent hospitalization has been improving.\"      11/2022 note reviewed from initial stroke hospitalization:  Acute ischemic stroke in the distal right M1 territory   At approximately 12:45 today the patient was out to lunch with a friend when they noticed the patient had acute onset of slurred speech prompting them to call EMS.  Upon arrival here the patient still having some speech changes and was noted to have left facial droop and left sided weakness.  She was evaluated by neurology and after CT head and CTA head/neck decision was made to give tenecteplase.  On my evaluation patient's symptoms have improved significantly and family who was present at bedside noted no slurred speech or abnormalities but slight facial droop still present on exam   * CT Head:  1. No evidence of acute intracranial hemorrhage, mass, or herniation.  2. Mild nonspecific white matter changes likely due to chronic microvascular ischemic disease.  * CTA Head/Neck:  1.  Focal occlusion of the distal right M1 branch which also extends into several proximal right M2 branches. The M2 branches re-opacify, however, the MCA branches are overall decreased in number and caliber compared to the left MCA branches. This is concerning for thromboembolism.  2.  No other " vascular cutoff of the proximal ACAs, left MCA, or PCAs.  3.  Patent arteries in the neck without evidence of dissection.  4.  Atherosclerotic disease at the carotid bifurcations bilaterally without significant stenosis.  MRI brain- Patchy areas of diffusion restriction/acute to early subacute ischemia in the right frontal operculum and insula. Additional tiny foci in each parietal lobe. No acute hemorrhage or mass effect. 2. Small cluster of chronic hemosiderin within the left parietal white matter has developed since 2021.  - A1c 5.7, LDL 39  * f/u CTH- no hemorrhage, Multiple known acute infarcts scattered throughout the bilateral cerebral hemispheres. Volume loss and white matter hypoattenuation likely represent chronic small vessel ischemic change  * echo as below  * repeat CTA head/neck-  Resolution of multiple right-sided M1/M2 branch occlusions without significant residual thromboemboli or stenoses identified. 2.  No evidence of large vessel occlusion or high-grade stenosis. See below regarding incidental finding on CTA neck     - PTA simvastatin 10mg daily continued per neurology recommendations  - ASA started on 11/5-11/6, transitioned to Apixaban 5 mg BID on 11/7, will continue at discharge  - f/u with neurology team in 6-8 weeks and neurology also made referral for sleep study  - PT/OT/SLP--recommend OP PT  - BP medications as bleow  - discharge plan discussed with patient and daughter at bedside.         Newly found atrial fibrillation   H/o CAD  Idiopathic cardiomyopathy.  Not decompensated  HTN/HLP   Initially in the ED the patient was noted to be tachycardiac with heart rates in to the 120s and irregular.  EKG showed atrial fibrillation but rate much improved in to the 90s.  Since settling in patient  Last echocardiogram from 4/7/22 showed an EF of 40%.  On my evaluation patient does not appear to be fluid overloaded  * TTE- LVEF 40%, mild to moderate global hypokinesis of LV, no significant change  from 4/7/22  - continue with PTA Coreg 50mg BID, Avapro 300mg daily and Torsemide with K supplement  - PTA Hydralazine resumed at discharge  - Cardiology clinic referral for [facility] follow up made given new Amiar. Patient follows up with Dr. Kumar Connors (last seen in clinic 4/2022)  - Apixaban initiated in hospital as above     Incidental findings  Thyroid nodules  Enlarged lymphnode  CTA on 11/4- Multiple thyroid nodules including a 2 cm nodule in the left thyroid gland which contains dystrophic calcification.   CTA on 11/6- 2. Heterogeneous multinodular thyroid gland as previously described. 3.  Abnormal heterogeneous right level 2A lymph node measuring up to 1.7 cm. This could be related to metastatic lymphadenopathy or granulomatous process. Further workup/follow-up would be typically recommended.  - discussed above finding with patient and her daughter at bedside. Per request, I reached out to [facility]-f/u with Dr. Holder. Dr. Holder not in office today. Discussed with MARINO Portillo who also discussed with rounding attending. Patient initially wanted to do imaging while in hospital since she already had an IV access, however while discussion was ongoing, she lost IV access. Discussed with pt/dtr regarding establishing another IV access and obtain CT c/a/p prior to discharge then f/u with Dr. Holder next week vs have the imaging/follow up as outpatient with Dr. Holder next week. Since it is not clear what time CT could be completed today after establishing IV access, she elected to do as OP next week.   - notified Lily PICHARDO, the clinic will arrange follow up imaging and clinic visit with Dr. Holder for next week         Past Medical History:     Patient Active Problem List   Diagnosis     Shortness of breath     Breast cancer (H)     Hypertension     Shoulder pain     Cardiomyopathy (H)     Hypercholesterolemia     Arthritis     Coronary artery disease     Malignant neoplasm (H)     Physical deconditioning      Rotator cuff arthropathy, left     S/P reverse total shoulder arthroplasty, left     Slow transit constipation     Anemia due to blood loss, acute     Advanced directives, counseling/discussion     Complete rupture of rotator cuff     Gastroesophageal reflux disease without esophagitis     Normocytic normochromic anemia     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 (H)     Facial droop due to acute stroke (H)     Past Medical History:   Diagnosis Date     Arthritis      Breast cancer (H)      Cardiomyopathy (H)     ideopathic     Coronary artery disease 9/25/2014     Hypercholesterolemia      Hypertension      Hypokalemia      Malignant neoplasm (H)      Shortness of breath      Shoulder pain         Past Surgical History:     Past Surgical History:   Procedure Laterality Date     BACK SURGERY       CARDIAC SURGERY      angiogram neg many yrs ago     CHOLECYSTECTOMY       ESOPHAGOSCOPY, GASTROSCOPY, DUODENOSCOPY (EGD), COMBINED N/A 6/22/2021    Procedure: ESOPHAGOGASTRODUODENOSCOPY, WITH ENDOSCOPIC US WITH FINE NEEDLE ASPIRATION;  Surgeon: Ventura Mcgill MD;  Location:  GI     EYE SURGERY       ORTHOPEDIC SURGERY      lt shoulder replaced, total     partial masectomy          Social History:     Social History     Tobacco Use     Smoking status: Never     Smokeless tobacco: Never   Substance Use Topics     Alcohol use: Yes     Comment: socially     Drug use: No        Family History:     Family History   Problem Relation Age of Onset     Other Cancer Mother      Other Cancer Brother      No Known Problems Son      Heart Failure Daughter      Family History Negative No family hx of         Medications:     Current Outpatient Medications   Medication Sig     Acetaminophen (TYLENOL PO) Take 500 mg by mouth every 6 hours as needed for mild pain or fever      amoxicillin (AMOXIL) 500 MG capsule TAKE 4 CAPSULES BY MOUTH 1 HOUR BEFORE DENTAL APPOINTMENT     apixaban  ANTICOAGULANT (ELIQUIS) 5 MG tablet Take 1 tablet (5 mg) by mouth 2 times daily     benzonatate (TESSALON) 100 MG capsule Take 1 capsule (100 mg) by mouth 3 times daily as needed for cough     carvedilol (COREG) 25 MG tablet Take 1 tablet (25 mg) by mouth 2 times daily     cholecalciferol (VITAMIN D3) 25 mcg (1000 units) capsule Take 1 capsule by mouth daily     digoxin (LANOXIN) 125 MCG tablet Take 1 tablet (125 mcg) by mouth daily     guaiFENesin-dextromethorphan (ROBITUSSIN DM) 100-10 MG/5ML syrup Take 10 mLs by mouth every 4 hours as needed for cough     hydrALAZINE (APRESOLINE) 50 MG tablet Take 1 tablet (50 mg) by mouth 2 times daily     ipratropium - albuterol 0.5 mg/2.5 mg/3 mL (DUONEB) 0.5-2.5 (3) MG/3ML neb solution Take 1 vial (3 mLs) by nebulization every 4 hours as needed for shortness of breath, wheezing or cough     irbesartan (AVAPRO) 300 MG tablet Take 1 tablet (300 mg) by mouth daily     methimazole (TAPAZOLE) 5 MG tablet Take 5 mg by mouth twice a week     omeprazole (PRILOSEC) 20 MG DR capsule Take 20 mg by mouth daily     potassium chloride ER (K-TAB/KLOR-CON) 10 MEQ CR tablet Take 1 tablet (10 mEq) by mouth 2 times daily And as needed as directed.     predniSONE (DELTASONE) 20 MG tablet Take 2 tablets (40 mg) by mouth daily     simvastatin (ZOCOR) 10 MG tablet Take 1 tablet (10 mg) by mouth At Bedtime     terazosin (HYTRIN) 5 MG capsule Take 1 capsule (5 mg) by mouth At Bedtime     torsemide (DEMADEX) 10 MG tablet Take 3 tablets (30 mg) by mouth daily     No current facility-administered medications for this visit.        Allergies:     Allergies   Allergen Reactions     Amlodipine      swelling     Aspirin Other (See Comments)     Bleeding              Bleeding, GI Lesion         Codeine Sulfate GI Disturbance        Review of Systems:   As noted above     Physical Exam:   Vitals: /66   Pulse 66   SpO2 98%   CV: peripheral pulse appreciated  Lungs: breathing comfortably    Neuro:    General Appearance: No apparent distress, well-nourished, well-groomed, pleasant     Mental Status: Alert and oriented to person, place, and time. Speech fluent and comprehension intact. No dysarthria.    Cranial Nerves:   II: Visual fields: normal  III: Pupils: 3 mm, equal, round, reactive to light   III,IV,VI: Extraocular Movements: intact   V: Facial sensation: intact to light touch  VII: Facial strength: intact without clear droop today  VIII: Hearing: intact grossly  IX: Palate: intact   XII: Tongue movement: normal     Motor Exam:   5/5 Diffusely  Sensory: intact to light touch    Coordination: no dysmetria with finger-to-nose bilaterally    Reflexes: biceps, triceps, brachioradialis, patellar and ankle jerks 1+ and symmetric.     Gait: normal casual gait, normal stride length. Good tandem walk         Data: Pertinent prior to visit   [id number] imaging  MRA Angiogram Neck w/o & w Contrast   Final Result   IMPRESSION:    1. Evaluation of the origin/proximal aspect of the right common   carotid artery is somewhat limited due to artifacts. Within that   limitation, no evidence for high-grade stenosis or occlusion of the   cervical carotid or vertebral arteries.   2. Heterogeneous thyroid gland with a left thyroid nodule that   measures over 1.5 cm, as seen on prior CTA. Multiple additional   nodules were seen in the thyroid gland on that prior CTA exam. If not   performed previously and if it would change the patient's clinical   management, follow-up thyroid ultrasound would typically be   recommended for further characterization.     MR Head w/o Contrast Angiogram   Final Result   IMPRESSION: Unremarkable MR angiogram of the [place].           MR Brain w/o & w Contrast   Final Result   IMPRESSION:   1. No definite acute intracranial process.   2. Interval expected evolution of presumed multiple small infarcts in   the supratentorial brain compared to the prior MRI.   3. Small focus of apparent  enhancement in the left middle frontal   gyrus, nonspecific, but probably related to a developmental venous   anomaly or artifact. Other possibilities are thought to be less   likely. Follow-up can be considered, as clinically warranted.   4. Brain atrophy and presumed chronic small vessel ischemic changes,   as described.   5. Unchanged small clustered foci of susceptibility-related signal   loss in the left parietal white matter, which may be related to   chronic hemosiderin staining.           Previous imaging:  EXAM: MR BRAIN WITHOUT CONTRAST  LOCATION: [facility]  DATE/TIME: 11/05/2022, 4:35 AM     INDICATION: Acute ischemic stroke.  COMPARISON: 11/04/2022 CT. Brain MRI 01/27/2021.  TECHNIQUE: Routine multiplanar multisequence head MRI without intravenous contrast.     FINDINGS:  INTRACRANIAL CONTENTS: Small foci of diffusion restriction, one each in the parietal lobes compatible with acute to early subacute ischemia. Patchy areas of cortical diffusion restriction in the right frontal operculum and insula with acute to early   subacute ischemia. No acute hemorrhage or mass effect. Small cluster of chronic hemosiderin within the left lobe has developed since 01/27/2021. No mass, acute hemorrhage, or extra-axial fluid collections. Normal position of the cerebellar tonsils.      SELLA: No abnormality accounting for technique.     OSSEOUS STRUCTURES/SOFT TISSUES: Normal marrow signal. The major intracranial vascular flow-voids are maintained.      ORBITS: No abnormality accounting for technique.      SINUSES/MASTOIDS: No paranasal sinus mucosal disease. No middle ear or mastoid effusion.                                                                       IMPRESSION:  1.  Patchy areas of diffusion restriction/acute to early subacute ischemia in the right frontal operculum and insula. Additional tiny foci in each parietal lobe. No acute hemorrhage or mass effect.     2.  Small cluster of  chronic hemosiderin within the left parietal white matter has developed since 2021                   The total time of this encounter today amounted to 65 minutes. This time included time spent with the patient, prep work, ordering tests, and performing post visit documentation.    Answers for HPI/ROS submitted by the patient on 4/11/2023  If you checked off any problems, how difficult have these problems made it for you to do your work, take care of things at home, or get along with other people?: Somewhat difficult  PHQ9 TOTAL SCORE: 14      Depression Screening Follow-up        4/11/2023     9:34 AM   PHQ   PHQ-9 Total Score 14   Q9: Thoughts of better off dead/self-harm past 2 weeks Not at all       Does the patient currently have a mental health provider?  No  Follow Up Actions Taken  Patient to follow up with PCP. Clinic staff to schedule appointment if able.      Sherwin Stoner MD        Again, thank you for allowing me to participate in the care of your patient.        Sincerely,        Sherwin Stoner MD

## 2023-04-11 NOTE — PATIENT INSTRUCTIONS
Magnesium oxide 400 mg (see if cardiology OK with this first) Riboflavin (vitamin b2) 400 mg daily  Stick tylenol +caffeine 3-4 days out of the week as needed for headaches   I would not recommend meclizine (anti vert as needed)  I would integrate some time in the morning of more relaxation, more fluids (drinking water), more sitting or laying, try sitting down during episodes  Keep titrating medications with cardiology  Visual changes could be related to digoxin, less likely migrainous but possible.

## 2023-04-11 NOTE — NURSING NOTE
"Janessa Melendez is a 82 year old female who presents for:  Chief Complaint   Patient presents with     Referral     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   New onset atrial fibrillation         Initial Vitals:  /66   Pulse 66   SpO2 98%  Estimated body mass index is 26.82 kg/m  as calculated from the following:    Height as of 3/8/23: 1.499 m (4' 11\").    Weight as of 3/9/23: 60.2 kg (132 lb 12.8 oz).. There is no height or weight on file to calculate BSA. BP completed using cuff size: pat Adamson    "

## 2023-04-18 ENCOUNTER — ALLIED HEALTH/NURSE VISIT (OUTPATIENT)
Dept: CARDIOLOGY | Facility: CLINIC | Age: 83
End: 2023-04-18
Payer: COMMERCIAL

## 2023-04-18 ENCOUNTER — HOSPITAL ENCOUNTER (OUTPATIENT)
Dept: CARDIOLOGY | Facility: CLINIC | Age: 83
Discharge: HOME | End: 2023-04-18
Attending: INTERNAL MEDICINE | Admitting: INTERNAL MEDICINE
Payer: COMMERCIAL

## 2023-04-18 ENCOUNTER — LAB (OUTPATIENT)
Dept: LAB | Facility: CLINIC | Age: 83
End: 2023-04-18
Payer: COMMERCIAL

## 2023-04-18 VITALS — HEART RATE: 56 BPM | DIASTOLIC BLOOD PRESSURE: 69 MMHG | SYSTOLIC BLOOD PRESSURE: 119 MMHG

## 2023-04-18 DIAGNOSIS — I45.5 SINUS PAUSE: ICD-10-CM

## 2023-04-18 DIAGNOSIS — R00.1 BRADYCARDIA: ICD-10-CM

## 2023-04-18 DIAGNOSIS — I42.0 DILATED CARDIOMYOPATHY (H): ICD-10-CM

## 2023-04-18 DIAGNOSIS — I15.9 SECONDARY HYPERTENSION: Primary | ICD-10-CM

## 2023-04-18 DIAGNOSIS — I10 BENIGN ESSENTIAL HYPERTENSION: ICD-10-CM

## 2023-04-18 LAB
ANION GAP SERPL CALCULATED.3IONS-SCNC: 15 MMOL/L (ref 7–15)
BUN SERPL-MCNC: 27 MG/DL (ref 8–23)
CALCIUM SERPL-MCNC: 7.4 MG/DL (ref 8.8–10.2)
CHLORIDE SERPL-SCNC: 100 MMOL/L (ref 98–107)
CREAT SERPL-MCNC: 1.43 MG/DL (ref 0.51–0.95)
DEPRECATED HCO3 PLAS-SCNC: 30 MMOL/L (ref 22–29)
GFR SERPL CREATININE-BSD FRML MDRD: 37 ML/MIN/1.73M2
GLUCOSE SERPL-MCNC: 130 MG/DL (ref 70–99)
POTASSIUM SERPL-SCNC: 4.2 MMOL/L (ref 3.4–5.3)
SODIUM SERPL-SCNC: 145 MMOL/L (ref 136–145)

## 2023-04-18 PROCEDURE — 93227 XTRNL ECG REC<48 HR R&I: CPT | Performed by: INTERNAL MEDICINE

## 2023-04-18 PROCEDURE — 93226 XTRNL ECG REC<48 HR SCAN A/R: CPT

## 2023-04-18 PROCEDURE — 80048 BASIC METABOLIC PNL TOTAL CA: CPT | Performed by: NURSE PRACTITIONER

## 2023-04-18 PROCEDURE — 36415 COLL VENOUS BLD VENIPUNCTURE: CPT | Performed by: NURSE PRACTITIONER

## 2023-04-18 PROCEDURE — 99207 PR NO CHARGE NURSE ONLY: CPT | Performed by: INTERNAL MEDICINE

## 2023-04-18 NOTE — PROGRESS NOTES
Last office visit:    04/11/2023    Previous blood pressure:      MmHg  131/66    Previous heart rate:      bpm   66    Time of reading:   10:04am    Morning medications were taken at: 06:30am    Today's blood pressure:       mm Hg   119/68    Today's heart rate:       bpm 71    Ordering Provider:   Sarah    Results sent to team # :5    Additional comments:       10:09     BP:  126/49              Pulse   78        10:13    BP   119/69           Pulse   56

## 2023-04-20 NOTE — TELEPHONE ENCOUNTER
Reviewed Holter monitor showing       Reviewed BP check showing  Last office visit:    04/11/2023   Previous blood pressure:      MmHg  131/66  Previous heart rate:      bpm   66     Time of reading:   10:04am  Morning medications were taken at: 06:30am  Today's blood pressure:       mm Hg   119/68  Today's heart rate:       bpm 71     Ordering Provider:   Sarah  Results sent to team # :5     Additional comments:  10:09     BP:  126/49 Pulse   78  10:13    BP   119/69  Pulse   56    Pt has appt to see Rosemarie Haque NP 4/24/23. Will message Dr. Smith to review. Randy HALEY

## 2023-04-21 ENCOUNTER — DOCUMENTATION ONLY (OUTPATIENT)
Dept: CARDIOLOGY | Facility: CLINIC | Age: 83
End: 2023-04-21
Payer: COMMERCIAL

## 2023-04-21 NOTE — TELEPHONE ENCOUNTER
Called patient with results of Holter and recommendations from Dr. Albino Connors.  Patient states she has been having episodes of fainting or near fainting, most recent was yesterday.  Patient states the symptoms do not last very long and has improved since decreasing the carvedilol.  Patient advised if she gets the symptoms again that she needs to get to the ED and was advised not to drive herself.  Patient verbalized understanding.  Patient advised to keep appointment Monday at 1115 for the labs as ordered by Dr. Albino Connors and to see Rosemarie Haque at 1 PM.  Patient also complaining of swelling in her legs since starting a medication in November.  Patient states her right foot is more swollen than the left and states she can no longer get her shoe on her right foot.  Patient denies any difficulty breathing.  Patient again advised to go to the ED with any symptoms of fainting or near fainting over the weekend otherwise will see her Monday at 1115 for labs and will see Rosemarie Haque at 1 PM Monday. Randy HALEY

## 2023-04-21 NOTE — PROGRESS NOTES
Shane Silva this patient had a long run of ventricular tachycardia lasting 34 beats.  I would have a basic metabolic profile drawn, magnesium and TSH.  This patient also will need to have a nuclear stress test done as soon as possible.  I will call her to see if she had any symptoms of fainting or near fainting.  I would warn her if she starts to get episodes of fainting or near fainting that she needs to go to the emergency room immediately.  Thanks

## 2023-04-21 NOTE — TELEPHONE ENCOUNTER
"Received recommendation from Dr. Smith, \"Shane Silva this patient had a long run of ventricular tachycardia lasting 34 beats.  I would have a basic metabolic profile drawn, magnesium and TSH.  This patient also will need to have a nuclear stress test done as soon as possible.  I will call her to see if she had any symptoms of fainting or near fainting.  I would warn her if she starts to get episodes of fainting or near fainting that she needs to go to the emergency room immediately.  Thanks.\"   "

## 2023-04-24 ENCOUNTER — OFFICE VISIT (OUTPATIENT)
Dept: CARDIOLOGY | Facility: CLINIC | Age: 83
End: 2023-04-24
Attending: INTERNAL MEDICINE
Payer: COMMERCIAL

## 2023-04-24 ENCOUNTER — LAB (OUTPATIENT)
Dept: LAB | Facility: CLINIC | Age: 83
End: 2023-04-24
Payer: COMMERCIAL

## 2023-04-24 VITALS
WEIGHT: 137.6 LBS | DIASTOLIC BLOOD PRESSURE: 82 MMHG | HEIGHT: 59 IN | OXYGEN SATURATION: 97 % | BODY MASS INDEX: 27.74 KG/M2 | HEART RATE: 51 BPM | SYSTOLIC BLOOD PRESSURE: 151 MMHG

## 2023-04-24 DIAGNOSIS — I47.29 PAROXYSMAL VENTRICULAR TACHYCARDIA (H): Primary | ICD-10-CM

## 2023-04-24 DIAGNOSIS — R00.1 BRADYCARDIA: ICD-10-CM

## 2023-04-24 DIAGNOSIS — R03.0 ELEVATED BLOOD PRESSURE READING WITHOUT DIAGNOSIS OF HYPERTENSION: ICD-10-CM

## 2023-04-24 DIAGNOSIS — I48.91 ATRIAL FIBRILLATION, UNSPECIFIED TYPE (H): ICD-10-CM

## 2023-04-24 DIAGNOSIS — I47.29 NSVT (NONSUSTAINED VENTRICULAR TACHYCARDIA) (H): ICD-10-CM

## 2023-04-24 DIAGNOSIS — I45.5 SINUS PAUSE: ICD-10-CM

## 2023-04-24 LAB
ANION GAP SERPL CALCULATED.3IONS-SCNC: 10 MMOL/L (ref 7–15)
BUN SERPL-MCNC: 18 MG/DL (ref 8–23)
CALCIUM SERPL-MCNC: 8.7 MG/DL (ref 8.8–10.2)
CHLORIDE SERPL-SCNC: 102 MMOL/L (ref 98–107)
CREAT SERPL-MCNC: 1.27 MG/DL (ref 0.51–0.95)
DEPRECATED HCO3 PLAS-SCNC: 31 MMOL/L (ref 22–29)
DIGOXIN SERPL-MCNC: 1.2 NG/ML (ref 0.6–2)
GFR SERPL CREATININE-BSD FRML MDRD: 42 ML/MIN/1.73M2
GLUCOSE SERPL-MCNC: 112 MG/DL (ref 70–99)
MAGNESIUM SERPL-MCNC: 1.2 MG/DL (ref 1.7–2.3)
POTASSIUM SERPL-SCNC: 4.5 MMOL/L (ref 3.4–5.3)
SODIUM SERPL-SCNC: 143 MMOL/L (ref 136–145)
TSH SERPL DL<=0.005 MIU/L-ACNC: 0.9 UIU/ML (ref 0.3–4.2)

## 2023-04-24 PROCEDURE — 99215 OFFICE O/P EST HI 40 MIN: CPT | Performed by: NURSE PRACTITIONER

## 2023-04-24 PROCEDURE — 83735 ASSAY OF MAGNESIUM: CPT | Performed by: INTERNAL MEDICINE

## 2023-04-24 PROCEDURE — 84443 ASSAY THYROID STIM HORMONE: CPT | Performed by: INTERNAL MEDICINE

## 2023-04-24 PROCEDURE — 80048 BASIC METABOLIC PNL TOTAL CA: CPT | Performed by: INTERNAL MEDICINE

## 2023-04-24 PROCEDURE — 36415 COLL VENOUS BLD VENIPUNCTURE: CPT | Performed by: INTERNAL MEDICINE

## 2023-04-24 PROCEDURE — 80162 ASSAY OF DIGOXIN TOTAL: CPT | Performed by: NURSE PRACTITIONER

## 2023-04-24 NOTE — PROGRESS NOTES
Cardiology Clinic Progress Note  Janessa Melendez MRN# [id number]   YOB: 1940 Age: 82 year old   Primary Cardiologist:  Reason for visit: 1 month follow up             Assessment and Plan:       1. 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  Repeat Holter monitor showed average heart rate of 56.  No further episodes since reduction in carvedilol  -Repeat trough digoxin level and TSH    2.  Idiopathic cardiomyopathy       HFrEF  -April 2022 showed EF at 40%, moderate global hypokinesia of the left ventricle with no valvular disease.  -With increased lower extremity edema and shortness of breath with exertion, will increase torsemide to 40mg daily   -Will assess volume status with right heart catheterization and adjust diuretic regimen based on results  I explained the risks of the procedure to the patient, including the risks of bleeding, hematoma formation, vascular damage in the femoral vein, arrhthymias, the risk of cardiac perforation and the risk of infection.    3.  Mild renal insufficiency  -Baseline creatinine ~1.04-1.2  -Avoid nephrotoxins, however monitor closely with upcoming angiogram and increase in torsemide   -Repeat BMP today    4. Ventricular tachycardia  -38 beat run of VT on holter monitor as well as a significant amount of pauses, longest 2.66 seconds.  Episodes did not correlate to monitor trigger/symptoms  -BMP and MG level today   -Will place EP consult   -Given shortness of breath on exertion and new finding of VT, will proceed with coronary angiography  Risks and benefits of coronary angiogram discussed today including, bleeding, bruising, infection, allergic reaction, kidney damage (including need for dialysis), stroke, heart attack,  vascular damage, emergency open heart surgery, up to and including death.  Patient indicates understanding and is agreeable to proceed.        Contrast allergy: None   Anticoagulation: Eliquis  Metformin: none  Oral DM meds: none  Insulin: none  Diuretic: Torsemide  Use of phosphodiesterase type 5 inhibitor: None  Aspirin: GI bleeding hx  Pt informed to be NPO at midnight  Renal issues: mild renal insufficiency  Pt has transportation and 24 hours post procedure monitoring set up.   Pt aware of no driving for 24 hours post procedure.     Patient is aware if she should require an intervention and stent placement, DAPT would be required for one year without interruption. Patient also has a hx of GI bleeding on aspirin and is now on eliquis for atrial fibrillation.     5.  Whitecoat hypertension  -3/2023 24-hour ambulatory blood pressure monitor normal to low blood pressure    Reviewed plan and current symptoms with Dr. Smith who recommends coronary angiography and right heart catheterization. Also EP consult for VT and atrial fibrillation.     Changes today: Trough digoxin level, right and left heart catheterization, will cancel stress nuclear test, BMP, Mg, and TSH today, increase torsemide to 40mg daily     Hold eliquis x 2 days prior to procedure     Patient come early for IV hydration 2/2 mild renal insufficiency the day of her angiogram     Follow up plan: Follow up in 2 weeks after coronary angiogram and C        History of Presenting Illness:    Janessa Melendez is a very pleasant 82 year old female with a history of embolic CVA secondary to new onset atrial fibrillation, idiopathic cardiomyopathy, hypercholesterolemia, and resistant hypertension.     November 2022 patient presented to the emergency room after experiencing a sudden onset of left-sided facial droop, left arm and leg weakness as well as slurred speech.    Patient was seen by Dr. Albino Connors in February 2023 following her  hospitalization. Digoxin was initiated for rate control.  A subsequent 24-hour blood pressure monitor revealed normal to low blood pressure consistent with whitecoat hypertension.     She was hospitalized with bronchitis and acute on chronic heart failure 3/8/2023. Her NT pro BNP was 5832 on admission and she was treated with IV diuresis with furosemide, also prednisone and antibiotics for her bronchitis.     She had an episode of dizziness and lightheadedness to the point of incoherence and was seen in the ER on 4/4/2023.  EKG showed atrial fibrillation with slow ventricular response.  Brain MRI was negative for acute intracranial process.  Her symptoms improved after a 1 L saline bolus.  She saw her primary care provider for/7/23, who requested cardiology reassess her blood pressure medications prior to her appointment with us.  I reduced her carvedilol to 25mg twice daily.      Patient had a repeat Holter monitor after carvedilol was reduced secondary to symptoms of dizziness.  This revealed a 34 beat run of ventricular tachycardia.  There were 1351 pauses greater than 2 seconds, the longest 2.66 seconds.  Overall 3% PVC burden, average heart rate 56, 14% in bradycardia, 14% tachycardia, 100% atrial fibrillation.    Patient is here today for 1 month follow-up. Patient's son is present for the visit today. She has significant shortness of breath on exertion. She has been compliant with her torsemide, however she doesn't feel she voids much after. Her lower extremity edema has been progressing over the last 2-3 weeks. Denies orthopnea or PND. Weight was 130lbs. At discharge in early March, since then, weight has been slowly increasing.     Patient denies chest pain or chest tightness. Denies dizziness, lightheadedness or other presyncopal symptoms. Denies tachycardia or palpitations.     Denies bleeding issues on eliquis.     Blood pressure 151/82 and HR 51 in clinic today. She has been taking magnesium for leg  "cramps.           Recent Hospitalizations   ER Visit for dizziness/lightheadedness           Social History      Social History     Socioeconomic History     Marital status: Single     Spouse name: Not on file     Number of children: Not on file     Years of education: Not on file     Highest education level: Not on file   Occupational History     Not on file   Tobacco Use     Smoking status: Never     Smokeless tobacco: Never   Vaping Use     Vaping status: Not on file   Substance and Sexual Activity     Alcohol use: Yes     Comment: socially     Drug use: No     Sexual activity: Never   Other Topics Concern     Parent/sibling w/ CABG, MI or angioplasty before 65F 55M? Not Asked      Service Not Asked     Blood Transfusions Not Asked     Caffeine Concern Not Asked     Occupational Exposure Not Asked     Hobby Hazards Not Asked     Sleep Concern Not Asked     Stress Concern Not Asked     Weight Concern Not Asked     Special Diet No     Back Care Not Asked     Exercise No     Bike Helmet Not Asked     Seat Belt Not Asked     Self-Exams Not Asked   Social History Narrative     Not on file     Social Determinants of Health     Financial Resource Strain: Not on file   Food Insecurity: Not on file   Transportation Needs: Not on file   Physical Activity: Not on file   Stress: Not on file   Social Connections: Not on file   Intimate Partner Violence: Not on file   Housing Stability: Not on file            Review of Systems:   Skin:        Eyes:       ENT:       Respiratory:  Positive for dyspnea on exertion  Cardiovascular:  palpitations;Negative;chest pain;lightheadedness;dizziness;syncope or near-syncope fatigue;exercise intolerance  Gastroenterology:      Genitourinary:       Musculoskeletal:       Neurologic:       Psychiatric:       Heme/Lymph/Imm:       Endocrine:              Physical Exam:   Vitals: BP (!) 151/82   Pulse 51   Ht 1.499 m (4' 11\")   Wt 62.4 kg (137 lb 9.6 oz)   SpO2 97%   BMI 27.79 kg/m  "    Wt Readings from Last 4 Encounters:   04/24/23 62.4 kg (137 lb 9.6 oz)   03/09/23 60.2 kg (132 lb 12.8 oz)   02/21/23 63.5 kg (140 lb)   11/05/22 69 kg (152 lb 1.9 oz)     GEN: well nourished, in no acute distress.  HEENT:  Pupils equal, round. Sclerae nonicteric.   NECK: Supple, no masses appreciated. No JVD with patient supine  C/V:  Irregularly irregular rate and rhythm, no murmur, rub or gallop.    RESP: Respirations are unlabored. Clear to auscultation bilaterally without wheezing, rales, or rhonchi.  GI: Abdomen soft, nontender.  EXTREM: 1-2+ bilateral LE edema.  NEURO: Alert and oriented, cooperative.  SKIN: Warm and dry.        Data:     LIPID RESULTS:  Lab Results   Component Value Date    CHOL 118 02/20/2023    CHOL 133 05/06/2021    HDL 50 02/20/2023    HDL 59 05/06/2021    LDL 47 02/20/2023    LDL 54 05/06/2021    TRIG 105 02/20/2023    TRIG 101 05/06/2021    CHOLHDLRATIO 2.3 10/08/2015     LIVER ENZYME RESULTS:  Lab Results   Component Value Date    AST 18 04/04/2023    AST 14 05/31/2021    ALT 23 04/04/2023    ALT 26 05/31/2021     CBC RESULTS:  Lab Results   Component Value Date    WBC 5.8 04/04/2023    WBC 8.0 05/31/2021    RBC 3.80 04/04/2023    RBC 3.92 05/31/2021    HGB 11.5 (L) 04/04/2023    HGB 11.9 05/31/2021    HCT 34.3 (L) 04/04/2023    HCT 35.7 05/31/2021    MCV 90 04/04/2023    MCV 91 05/31/2021    MCH 30.3 04/04/2023    MCH 30.4 05/31/2021    MCHC 33.5 04/04/2023    MCHC 33.3 05/31/2021    RDW 13.7 04/04/2023    RDW 14.0 05/31/2021     04/04/2023     05/31/2021     BMP RESULTS:  Lab Results   Component Value Date     04/24/2023     05/31/2021    POTASSIUM 4.5 04/24/2023    POTASSIUM 3.6 11/06/2022    POTASSIUM 3.3 (L) 05/31/2021    CHLORIDE 102 04/24/2023    CHLORIDE 102 11/06/2022    CHLORIDE 99 05/31/2021    CO2 31 (H) 04/24/2023    CO2 27 11/06/2022    CO2 31 05/31/2021    ANIONGAP 10 04/24/2023    ANIONGAP 6 11/06/2022    ANIONGAP 7 05/31/2021      (H) 04/24/2023     (H) 11/07/2022     (H) 11/06/2022     (H) 05/31/2021    BUN 18.0 04/24/2023    BUN 14 11/06/2022    BUN 14 05/31/2021    CR 1.27 (H) 04/24/2023    CR 0.73 05/31/2021    GFRESTIMATED 42 (L) 04/24/2023    GFRESTIMATED 78 05/31/2021    GFRESTBLACK >90 05/31/2021    PENNY 8.7 (L) 04/24/2023    PENNY 9.0 05/31/2021      A1C RESULTS:  Lab Results   Component Value Date    A1C 5.7 (H) 11/04/2022     INR RESULTS:  Lab Results   Component Value Date    INR 1.11 11/05/2022    INR 1.03 11/04/2022    INR 0.97 06/05/2009            Medications     Current Outpatient Medications   Medication Sig Dispense Refill     Acetaminophen (TYLENOL PO) Take 500 mg by mouth every 6 hours as needed for mild pain or fever        amoxicillin (AMOXIL) 500 MG capsule TAKE 4 CAPSULES BY MOUTH 1 HOUR BEFORE DENTAL APPOINTMENT       apixaban ANTICOAGULANT (ELIQUIS) 5 MG tablet Take 1 tablet (5 mg) by mouth 2 times daily 180 tablet 3     benzonatate (TESSALON) 100 MG capsule Take 1 capsule (100 mg) by mouth 3 times daily as needed for cough 30 capsule 0     carvedilol (COREG) 25 MG tablet Take 1 tablet (25 mg) by mouth 2 times daily 180 tablet 3     cholecalciferol (VITAMIN D3) 25 mcg (1000 units) capsule Take 1 capsule by mouth daily       digoxin (LANOXIN) 125 MCG tablet Take 1 tablet (125 mcg) by mouth daily 90 tablet 3     guaiFENesin-dextromethorphan (ROBITUSSIN DM) 100-10 MG/5ML syrup Take 10 mLs by mouth every 4 hours as needed for cough 118 mL 0     hydrALAZINE (APRESOLINE) 50 MG tablet Take 1 tablet (50 mg) by mouth 2 times daily 180 tablet 3     ipratropium - albuterol 0.5 mg/2.5 mg/3 mL (DUONEB) 0.5-2.5 (3) MG/3ML neb solution Take 1 vial (3 mLs) by nebulization every 4 hours as needed for shortness of breath, wheezing or cough 90 mL 0     irbesartan (AVAPRO) 300 MG tablet Take 1 tablet (300 mg) by mouth daily 90 tablet 3     methimazole (TAPAZOLE) 5 MG tablet Take 5 mg by mouth twice a week        omeprazole (PRILOSEC) 20 MG DR capsule Take 20 mg by mouth daily       potassium chloride ER (K-TAB/KLOR-CON) 10 MEQ CR tablet Take 1 tablet (10 mEq) by mouth 2 times daily And as needed as directed. 180 tablet 3     simvastatin (ZOCOR) 10 MG tablet Take 1 tablet (10 mg) by mouth At Bedtime 90 tablet 3     terazosin (HYTRIN) 5 MG capsule Take 1 capsule (5 mg) by mouth At Bedtime 90 capsule 3     torsemide (DEMADEX) 10 MG tablet Take 3 tablets (30 mg) by mouth daily 270 tablet 2     Magnesium Oxide 250 MG TABS Take 1 tablet (250 mg) by mouth 2 times daily 60 tablet 0          Past Medical History     Past Medical History:   Diagnosis Date     Arthritis      Breast cancer (H)      Cardiomyopathy (H)     ideopathic     Coronary artery disease 9/25/2014     Hypercholesterolemia      Hypertension      Hypokalemia      Malignant neoplasm (H)      Shortness of breath      Shoulder pain      Past Surgical History:   Procedure Laterality Date     BACK SURGERY       CARDIAC SURGERY      angiogram neg many yrs ago     CHOLECYSTECTOMY       ESOPHAGOSCOPY, GASTROSCOPY, DUODENOSCOPY (EGD), COMBINED N/A 6/22/2021    Procedure: ESOPHAGOGASTRODUODENOSCOPY, WITH ENDOSCOPIC US WITH FINE NEEDLE ASPIRATION;  Surgeon: Ventura Mcgill MD;  Location:  GI     EYE SURGERY       ORTHOPEDIC SURGERY      lt shoulder replaced, total     partial masectomy       Family History   Problem Relation Age of Onset     Other Cancer Mother      Other Cancer Brother      No Known Problems Son      Heart Failure Daughter      Family History Negative No family hx of             Allergies   Amlodipine, Aspirin, and Codeine sulfate        Heidi Haque NP  [facility]  Pager: [phone]

## 2023-04-24 NOTE — LETTER
4/24/2023    [facility]  [address] [place] [id number]    RE: Janessa Melendez       Dear Colleague,     I had the pleasure of seeing Janessa Melendez in the [facility].  Cardiology Clinic Progress Note  Janessa Melendez MRN# [id number]   YOB: 1940 Age: 82 year old   Primary Cardiologist:  Reason for visit: 1 month follow up             Assessment and Plan:       1. 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  Repeat Holter monitor showed average heart rate of 56.  No further episodes since reduction in carvedilol  -Repeat trough digoxin level and TSH    2.  Idiopathic cardiomyopathy       HFrEF  -April 2022 showed EF at 40%, moderate global hypokinesia of the left ventricle with no valvular disease.  -With increased lower extremity edema and shortness of breath with exertion, will increase torsemide to 40mg daily   -Will assess volume status with right heart catheterization and adjust diuretic regimen based on results  I explained the risks of the procedure to the patient, including the risks of bleeding, hematoma formation, vascular damage in the femoral vein, arrhthymias, the risk of cardiac perforation and the risk of infection.    3.  Mild renal insufficiency  -Baseline creatinine ~1.04-1.2  -Avoid nephrotoxins, however monitor closely with upcoming angiogram and increase in torsemide   -Repeat BMP today    4. Ventricular tachycardia  -38 beat run of VT on holter monitor as well as a significant amount of pauses, longest 2.66 seconds.  Episodes did not correlate to monitor trigger/symptoms  -BMP and MG level today   -Will place EP consult   -Given shortness of breath on exertion and new  finding of VT, will proceed with coronary angiography  Risks and benefits of coronary angiogram discussed today including, bleeding, bruising, infection, allergic reaction, kidney damage (including need for dialysis), stroke, heart attack, vascular damage, emergency open heart surgery, up to and including death.  Patient indicates understanding and is agreeable to proceed.        Contrast allergy: None   Anticoagulation: Eliquis  Metformin: none  Oral DM meds: none  Insulin: none  Diuretic: Torsemide  Use of phosphodiesterase type 5 inhibitor: None  Aspirin: GI bleeding hx  Pt informed to be NPO at midnight  Renal issues: mild renal insufficiency  Pt has transportation and 24 hours post procedure monitoring set up.   Pt aware of no driving for 24 hours post procedure.     Patient is aware if she should require an intervention and stent placement, DAPT would be required for one year without interruption. Patient also has a hx of GI bleeding on aspirin and is now on eliquis for atrial fibrillation.     5.  Whitecoat hypertension  -3/2023 24-hour ambulatory blood pressure monitor normal to low blood pressure    Reviewed plan and current symptoms with Dr. Smith who recommends coronary angiography and right heart catheterization. Also EP consult for VT and atrial fibrillation.     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     November  2022 patient presented to the emergency room after experiencing a sudden onset of left-sided facial droop, left arm and leg weakness as well as slurred speech.    Patient was seen by Dr. Albino Connors in February 2023 following her hospitalization. Digoxin was initiated for rate control.  A subsequent 24-hour blood pressure monitor revealed normal to low blood pressure consistent with whitecoat hypertension.     She was hospitalized with bronchitis and acute on chronic heart failure 3/8/2023. Her NT pro BNP was 5832 on admission and she was treated with IV diuresis with furosemide, also prednisone and antibiotics for her bronchitis.     She had an episode of dizziness and lightheadedness to the point of incoherence and was seen in the ER on 4/4/2023.  EKG showed atrial fibrillation with slow ventricular response.  Brain MRI was negative for acute intracranial process.  Her symptoms improved after a 1 L saline bolus.  She saw her primary care provider for/7/23, who requested cardiology reassess her blood pressure medications prior to her appointment with us.  I reduced her carvedilol to 25mg twice daily.      Patient had a repeat Holter monitor after carvedilol was reduced secondary to symptoms of dizziness.  This revealed a 34 beat run of ventricular tachycardia.  There were 1351 pauses greater than 2 seconds, the longest 2.66 seconds.  Overall 3% PVC burden, average heart rate 56, 14% in bradycardia, 14% tachycardia, 100% atrial fibrillation.    Patient is here today for 1 month follow-up. Patient's son is present for the visit today. She has significant shortness of breath on exertion. She has been compliant with her torsemide, however she doesn't feel she voids much after. Her lower extremity edema has been progressing over the last 2-3 weeks. Denies orthopnea or PND. Weight was 130lbs. At discharge in early March, since then, weight has been slowly increasing.     Patient denies chest pain or chest tightness.  Denies dizziness, lightheadedness or other presyncopal symptoms. Denies tachycardia or palpitations.     Denies bleeding issues on eliquis.     Blood pressure 151/82 and HR 51 in clinic today. She has been taking magnesium for leg cramps.           Recent Hospitalizations   ER Visit for dizziness/lightheadedness           Social History      Social History     Socioeconomic History    Marital status: Single     Spouse name: Not on file    Number of children: Not on file    Years of education: Not on file    Highest education level: Not on file   Occupational History    Not on file   Tobacco Use    Smoking status: Never    Smokeless tobacco: Never   Vaping Use    Vaping status: Not on file   Substance and Sexual Activity    Alcohol use: Yes     Comment: socially    Drug use: No    Sexual activity: Never   Other Topics Concern    Parent/sibling w/ CABG, MI or angioplasty before 65F 55M? Not Asked     Service Not Asked    Blood Transfusions Not Asked    Caffeine Concern Not Asked    Occupational Exposure Not Asked    Hobby Hazards Not Asked    Sleep Concern Not Asked    Stress Concern Not Asked    Weight Concern Not Asked    Special Diet No    Back Care Not Asked    Exercise No    Bike Helmet Not Asked    Seat Belt Not Asked    Self-Exams Not Asked   Social History Narrative    Not on file     Social Determinants of Health     Financial Resource Strain: Not on file   Food Insecurity: Not on file   Transportation Needs: Not on file   Physical Activity: Not on file   Stress: Not on file   Social Connections: Not on file   Intimate Partner Violence: Not on file   Housing Stability: Not on file            Review of Systems:   Skin:        Eyes:       ENT:       Respiratory:  Positive for dyspnea on exertion  Cardiovascular:  palpitations;Negative;chest pain;lightheadedness;dizziness;syncope or near-syncope fatigue;exercise intolerance  Gastroenterology:      Genitourinary:       Musculoskeletal:       Neurologic:    "    Psychiatric:       Heme/Lymph/Imm:       Endocrine:              Physical Exam:   Vitals: BP (!) 151/82   Pulse 51   Ht 1.499 m (4' 11\")   Wt 62.4 kg (137 lb 9.6 oz)   SpO2 97%   BMI 27.79 kg/m     Wt Readings from Last 4 Encounters:   04/24/23 62.4 kg (137 lb 9.6 oz)   03/09/23 60.2 kg (132 lb 12.8 oz)   02/21/23 63.5 kg (140 lb)   11/05/22 69 kg (152 lb 1.9 oz)     GEN: well nourished, in no acute distress.  HEENT:  Pupils equal, round. Sclerae nonicteric.   NECK: Supple, no masses appreciated. No JVD with patient supine  C/V:  Irregularly irregular rate and rhythm, no murmur, rub or gallop.    RESP: Respirations are unlabored. Clear to auscultation bilaterally without wheezing, rales, or rhonchi.  GI: Abdomen soft, nontender.  EXTREM: 1-2+ bilateral LE edema.  NEURO: Alert and oriented, cooperative.  SKIN: Warm and dry.        Data:     LIPID RESULTS:  Lab Results   Component Value Date    CHOL 118 02/20/2023    CHOL 133 05/06/2021    HDL 50 02/20/2023    HDL 59 05/06/2021    LDL 47 02/20/2023    LDL 54 05/06/2021    TRIG 105 02/20/2023    TRIG 101 05/06/2021    CHOLHDLRATIO 2.3 10/08/2015     LIVER ENZYME RESULTS:  Lab Results   Component Value Date    AST 18 04/04/2023    AST 14 05/31/2021    ALT 23 04/04/2023    ALT 26 05/31/2021     CBC RESULTS:  Lab Results   Component Value Date    WBC 5.8 04/04/2023    WBC 8.0 05/31/2021    RBC 3.80 04/04/2023    RBC 3.92 05/31/2021    HGB 11.5 (L) 04/04/2023    HGB 11.9 05/31/2021    HCT 34.3 (L) 04/04/2023    HCT 35.7 05/31/2021    MCV 90 04/04/2023    MCV 91 05/31/2021    MCH 30.3 04/04/2023    MCH 30.4 05/31/2021    MCHC 33.5 04/04/2023    MCHC 33.3 05/31/2021    RDW 13.7 04/04/2023    RDW 14.0 05/31/2021     04/04/2023     05/31/2021     BMP RESULTS:  Lab Results   Component Value Date     04/24/2023     05/31/2021    POTASSIUM 4.5 04/24/2023    POTASSIUM 3.6 11/06/2022    POTASSIUM 3.3 (L) 05/31/2021    CHLORIDE 102 04/24/2023    " CHLORIDE 102 11/06/2022    CHLORIDE 99 05/31/2021    CO2 31 (H) 04/24/2023    CO2 27 11/06/2022    CO2 31 05/31/2021    ANIONGAP 10 04/24/2023    ANIONGAP 6 11/06/2022    ANIONGAP 7 05/31/2021     (H) 04/24/2023     (H) 11/07/2022     (H) 11/06/2022     (H) 05/31/2021    BUN 18.0 04/24/2023    BUN 14 11/06/2022    BUN 14 05/31/2021    CR 1.27 (H) 04/24/2023    CR 0.73 05/31/2021    GFRESTIMATED 42 (L) 04/24/2023    GFRESTIMATED 78 05/31/2021    GFRESTBLACK >90 05/31/2021    PENNY 8.7 (L) 04/24/2023    PENNY 9.0 05/31/2021      A1C RESULTS:  Lab Results   Component Value Date    A1C 5.7 (H) 11/04/2022     INR RESULTS:  Lab Results   Component Value Date    INR 1.11 11/05/2022    INR 1.03 11/04/2022    INR 0.97 06/05/2009            Medications     Current Outpatient Medications   Medication Sig Dispense Refill    Acetaminophen (TYLENOL PO) Take 500 mg by mouth every 6 hours as needed for mild pain or fever       amoxicillin (AMOXIL) 500 MG capsule TAKE 4 CAPSULES BY MOUTH 1 HOUR BEFORE DENTAL APPOINTMENT      apixaban ANTICOAGULANT (ELIQUIS) 5 MG tablet Take 1 tablet (5 mg) by mouth 2 times daily 180 tablet 3    benzonatate (TESSALON) 100 MG capsule Take 1 capsule (100 mg) by mouth 3 times daily as needed for cough 30 capsule 0    carvedilol (COREG) 25 MG tablet Take 1 tablet (25 mg) by mouth 2 times daily 180 tablet 3    cholecalciferol (VITAMIN D3) 25 mcg (1000 units) capsule Take 1 capsule by mouth daily      digoxin (LANOXIN) 125 MCG tablet Take 1 tablet (125 mcg) by mouth daily 90 tablet 3    guaiFENesin-dextromethorphan (ROBITUSSIN DM) 100-10 MG/5ML syrup Take 10 mLs by mouth every 4 hours as needed for cough 118 mL 0    hydrALAZINE (APRESOLINE) 50 MG tablet Take 1 tablet (50 mg) by mouth 2 times daily 180 tablet 3    ipratropium - albuterol 0.5 mg/2.5 mg/3 mL (DUONEB) 0.5-2.5 (3) MG/3ML neb solution Take 1 vial (3 mLs) by nebulization every 4 hours as needed for shortness of breath,  wheezing or cough 90 mL 0    irbesartan (AVAPRO) 300 MG tablet Take 1 tablet (300 mg) by mouth daily 90 tablet 3    methimazole (TAPAZOLE) 5 MG tablet Take 5 mg by mouth twice a week      omeprazole (PRILOSEC) 20 MG DR capsule Take 20 mg by mouth daily      potassium chloride ER (K-TAB/KLOR-CON) 10 MEQ CR tablet Take 1 tablet (10 mEq) by mouth 2 times daily And as needed as directed. 180 tablet 3    simvastatin (ZOCOR) 10 MG tablet Take 1 tablet (10 mg) by mouth At Bedtime 90 tablet 3    terazosin (HYTRIN) 5 MG capsule Take 1 capsule (5 mg) by mouth At Bedtime 90 capsule 3    torsemide (DEMADEX) 10 MG tablet Take 3 tablets (30 mg) by mouth daily 270 tablet 2    Magnesium Oxide 250 MG TABS Take 1 tablet (250 mg) by mouth 2 times daily 60 tablet 0          Past Medical History     Past Medical History:   Diagnosis Date    Arthritis     Breast cancer (H)     Cardiomyopathy (H)     ideopathic    Coronary artery disease 9/25/2014    Hypercholesterolemia     Hypertension     Hypokalemia     Malignant neoplasm (H)     Shortness of breath     Shoulder pain      Past Surgical History:   Procedure Laterality Date    BACK SURGERY      CARDIAC SURGERY      angiogram neg many yrs ago    CHOLECYSTECTOMY      ESOPHAGOSCOPY, GASTROSCOPY, DUODENOSCOPY (EGD), COMBINED N/A 6/22/2021    Procedure: ESOPHAGOGASTRODUODENOSCOPY, WITH ENDOSCOPIC US WITH FINE NEEDLE ASPIRATION;  Surgeon: Ventura Mcgill MD;  Location:  GI    EYE SURGERY      ORTHOPEDIC SURGERY      lt shoulder replaced, total    partial masectomy       Family History   Problem Relation Age of Onset    Other Cancer Mother     Other Cancer Brother     No Known Problems Son     Heart Failure Daughter     Family History Negative No family hx of             Allergies   Amlodipine, Aspirin, and Codeine sulfate        Heidi Haque NP  [facility]  Pager: [phone]        Thank you for allowing me to participate in the care of your  patient.      Sincerely,     Heidi Haque NP     [facility]  cc:   Brett Smith MD  [address] [place]  [place] [address]

## 2023-04-24 NOTE — PATIENT INSTRUCTIONS
Today's Recommendations    I would like you to have a treadmill stress test to look for any concerns for reduced blood flow in your heart arteries   I would like you to have some labs done today  Once I have your labs results, I will call you and we can review a plan for any medication changes  Please follow up with Me after your stress test.    Please send a [employer] message or call [phone] to the RN team with questions or concerns.     Scheduling number [phone]  KATIE Jacobo, CNP

## 2023-04-25 NOTE — TELEPHONE ENCOUNTER
Patient with 2+ reactions to both Carba mix and thiuram patient patient advise that this is components of rubber gloves that he is probably using at work advised him that this may improve and go away if he stops wearing rubber gloves he can use vinyl or cloth gloves for protection with planned Reviewed labs showing    Latest Reference Range & Units 04/24/23 13:59   Sodium 136 - 145 mmol/L 143   Potassium 3.4 - 5.3 mmol/L 4.5   Chloride 98 - 107 mmol/L 102   Carbon Dioxide (CO2) 22 - 29 mmol/L 31 (H)   Urea Nitrogen 8.0 - 23.0 mg/dL 18.0   Creatinine 0.51 - 0.95 mg/dL 1.27 (H)   GFR Estimate >60 mL/min/1.73m2 42 (L)   Calcium 8.8 - 10.2 mg/dL 8.7 (L)   Anion Gap 7 - 15 mmol/L 10   Magnesium 1.7 - 2.3 mg/dL 1.2 (L)   Glucose 70 - 99 mg/dL 112 (H)   TSH 0.30 - 4.20 uIU/mL 0.90   (H): Data is abnormally high  (L): Data is abnormally low    Will message Dr. Smith to review. Randy HALEY

## 2023-04-25 NOTE — TELEPHONE ENCOUNTER
Called pt with recommendations from Dr. Smith to start magnesium 250 mg twice daily w/ recheck on Friday. Prescription escripted to [facility] & order placed for repeat magnesium on Friday. Will message scheduling to [facility] pt to arrange. Randy HALEY

## 2023-04-28 ENCOUNTER — LAB (OUTPATIENT)
Dept: LAB | Facility: CLINIC | Age: 83
End: 2023-04-28
Payer: COMMERCIAL

## 2023-04-28 DIAGNOSIS — I47.29 NSVT (NONSUSTAINED VENTRICULAR TACHYCARDIA) (H): ICD-10-CM

## 2023-04-28 DIAGNOSIS — E83.42 HYPOMAGNESEMIA: ICD-10-CM

## 2023-04-28 LAB — MAGNESIUM SERPL-MCNC: 1.4 MG/DL (ref 1.7–2.3)

## 2023-04-28 PROCEDURE — 83735 ASSAY OF MAGNESIUM: CPT | Performed by: INTERNAL MEDICINE

## 2023-04-28 PROCEDURE — 36415 COLL VENOUS BLD VENIPUNCTURE: CPT | Performed by: INTERNAL MEDICINE

## 2023-04-28 NOTE — TELEPHONE ENCOUNTER
Would start on omeprazole 40 mg per day and start aspirin. Aspirin needs to be taken on a full stomach. Thx

## 2023-04-28 NOTE — TELEPHONE ENCOUNTER
Attempted to call pt with results & recommendations, left message for pt to call back. Randy HALEY

## 2023-04-28 NOTE — TELEPHONE ENCOUNTER
Pt called back, informed of recommendations from Dr. Smith to continue the the magnesium oxide 250 mg twice daily. Prescription escripted to [facility] & order placed for repeat magnesium in 2 weeks. Will message scheduling ot call pt ot arrange.     Pt states she is allergic to aspirin, had previous GI bleed with aspirin & the instruction for heart cath scheduled 5/05/23 instructs her to take aspirin 325 mg the day before & day of procedure. She does have aspirin 81 mg at home but has not been taking it. Will message Dr. Smith to review. Randy HALEY

## 2023-04-28 NOTE — TELEPHONE ENCOUNTER
Reviewed magnesium showing    Latest Reference Range & Units 04/24/23 13:59 04/28/23 09:13   Magnesium 1.7 - 2.3 mg/dL 1.2 (L) 1.4 (L)   (L): Data is abnormally low    Will message Dr. Smith to review. Randy HALEY     Magnesium at 1.4 up from 1.2 with Magnesium oxide 250 mg twice a day for 1 week. Do you want her to continue taking this? Thanks, Shira

## 2023-05-01 ENCOUNTER — TELEPHONE (OUTPATIENT)
Dept: CARDIOLOGY | Facility: CLINIC | Age: 83
End: 2023-05-01
Payer: COMMERCIAL

## 2023-05-01 DIAGNOSIS — I10 ESSENTIAL HYPERTENSION: ICD-10-CM

## 2023-05-01 DIAGNOSIS — I47.29 PAROXYSMAL VENTRICULAR TACHYCARDIA (H): Primary | ICD-10-CM

## 2023-05-01 DIAGNOSIS — I27.20 MODERATE PULMONARY HYPERTENSION (H): ICD-10-CM

## 2023-05-01 DIAGNOSIS — I48.91 ATRIAL FIBRILLATION, UNSPECIFIED TYPE (H): ICD-10-CM

## 2023-05-01 NOTE — TELEPHONE ENCOUNTER
Attempted to call pt with recommendations from Dr. Smith, left message for pt to call back. Randy HALEY

## 2023-05-01 NOTE — TELEPHONE ENCOUNTER
Patient will need to come early on Friday for gentle  pre-hydration prior to her coronary angiogram given her baseline CKD.      Also Dr. Smith would like her to have a consultation with EP given her recent VT and also atrial fibrillation.

## 2023-05-02 NOTE — TELEPHONE ENCOUNTER
Called pt with recommendations from Dr. Smith to take omeprazole 40 mg daily and to take ASA 81 mg x 4 for 324 mg Thursday & Friday morning. Randy HALEY

## 2023-05-04 DIAGNOSIS — I47.29 PAROXYSMAL VENTRICULAR TACHYCARDIA (H): Primary | ICD-10-CM

## 2023-05-04 DIAGNOSIS — I47.29 NSVT (NONSUSTAINED VENTRICULAR TACHYCARDIA) (H): ICD-10-CM

## 2023-05-04 DIAGNOSIS — R06.09 DOE (DYSPNEA ON EXERTION): ICD-10-CM

## 2023-05-04 RX ORDER — SODIUM CHLORIDE 9 MG/ML
INJECTION, SOLUTION INTRAVENOUS CONTINUOUS
Status: CANCELLED | OUTPATIENT
Start: 2023-05-04 | End: 2023-05-04

## 2023-05-04 RX ORDER — LIDOCAINE 40 MG/G
CREAM TOPICAL
Status: CANCELLED | OUTPATIENT
Start: 2023-05-04

## 2023-05-04 RX ORDER — POTASSIUM CHLORIDE 1500 MG/1
20 TABLET, EXTENDED RELEASE ORAL
Status: CANCELLED | OUTPATIENT
Start: 2023-05-04

## 2023-05-04 RX ORDER — ASPIRIN 325 MG
325 TABLET ORAL ONCE
Status: CANCELLED | OUTPATIENT
Start: 2023-05-04 | End: 2023-05-04

## 2023-05-04 RX ORDER — ASPIRIN 81 MG/1
243 TABLET, CHEWABLE ORAL ONCE
Status: CANCELLED | OUTPATIENT
Start: 2023-05-04

## 2023-05-04 NOTE — TELEPHONE ENCOUNTER
----- Message from Tahira Brooks sent at 4/24/2023  2:49 PM CDT -----  Regarding: L & RHC -- DOS 05/05/23  .Angiogram Orders    Location: [facility] [address], [place] [address] - Main Entrance of the Hospital    Procedure: Left and Right Heart Cath    Diagnosis: Paroxysmal ventricular tachycardia (H)    Procedure Date: 05/05/23    Patient Arrival Time: 7.30 AM    Procedure Time: 2nd case to follow    Ordering Cardiologist: Drea    Performing Cardiologist: Dr. Daugherty    Cardiac Assessment Completed: Yes  Date: 04/24/23  Provider: Garland Houston    Pre-Procedure Labs completed: on admit    Post Procedure MARINO appointment scheduled: Yes  Date: 06/01/23  Provider: Garland Houston    Patient Diabetic on Meds/Insulin:  No  If on Metformin, has 2 day post procedure BMP been scheduled: No  (Thursday and Friday procedures should have Monday BMP)  Patient on Coumadin/Warfarin:  No  Patient on Pradaxa/Xarelto/Eliquis:  Yes  Patient on Invokana/Farxiga/Jardiance/Steglatro:  No    Does Patient have a history of bypass:  No    Pt advised to report any COVID like symptoms to care team prior to procedure.    Appointment was scheduled: Face to Face    Special instructions:  N/A

## 2023-05-04 NOTE — TELEPHONE ENCOUNTER
Coronary angiogram/PCI/Right Heart Cath prep instructions.     Patient is scheduled for a Left and Right Heart Cath at [facility] [address] [place] [address] - Main Entrance of the Hospital on 5/05/23.  Check in time is at 7:30 AM and procedure to follow.    Patient instructed to remain NPO for solid foods 8 hours prior to arrival and may have clear liquids up to 2 hours prior to arrival.    Patient Patient does require extra fluids prior to procedure and has been instructed to arrive at 7:30    Patient is not diabetic.    Patient is on Eliquis (Eliquis, Pradaxa, Xarelto) and has been advised to hold for 2 days prior to procedure starting 5/03/23.  Patient can resume after the procedure.    Patient is taking torsemide and has been instructed to hold it the morning of procedure.     Patient is not currently taking ASA and has been advised to take one 325 mg tablet the day prior and morning of the procedure.    Pt is not on a SGLT2 inhibitor.    Patient advised to take their other daily medications the morning of the procedure with small sips of water.     Verified patient does not have a contrast allergy.    Verified patient has someone available to drive them home from the hospital and can stay with them for 24 hours after the procedure.     Patient advised to notify care team with any new COVID like symptoms prior to procedure.    Patient will check their temperature the morning of procedure and call [facility] at [phone] if temp is >100.0.    Patient is aware of visitor policy.    Patient expresses understanding of above instructions and denies further questions at this time.      Randy HALEY   [facility]

## 2023-05-04 NOTE — TELEPHONE ENCOUNTER
"Received recommendations from Rosemarie Haque NP, \"We can do 75ml/hr for 2 hours prior to her procedure since I am concerned she is still volume overloaded and she will be holding her torsemide this morning of the angiogram.\" Randy HALEY     "

## 2023-05-05 ENCOUNTER — HOSPITAL ENCOUNTER (OUTPATIENT)
Facility: CLINIC | Age: 83
Discharge: HOME | End: 2023-05-05
Admitting: INTERNAL MEDICINE
Payer: COMMERCIAL

## 2023-05-05 VITALS
BODY MASS INDEX: 27.62 KG/M2 | TEMPERATURE: 97.1 F | SYSTOLIC BLOOD PRESSURE: 125 MMHG | HEART RATE: 54 BPM | RESPIRATION RATE: 17 BRPM | OXYGEN SATURATION: 99 % | WEIGHT: 137 LBS | HEIGHT: 59 IN | DIASTOLIC BLOOD PRESSURE: 59 MMHG

## 2023-05-05 DIAGNOSIS — R06.09 DOE (DYSPNEA ON EXERTION): ICD-10-CM

## 2023-05-05 DIAGNOSIS — I48.91 ATRIAL FIBRILLATION, UNSPECIFIED TYPE (H): ICD-10-CM

## 2023-05-05 DIAGNOSIS — I47.29 PAROXYSMAL VENTRICULAR TACHYCARDIA (H): ICD-10-CM

## 2023-05-05 DIAGNOSIS — I10 BENIGN ESSENTIAL HYPERTENSION: Primary | ICD-10-CM

## 2023-05-05 DIAGNOSIS — I47.29 NSVT (NONSUSTAINED VENTRICULAR TACHYCARDIA) (H): ICD-10-CM

## 2023-05-05 PROBLEM — Z98.890 STATUS POST CORONARY ANGIOGRAM: Status: ACTIVE | Noted: 2023-05-05

## 2023-05-05 PROBLEM — I47.20 PAROXYSMAL VENTRICULAR TACHYCARDIA (H): Status: ACTIVE | Noted: 2023-05-05

## 2023-05-05 LAB
ANION GAP SERPL CALCULATED.3IONS-SCNC: 14 MMOL/L (ref 7–15)
APTT PPP: 25 SECONDS (ref 22–38)
BUN SERPL-MCNC: 15 MG/DL (ref 8–23)
CALCIUM SERPL-MCNC: 9.2 MG/DL (ref 8.8–10.2)
CHLORIDE SERPL-SCNC: 99 MMOL/L (ref 98–107)
COHGB MFR BLD: 100 % (ref 92–100)
COHGB MFR BLD: 99 % (ref 92–100)
CREAT SERPL-MCNC: 1.15 MG/DL (ref 0.51–0.95)
DEPRECATED HCO3 PLAS-SCNC: 26 MMOL/L (ref 22–29)
ERYTHROCYTE [DISTWIDTH] IN BLOOD BY AUTOMATED COUNT: 14.4 % (ref 10–15)
GFR SERPL CREATININE-BSD FRML MDRD: 48 ML/MIN/1.73M2
GLUCOSE SERPL-MCNC: 139 MG/DL (ref 70–99)
HCO3 BLDA-SCNC: 29 MMOL/L (ref 21–28)
HCO3 BLDA-SCNC: 29 MMOL/L (ref 21–28)
HCO3 BLDV-SCNC: 29 MMOL/L (ref 21–28)
HCO3 BLDV-SCNC: 30 MMOL/L (ref 21–28)
HCT VFR BLD AUTO: 33 % (ref 35–47)
HGB BLD-MCNC: 10.7 G/DL (ref 11.7–15.7)
INR PPP: 1.02 (ref 0.85–1.15)
LACTATE BLD-SCNC: 0.8 MMOL/L
LACTATE BLD-SCNC: 0.8 MMOL/L
LACTATE BLD-SCNC: 1 MMOL/L
LACTATE BLD-SCNC: 1 MMOL/L
MAGNESIUM SERPL-MCNC: 1.8 MG/DL (ref 1.7–2.3)
MCH RBC QN AUTO: 30.8 PG (ref 26.5–33)
MCHC RBC AUTO-ENTMCNC: 32.4 G/DL (ref 31.5–36.5)
MCV RBC AUTO: 95 FL (ref 78–100)
PCO2 BLDA: 44 MM HG (ref 35–45)
PCO2 BLDA: 44 MM HG (ref 35–45)
PCO2 BLDV: 47 MM HG (ref 40–50)
PCO2 BLDV: 49 MM HG (ref 40–50)
PH BLDA: 7.42 [PH] (ref 7.35–7.45)
PH BLDA: 7.43 [PH] (ref 7.35–7.45)
PH BLDV: 7.39 [PH] (ref 7.32–7.43)
PH BLDV: 7.4 [PH] (ref 7.32–7.43)
PLATELET # BLD AUTO: 194 10E3/UL (ref 150–450)
PO2 BLDA: 158 MM HG (ref 80–105)
PO2 BLDA: 176 MM HG (ref 80–105)
PO2 BLDV: 33 MM HG (ref 25–47)
PO2 BLDV: 38 MM HG (ref 25–47)
POTASSIUM SERPL-SCNC: 4.1 MMOL/L (ref 3.4–5.3)
RBC # BLD AUTO: 3.47 10E6/UL (ref 3.8–5.2)
SAO2 % BLDV: 62 % (ref 94–100)
SAO2 % BLDV: 71 % (ref 94–100)
SODIUM SERPL-SCNC: 139 MMOL/L (ref 136–145)
WBC # BLD AUTO: 8.8 10E3/UL (ref 4–11)

## 2023-05-05 PROCEDURE — 85027 COMPLETE CBC AUTOMATED: CPT | Performed by: INTERNAL MEDICINE

## 2023-05-05 PROCEDURE — 85610 PROTHROMBIN TIME: CPT | Performed by: INTERNAL MEDICINE

## 2023-05-05 PROCEDURE — 93460 R&L HRT ART/VENTRICLE ANGIO: CPT | Mod: 26 | Performed by: INTERNAL MEDICINE

## 2023-05-05 PROCEDURE — 999N000071 HC STATISTIC HEART CATH LAB OR EP LAB

## 2023-05-05 PROCEDURE — 250N000009 HC RX 250: Performed by: INTERNAL MEDICINE

## 2023-05-05 PROCEDURE — 85730 THROMBOPLASTIN TIME PARTIAL: CPT | Performed by: INTERNAL MEDICINE

## 2023-05-05 PROCEDURE — 258N000003 HC RX IP 258 OP 636: Performed by: INTERNAL MEDICINE

## 2023-05-05 PROCEDURE — 36415 COLL VENOUS BLD VENIPUNCTURE: CPT | Performed by: INTERNAL MEDICINE

## 2023-05-05 PROCEDURE — 250N000011 HC RX IP 250 OP 636: Performed by: INTERNAL MEDICINE

## 2023-05-05 PROCEDURE — 99152 MOD SED SAME PHYS/QHP 5/>YRS: CPT | Performed by: INTERNAL MEDICINE

## 2023-05-05 PROCEDURE — 83735 ASSAY OF MAGNESIUM: CPT | Performed by: NURSE PRACTITIONER

## 2023-05-05 PROCEDURE — 83605 ASSAY OF LACTIC ACID: CPT

## 2023-05-05 PROCEDURE — 93463 DRUG ADMIN & HEMODYNMIC MEAS: CPT | Performed by: INTERNAL MEDICINE

## 2023-05-05 PROCEDURE — 999N000184 HC STATISTIC TELEMETRY

## 2023-05-05 PROCEDURE — 80048 BASIC METABOLIC PNL TOTAL CA: CPT | Performed by: INTERNAL MEDICINE

## 2023-05-05 PROCEDURE — 93460 R&L HRT ART/VENTRICLE ANGIO: CPT | Performed by: INTERNAL MEDICINE

## 2023-05-05 PROCEDURE — 272N000001 HC OR GENERAL SUPPLY STERILE: Performed by: INTERNAL MEDICINE

## 2023-05-05 PROCEDURE — 999N000054 HC STATISTIC EKG NON-CHARGEABLE

## 2023-05-05 PROCEDURE — 99153 MOD SED SAME PHYS/QHP EA: CPT | Performed by: INTERNAL MEDICINE

## 2023-05-05 PROCEDURE — 93005 ELECTROCARDIOGRAM TRACING: CPT

## 2023-05-05 PROCEDURE — C1751 CATH, INF, PER/CENT/MIDLINE: HCPCS | Performed by: INTERNAL MEDICINE

## 2023-05-05 PROCEDURE — C1769 GUIDE WIRE: HCPCS | Performed by: INTERNAL MEDICINE

## 2023-05-05 RX ORDER — HYDRALAZINE HYDROCHLORIDE 10 MG/1
10 TABLET, FILM COATED ORAL ONCE
Status: DISCONTINUED | OUTPATIENT
Start: 2023-05-05 | End: 2023-06-20

## 2023-05-05 RX ORDER — SODIUM CHLORIDE 9 MG/ML
INJECTION, SOLUTION INTRAVENOUS CONTINUOUS
Status: DISCONTINUED | OUTPATIENT
Start: 2023-05-05 | End: 2023-05-05 | Stop reason: HOSPADM

## 2023-05-05 RX ORDER — FENTANYL CITRATE 50 UG/ML
25 INJECTION, SOLUTION INTRAMUSCULAR; INTRAVENOUS
Status: DISCONTINUED | OUTPATIENT
Start: 2023-05-05 | End: 2023-05-05 | Stop reason: HOSPADM

## 2023-05-05 RX ORDER — FLUMAZENIL 0.1 MG/ML
0.2 INJECTION, SOLUTION INTRAVENOUS
Status: DISCONTINUED | OUTPATIENT
Start: 2023-05-05 | End: 2023-05-05 | Stop reason: HOSPADM

## 2023-05-05 RX ORDER — ATROPINE SULFATE 0.1 MG/ML
0.5 INJECTION INTRAVENOUS
Status: DISCONTINUED | OUTPATIENT
Start: 2023-05-05 | End: 2023-05-05 | Stop reason: HOSPADM

## 2023-05-05 RX ORDER — HYDRALAZINE HYDROCHLORIDE 10 MG/1
10 TABLET, FILM COATED ORAL 3 TIMES DAILY
Qty: 90 TABLET | Refills: 2 | Status: SHIPPED | OUTPATIENT
Start: 2023-05-05 | End: 2023-07-25

## 2023-05-05 RX ORDER — OXYCODONE HYDROCHLORIDE 5 MG/1
10 TABLET ORAL EVERY 4 HOURS PRN
Status: DISCONTINUED | OUTPATIENT
Start: 2023-05-05 | End: 2023-05-05 | Stop reason: HOSPADM

## 2023-05-05 RX ORDER — IOPAMIDOL 755 MG/ML
INJECTION, SOLUTION INTRAVASCULAR
Status: DISCONTINUED | OUTPATIENT
Start: 2023-05-05 | End: 2023-05-05 | Stop reason: HOSPADM

## 2023-05-05 RX ORDER — HYDRALAZINE HYDROCHLORIDE 10 MG/1
10 TABLET, FILM COATED ORAL EVERY 8 HOURS SCHEDULED
Status: DISCONTINUED | OUTPATIENT
Start: 2023-05-05 | End: 2023-05-05 | Stop reason: HOSPADM

## 2023-05-05 RX ORDER — NALOXONE HYDROCHLORIDE 0.4 MG/ML
0.4 INJECTION, SOLUTION INTRAMUSCULAR; INTRAVENOUS; SUBCUTANEOUS
Status: DISCONTINUED | OUTPATIENT
Start: 2023-05-05 | End: 2023-05-05 | Stop reason: HOSPADM

## 2023-05-05 RX ORDER — NALOXONE HYDROCHLORIDE 0.4 MG/ML
0.2 INJECTION, SOLUTION INTRAMUSCULAR; INTRAVENOUS; SUBCUTANEOUS
Status: DISCONTINUED | OUTPATIENT
Start: 2023-05-05 | End: 2023-05-05 | Stop reason: HOSPADM

## 2023-05-05 RX ORDER — POTASSIUM CHLORIDE 1500 MG/1
20 TABLET, EXTENDED RELEASE ORAL
Status: DISCONTINUED | OUTPATIENT
Start: 2023-05-05 | End: 2023-05-05 | Stop reason: HOSPADM

## 2023-05-05 RX ORDER — LIDOCAINE 40 MG/G
CREAM TOPICAL
Status: DISCONTINUED | OUTPATIENT
Start: 2023-05-05 | End: 2023-05-05 | Stop reason: HOSPADM

## 2023-05-05 RX ORDER — OXYCODONE HYDROCHLORIDE 5 MG/1
5 TABLET ORAL EVERY 4 HOURS PRN
Status: DISCONTINUED | OUTPATIENT
Start: 2023-05-05 | End: 2023-05-05 | Stop reason: HOSPADM

## 2023-05-05 RX ORDER — SODIUM CHLORIDE 9 MG/ML
INJECTION, SOLUTION INTRAVENOUS CONTINUOUS
Status: ACTIVE | OUTPATIENT
Start: 2023-05-05 | End: 2023-05-05

## 2023-05-05 RX ORDER — ASPIRIN 325 MG
325 TABLET ORAL ONCE
Status: COMPLETED | OUTPATIENT
Start: 2023-05-05 | End: 2023-05-05

## 2023-05-05 RX ORDER — FENTANYL CITRATE 50 UG/ML
INJECTION, SOLUTION INTRAMUSCULAR; INTRAVENOUS
Status: DISCONTINUED | OUTPATIENT
Start: 2023-05-05 | End: 2023-05-05 | Stop reason: HOSPADM

## 2023-05-05 RX ORDER — ASPIRIN 81 MG/1
243 TABLET, CHEWABLE ORAL ONCE
Status: COMPLETED | OUTPATIENT
Start: 2023-05-05 | End: 2023-05-05

## 2023-05-05 RX ORDER — ACETAMINOPHEN 325 MG/1
650 TABLET ORAL EVERY 4 HOURS PRN
Status: DISCONTINUED | OUTPATIENT
Start: 2023-05-05 | End: 2023-05-05 | Stop reason: HOSPADM

## 2023-05-05 RX ADMIN — SODIUM CHLORIDE: 9 INJECTION, SOLUTION INTRAVENOUS at 07:47

## 2023-05-05 ASSESSMENT — ACTIVITIES OF DAILY LIVING (ADL)
ADLS_ACUITY_SCORE: 35

## 2023-05-05 NOTE — TELEPHONE ENCOUNTER
Attempted to call patient with recommendations, left message for patient to call back.  Called patient's family, Ari with recommendations from Rosemarie Haque NP to start hydralazine 10 mg 3 times daily, to monitor for dizziness and to keep a record of blood pressures twice daily starting an hour after meds and to bring this blood pressure log with her at her follow-up visit 6/01/23. Ari advised to stop aspirin and resume Eliquis tonight.  Ari verbalized understanding.  JONG Miramontes RN

## 2023-05-05 NOTE — PRE-PROCEDURE
GENERAL PRE-PROCEDURE:   Procedure:  Heart catheteriztation possible intervention  Date/Time:  5/5/2023 7:54 AM    Written consent obtained?: Yes    Risks and benefits: Risks, benefits and alternatives were discussed    Consent given by:  Patient  Patient states understanding of procedure being performed: Yes    Patient's understanding of procedure matches consent: Yes    Procedure consent matches procedure scheduled: Yes    Expected level of sedation:  Moderate  Appropriately NPO:  Yes  ASA Class:  2  Lungs:  Lungs clear with good breath sounds bilaterally  Heart:  Normal heart sounds and rate  History & Physical reviewed:  History and physical reviewed and no updates needed  Statement of review:  I have reviewed the lab findings, diagnostic data, medications, and the plan for sedation  risk benefit indication for cath poss intervention  (r/l cath) discussed with pt  Discussed possible dapt in light of noac  Discussed creat and dye dose and possible staging if needed  All questions answered and she wishes to proceed  Awaiting lab results

## 2023-05-05 NOTE — TELEPHONE ENCOUNTER
Received recommendations from Rosemarie Haque NP:   Heidi Haque NP Bartelt, Lynette E, RN  Left heart catheterization showing trivial CAD.   Right heart catheterization showing elevated pressures consistent with moderate pulmonary hypertension.   Recommend starting hydralazine 10mg TID.   Monitor for dizziness. Check home blood pressures and bring a log with to her follow up visit.   Reviewed her labs from today and her renal function has improved.   She can resume her eliquis tonight.   She can stop aspirin 81mg daily given trivial CAD and bleeding risk of being on both aspirin and eliquis.

## 2023-05-05 NOTE — PROGRESS NOTES
Care Suites Discharge Nursing Note    Patient Information  Name: Janessa Melendez  Age: 82 year old    Discharge Education:  Discharge instructions reviewed: Yes  Additional education/resources provided:   Patient/patient representative verbalizes understanding: Yes  Patient discharging on new medications: No  Medication education completed: Yes    Discharge Plans:   Discharge location: home  Discharge ride contacted: Yes  Approximate discharge time: 1250    Discharge Criteria:  Discharge criteria met and vital signs stable: Yes    Patient Belongs:  Patient belongings returned to patient: Yes    Belinda Perkins RN

## 2023-05-05 NOTE — DISCHARGE INSTRUCTIONS
Cardiac Angiogram Discharge Instructions - Femoral    After you go home:    Have an adult stay with you until tomorrow.  Drink extra fluids for 2 days.  You may resume your normal diet.  No smoking       For 24 hours - due to the sedation you received:  Relax and take it easy.  Do NOT make any important or legal decisions.  Do NOT drive or operate machines at home or at work.  Do NOT drink alcohol.    Care of Groin Puncture Site:    For the first 24 hrs - check the puncture site every 1-2 hours while awake.  For 2 days, when you cough, sneeze, laugh or move your bowels, hold your hand over the puncture site and press firmly.  Remove the bandaid after 24 hours. If there is minor oozing, apply another bandaid and remove it after 12 hours.  It is normal to have a small bruise or pea size lump at the site.  You may shower tomorrow. Do NOT take a bath, or use a hot tub or pool for at least 3 days. Do NOT scrub the site. Do not use lotion or powder near the puncture site.    Activity:            For 2 days:  No stooping or squatting  Do NOT do any heavy activity such as exercise, lifting, or straining.   No housework, yard work or any activity that make you sweat  Do NOT lift more than 10 pounds    Bleeding:    If you start bleeding from the site in your groin, lie down flat and press firmly on/above the site for 10 minutes.   Once bleeding stops, lay flat for 2 hours.   Call [facility] as soon as you can.       Call 911 right away if you have heavy bleeding or bleeding that does not stop.      Medicines:    If you are taking an antiplatelet medication such as Plavix, Brilinta or Effient, do not stop taking it until you talk to your cardiologist.    If you are on Metformin (Glucophage), do not restart it until you have blood tests (within 2 to 3 days after discharge).  After you have your blood drawn, you may restart the Metformin.   Take your medications, including blood thinners, unless your provider tells you not to.     If you take Coumadin (Warfarin), have your INR checked by your provider in  3-5 days. Call your clinic to schedule this.  If you have stopped any medicines, check with your provider about when to restart them.    Follow Up Appointments:    Follow up with [facility] Nurse Practitioner at [facility] of patient preference in 7-10 days.    Call the clinic if:    You have increased pain or a large or growing hard lump around the site.  The site is red, swollen, hot or tender.  Blood or fluid is draining from the site.  You have chills or a fever greater than 101 F (38 C).  Your leg feels numb, cool or changes color.  You have hives, a rash or unusual itching.  New pain in the back or belly that you cannot control with Tylenol.  Any questions or concerns.          [facility] [place]:    [phone] [facility] (7 days a week)     Right Heart Cath Discharge Instructions - Femoral    After you go home:    Have an adult stay with you until tomorrow.  Drink extra fluids for 2 days.  You may resume your normal diet.  No smoking       For 24 hours - due to the sedation you received:  Relax and take it easy.  Do NOT make any important or legal decisions.  Do NOT drive or operate machines at home or at work.  Do NOT drink alcohol.    Care of Groin Puncture Site:    For the first 24 hrs - check the puncture site every 1-2 hours while awake.  For 2 days, when you cough, sneeze, laugh or move your bowels, hold your hand over the puncture site and press firmly.  Remove the bandaid after 24 hours. If there is minor oozing, apply another bandaid and remove it after 12 hours.  It is normal to have a small bruise or pea size lump at the site.  You may shower. Do NOT take a bath, or use a hot tub or pool for at least 3 days. Do NOT scrub the site. Do not use lotion or powder near the puncture site.    Activity:            For 2 days:  No stooping or squatting  Do NOT do any heavy activity such as  exercise, lifting, or straining.   No housework, yard work or any activity that make you sweat  Do NOT lift more than 10 pounds    Bleeding:    If you start bleeding from the site in your groin, lie down flat and press firmly on/above the site for 10 minutes.   Once bleeding stops, lay flat for 2 hours.   Call [facility] as soon as you can.       Call 911 right away if you have heavy bleeding or bleeding that does not stop.      Medicines:    If you are taking an antiplatelet medication such as Plavix, Brilinta or Effient, do not stop taking it until you talk to your cardiologist.      If you are on Metformin (Glucophage), do not restart it until you have blood tests (within 2 to 3 days after discharge).  After you have your blood drawn, you may restart the Metformin.   Take your medications, including blood thinners, unless your provider tells you not to.    If you take Coumadin (Warfarin), have your INR checked by your provider in  3-5 days. Call your clinic to schedule this.  If you have stopped any medicines, check with your provider about when to restart them.    Follow Up Appointments:    Follow up with [facility] Nurse Practitioner at [facility] of patient preference in 7-10 days.    Call the clinic if:    You have increased pain or a large or growing hard lump around the site.  The site is red, swollen, hot or tender.  Blood or fluid is draining from the site.  You have chills or a fever greater than 101 F (38 C).  Your leg feels numb, cool or changes color.  You have hives, a rash or unusual itching.  New pain in the back or belly that you cannot control with Tylenol.  Any questions or concerns.          [facility] [place]:    [phone] [facility] (7 days a week)

## 2023-05-09 LAB
ATRIAL RATE - MUSE: 326 BPM
DIASTOLIC BLOOD PRESSURE - MUSE: NORMAL MMHG
INTERPRETATION ECG - MUSE: NORMAL
P AXIS - MUSE: NORMAL DEGREES
PR INTERVAL - MUSE: NORMAL MS
QRS DURATION - MUSE: 110 MS
QT - MUSE: 422 MS
QTC - MUSE: 369 MS
R AXIS - MUSE: -40 DEGREES
SYSTOLIC BLOOD PRESSURE - MUSE: NORMAL MMHG
T AXIS - MUSE: 26 DEGREES
VENTRICULAR RATE- MUSE: 46 BPM

## 2023-05-16 ENCOUNTER — TELEPHONE (OUTPATIENT)
Dept: CARDIOLOGY | Facility: CLINIC | Age: 83
End: 2023-05-16
Payer: COMMERCIAL

## 2023-05-16 NOTE — TELEPHONE ENCOUNTER
Patient called to confirm her dosing of hydralazine. RN confirmed for patient she should be taking 10mg TID. Patient verbalized understanding and had no further questions at this time. Patient will call us with any further questions/concerns prior to her f/u apt with MARINO Rosemarie on 6/1/23.

## 2023-05-26 DIAGNOSIS — E83.42 HYPOMAGNESEMIA: ICD-10-CM

## 2023-05-26 DIAGNOSIS — I47.29 NSVT (NONSUSTAINED VENTRICULAR TACHYCARDIA) (H): ICD-10-CM

## 2023-06-01 ENCOUNTER — OFFICE VISIT (OUTPATIENT)
Dept: CARDIOLOGY | Facility: CLINIC | Age: 83
End: 2023-06-01
Attending: NURSE PRACTITIONER
Payer: COMMERCIAL

## 2023-06-01 ENCOUNTER — TELEPHONE (OUTPATIENT)
Dept: CARDIOLOGY | Facility: CLINIC | Age: 83
End: 2023-06-01

## 2023-06-01 ENCOUNTER — LAB (OUTPATIENT)
Dept: LAB | Facility: CLINIC | Age: 83
End: 2023-06-01
Payer: COMMERCIAL

## 2023-06-01 VITALS
WEIGHT: 134 LBS | HEIGHT: 59 IN | BODY MASS INDEX: 27.01 KG/M2 | SYSTOLIC BLOOD PRESSURE: 168 MMHG | DIASTOLIC BLOOD PRESSURE: 65 MMHG | HEART RATE: 56 BPM

## 2023-06-01 DIAGNOSIS — I10 BENIGN ESSENTIAL HYPERTENSION: Primary | ICD-10-CM

## 2023-06-01 DIAGNOSIS — I47.29 NSVT (NONSUSTAINED VENTRICULAR TACHYCARDIA) (H): ICD-10-CM

## 2023-06-01 DIAGNOSIS — I48.91 ATRIAL FIBRILLATION, UNSPECIFIED TYPE (H): ICD-10-CM

## 2023-06-01 DIAGNOSIS — E83.42 HYPOMAGNESEMIA: ICD-10-CM

## 2023-06-01 DIAGNOSIS — I10 BENIGN ESSENTIAL HYPERTENSION: ICD-10-CM

## 2023-06-01 DIAGNOSIS — E78.00 PURE HYPERCHOLESTEROLEMIA: ICD-10-CM

## 2023-06-01 DIAGNOSIS — I42.9 CARDIOMYOPATHY, UNSPECIFIED TYPE (H): ICD-10-CM

## 2023-06-01 DIAGNOSIS — I42.0 DILATED CARDIOMYOPATHY (H): ICD-10-CM

## 2023-06-01 LAB
ANION GAP SERPL CALCULATED.3IONS-SCNC: 11 MMOL/L (ref 7–15)
BUN SERPL-MCNC: 19.5 MG/DL (ref 8–23)
CALCIUM SERPL-MCNC: 9.9 MG/DL (ref 8.8–10.2)
CHLORIDE SERPL-SCNC: 99 MMOL/L (ref 98–107)
CREAT SERPL-MCNC: 1.21 MG/DL (ref 0.51–0.95)
DEPRECATED HCO3 PLAS-SCNC: 32 MMOL/L (ref 22–29)
DIGOXIN SERPL-MCNC: 1.2 NG/ML (ref 0.6–2)
GFR SERPL CREATININE-BSD FRML MDRD: 45 ML/MIN/1.73M2
GLUCOSE SERPL-MCNC: 131 MG/DL (ref 70–99)
MAGNESIUM SERPL-MCNC: 2.3 MG/DL (ref 1.7–2.3)
POTASSIUM SERPL-SCNC: 3.6 MMOL/L (ref 3.4–5.3)
SODIUM SERPL-SCNC: 142 MMOL/L (ref 136–145)

## 2023-06-01 PROCEDURE — 36415 COLL VENOUS BLD VENIPUNCTURE: CPT | Performed by: NURSE PRACTITIONER

## 2023-06-01 PROCEDURE — 80162 ASSAY OF DIGOXIN TOTAL: CPT | Performed by: NURSE PRACTITIONER

## 2023-06-01 PROCEDURE — 80048 BASIC METABOLIC PNL TOTAL CA: CPT | Performed by: NURSE PRACTITIONER

## 2023-06-01 PROCEDURE — 99214 OFFICE O/P EST MOD 30 MIN: CPT | Performed by: NURSE PRACTITIONER

## 2023-06-01 PROCEDURE — 83735 ASSAY OF MAGNESIUM: CPT | Performed by: NURSE PRACTITIONER

## 2023-06-01 RX ORDER — TORSEMIDE 10 MG/1
30 TABLET ORAL DAILY
Qty: 270 TABLET | Refills: 2 | Status: SHIPPED | OUTPATIENT
Start: 2023-06-01 | End: 2024-02-26

## 2023-06-01 RX ORDER — SIMVASTATIN 10 MG
10 TABLET ORAL AT BEDTIME
Qty: 90 TABLET | Refills: 3 | Status: SHIPPED | OUTPATIENT
Start: 2023-06-01 | End: 2024-07-25

## 2023-06-01 RX ORDER — TORSEMIDE 10 MG/1
40 TABLET ORAL DAILY
Qty: 90 TABLET | Refills: 3 | Status: CANCELLED | OUTPATIENT
Start: 2023-06-01

## 2023-06-01 NOTE — TELEPHONE ENCOUNTER
----- Message from Heidi Haque NP sent at 6/1/2023  3:39 PM CDT -----  Magnesium level high end of normal. Lets reduce magnesium to 250mg daily.   BMP stable. She should continue current dose of torsemide.   Dig level normal.

## 2023-06-01 NOTE — PATIENT INSTRUCTIONS
Today's Recommendations    I would like you to wear compression stockings during the day  Reduce your torsemide back to 30mg (3 tabs of the 10mg tablets) daily   Your coronary angiogram didn't show you any blocked heart arteries  I will check on the cost of the medication (entresto) that we discussed  I would like you to have labs today   Please follow up with our [facility] clinic in 2-4 weeks.    Please send a [employer] message or call [phone] to the RN team with questions or concerns.     Scheduling number [phone]  KATIE Jacobo, CNP

## 2023-06-01 NOTE — LETTER
6/1/2023    [facility]  [address] [place] [id number]    RE: Janessa Melendez       Dear Colleague,     I had the pleasure of seeing Janessa Melendez in the [facility].  Cardiology Clinic Progress Note  Janessa Melendez MRN# [id number]   YOB: 1940 Age: 82 year old   Primary Cardiologist: Dr. Smith  Reason for visit: 1 month follow up             Assessment and Plan:     1.  Idiopathic cardiomyopathy       HFrEF  -April 2022 showed EF at 40%, moderate global hypokinesia of the left ventricle with no valvular disease.  -No change in edema or shortness of breath with increased dose of torsemide, Will reduce back to 30mg daily   -Assessed volume status with right heart catheterization which showed moderate pulmonary hypertension and moderately elevated right and left sided filling pressures, hydralazine 10mg TID started   -Recommend compression stockings during the day  -Patient symptoms and hypertensive control would benefit from change to entresto given lack of improvement in symptoms with diuretic adjustment, rx coverage request sent    2. 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  Repeat Holter monitor showed average heart rate of 56.    -No further episodes of near syncope since reduction in carvedilol, but she continues to feel fatigued following her morning medications    3.  Mild renal insufficiency  -Baseline creatinine ~1.04-1.2  -Avoid nephrotoxins  -Repeat BMP today     4.  Trivial coronary artery disease      Ventricular tachycardia  -38 beat run of VT on holter monitor as well as a significant amount of pauses, longest 2.66 seconds.  Episodes did  not correlate to monitor trigger/symptoms  -4/2023 coronary angiogram showed trivial coronary artery disease, aspirin was discontinued due to increased bleeding risk    5.  Whitecoat hypertension  -3/2023 24-hour ambulatory blood pressure monitor normal to low blood pressure  -Home readings reviewed and stable with systolic readings in 120-130 range primarily     6. Hypomagnesemia   -Resolved with supplementation 1.2->1.8  -Will repeat level today    7.  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     November 2022 patient presented to the emergency room after experiencing a sudden onset of left-sided facial droop, left arm and leg weakness as well as slurred speech.    Patient was seen by Dr. Albino Connors in February 2023 following her hospitalization. Digoxin was initiated for rate control.  A subsequent 24-hour blood pressure monitor revealed normal to low blood pressure consistent with whitecoat hypertension.     She was hospitalized with bronchitis and acute on chronic heart failure 3/8/2023. Her NT pro BNP was 5832 on admission and she was treated with IV diuresis with furosemide, also prednisone and antibiotics for her bronchitis.     She had an episode of dizziness and lightheadedness to the point of incoherence and was seen in the ER on 4/4/2023.  EKG showed atrial fibrillation with slow ventricular response.  Brain MRI was negative for acute intracranial process.  Her symptoms improved after a 1 L saline bolus.  She saw her primary care provider for/7/23,  who requested cardiology reassess her blood pressure medications prior to her appointment with us.  I reduced her carvedilol to 25mg twice daily.      Patient had a repeat Holter monitor after carvedilol was reduced secondary to symptoms of dizziness.  This revealed a 34 beat run of ventricular tachycardia.  There were 1351 pauses greater than 2 seconds, the longest 2.66 seconds.  Overall 3% PVC burden, average heart rate 56, 14% in bradycardia, 14% tachycardia, 100% atrial fibrillation.    When we met in late April, she was having significant shortness of breath on exertion with gradual weight gain. I increased her torsemide to 40mg daily and repeated labs which showed sodium 143, potassium 4.5, BUN 18, creatinine 1.27, GFR 42, magnesium 1.2, TSH 0.90.     Given her episode of ventricular tachycardia as well as new symptoms, she underwent right and left heart catheterization on 5/5/23. This revealed minimal coronary disease with 15% proximal LAD lesion and 10% proximal circumflex lesion.  Right heart catheterization showed moderate pulmonary hypertension with severe systemic hypertension and moderately elevated right and left filling pressures. Normal cardiac output.     Repeat BMP done the day of her angiogram showed sodium 139 potassium 4.1, BUN 15, creatinine 1.1, GFR 48, magnesium 1.8, hemoglobin 10.7.     Patient is here today for follow up after her angiogram. Patient's daughter is present for the visit today. She has significant shortness of breath on exertion and moderate lower extremity edema, unchanged since the previous visit. Denies orthopnea or PND. She does not feel she is voiding as much as previously after she takes her torsemide.     She gets dizzy following taking her morning medications. She has also had a more sensitive stomach lately.     Patient denies chest pain or chest tightness. Denies tachycardia or palpitations.     Denies bleeding issues on eliquis.     Blood pressure 165/85 and HR 56  "in clinic today.            Recent Hospitalizations   April 2023 ER Visit for dizziness/lightheadedness           Social History      Social History     Socioeconomic History    Marital status: Single     Spouse name: Not on file    Number of children: Not on file    Years of education: Not on file    Highest education level: Not on file   Occupational History    Not on file   Tobacco Use    Smoking status: Never    Smokeless tobacco: Never   Vaping Use    Vaping status: Not on file   Substance and Sexual Activity    Alcohol use: Yes     Comment: socially    Drug use: No    Sexual activity: Never   Other Topics Concern    Parent/sibling w/ CABG, MI or angioplasty before 65F 55M? Not Asked     Service Not Asked    Blood Transfusions Not Asked    Caffeine Concern Not Asked    Occupational Exposure Not Asked    Hobby Hazards Not Asked    Sleep Concern Not Asked    Stress Concern Not Asked    Weight Concern Not Asked    Special Diet No    Back Care Not Asked    Exercise No    Bike Helmet Not Asked    Seat Belt Not Asked    Self-Exams Not Asked   Social History Narrative    Not on file     Social Determinants of Health     Financial Resource Strain: Not on file   Food Insecurity: Not on file   Transportation Needs: Not on file   Physical Activity: Not on file   Stress: Not on file   Social Connections: Not on file   Intimate Partner Violence: Not on file   Housing Stability: Not on file            Review of Systems:   Skin:        Eyes:       ENT:       Respiratory:  Positive for dyspnea on exertion  Cardiovascular:  palpitations;Negative;chest pain;lightheadedness;dizziness;syncope or near-syncope fatigue;exercise intolerance;Positive for  Gastroenterology:      Genitourinary:       Musculoskeletal:       Neurologic:       Psychiatric:       Heme/Lymph/Imm:       Endocrine:              Physical Exam:   Vitals: BP (!) 168/65   Pulse 56   Ht 1.499 m (4' 11\")   Wt 60.8 kg (134 lb)   BMI 27.06 kg/m     Wt " Readings from Last 4 Encounters:   06/01/23 60.8 kg (134 lb)   05/05/23 62.1 kg (137 lb)   04/24/23 62.4 kg (137 lb 9.6 oz)   03/09/23 60.2 kg (132 lb 12.8 oz)     GEN: well nourished, in no acute distress.  HEENT:  Pupils equal, round. Sclerae nonicteric.   NECK: Supple, no masses appreciated. No JVD with patient supine  C/V:  Irregularly irregular rate and rhythm, no murmur, rub or gallop.    RESP: Respirations are unlabored. Clear to auscultation bilaterally without wheezing, rales, or rhonchi.  GI: Abdomen soft, nontender.  EXTREM: 1-2+ bilateral LE edema.  NEURO: Alert and oriented, cooperative.  SKIN: Warm and dry.        Data:     LIPID RESULTS:  Lab Results   Component Value Date    CHOL 118 02/20/2023    CHOL 133 05/06/2021    HDL 50 02/20/2023    HDL 59 05/06/2021    LDL 47 02/20/2023    LDL 54 05/06/2021    TRIG 105 02/20/2023    TRIG 101 05/06/2021    CHOLHDLRATIO 2.3 10/08/2015     LIVER ENZYME RESULTS:  Lab Results   Component Value Date    AST 18 04/04/2023    AST 14 05/31/2021    ALT 23 04/04/2023    ALT 26 05/31/2021     CBC RESULTS:  Lab Results   Component Value Date    WBC 8.8 05/05/2023    WBC 8.0 05/31/2021    RBC 3.47 (L) 05/05/2023    RBC 3.92 05/31/2021    HGB 10.7 (L) 05/05/2023    HGB 11.9 05/31/2021    HCT 33.0 (L) 05/05/2023    HCT 35.7 05/31/2021    MCV 95 05/05/2023    MCV 91 05/31/2021    MCH 30.8 05/05/2023    MCH 30.4 05/31/2021    MCHC 32.4 05/05/2023    MCHC 33.3 05/31/2021    RDW 14.4 05/05/2023    RDW 14.0 05/31/2021     05/05/2023     05/31/2021     BMP RESULTS:  Lab Results   Component Value Date     06/01/2023     05/31/2021    POTASSIUM 3.6 06/01/2023    POTASSIUM 3.6 11/06/2022    POTASSIUM 3.3 (L) 05/31/2021    CHLORIDE 99 06/01/2023    CHLORIDE 102 11/06/2022    CHLORIDE 99 05/31/2021    CO2 32 (H) 06/01/2023    CO2 27 11/06/2022    CO2 31 05/31/2021    ANIONGAP 11 06/01/2023    ANIONGAP 6 11/06/2022    ANIONGAP 7 05/31/2021     (H)  06/01/2023     (H) 11/07/2022     (H) 11/06/2022     (H) 05/31/2021    BUN 19.5 06/01/2023    BUN 14 11/06/2022    BUN 14 05/31/2021    CR 1.21 (H) 06/01/2023    CR 0.73 05/31/2021    GFRESTIMATED 45 (L) 06/01/2023    GFRESTIMATED 78 05/31/2021    GFRESTBLACK >90 05/31/2021    PENNY 9.9 06/01/2023    PENNY 9.0 05/31/2021      A1C RESULTS:  Lab Results   Component Value Date    A1C 5.7 (H) 11/04/2022     INR RESULTS:  Lab Results   Component Value Date    INR 1.02 05/05/2023    INR 1.11 11/05/2022    INR 0.97 06/05/2009            Medications     Current Outpatient Medications   Medication Sig Dispense Refill    Acetaminophen (TYLENOL PO) Take 500 mg by mouth every 6 hours as needed for mild pain or fever       apixaban ANTICOAGULANT (ELIQUIS) 5 MG tablet Take 1 tablet (5 mg) by mouth 2 times daily Restart eliquis 5/6/23 PM dose if no bleeding 180 tablet 3    carvedilol (COREG) 25 MG tablet Take 1 tablet (25 mg) by mouth 2 times daily 180 tablet 3    cholecalciferol (VITAMIN D3) 25 mcg (1000 units) capsule Take 1 capsule by mouth daily      digoxin (LANOXIN) 125 MCG tablet Take 1 tablet (125 mcg) by mouth daily 90 tablet 3    hydrALAZINE (APRESOLINE) 10 MG tablet Take 1 tablet (10 mg) by mouth 3 times daily 90 tablet 2    ipratropium - albuterol 0.5 mg/2.5 mg/3 mL (DUONEB) 0.5-2.5 (3) MG/3ML neb solution Take 1 vial (3 mLs) by nebulization every 4 hours as needed for shortness of breath, wheezing or cough 90 mL 0    irbesartan (AVAPRO) 300 MG tablet Take 1 tablet (300 mg) by mouth daily 90 tablet 3    Magnesium Oxide 250 MG TABS Take 1 tablet (250 mg) by mouth 2 times daily 60 tablet 1    methimazole (TAPAZOLE) 5 MG tablet Take 5 mg by mouth twice a week      omeprazole (PRILOSEC) 20 MG DR capsule Take 20 mg by mouth daily      potassium chloride ER (K-TAB/KLOR-CON) 10 MEQ CR tablet Take 1 tablet (10 mEq) by mouth 2 times daily And as needed as directed. 180 tablet 3    simvastatin (ZOCOR) 10 MG  tablet Take 1 tablet (10 mg) by mouth At Bedtime 90 tablet 3    terazosin (HYTRIN) 5 MG capsule Take 1 capsule (5 mg) by mouth At Bedtime 90 capsule 3    torsemide (DEMADEX) 10 MG tablet Take 3 tablets (30 mg) by mouth daily 270 tablet 2    amoxicillin (AMOXIL) 500 MG capsule TAKE 4 CAPSULES BY MOUTH 1 HOUR BEFORE DENTAL APPOINTMENT (Patient not taking: Reported on 6/1/2023)      benzonatate (TESSALON) 100 MG capsule Take 1 capsule (100 mg) by mouth 3 times daily as needed for cough (Patient not taking: Reported on 6/1/2023) 30 capsule 0    guaiFENesin-dextromethorphan (ROBITUSSIN DM) 100-10 MG/5ML syrup Take 10 mLs by mouth every 4 hours as needed for cough (Patient not taking: Reported on 6/1/2023) 118 mL 0          Past Medical History     Past Medical History:   Diagnosis Date    Arthritis     Breast cancer (H)     Cardiomyopathy (H)     ideopathic    Coronary artery disease 9/25/2014    Hypercholesterolemia     Hypertension     Hypokalemia     Malignant neoplasm (H)     Shortness of breath     Shoulder pain      Past Surgical History:   Procedure Laterality Date    BACK SURGERY      CARDIAC SURGERY      angiogram neg many yrs ago    CHOLECYSTECTOMY      CV CORONARY ANGIOGRAM N/A 5/5/2023    Procedure: Coronary Angiogram;  Surgeon: Kane Daugherty MD;  Location: [facility] CARDIAC CATH LAB    CV LEFT HEART CATH N/A 5/5/2023    Procedure: Left Heart Catheterization;  Surgeon: Kane Daugherty MD;  Location: [facility] CARDIAC CATH LAB     RIGHT HEART CATH MEASUREMENTS RECORDED N/A 5/5/2023    Procedure: Right Heart Catheterization;  Surgeon: Kane Daugherty MD;  Location: [facility] CARDIAC CATH LAB    CV RIGHT HEART EXERCISE STRESS STUDY N/A 5/5/2023    Procedure: Stress Drug Study;  Surgeon: Kane Daugherty MD;  Location: [facility] CARDIAC CATH LAB    ESOPHAGOSCOPY, GASTROSCOPY, DUODENOSCOPY (EGD), COMBINED N/A 6/22/2021    Procedure: ESOPHAGOGASTRODUODENOSCOPY, WITH ENDOSCOPIC US WITH FINE NEEDLE  ASPIRATION;  Surgeon: Ventura Mcgill MD;  Location:  GI    EYE SURGERY      ORTHOPEDIC SURGERY      lt shoulder replaced, total    partial masectomy       Family History   Problem Relation Age of Onset    Other Cancer Mother     Other Cancer Brother     No Known Problems Son     Heart Failure Daughter     Family History Negative No family hx of             Allergies   Amlodipine, Aspirin, and Codeine sulfate        Heidi Haque NP  [facility]  Pager: [phone]        Thank you for allowing me to participate in the care of your patient.      Sincerely,     Heidi Haque NP     [facility]  cc:   Heidi Haque NP  [address] [place] [address]

## 2023-06-01 NOTE — PROGRESS NOTES
Cardiology Clinic Progress Note  Janessa Melendez MRN# [id number]   YOB: 1940 Age: 82 year old   Primary Cardiologist: Dr. Smith  Reason for visit: 1 month follow up             Assessment and Plan:     1.  Idiopathic cardiomyopathy       HFrEF  -April 2022 showed EF at 40%, moderate global hypokinesia of the left ventricle with no valvular disease.  -No change in edema or shortness of breath with increased dose of torsemide, Will reduce back to 30mg daily   -Assessed volume status with right heart catheterization which showed moderate pulmonary hypertension and moderately elevated right and left sided filling pressures, hydralazine 10mg TID started   -Recommend compression stockings during the day  -Patient symptoms and hypertensive control would benefit from change to entresto given lack of improvement in symptoms with diuretic adjustment, rx coverage request sent    2. 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  Repeat Holter monitor showed average heart rate of 56.    -No further episodes of near syncope since reduction in carvedilol, but she continues to feel fatigued following her morning medications    3.  Mild renal insufficiency  -Baseline creatinine ~1.04-1.2  -Avoid nephrotoxins  -Repeat BMP today     4.  Trivial coronary artery disease      Ventricular tachycardia  -38 beat run of VT on holter monitor as well as a significant amount of pauses, longest 2.66 seconds.  Episodes did not correlate to monitor trigger/symptoms  -4/2023 coronary angiogram showed trivial coronary artery disease, aspirin was discontinued due to increased bleeding risk    5.  Whitecoat hypertension  -3/2023 24-hour ambulatory blood  pressure monitor normal to low blood pressure  -Home readings reviewed and stable with systolic readings in 120-130 range primarily     6. Hypomagnesemia   -Resolved with supplementation 1.2->1.8  -Will repeat level today    7.  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     November 2022 patient presented to the emergency room after experiencing a sudden onset of left-sided facial droop, left arm and leg weakness as well as slurred speech.    Patient was seen by Dr. Albino Connors in February 2023 following her hospitalization. Digoxin was initiated for rate control.  A subsequent 24-hour blood pressure monitor revealed normal to low blood pressure consistent with whitecoat hypertension.     She was hospitalized with bronchitis and acute on chronic heart failure 3/8/2023. Her NT pro BNP was 5832 on admission and she was treated with IV diuresis with furosemide, also prednisone and antibiotics for her bronchitis.     She had an episode of dizziness and lightheadedness to the point of incoherence and was seen in the ER on 4/4/2023.  EKG showed atrial fibrillation with slow ventricular response.  Brain MRI was negative for acute intracranial process.  Her symptoms improved after a 1 L saline bolus.  She saw her primary care provider for/7/23, who requested cardiology reassess her blood pressure medications prior to her appointment with us.  I reduced her carvedilol to 25mg twice daily.      Patient had a repeat Holter monitor after carvedilol was reduced secondary to  symptoms of dizziness.  This revealed a 34 beat run of ventricular tachycardia.  There were 1351 pauses greater than 2 seconds, the longest 2.66 seconds.  Overall 3% PVC burden, average heart rate 56, 14% in bradycardia, 14% tachycardia, 100% atrial fibrillation.    When we met in late April, she was having significant shortness of breath on exertion with gradual weight gain. I increased her torsemide to 40mg daily and repeated labs which showed sodium 143, potassium 4.5, BUN 18, creatinine 1.27, GFR 42, magnesium 1.2, TSH 0.90.     Given her episode of ventricular tachycardia as well as new symptoms, she underwent right and left heart catheterization on 5/5/23. This revealed minimal coronary disease with 15% proximal LAD lesion and 10% proximal circumflex lesion.  Right heart catheterization showed moderate pulmonary hypertension with severe systemic hypertension and moderately elevated right and left filling pressures. Normal cardiac output.     Repeat BMP done the day of her angiogram showed sodium 139 potassium 4.1, BUN 15, creatinine 1.1, GFR 48, magnesium 1.8, hemoglobin 10.7.     Patient is here today for follow up after her angiogram. Patient's daughter is present for the visit today. She has significant shortness of breath on exertion and moderate lower extremity edema, unchanged since the previous visit. Denies orthopnea or PND. She does not feel she is voiding as much as previously after she takes her torsemide.     She gets dizzy following taking her morning medications. She has also had a more sensitive stomach lately.     Patient denies chest pain or chest tightness. Denies tachycardia or palpitations.     Denies bleeding issues on eliquis.     Blood pressure 165/85 and HR 56 in clinic today.            Recent Hospitalizations   April 2023 ER Visit for dizziness/lightheadedness           Social History      Social History     Socioeconomic History     Marital status: Single     Spouse name: Not on file  "    Number of children: Not on file     Years of education: Not on file     Highest education level: Not on file   Occupational History     Not on file   Tobacco Use     Smoking status: Never     Smokeless tobacco: Never   Vaping Use     Vaping status: Not on file   Substance and Sexual Activity     Alcohol use: Yes     Comment: socially     Drug use: No     Sexual activity: Never   Other Topics Concern     Parent/sibling w/ CABG, MI or angioplasty before 65F 55M? Not Asked      Service Not Asked     Blood Transfusions Not Asked     Caffeine Concern Not Asked     Occupational Exposure Not Asked     Hobby Hazards Not Asked     Sleep Concern Not Asked     Stress Concern Not Asked     Weight Concern Not Asked     Special Diet No     Back Care Not Asked     Exercise No     Bike Helmet Not Asked     Seat Belt Not Asked     Self-Exams Not Asked   Social History Narrative     Not on file     Social Determinants of Health     Financial Resource Strain: Not on file   Food Insecurity: Not on file   Transportation Needs: Not on file   Physical Activity: Not on file   Stress: Not on file   Social Connections: Not on file   Intimate Partner Violence: Not on file   Housing Stability: Not on file            Review of Systems:   Skin:        Eyes:       ENT:       Respiratory:  Positive for dyspnea on exertion  Cardiovascular:  palpitations;Negative;chest pain;lightheadedness;dizziness;syncope or near-syncope fatigue;exercise intolerance;Positive for  Gastroenterology:      Genitourinary:       Musculoskeletal:       Neurologic:       Psychiatric:       Heme/Lymph/Imm:       Endocrine:              Physical Exam:   Vitals: BP (!) 168/65   Pulse 56   Ht 1.499 m (4' 11\")   Wt 60.8 kg (134 lb)   BMI 27.06 kg/m     Wt Readings from Last 4 Encounters:   06/01/23 60.8 kg (134 lb)   05/05/23 62.1 kg (137 lb)   04/24/23 62.4 kg (137 lb 9.6 oz)   03/09/23 60.2 kg (132 lb 12.8 oz)     GEN: well nourished, in no acute " distress.  HEENT:  Pupils equal, round. Sclerae nonicteric.   NECK: Supple, no masses appreciated. No JVD with patient supine  C/V:  Irregularly irregular rate and rhythm, no murmur, rub or gallop.    RESP: Respirations are unlabored. Clear to auscultation bilaterally without wheezing, rales, or rhonchi.  GI: Abdomen soft, nontender.  EXTREM: 1-2+ bilateral LE edema.  NEURO: Alert and oriented, cooperative.  SKIN: Warm and dry.        Data:     LIPID RESULTS:  Lab Results   Component Value Date    CHOL 118 02/20/2023    CHOL 133 05/06/2021    HDL 50 02/20/2023    HDL 59 05/06/2021    LDL 47 02/20/2023    LDL 54 05/06/2021    TRIG 105 02/20/2023    TRIG 101 05/06/2021    CHOLHDLRATIO 2.3 10/08/2015     LIVER ENZYME RESULTS:  Lab Results   Component Value Date    AST 18 04/04/2023    AST 14 05/31/2021    ALT 23 04/04/2023    ALT 26 05/31/2021     CBC RESULTS:  Lab Results   Component Value Date    WBC 8.8 05/05/2023    WBC 8.0 05/31/2021    RBC 3.47 (L) 05/05/2023    RBC 3.92 05/31/2021    HGB 10.7 (L) 05/05/2023    HGB 11.9 05/31/2021    HCT 33.0 (L) 05/05/2023    HCT 35.7 05/31/2021    MCV 95 05/05/2023    MCV 91 05/31/2021    MCH 30.8 05/05/2023    MCH 30.4 05/31/2021    MCHC 32.4 05/05/2023    MCHC 33.3 05/31/2021    RDW 14.4 05/05/2023    RDW 14.0 05/31/2021     05/05/2023     05/31/2021     BMP RESULTS:  Lab Results   Component Value Date     06/01/2023     05/31/2021    POTASSIUM 3.6 06/01/2023    POTASSIUM 3.6 11/06/2022    POTASSIUM 3.3 (L) 05/31/2021    CHLORIDE 99 06/01/2023    CHLORIDE 102 11/06/2022    CHLORIDE 99 05/31/2021    CO2 32 (H) 06/01/2023    CO2 27 11/06/2022    CO2 31 05/31/2021    ANIONGAP 11 06/01/2023    ANIONGAP 6 11/06/2022    ANIONGAP 7 05/31/2021     (H) 06/01/2023     (H) 11/07/2022     (H) 11/06/2022     (H) 05/31/2021    BUN 19.5 06/01/2023    BUN 14 11/06/2022    BUN 14 05/31/2021    CR 1.21 (H) 06/01/2023    CR 0.73 05/31/2021     GFRESTIMATED 45 (L) 06/01/2023    GFRESTIMATED 78 05/31/2021    GFRESTBLACK >90 05/31/2021    PENNY 9.9 06/01/2023    PENNY 9.0 05/31/2021      A1C RESULTS:  Lab Results   Component Value Date    A1C 5.7 (H) 11/04/2022     INR RESULTS:  Lab Results   Component Value Date    INR 1.02 05/05/2023    INR 1.11 11/05/2022    INR 0.97 06/05/2009            Medications     Current Outpatient Medications   Medication Sig Dispense Refill     Acetaminophen (TYLENOL PO) Take 500 mg by mouth every 6 hours as needed for mild pain or fever        apixaban ANTICOAGULANT (ELIQUIS) 5 MG tablet Take 1 tablet (5 mg) by mouth 2 times daily Restart eliquis 5/6/23 PM dose if no bleeding 180 tablet 3     carvedilol (COREG) 25 MG tablet Take 1 tablet (25 mg) by mouth 2 times daily 180 tablet 3     cholecalciferol (VITAMIN D3) 25 mcg (1000 units) capsule Take 1 capsule by mouth daily       digoxin (LANOXIN) 125 MCG tablet Take 1 tablet (125 mcg) by mouth daily 90 tablet 3     hydrALAZINE (APRESOLINE) 10 MG tablet Take 1 tablet (10 mg) by mouth 3 times daily 90 tablet 2     ipratropium - albuterol 0.5 mg/2.5 mg/3 mL (DUONEB) 0.5-2.5 (3) MG/3ML neb solution Take 1 vial (3 mLs) by nebulization every 4 hours as needed for shortness of breath, wheezing or cough 90 mL 0     irbesartan (AVAPRO) 300 MG tablet Take 1 tablet (300 mg) by mouth daily 90 tablet 3     Magnesium Oxide 250 MG TABS Take 1 tablet (250 mg) by mouth 2 times daily 60 tablet 1     methimazole (TAPAZOLE) 5 MG tablet Take 5 mg by mouth twice a week       omeprazole (PRILOSEC) 20 MG DR capsule Take 20 mg by mouth daily       potassium chloride ER (K-TAB/KLOR-CON) 10 MEQ CR tablet Take 1 tablet (10 mEq) by mouth 2 times daily And as needed as directed. 180 tablet 3     simvastatin (ZOCOR) 10 MG tablet Take 1 tablet (10 mg) by mouth At Bedtime 90 tablet 3     terazosin (HYTRIN) 5 MG capsule Take 1 capsule (5 mg) by mouth At Bedtime 90 capsule 3     torsemide (DEMADEX) 10 MG tablet Take  3 tablets (30 mg) by mouth daily 270 tablet 2     amoxicillin (AMOXIL) 500 MG capsule TAKE 4 CAPSULES BY MOUTH 1 HOUR BEFORE DENTAL APPOINTMENT (Patient not taking: Reported on 6/1/2023)       benzonatate (TESSALON) 100 MG capsule Take 1 capsule (100 mg) by mouth 3 times daily as needed for cough (Patient not taking: Reported on 6/1/2023) 30 capsule 0     guaiFENesin-dextromethorphan (ROBITUSSIN DM) 100-10 MG/5ML syrup Take 10 mLs by mouth every 4 hours as needed for cough (Patient not taking: Reported on 6/1/2023) 118 mL 0          Past Medical History     Past Medical History:   Diagnosis Date     Arthritis      Breast cancer (H)      Cardiomyopathy (H)     ideopathic     Coronary artery disease 9/25/2014     Hypercholesterolemia      Hypertension      Hypokalemia      Malignant neoplasm (H)      Shortness of breath      Shoulder pain      Past Surgical History:   Procedure Laterality Date     BACK SURGERY       CARDIAC SURGERY      angiogram neg many yrs ago     CHOLECYSTECTOMY       CV CORONARY ANGIOGRAM N/A 5/5/2023    Procedure: Coronary Angiogram;  Surgeon: Kane Daugherty MD;  Location:  HEART CARDIAC CATH LAB     CV LEFT HEART CATH N/A 5/5/2023    Procedure: Left Heart Catheterization;  Surgeon: Kane Daugherty MD;  Location:  HEART CARDIAC CATH LAB     CV RIGHT HEART CATH MEASUREMENTS RECORDED N/A 5/5/2023    Procedure: Right Heart Catheterization;  Surgeon: Kane Daugherty MD;  Location: [facility] CARDIAC CATH LAB     CV RIGHT HEART EXERCISE STRESS STUDY N/A 5/5/2023    Procedure: Stress Drug Study;  Surgeon: Kane Daugherty MD;  Location: [facility] CARDIAC CATH LAB     ESOPHAGOSCOPY, GASTROSCOPY, DUODENOSCOPY (EGD), COMBINED N/A 6/22/2021    Procedure: ESOPHAGOGASTRODUODENOSCOPY, WITH ENDOSCOPIC US WITH FINE NEEDLE ASPIRATION;  Surgeon: Ventura Mcgill MD;  Location:  GI     EYE SURGERY       ORTHOPEDIC SURGERY      lt shoulder replaced, total     partial masectomy       Family  History   Problem Relation Age of Onset     Other Cancer Mother      Other Cancer Brother      No Known Problems Son      Heart Failure Daughter      Family History Negative No family hx of             Allergies   Amlodipine, Aspirin, and Codeine sulfate        Heidi Haque NP  [facility]  Pager: [phone]

## 2023-06-05 NOTE — TELEPHONE ENCOUNTER
Spoke with patient and reviewed recommendations from Rosemarie Haque to reduce Magnesium.  Patient verbalized understanding and has no new questions.  Radha Mukherjee RN on 6/5/2023 at 4:36 PM

## 2023-06-08 DIAGNOSIS — I10 BENIGN ESSENTIAL HYPERTENSION: ICD-10-CM

## 2023-06-08 RX ORDER — TERAZOSIN 5 MG/1
5 CAPSULE ORAL AT BEDTIME
Qty: 90 CAPSULE | Refills: 0 | Status: SHIPPED | OUTPATIENT
Start: 2023-06-08 | End: 2023-08-21

## 2023-06-19 ENCOUNTER — TELEPHONE (OUTPATIENT)
Dept: CARDIOLOGY | Facility: CLINIC | Age: 83
End: 2023-06-19
Payer: COMMERCIAL

## 2023-06-19 DIAGNOSIS — I50.23 ACUTE ON CHRONIC SYSTOLIC HEART FAILURE (H): ICD-10-CM

## 2023-06-19 DIAGNOSIS — I42.9 CARDIOMYOPATHY, UNSPECIFIED TYPE (H): Primary | ICD-10-CM

## 2023-06-19 NOTE — TELEPHONE ENCOUNTER
[facility]    - Pt has [facility] enrollment tomorrow 6/20 with no lab priors. Talked to Janessa who states she cannot come in early due to her ride.      Shey Mcnulty RN BAN   4:18 PM 6/19/2023    [facility] nurse line M-F 8a-4p: [phone]

## 2023-06-19 NOTE — PROGRESS NOTES
Cardiology Progress Note    Patient seen today in follow up of: CORE enrollment  Primary cardiologist: Dr. 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  She has been a patient of Dr. Albino Connors's for over 2 decades for her mild cardiomyopathy.  Her ejection fraction has fluctuated from anywhere between 40 to 55%.    Ms. Melendez was hospitalized in November 2022 after experiencing an onset left facial droop, left arm and leg weakness as well as slurred speech secondary to an acute ischemic stroke in the distal right M1 territory.  She was found to have a new diagnosis of atrial fibrillation and was started on anticoagulation.  She saw Dr. Albino Connors in follow-up in February.  Her rates were suboptimally controlled and she was started on digoxin.  Additionally, blood pressure readings were elevated in clinic and a 24-hour Holter monitor was done.  This revealed low normal to low blood pressure consistent with whitecoat hypertension.    In March, she was hospitalized with bronchitis and acute on chronic systolic congestive heart failure.  Her NT proBNP was nearly 6000 on admission.  She was treated with IV diuretics as well as prednisone and antibiotics with improvement.    She was seen in the ER on 4/4/2023 with episodic dizziness and lightheadedness.  EKG showed atrial fibrillation with slow ventricular response.  MRI of the brain was negative for acute intracranial process.  She was hydrated with IV fluids and symptoms improved.  She was subsequently seen in our clinic and Coreg was reduced to 25 mg twice daily.  Given her dizziness, she had a repeat Holter monitor This revealed a 34 beat run of ventricular tachycardia.  There were 1351 pauses greater  than 2 seconds, the longest 2.66 seconds.  Overall 3% PVC burden, average heart rate 56, 14% in bradycardia, 100% atrial fibrillation.    She was seen by my colleague Rosemarie Haque NP last in clinic on 4/24/23. She was reporting shorntess of breath on exertion. additionally, given the VT noted on her monitor, coronary angiography and right heart catheterization were recommended. She was also referred to EP given her VT and afib.     She underwent coronary angiography and RHC on 5/5/23. Angiography showed trivial coronary disease. RHC showed moderate pulmonary hypertension and moderately elevated right and left filling pressures. Notably, she was severely hypertensive on arrival to the lab. CO was normal. She was restarted on 10 mg of hydralazine.     She saw Ms. Haque in follow up again on 6/1/23. She had ongoing dyspnea and peripheral edema. Torsemide had been increased to 40 mg daily without improvement so was reduced to 30 mg daily. She was encouraged to wear compression stockings. Entresto was also discussed so she was referred to the CORE clinic.    She is here today for CORE clinic enrollment. She presents today with her daughter.  She reports ongoing dyspnea on exertion.  She and her daughter tell me this has been going on for several years.  It does seem to be worse since she was hospitalized in March.  She however previously was able to go for long walks.  She now has been having intermittent episodes of dizziness and fogginess.  These episodes are brief and not necessarily associated with position changes.  Because of her symptoms, she has been concerned about going out too far and feeling poorly so she has not been walking like usual.    She was on a higher dose of torsemide for about a month at 40 mg daily without any significant improvement in her dyspnea nor her peripheral edema.  She is now back on 30 mg daily.  Weight is stable in clinic today.    She does note she feels sleepy after taking her  medications.  Her and her daughter wonder if the digoxin is causing some of her symptoms as that is the last new medication that was added.    ASSESSMENT/PLAN:  Janessa Melendez is a very pleasant 82 year old female with a longstanding history of mild LV dysfunction.  She was diagnosed with paroxysmal atrial fibrillation in November 2022 after presenting with a stroke.  She is currently being managed with a rate control strategy and is on Eliquis for thromboembolic prevention.      I had a long discussion with with Ms. Melendez and her daughter today about her symptoms and medications.  She has chronic dyspnea on exertion which has been quite a longstanding issue for her although perhaps somewhat worse over the last year.  Her bigger issue today is concerns of episodic lightheadedness/foggy feeling.  Her heart rate in clinic today is on the slow side and I reviewed her last Holter monitor from April which showed an average heart rate in the 50s and a maximum heart rate in the 80s.  This was done on digoxin and 25 of carvedilol which she is on at present.  She and her daughter share concerns that the digoxin may be causing some of her symptoms as it seemed to start around the time that was added.  As her heart rate seem to be quite well controlled and even slow at times with her heart rate being in the 40s when she was there for her angiogram, we agreed to hold the digoxin for now.  We will continue Coreg to 25 mg twice daily.  We talked about ways to monitor her pulse at home and if she has any issues with elevated heart rates, palpitation, I asked them to contact us.  She does have a consultation with electrophysiology set up in a few weeks.    We did spend some time talking today about ways to optimize medications given her cardiomyopathy and heart failure.  We talked about the benefits of an SGLT2 inhibitor as well as Entresto.  We did review the cost of both of these medications for her as well.  She is  on a limited income so we may need to explore patient assistance programs.  If we had to choose just 1 of these medications, I probably would start with an SGLT2 inhibitor.  For now, we agreed to not make too many changes at once.  We will see how she feels off the digoxin and can revisit in follow-up whether she would like to start 1 of these medications and if they would be affordable for her.  She and her daughter were happy with that plan.    I did not change her diuretics today. I encouraged her to use her compression stockings at home and continue to elevate her legs at home to help with her edema.    She scheduled to have labs including a BMP, NT proBNP and hemoglobin following our visit today.  We will let her know those results.    It was a pleasure seeing her in clinic today.  We will arrange for a visit in a few weeks to review these issues.  As always, for they have any questions or concerns in the meantime asked them to contact us.    Orders this Visit:  Orders Placed This Encounter   Procedures     Follow-Up with Cardiology [facility]     No orders of the defined types were placed in this encounter.    Medications Discontinued During This Encounter   Medication Reason     hydrALAZINE (APRESOLINE) tablet 10 mg Therapy completed (No AVS)     hydrALAZINE (APRESOLINE) tablet 10 mg Therapy completed (No AVS)     hydrALAZINE (APRESOLINE) tablet 10 mg Therapy completed (No AVS)     digoxin (LANOXIN) 125 MCG tablet        CURRENT MEDICATIONS:  Current Outpatient Medications   Medication Sig Dispense Refill     Acetaminophen (TYLENOL PO) Take 500 mg by mouth every 6 hours as needed for mild pain or fever        apixaban ANTICOAGULANT (ELIQUIS) 5 MG tablet Take 1 tablet (5 mg) by mouth 2 times daily Restart eliquis 5/6/23 PM dose if no bleeding 180 tablet 3     carvedilol (COREG) 25 MG tablet Take 1 tablet (25 mg) by mouth 2 times daily 180 tablet 3     cholecalciferol (VITAMIN D3) 25 mcg (1000 units) capsule Take  1 capsule by mouth daily       hydrALAZINE (APRESOLINE) 10 MG tablet Take 1 tablet (10 mg) by mouth 3 times daily 90 tablet 2     ipratropium - albuterol 0.5 mg/2.5 mg/3 mL (DUONEB) 0.5-2.5 (3) MG/3ML neb solution Take 1 vial (3 mLs) by nebulization every 4 hours as needed for shortness of breath, wheezing or cough 90 mL 0     irbesartan (AVAPRO) 300 MG tablet Take 1 tablet (300 mg) by mouth daily 90 tablet 3     Magnesium Oxide 250 MG TABS Take 1 tablet (250 mg) by mouth daily 30 tablet 1     methimazole (TAPAZOLE) 5 MG tablet Take 5 mg by mouth twice a week       omeprazole (PRILOSEC) 20 MG DR capsule Take 20 mg by mouth daily       potassium chloride ER (K-TAB/KLOR-CON) 10 MEQ CR tablet Take 1 tablet (10 mEq) by mouth 2 times daily And as needed as directed. 180 tablet 3     simvastatin (ZOCOR) 10 MG tablet Take 1 tablet (10 mg) by mouth At Bedtime 90 tablet 3     terazosin (HYTRIN) 5 MG capsule Take 1 capsule (5 mg) by mouth At Bedtime 90 capsule 0     torsemide (DEMADEX) 10 MG tablet Take 3 tablets (30 mg) by mouth daily 270 tablet 2     amoxicillin (AMOXIL) 500 MG capsule TAKE 4 CAPSULES BY MOUTH 1 HOUR BEFORE DENTAL APPOINTMENT (Patient not taking: Reported on 6/1/2023)       benzonatate (TESSALON) 100 MG capsule Take 1 capsule (100 mg) by mouth 3 times daily as needed for cough (Patient not taking: Reported on 6/1/2023) 30 capsule 0     guaiFENesin-dextromethorphan (ROBITUSSIN DM) 100-10 MG/5ML syrup Take 10 mLs by mouth every 4 hours as needed for cough (Patient not taking: Reported on 6/1/2023) 118 mL 0     ALLERGIES  Allergies   Allergen Reactions     Amlodipine      swelling     Aspirin Other (See Comments)     Bleeding              Bleeding, GI Lesion         Codeine Sulfate GI Disturbance     PAST MEDICAL HISTORY:  Past Medical History:   Diagnosis Date     Arthritis      Breast cancer (H)      Cardiomyopathy (H)     ideopathic     Coronary artery disease 9/25/2014     Hypercholesterolemia       Hypertension      Hypokalemia      Malignant neoplasm (H)      Shortness of breath      Shoulder pain      PAST SURGICAL HISTORY:  Past Surgical History:   Procedure Laterality Date     BACK SURGERY       CARDIAC SURGERY      angiogram neg many yrs ago     CHOLECYSTECTOMY       CV CORONARY ANGIOGRAM N/A 5/5/2023    Procedure: Coronary Angiogram;  Surgeon: Kane Daugheryt MD;  Location:  [facility]     CV LEFT HEART CATH N/A 5/5/2023    Procedure: Left Heart Catheterization;  Surgeon: Knae Daugherty MD;  Location:  [facility]     CV RIGHT HEART CATH MEASUREMENTS RECORDED N/A 5/5/2023    Procedure: Right Heart Catheterization;  Surgeon: Kane Daugherty MD;  Location:  [facility]     CV RIGHT HEART EXERCISE STRESS STUDY N/A 5/5/2023    Procedure: Stress Drug Study;  Surgeon: Kane Daugherty MD;  Location:  [facility]ATH [facility]     ESOPHAGOSCOPY, GASTROSCOPY, DUODENOSCOPY (EGD), COMBINED N/A 6/22/2021    Procedure: ESOPHAGOGASTRODUODENOSCOPY, WITH ENDOSCOPIC US WITH FINE NEEDLE ASPIRATION;  Surgeon: Ventura Mcgill MD;  Location:  GI     EYE SURGERY       ORTHOPEDIC SURGERY      lt shoulder replaced, total     partial masectomy       SOCIAL HISTORY:  Social History     Socioeconomic History     Marital status: Single   Tobacco Use     Smoking status: Never     Smokeless tobacco: Never   Substance and Sexual Activity     Alcohol use: Yes     Comment: socially     Drug use: No     Sexual activity: Never   Other Topics Concern     Special Diet No     Exercise No     Review of Systems:  Skin:  Negative     Eyes:  Negative    ENT:  Negative    Respiratory:  Positive for dyspnea on exertion  Cardiovascular:  palpitations;Negative;chest pain;lightheadedness;dizziness;syncope or near-syncope fatigue;exercise intolerance;Positive for  Gastroenterology: Negative    Genitourinary:  Negative    Musculoskeletal:  Negative    Neurologic:  Negative    Psychiatric:  " Negative    Heme/Lymph/Imm:       Endocrine:          Physical Exam:  Vitals: /56   Pulse 50   Ht 1.499 m (4' 11\")   Wt 61 kg (134 lb 6.4 oz)   SpO2 100%   BMI 27.15 kg/m     Wt Readings from Last 4 Encounters:   06/20/23 61 kg (134 lb 6.4 oz)   06/01/23 60.8 kg (134 lb)   05/05/23 62.1 kg (137 lb)   04/24/23 62.4 kg (137 lb 9.6 oz)     GEN: well nourished, in no acute distress.  C/V:  IRR, slow  RESP: Respirations are unlabored. Clear to auscultation bilaterally without wheezing, rales, or rhonchi.  EXTREM: 1+ LE edema, R > L.      Vero Bacon PA-C  [facility]  "

## 2023-06-20 ENCOUNTER — OFFICE VISIT (OUTPATIENT)
Dept: CARDIOLOGY | Facility: CLINIC | Age: 83
End: 2023-06-20
Attending: NURSE PRACTITIONER
Payer: COMMERCIAL

## 2023-06-20 ENCOUNTER — LAB (OUTPATIENT)
Dept: LAB | Facility: CLINIC | Age: 83
End: 2023-06-20
Payer: COMMERCIAL

## 2023-06-20 VITALS
DIASTOLIC BLOOD PRESSURE: 56 MMHG | BODY MASS INDEX: 27.09 KG/M2 | SYSTOLIC BLOOD PRESSURE: 138 MMHG | OXYGEN SATURATION: 100 % | HEIGHT: 59 IN | HEART RATE: 50 BPM | WEIGHT: 134.4 LBS

## 2023-06-20 DIAGNOSIS — I50.23 ACUTE ON CHRONIC SYSTOLIC HEART FAILURE (H): ICD-10-CM

## 2023-06-20 DIAGNOSIS — I42.9 CARDIOMYOPATHY, UNSPECIFIED TYPE (H): ICD-10-CM

## 2023-06-20 DIAGNOSIS — I42.9 CARDIOMYOPATHY, UNSPECIFIED TYPE (H): Primary | ICD-10-CM

## 2023-06-20 LAB
ANION GAP SERPL CALCULATED.3IONS-SCNC: 13 MMOL/L (ref 7–15)
BUN SERPL-MCNC: 19.6 MG/DL (ref 8–23)
CALCIUM SERPL-MCNC: 9.7 MG/DL (ref 8.8–10.2)
CHLORIDE SERPL-SCNC: 98 MMOL/L (ref 98–107)
CREAT SERPL-MCNC: 1.1 MG/DL (ref 0.51–0.95)
DEPRECATED HCO3 PLAS-SCNC: 28 MMOL/L (ref 22–29)
GFR SERPL CREATININE-BSD FRML MDRD: 50 ML/MIN/1.73M2
GLUCOSE SERPL-MCNC: 122 MG/DL (ref 70–99)
HGB BLD-MCNC: 11 G/DL (ref 11.7–15.7)
NT-PROBNP SERPL-MCNC: 6085 PG/ML (ref 0–1800)
POTASSIUM SERPL-SCNC: 3.7 MMOL/L (ref 3.4–5.3)
SODIUM SERPL-SCNC: 139 MMOL/L (ref 136–145)

## 2023-06-20 PROCEDURE — 85018 HEMOGLOBIN: CPT | Performed by: PHYSICIAN ASSISTANT

## 2023-06-20 PROCEDURE — 83880 ASSAY OF NATRIURETIC PEPTIDE: CPT | Performed by: PHYSICIAN ASSISTANT

## 2023-06-20 PROCEDURE — 99215 OFFICE O/P EST HI 40 MIN: CPT | Performed by: PHYSICIAN ASSISTANT

## 2023-06-20 PROCEDURE — 80048 BASIC METABOLIC PNL TOTAL CA: CPT | Performed by: PHYSICIAN ASSISTANT

## 2023-06-20 PROCEDURE — 36415 COLL VENOUS BLD VENIPUNCTURE: CPT | Performed by: PHYSICIAN ASSISTANT

## 2023-06-20 NOTE — LETTER
6/20/2023    [facility]  [facility] [address]    RE: Janessa Benavideze       Dear Colleague,     I had the pleasure of seeing Janessa Melendez in the [facility].    Cardiology Progress Note    Patient seen today in follow up of: CORE enrollment  Primary cardiologist: Dr. 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  She has been a patient of Dr. Albino Connors's for over 2 decades for her mild cardiomyopathy.  Her ejection fraction has fluctuated from anywhere between 40 to 55%.    Ms. Melendez was hospitalized in November 2022 after experiencing an onset left facial droop, left arm and leg weakness as well as slurred speech secondary to an acute ischemic stroke in the distal right M1 territory.  She was found to have a new diagnosis of atrial fibrillation and was started on anticoagulation.  She saw Dr. Albino Connors in follow-up in February.  Her rates were suboptimally controlled and she was started on digoxin.  Additionally, blood pressure readings were elevated in clinic and a 24-hour Holter monitor was done.  This revealed low normal to low blood pressure consistent with whitecoat hypertension.    In March, she was hospitalized with bronchitis and acute on chronic systolic congestive heart failure.  Her NT proBNP was nearly 6000 on admission.  She was treated with IV diuretics as well as prednisone and antibiotics with improvement.    She was seen in the ER on 4/4/2023 with episodic dizziness and lightheadedness.  EKG showed atrial fibrillation with slow ventricular response.  MRI of the brain was negative for acute intracranial process.  She was hydrated with IV fluids and symptoms  improved.  She was subsequently seen in our clinic and Coreg was reduced to 25 mg twice daily.  Given her dizziness, she had a repeat Holter monitor This revealed a 34 beat run of ventricular tachycardia.  There were 1351 pauses greater than 2 seconds, the longest 2.66 seconds.  Overall 3% PVC burden, average heart rate 56, 14% in bradycardia, 100% atrial fibrillation.    She was seen by my colleague Rosemarie Haque NP last in clinic on 4/24/23. She was reporting shorntess of breath on exertion. additionally, given the VT noted on her monitor, coronary angiography and right heart catheterization were recommended. She was also referred to EP given her VT and afib.     She underwent coronary angiography and RHC on 5/5/23. Angiography showed trivial coronary disease. RHC showed moderate pulmonary hypertension and moderately elevated right and left filling pressures. Notably, she was severely hypertensive on arrival to the lab. CO was normal. She was restarted on 10 mg of hydralazine.     She saw Ms. Haque in follow up again on 6/1/23. She had ongoing dyspnea and peripheral edema. Torsemide had been increased to 40 mg daily without improvement so was reduced to 30 mg daily. She was encouraged to wear compression stockings. Entresto was also discussed so she was referred to the CORE clinic.    She is here today for CORE clinic enrollment. She presents today with her daughter.  She reports ongoing dyspnea on exertion.  She and her daughter tell me this has been going on for several years.  It does seem to be worse since she was hospitalized in March.  She however previously was able to go for long walks.  She now has been having intermittent episodes of dizziness and fogginess.  These episodes are brief and not necessarily associated with position changes.  Because of her symptoms, she has been concerned about going out too far and feeling poorly so she has not been walking like usual.    She was on a higher dose of torsemide  for about a month at 40 mg daily without any significant improvement in her dyspnea nor her peripheral edema.  She is now back on 30 mg daily.  Weight is stable in clinic today.    She does note she feels sleepy after taking her medications.  Her and her daughter wonder if the digoxin is causing some of her symptoms as that is the last new medication that was added.    ASSESSMENT/PLAN:  Janessa Melendez is a very pleasant 82 year old female with a longstanding history of mild LV dysfunction.  She was diagnosed with paroxysmal atrial fibrillation in November 2022 after presenting with a stroke.  She is currently being managed with a rate control strategy and is on Eliquis for thromboembolic prevention.      I had a long discussion with with Ms. Melendez and her daughter today about her symptoms and medications.  She has chronic dyspnea on exertion which has been quite a longstanding issue for her although perhaps somewhat worse over the last year.  Her bigger issue today is concerns of episodic lightheadedness/foggy feeling.  Her heart rate in clinic today is on the slow side and I reviewed her last Holter monitor from April which showed an average heart rate in the 50s and a maximum heart rate in the 80s.  This was done on digoxin and 25 of carvedilol which she is on at present.  She and her daughter share concerns that the digoxin may be causing some of her symptoms as it seemed to start around the time that was added.  As her heart rate seem to be quite well controlled and even slow at times with her heart rate being in the 40s when she was there for her angiogram, we agreed to hold the digoxin for now.  We will continue Coreg to 25 mg twice daily.  We talked about ways to monitor her pulse at home and if she has any issues with elevated heart rates, palpitation, I asked them to contact us.  She does have a consultation with electrophysiology set up in a few weeks.    We did spend some time talking today  about ways to optimize medications given her cardiomyopathy and heart failure.  We talked about the benefits of an SGLT2 inhibitor as well as Entresto.  We did review the cost of both of these medications for her as well.  She is on a limited income so we may need to explore patient assistance programs.  If we had to choose just 1 of these medications, I probably would start with an SGLT2 inhibitor.  For now, we agreed to not make too many changes at once.  We will see how she feels off the digoxin and can revisit in follow-up whether she would like to start 1 of these medications and if they would be affordable for her.  She and her daughter were happy with that plan.    I did not change her diuretics today. I encouraged her to use her compression stockings at home and continue to elevate her legs at home to help with her edema.    She scheduled to have labs including a BMP, NT proBNP and hemoglobin following our visit today.  We will let her know those results.    It was a pleasure seeing her in clinic today.  We will arrange for a visit in a few weeks to review these issues.  As always, for they have any questions or concerns in the meantime asked them to contact us.    Orders this Visit:  Orders Placed This Encounter   Procedures    Follow-Up with Cardiology MARINO CORE     No orders of the defined types were placed in this encounter.    Medications Discontinued During This Encounter   Medication Reason    hydrALAZINE (APRESOLINE) tablet 10 mg Therapy completed (No AVS)    hydrALAZINE (APRESOLINE) tablet 10 mg Therapy completed (No AVS)    hydrALAZINE (APRESOLINE) tablet 10 mg Therapy completed (No AVS)    digoxin (LANOXIN) 125 MCG tablet        CURRENT MEDICATIONS:  Current Outpatient Medications   Medication Sig Dispense Refill    Acetaminophen (TYLENOL PO) Take 500 mg by mouth every 6 hours as needed for mild pain or fever       apixaban ANTICOAGULANT (ELIQUIS) 5 MG tablet Take 1 tablet (5 mg) by mouth 2 times  daily Restart eliquis 5/6/23 PM dose if no bleeding 180 tablet 3    carvedilol (COREG) 25 MG tablet Take 1 tablet (25 mg) by mouth 2 times daily 180 tablet 3    cholecalciferol (VITAMIN D3) 25 mcg (1000 units) capsule Take 1 capsule by mouth daily      hydrALAZINE (APRESOLINE) 10 MG tablet Take 1 tablet (10 mg) by mouth 3 times daily 90 tablet 2    ipratropium - albuterol 0.5 mg/2.5 mg/3 mL (DUONEB) 0.5-2.5 (3) MG/3ML neb solution Take 1 vial (3 mLs) by nebulization every 4 hours as needed for shortness of breath, wheezing or cough 90 mL 0    irbesartan (AVAPRO) 300 MG tablet Take 1 tablet (300 mg) by mouth daily 90 tablet 3    Magnesium Oxide 250 MG TABS Take 1 tablet (250 mg) by mouth daily 30 tablet 1    methimazole (TAPAZOLE) 5 MG tablet Take 5 mg by mouth twice a week      omeprazole (PRILOSEC) 20 MG DR capsule Take 20 mg by mouth daily      potassium chloride ER (K-TAB/KLOR-CON) 10 MEQ CR tablet Take 1 tablet (10 mEq) by mouth 2 times daily And as needed as directed. 180 tablet 3    simvastatin (ZOCOR) 10 MG tablet Take 1 tablet (10 mg) by mouth At Bedtime 90 tablet 3    terazosin (HYTRIN) 5 MG capsule Take 1 capsule (5 mg) by mouth At Bedtime 90 capsule 0    torsemide (DEMADEX) 10 MG tablet Take 3 tablets (30 mg) by mouth daily 270 tablet 2    amoxicillin (AMOXIL) 500 MG capsule TAKE 4 CAPSULES BY MOUTH 1 HOUR BEFORE DENTAL APPOINTMENT (Patient not taking: Reported on 6/1/2023)      benzonatate (TESSALON) 100 MG capsule Take 1 capsule (100 mg) by mouth 3 times daily as needed for cough (Patient not taking: Reported on 6/1/2023) 30 capsule 0    guaiFENesin-dextromethorphan (ROBITUSSIN DM) 100-10 MG/5ML syrup Take 10 mLs by mouth every 4 hours as needed for cough (Patient not taking: Reported on 6/1/2023) 118 mL 0     ALLERGIES  Allergies   Allergen Reactions    Amlodipine      swelling    Aspirin Other (See Comments)     Bleeding              Bleeding, GI Lesion        Codeine Sulfate GI Disturbance     PAST  MEDICAL HISTORY:  Past Medical History:   Diagnosis Date    Arthritis     Breast cancer (H)     Cardiomyopathy (H)     ideopathic    Coronary artery disease 9/25/2014    Hypercholesterolemia     Hypertension     Hypokalemia     Malignant neoplasm (H)     Shortness of breath     Shoulder pain      PAST SURGICAL HISTORY:  Past Surgical History:   Procedure Laterality Date    BACK SURGERY      CARDIAC SURGERY      angiogram neg many yrs ago    CHOLECYSTECTOMY      CV CORONARY ANGIOGRAM N/A 5/5/2023    Procedure: Coronary Angiogram;  Surgeon: Kane Daugherty MD;  Location:  HEART CARDIAC CATH LAB    CV LEFT HEART CATH N/A 5/5/2023    Procedure: Left Heart Catheterization;  Surgeon: Kane Daugherty MD;  Location:  HEART CARDIAC CATH LAB    CV RIGHT HEART CATH MEASUREMENTS RECORDED N/A 5/5/2023    Procedure: Right Heart Catheterization;  Surgeon: Kane Daugherty MD;  Location:  HEART CARDIAC CATH LAB    CV RIGHT HEART EXERCISE STRESS STUDY N/A 5/5/2023    Procedure: Stress Drug Study;  Surgeon: Kane Daugherty MD;  Location:  HEART CARDIAC CATH LAB    ESOPHAGOSCOPY, GASTROSCOPY, DUODENOSCOPY (EGD), COMBINED N/A 6/22/2021    Procedure: ESOPHAGOGASTRODUODENOSCOPY, WITH ENDOSCOPIC US WITH FINE NEEDLE ASPIRATION;  Surgeon: Ventura Mcgill MD;  Location:  GI    EYE SURGERY      ORTHOPEDIC SURGERY      lt shoulder replaced, total    partial masectomy       SOCIAL HISTORY:  Social History     Socioeconomic History    Marital status: Single   Tobacco Use    Smoking status: Never    Smokeless tobacco: Never   Substance and Sexual Activity    Alcohol use: Yes     Comment: socially    Drug use: No    Sexual activity: Never   Other Topics Concern    Special Diet No    Exercise No     Review of Systems:  Skin:  Negative     Eyes:  Negative    ENT:  Negative    Respiratory:  Positive for dyspnea on exertion  Cardiovascular:  palpitations;Negative;chest pain;lightheadedness;dizziness;syncope or  "near-syncope fatigue;exercise intolerance;Positive for  Gastroenterology: Negative    Genitourinary:  Negative    Musculoskeletal:  Negative    Neurologic:  Negative    Psychiatric:  Negative    Heme/Lymph/Imm:       Endocrine:          Physical Exam:  Vitals: /56   Pulse 50   Ht 1.499 m (4' 11\")   Wt 61 kg (134 lb 6.4 oz)   SpO2 100%   BMI 27.15 kg/m     Wt Readings from Last 4 Encounters:   06/20/23 61 kg (134 lb 6.4 oz)   06/01/23 60.8 kg (134 lb)   05/05/23 62.1 kg (137 lb)   04/24/23 62.4 kg (137 lb 9.6 oz)     GEN: well nourished, in no acute distress.  C/V:  IRR, slow  RESP: Respirations are unlabored. Clear to auscultation bilaterally without wheezing, rales, or rhonchi.  EXTREM: 1+ LE edema, R > L.      Vero Bacon PA-C  [facility]      Thank you for allowing me to participate in the care of your patient.      Sincerely,     Vero Bacon PA-C     [facility]  cc:   Heidi Haque, SHORTY  [address] [place] [address]        "

## 2023-06-20 NOTE — PATIENT INSTRUCTIONS
Call CORE nurse for any questions or concerns:  [phone]   *If you have concerns after hours, please call [phone], option 2 to speak with on call Cardiologist.    1. Medication changes and/or recommendations from today:  STOP the digoxin.     2. Follow up plan: I'll have my nurses call you in a week or two to see how you're feeling after stopping the digoxin.   -  See Dr. Ventura with electrophysiology as scheduled.  -  We can talk about starting either jardiance/Farxiga or Entresto in follow up depending on how you are feeling. These would be $47 per month. These would be beneficial given your heart muscle/heart failure.   -  Virtual visit with me in a few weeks      3. Weigh yourself daily and write it down.     4. Call CORE nurse if your weight is up more than 2 pounds in one day or 5 pounds in one week.     5. Call CORE nurse if you feel more short of breath, have more abdominal bloating, or leg swelling.     6. Continue low sodium diet (less than 2000 mg daily). If you eat less salt, you will retain less fluid.     7. Alcohol can weaken your heart further. You should avoid alcohol or limit its use to special times, such as a holiday or birthday.      8. Do NOT take Aleve or ibuprofen without talking to your doctor first.      9. Lab Results: **     CORE Clinic: Cardiomyopathy, Optimization, Rehabilitation, Education  The CORE Clinic is a heart failure specialty clinic within the [facility] where you will work with specialized nurse practitioners, physician assistants, doctors, and registered nurses. They are dedicated to helping patients with heart failure to carefully adjust medications, receive education, and learn who and when to call if symptoms develop. They specialize in helping you better understand your condition, slow the progression of your disease, improve the length and quality of your life, help you detect future heart problems before they become life threatening, and avoid  hospitalizations.

## 2023-06-27 ENCOUNTER — CARE COORDINATION (OUTPATIENT)
Dept: CARDIOLOGY | Facility: CLINIC | Age: 83
End: 2023-06-27
Payer: COMMERCIAL

## 2023-06-27 DIAGNOSIS — I50.22 CHRONIC SYSTOLIC HEART FAILURE (H): Primary | ICD-10-CM

## 2023-06-27 RX ORDER — SACUBITRIL AND VALSARTAN 49; 51 MG/1; MG/1
1 TABLET, FILM COATED ORAL 2 TIMES DAILY
Qty: 60 TABLET | Refills: 11 | Status: SHIPPED | OUTPATIENT
Start: 2023-06-27 | End: 2023-08-31

## 2023-06-27 NOTE — PROGRESS NOTES
I am so glad she is feeling better! Would stop the irbesartan and start Entresto 49/51 mg BID. We may have to increase to 97/103 mg BID but we'll see what her BPs do. She should keep track. She can use the coupon to get a month free to at least see how she does and then if she tolerates we can hopefully have her enrolled in the assistance program. Thanks for the update! Christina

## 2023-06-27 NOTE — PROGRESS NOTES
[facility] - CORE Clinic    -Called Janessa to see how she is feeling since stopping her Digoxin. She states she feels much better. She also states it is amazing the change in her feeling in her head, she is more awake and has more energy. Her legs still have some swelling but this has improved. She has yet to receive the [employer] Patient Assistance Application for Entresto. She is agreeable to try the Entresto. Will discuss with Christina as to the dose she would like her to take.     She would like her meds filled at [facility] off [address].    Future Appointments   Date Time Provider Department Center   7/14/2023  9:15 AM [facility]   7/17/2023  9:30 AM Vero Bacon [facility]C [facility]   8/28/2023 10:00 AM [id number] POLO BISHOPG     SUDHA Zaldivar   1:47 PM 6/27/2023    CORE nurse line M-F 8a-4p: [phone]

## 2023-06-27 NOTE — PROGRESS NOTES
" Lily Moncada RN   6/21/2023  4:26 PM CDT Back to Top      Pt returned call. Reviewed lab results and recommendations per BERNADETTE Berry. Pt states she that she is very hesitant to start medication right now due to cost. Pt reports she just stopped Digoxin as of today. She would like to consider starting the Entresto, but would like me to mail her the patient assistance forms and she will look at them. We discussed income and she should qualify for patient assistance through Entresto based on income. Pt requested I call her next week to give her time off the Digoxin. Update sent to BERNADETTE Berry with pt's wished. Mailed Entresto patient assistance forms. Messaged CORE RN board to check in with pt early next week.     Lily Moncada RN   6/21/2023 11:12 AM CDT       Attempted to call pt to review, no answer. LVM asking for call back to review lab resutsl and recommendations from BERNADETTE Berry.      Of note- Coverage check note from pharmacy liaison from 11/2022 for when pt calls back:   \"Patient has Medicare Advantage through [employer]     Xarelto/Eliquis  --Upon receipt of RX, Discharge Pharmacy can provide 1 mo free.  --Subsequent fills will be $47/mo.  This price will continue into 2023, until patient enters the coverage gap, at which point cost will increase to a 25% coinsurance.\"    Vero Bacon PA-C   6/21/2023  9:43 AM CDT       Janessa's labs show stable hemoglobin. Her renal function is mildly improved from two weeks ago and has continued to improve over the last several months. Her NTproBNP however is quite elevated. We were going to wait on initiating a SGLT2 or Entresto since we were adjusting other medications but if she's agreeable - I'd consider starting one now. I'd recommend a SGLT2 inhibitor first but if cost is an issue (we discussed yesterday) we can consider Entresto and start with a free month and subsequently the assistance program. If she has questions/concerns I can try to call her on " Friday to discuss. Thanks. Christina Joyner RN   6/21/2023  9:25 AM CDT       Labs resulted after 6/20 CORE enrollment visit with Christina Bacon PAC--await her review.

## 2023-06-27 NOTE — PROGRESS NOTES
[facility] - CORE Clinic    -Left  for me to return our call to review medication instructions.         Shey Mcnulty RN BAN   3:51 PM 6/27/2023    CORE nurse line M-F 8a-4p: [phone]

## 2023-06-28 NOTE — PROGRESS NOTES
[facility] - CORE Clinic    -Reviewed plans to stop Irbesartan and start Entresto. Pt will hold evening dose of Irbesartan and start Entresto the following morning. She plans to either start is on July 1st or July 3rd. She will let us know.  She has not picked up the medication yet due her car not working. Reviewed potential side effects. She will call us if she experiences any.     Future Appointments   Date Time Provider Department Center   7/14/2023  9:15 AM Zach Ventura MD [facility] CL[facility]   7/17/2023  9:30 AM [facility]   8/28/2023 10:00 AM [email or web address] POLO Mcnulty RN BAN   10:03 AM 6/28/2023    CORE nurse line M-F 8a-4p: [phone]

## 2023-07-14 ENCOUNTER — OFFICE VISIT (OUTPATIENT)
Dept: CARDIOLOGY | Facility: CLINIC | Age: 83
End: 2023-07-14
Attending: NURSE PRACTITIONER
Payer: COMMERCIAL

## 2023-07-14 VITALS
DIASTOLIC BLOOD PRESSURE: 68 MMHG | HEART RATE: 81 BPM | HEIGHT: 59 IN | BODY MASS INDEX: 24.6 KG/M2 | SYSTOLIC BLOOD PRESSURE: 129 MMHG | WEIGHT: 122 LBS

## 2023-07-14 DIAGNOSIS — I48.21 PERMANENT ATRIAL FIBRILLATION (H): ICD-10-CM

## 2023-07-14 DIAGNOSIS — I47.29 PAROXYSMAL VENTRICULAR TACHYCARDIA (H): Primary | ICD-10-CM

## 2023-07-14 DIAGNOSIS — I50.22 CHRONIC SYSTOLIC HEART FAILURE (H): ICD-10-CM

## 2023-07-14 DIAGNOSIS — I10 BENIGN ESSENTIAL HYPERTENSION: ICD-10-CM

## 2023-07-14 PROCEDURE — 99204 OFFICE O/P NEW MOD 45 MIN: CPT | Performed by: INTERNAL MEDICINE

## 2023-07-14 NOTE — LETTER
7/14/2023    [facility]  [address] [place] [id number]    RE: Janessa Benavideze       Dear Colleague,     I had the pleasure of seeing Janessa Melendez in the [facility].  PHYSICIAN NOTE:  This visit was completed in person at the [facility].      I had the pleasure of seeing Ms. Janessa Melendez for evaluation of AF and NSVT.  She has been referred to EP by my colleagues Rosemarie Haque NP and Dr. Brett Smith.    Very pleasant 82-year-old female with history of idiopathic cardiomyopathy (EF has ranged between 40% and 55% over the years, recent cardiac catheterization with trivial CAD), atrial fibrillation with cardioembolic stroke (see details below), dyslipidemia, white-coat hypertension.      Initial diagnosis of AF was in 11/2022 when she presented with acute left facial droop, left-sided weakness and slurred speech.  She was diagnosed with acute R MCA stroke (M1 branch).  He was in AF with RVR.  She was placed on digoxin.    In 03/2023 she was hospitalized with bronchitis and volume overload.  EF was 45-50%.  A Holter monitor after discharge showed AF throughout with average rate 53, range 40-75.  Digoxin was stopped.      In 04/2023 she was admitted with dizziness.  AF with rate in the 50s was noted on her ECG.  Brain MRI was negative for acute process.  She was hydrated and symptoms improved.    Later, carvedilol was decreased from 50 mg twice daily to 25 mg twice daily.   A Holter monitor was repeated.  I reviewed it today.  It shows AF throughout, average rate 56, 1351 pauses between 2 and 2.6 seconds.  There was a 34 beat run of monomorphic VT at 190 bpm.    Today the patient came to our clinic accompanied by her daughter.  Ms. Melendez lives alone.      She started Entresto a few days ago. She tells me she is feeling much better.  This is the best she has felt in a while.  Lightheadedness has decreased.  Her weight has  decreased.  Her energy has improved and her mind is more clear.  She has not had syncope.  She does not have chest pain.    Social history: Lives alone.  She does not smoke and drinks alcohol on occasion.      PHYSICAL EXAMINATION:  Vital signs: 129/68, 81, 55.3 kg, BMI 24.6  General:  in no apparent distress  ENT/Mouth:  no nasal discharge.  Eyes:  normal conjunctivae.   Neck:  no thyromegaly or lymphadenopathy.  Chest/Lungs:  patient is not dyspneic.  Lungs CTA, without rales or wheezing  Cardiovascular: Normal JVP, rhythm is irregular in the 70s.  No gallop, murmur or rub.    Abdomen:  no abdominal distention.  Soft, negative HJR.    Extremities:  no edema  Skin:  no xanthelasma.    Neurologic:  alert & oriented x 3.  No tremor.    Vascular:  2+ carotids without bruits.  2+ radials.        DIAGNOSTIC STUDIES:  Laboratory studies: Creatinine 1.1, sodium 139, potassium 3.7, calcium 9.7  ECG 05/05/2023): AF with VR in the 40s, RBBB, left axis, low voltage  Echocardiogram (03/2023): EF 45-50% with mild global LV hypokinesis.  No significant valvular abnormalities.  Normal IVC.  Cardiac catheterization (01/2019).  Calcium score 319 (77th percentile), mild nonobstructive CAD.      IMPRESSION:  Permanent atrial fibrillation.  Rate was reportedly fast at the time of her initial presentation in 11/2022.  She later developed bradycardia on digoxin + carvedilol (she used to take carvedilol as high as 50 mg twice daily).  She is feeling much better off digoxin and on lower dose carvedilol 25 mg twice daily.  No clear indication for a permanent pacemaker at this time.  History of cardioembolic stroke.  [id number] is at least 6.  Continue Eliquis.  Nonsustained VT.  34-beat monomorphic VT run was recorded on 4/18/2023.  Unclear if symptomatic. NSVT of this duration is concerning in the setting of chronic cardiomyopathy.  However, EF is only mildly reduced at 45-50% and she is already on a beta-blocker.  There is no solid  indication for a diagnostic EP study, antiarrhythmic drug therapy and/or ICD.  Cardiomyopathy/CHF.  Symptomatically she feels much improved currently.    RECOMMENDATIONS:  No need to adjust medications at this time.  Rate control appears acceptable (resting heart rate was in the 70s today).  No further treatment for NSVT.  Continue medical therapy for cardiomyopathy.  Follow-up with the CORE team and Dr. Smith.    It was my pleasure seeing this delightful patient.  Please feel free to call with any questions.   Total time spent today, including time for extensive chart review and documentation, was >60 minutes.    Zach Ventura MD, [facility]        CURRENT MEDICATIONS:  Current Outpatient Medications   Medication Sig Dispense Refill    Acetaminophen (TYLENOL PO) Take 500 mg by mouth every 6 hours as needed for mild pain or fever       amoxicillin (AMOXIL) 500 MG capsule TAKE 4 CAPSULES BY MOUTH 1 HOUR BEFORE DENTAL APPOINTMENT (Patient not taking: Reported on 6/1/2023)      apixaban ANTICOAGULANT (ELIQUIS) 5 MG tablet Take 1 tablet (5 mg) by mouth 2 times daily Restart eliquis 5/6/23 PM dose if no bleeding 180 tablet 3    benzonatate (TESSALON) 100 MG capsule Take 1 capsule (100 mg) by mouth 3 times daily as needed for cough (Patient not taking: Reported on 6/1/2023) 30 capsule 0    carvedilol (COREG) 25 MG tablet Take 1 tablet (25 mg) by mouth 2 times daily 180 tablet 3    cholecalciferol (VITAMIN D3) 25 mcg (1000 units) capsule Take 1 capsule by mouth daily      guaiFENesin-dextromethorphan (ROBITUSSIN DM) 100-10 MG/5ML syrup Take 10 mLs by mouth every 4 hours as needed for cough (Patient not taking: Reported on 6/1/2023) 118 mL 0    hydrALAZINE (APRESOLINE) 10 MG tablet Take 1 tablet (10 mg) by mouth 3 times daily 90 tablet 2    ipratropium - albuterol 0.5 mg/2.5 mg/3 mL (DUONEB) 0.5-2.5 (3) MG/3ML neb solution Take 1 vial (3 mLs) by nebulization every 4 hours as needed for shortness of breath, wheezing or  cough 90 mL 0    Magnesium Oxide 250 MG TABS Take 1 tablet (250 mg) by mouth daily 30 tablet 1    methimazole (TAPAZOLE) 5 MG tablet Take 5 mg by mouth twice a week      omeprazole (PRILOSEC) 20 MG DR capsule Take 20 mg by mouth daily      potassium chloride ER (K-TAB/KLOR-CON) 10 MEQ CR tablet Take 1 tablet (10 mEq) by mouth 2 times daily And as needed as directed. 180 tablet 3    sacubitril-valsartan (ENTRESTO) 49-51 MG per tablet Take 1 tablet by mouth 2 times daily 60 tablet 11    simvastatin (ZOCOR) 10 MG tablet Take 1 tablet (10 mg) by mouth At Bedtime 90 tablet 3    terazosin (HYTRIN) 5 MG capsule Take 1 capsule (5 mg) by mouth At Bedtime 90 capsule 0    torsemide (DEMADEX) 10 MG tablet Take 3 tablets (30 mg) by mouth daily 270 tablet 2       ALLERGIES     Allergies   Allergen Reactions    Amlodipine      swelling    Aspirin Other (See Comments)     Bleeding              Bleeding, GI Lesion        Codeine Sulfate GI Disturbance       PAST MEDICAL HISTORY:  Past Medical History:   Diagnosis Date    Arthritis     Breast cancer (H)     Cardiomyopathy (H)     ideopathic    Coronary artery disease 9/25/2014    Hypercholesterolemia     Hypertension     Hypokalemia     Malignant neoplasm (H)     Shortness of breath     Shoulder pain        PAST SURGICAL HISTORY:  Past Surgical History:   Procedure Laterality Date    BACK SURGERY      CARDIAC SURGERY      angiogram neg many yrs ago    CHOLECYSTECTOMY      CV CORONARY ANGIOGRAM N/A 5/5/2023    Procedure: Coronary Angiogram;  Surgeon: Kane Daugherty MD;  Location: [facility] CARDIAC CATH LAB    CV LEFT HEART CATH N/A 5/5/2023    Procedure: Left Heart Catheterization;  Surgeon: Kane Daugherty MD;  Location: [facility] CARDIAC CATH LAB    CV RIGHT HEART CATH MEASUREMENTS RECORDED N/A 5/5/2023    Procedure: Right Heart Catheterization;  Surgeon: Kane Daugherty MD;  Location:  HEART CARDIAC CATH LAB    CV RIGHT HEART EXERCISE STRESS STUDY N/A 5/5/2023     Procedure: Stress Drug Study;  Surgeon: Kane Daugherty MD;  Location:  HEART CARDIAC CATH LAB    ESOPHAGOSCOPY, GASTROSCOPY, DUODENOSCOPY (EGD), COMBINED N/A 6/22/2021    Procedure: ESOPHAGOGASTRODUODENOSCOPY, WITH ENDOSCOPIC US WITH FINE NEEDLE ASPIRATION;  Surgeon: Ventura Mcgill MD;  Location:  GI    EYE SURGERY      ORTHOPEDIC SURGERY      lt shoulder replaced, total    partial masectomy         FAMILY HISTORY:  Family History   Problem Relation Age of Onset    Other Cancer Mother     Other Cancer Brother     No Known Problems Son     Heart Failure Daughter     Family History Negative No family hx of        SOCIAL HISTORY:  Social History     Socioeconomic History    Marital status: Single   Tobacco Use    Smoking status: Never    Smokeless tobacco: Never   Substance and Sexual Activity    Alcohol use: Yes     Comment: socially    Drug use: No    Sexual activity: Never   Other Topics Concern    Special Diet No    Exercise No           CC  Heidi Haque, NP  [address]  [place] [address]          Thank you for allowing me to participate in the care of your patient.      Sincerely,     Zach Ventura MD     [facility]

## 2023-07-17 ENCOUNTER — VIRTUAL VISIT (OUTPATIENT)
Dept: CARDIOLOGY | Facility: CLINIC | Age: 83
End: 2023-07-17
Attending: PHYSICIAN ASSISTANT
Payer: COMMERCIAL

## 2023-07-17 DIAGNOSIS — I42.9 CARDIOMYOPATHY, UNSPECIFIED TYPE (H): Primary | ICD-10-CM

## 2023-07-17 PROCEDURE — 98966 PH1 ASSMT&MGMT NQHP 5-10: CPT | Mod: VID | Performed by: PHYSICIAN ASSISTANT

## 2023-07-17 NOTE — PROGRESS NOTES
Janessa Melendez is a very pleasant 82 year old female with a history of Embolic CVA secondary to new onset atrial fibrillation in November 2022, and idiopathic cardiomyopathy, anemia, hypercholesterolemia, white coat hypertension who presents today for CORE clinic follow-up.  She has been a patient of Dr. Albino Connors's for over 2 decades for her mild cardiomyopathy.  Her ejection fraction has fluctuated from anywhere between 40 to 55%.     Ms. Melendez was hospitalized in November 2022 after experiencing an onset left facial droop, left arm and leg weakness as well as slurred speech secondary to an acute ischemic stroke in the distal right M1 territory.  She was found to have a new diagnosis of atrial fibrillation and was started on anticoagulation.  She saw Dr. Albino Connors in follow-up in February and was started on digoxin.  A 24 hour blood pressure monitor was done which showed low normal to low blood pressure consistent with whitecoat hypertension.     In March, she was hospitalized with bronchitis and acute on chronic systolic congestive heart failure.  Her NT proBNP was nearly 6000 on admission.  She was treated with IV diuretics as well as prednisone and antibiotics with improvement.     She was seen in the ER on 4/4/2023 with episodic dizziness and lightheadedness.  EKG showed atrial fibrillation with slow ventricular response.  MRI of the brain was negative for acute intracranial process.  She was hydrated with IV fluids and symptoms improved.  She was subsequently seen in our clinic and Coreg was reduced to 25 mg twice daily.  Given her dizziness, she had a repeat Holter monitor. This revealed a 34 beat run of ventricular tachycardia.  There were 1351 pauses greater than 2 seconds, the longest 2.66 seconds.  Overall 3% PVC burden, average heart rate 56, 14% in bradycardia, 100% atrial fibrillation.    She was seen in follow up by Rosemarie Haque NP in follow up. Given her dyspnea and VT noted on her  monitor, RHC and coronary angiography was recommended. Additionally, she was referred to EP. She underwent coronary angiography and RHC on 5/5/23. Angiography showed trivial coronary disease. RHC showed moderate pulmonary hypertension and moderately elevated right and left filling pressures. Notably, she was severely hypertensive on arrival to the lab. CO was normal. She was restarted on 10 mg of hydralazine.  Her torsemide also was increased at some point to 40 mg daily from 30 mg daily.  She had no improvement thus this was decreased back to 30 mg daily and ultimately she was referred to the CORE clinic.    I met Ms. Melendez on 6/20/2023.  We had a long visit about her symptoms.  She had ongoing dyspnea but also primarily was concerned about her episodes of dizziness and fogginess.  Her heart rate was in the 40s.  I stopped her digoxin.  We also talked about potentially adding an SGLT2 inhibitor or Entresto however she wanted to make 1 change at a time.  She felt much better after discontinuing the digoxin and ultimately agreed to start Entresto.  We changed her irbesartan to Entresto 49-51 mg twice daily a few weeks ago.    I spoke with her today for follow-up.  This was a video visit per her request.  She did just see Dr. Ventura a few days ago.  He made no changes and recommended continuing Coreg at 25 mg twice daily and staying off the digoxin.  He did not recommend any further evaluation for her NSVT other than continuing beta-blockers.

## 2023-07-17 NOTE — PROGRESS NOTES
Janessa Babb is a 82 year old who is being evaluated via a billable video visit.      How would you like to obtain your AVS? MyChart  If the video visit is dropped, the invitation should be resent by: Text to cell phone: [phone]  Will anyone else be joining your video visit? 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  She has been a patient of Dr. Albino Connors's for over 2 decades for her mild cardiomyopathy.  Her ejection fraction has fluctuated from anywhere between 40 to 55%.     Ms. Melendez was hospitalized in November 2022 after experiencing an onset left facial droop, left arm and leg weakness as well as slurred speech secondary to an acute ischemic stroke in the distal right M1 territory.  She was found to have a new diagnosis of atrial fibrillation and was started on anticoagulation.  She saw Dr. Albino Connors in follow-up in February and was started on digoxin.  A 24 hour blood pressure monitor was done which showed low normal to low blood pressure consistent with whitecoat hypertension.     In March, she was hospitalized with bronchitis and acute on chronic systolic congestive heart failure.  Her NT proBNP was nearly 6000 on admission.  She was treated with IV diuretics as well as prednisone and antibiotics with improvement.     She was seen in the ER on 4/4/2023 with episodic dizziness and lightheadedness.  EKG showed atrial fibrillation with slow ventricular response.  MRI of the brain was negative for acute intracranial process.  She was hydrated with IV fluids and symptoms improved.  She was subsequently seen in our clinic and Coreg was reduced to 25 mg twice daily.  Given her dizziness, she had a repeat Holter monitor. This revealed  a 34 beat run of ventricular tachycardia.  There were 1351 pauses greater than 2 seconds, the longest 2.66 seconds.  Overall 3% PVC burden, average heart rate 56, 14% in bradycardia, 100% atrial fibrillation.    She was seen in follow up by Rosemarie Haque NP in follow up. Given her dyspnea and VT noted on her monitor, RHC and coronary angiography was recommended. Additionally, she was referred to EP. She underwent coronary angiography and RHC on 5/5/23. Angiography showed trivial coronary disease. RHC showed moderate pulmonary hypertension and moderately elevated right and left filling pressures. Notably, she was severely hypertensive on arrival to the lab. CO was normal. She was restarted on 10 mg of hydralazine.  Her torsemide also was increased at some point to 40 mg daily from 30 mg daily.  She had no improvement thus this was decreased back to 30 mg daily and ultimately she was referred to the [facility] clinic.    I met Ms. Melendez on 6/20/2023.  We had a long visit about her symptoms.  She had ongoing dyspnea but also primarily was concerned about her episodes of dizziness and fogginess.  Her heart rate was in the 40s.  I stopped her digoxin.  We also talked about potentially adding an SGLT2 inhibitor or Entresto however she wanted to make 1 change at a time.  She felt much better after discontinuing the digoxin and ultimately agreed to start Entresto.  We changed her irbesartan to Entresto 49-51 mg twice daily a few weeks ago.    I spoke with her today for follow-up.  This was a video visit per her request.  She did just see Dr. Ventura a few days ago.  He made no changes and recommended continuing Coreg at 25 mg twice daily and staying off the digoxin.  He did not recommend any further evaluation for her NSVT other than continuing beta-blockers.  She confirms that her dizziness/fogginess has essentially resolved after stopping the digoxin.  She started the Entresto a few weeks ago.  She is tolerating that.  Blood  pressures have been typically running in the 120 systolic.  No dizziness or lightheadedness.  Her swelling has essentially resolved.  She also feels her dyspnea is a bit better.  She has been going out for walks and able to go a little longer.  Weight at home is 130 pounds.  This is only down 2 pounds from her visit with me.  Interestingly, her weight in clinic few days ago was down 12 pounds to 122.    Of note, she had been instructed to cut her torsemide back to 30 mg daily however tells me she is continue to take 40 mg daily today.    Assessment/plan:  Janessa Melendez is a delightful 82-year-old female with a history of an embolic CVA, and idiopathic cardiomyopathy with an ejection fraction in the 40 to 45% range, and atrial fibrillation managed with a rate control strategy and anticoagulation who I spoke with today for follow-up.    Today, Cece reports feeling improved from when I saw her about a month ago.  She is feeling much better after stopping digoxin.  She recently saw Dr. Ventura from electrophysiology.  Her heart rates appear to be well controlled on Coreg 25 mg twice daily and we will continue this unchanged.  She is on Eliquis for thromboembolic prevention which we will continue.    Per her report, her heart failure symptoms seem to be better.  Her weight is down slightly at home although she was down 12 pounds in clinic a few days ago?  Regardless, peripheral edema has essentially resolved and her dyspnea seems to be improved.  She seems to be tolerating Entresto and blood pressures are stable.  She needs a basic metabolic panel to follow-up on her renal function after the switch.  She would like to do this at her primary and we will fax over an order for basic metabolic panel to [facility].  If her renal function is stable on labs, I would recommend increasing her Entresto to  mg twice daily.  Her blood pressures have been well controlled and we can likely then stop  hydralazine.    I confirmed today she is continue to take 40 mg of torsemide.  Will attempt to cut back to 30 mg daily.  If she has weight gain, worsening swelling, dyspnea I asked her to call and we can increase back up to 40 mg daily.  We can certainly adjust further if needed based on her lab results as well.    It was a pleasure speaking with Janessa today.  I am glad she is feeling better.  We will be in touch after her lab results to make further medication adjustments. I suggested she make an appointment to see Dr. Albino Connors in 3 months with a repeat BMP prior. As always, I asked her to contact us sooner with any questions or concerns.       Telephone visit details:  Total call time: 8 minutes  Originating Location (pt. Location): Home    Distant Location (provider location):  On-site\

## 2023-07-17 NOTE — NURSING NOTE
[facility]    Faxed order for BMP per Christina Bacon PAC to [facility] (fax: [phone]).    Valerie Joyner RN BSN   10:11 AM 07/17/23  CORE nurse line M-F 8a-4p: [phone]

## 2023-07-17 NOTE — LETTER
7/17/2023    [facility]  [address] [place] [id number]    RE: Janessa Benavideze       Dear Colleague,     I had the pleasure of seeing Janessa Melendez in the [facility].  Janessa Babb is a 82 year old who is being evaluated via a billable video visit.      How would you like to obtain your AVS? MyChart  If the video visit is dropped, the invitation should be resent by: Text to cell phone: [phone]  Will anyone else be joining your video visit? 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  She has been a patient of Dr. Albino Connors's for over 2 decades for her mild cardiomyopathy.  Her ejection fraction has fluctuated from anywhere between 40 to 55%.     Ms. Melendez was hospitalized in November 2022 after experiencing an onset left facial droop, left arm and leg weakness as well as slurred speech secondary to an acute ischemic stroke in the distal right M1 territory.  She was found to have a new diagnosis of atrial fibrillation and was started on anticoagulation.  She saw Dr. Albino Connors in follow-up in February and was started on digoxin.  A 24 hour blood pressure monitor was done which showed low normal to low blood pressure consistent with whitecoat hypertension.     In March, she was hospitalized with bronchitis and acute on chronic systolic congestive heart failure.  Her NT proBNP was nearly 6000 on admission.  She was treated with IV diuretics as well as prednisone and antibiotics with improvement.     She was seen in the ER on 4/4/2023 with episodic dizziness and lightheadedness.  EKG showed atrial fibrillation with slow ventricular response.  MRI of the brain was negative for  acute intracranial process.  She was hydrated with IV fluids and symptoms improved.  She was subsequently seen in our clinic and Coreg was reduced to 25 mg twice daily.  Given her dizziness, she had a repeat Holter monitor. This revealed a 34 beat run of ventricular tachycardia.  There were 1351 pauses greater than 2 seconds, the longest 2.66 seconds.  Overall 3% PVC burden, average heart rate 56, 14% in bradycardia, 100% atrial fibrillation.    She was seen in follow up by Rosemarie Haque NP in follow up. Given her dyspnea and VT noted on her monitor, RHC and coronary angiography was recommended. Additionally, she was referred to EP. She underwent coronary angiography and RHC on 5/5/23. Angiography showed trivial coronary disease. RHC showed moderate pulmonary hypertension and moderately elevated right and left filling pressures. Notably, she was severely hypertensive on arrival to the lab. CO was normal. She was restarted on 10 mg of hydralazine.  Her torsemide also was increased at some point to 40 mg daily from 30 mg daily.  She had no improvement thus this was decreased back to 30 mg daily and ultimately she was referred to the CORE clinic.    I met Ms. Melendez on 6/20/2023.  We had a long visit about her symptoms.  She had ongoing dyspnea but also primarily was concerned about her episodes of dizziness and fogginess.  Her heart rate was in the 40s.  I stopped her digoxin.  We also talked about potentially adding an SGLT2 inhibitor or Entresto however she wanted to make 1 change at a time.  She felt much better after discontinuing the digoxin and ultimately agreed to start Entresto.  We changed her irbesartan to Entresto 49-51 mg twice daily a few weeks ago.    I spoke with her today for follow-up.  This was a video visit per her request.  She did just see Dr. Ventura a few days ago.  He made no changes and recommended continuing Coreg at 25 mg twice daily and staying off the digoxin.  He did not recommend any  further evaluation for her NSVT other than continuing beta-blockers.  She confirms that her dizziness/fogginess has essentially resolved after stopping the digoxin.  She started the Entresto a few weeks ago.  She is tolerating that.  Blood pressures have been typically running in the 120 systolic.  No dizziness or lightheadedness.  Her swelling has essentially resolved.  She also feels her dyspnea is a bit better.  She has been going out for walks and able to go a little longer.  Weight at home is 130 pounds.  This is only down 2 pounds from her visit with me.  Interestingly, her weight in clinic few days ago was down 12 pounds to 122.    Of note, she had been instructed to cut her torsemide back to 30 mg daily however tells me she is continue to take 40 mg daily today.    Assessment/plan:  Janessa Melendez is a delightful 82-year-old female with a history of an embolic CVA, and idiopathic cardiomyopathy with an ejection fraction in the 40 to 45% range, and atrial fibrillation managed with a rate control strategy and anticoagulation who I spoke with today for follow-up.    Today, Cece reports feeling improved from when I saw her about a month ago.  She is feeling much better after stopping digoxin.  She recently saw Dr. Ventura from electrophysiology.  Her heart rates appear to be well controlled on Coreg 25 mg twice daily and we will continue this unchanged.  She is on Eliquis for thromboembolic prevention which we will continue.    Per her report, her heart failure symptoms seem to be better.  Her weight is down slightly at home although she was down 12 pounds in clinic a few days ago?  Regardless, peripheral edema has essentially resolved and her dyspnea seems to be improved.  She seems to be tolerating Entresto and blood pressures are stable.  She needs a basic metabolic panel to follow-up on her renal function after the switch.  She would like to do this at her primary and we will fax over an order for  basic metabolic panel to [facility].  If her renal function is stable on labs, I would recommend increasing her Entresto to  mg twice daily.  Her blood pressures have been well controlled and we can likely then stop hydralazine.    I confirmed today she is continue to take 40 mg of torsemide.  Will attempt to cut back to 30 mg daily.  If she has weight gain, worsening swelling, dyspnea I asked her to call and we can increase back up to 40 mg daily.  We can certainly adjust further if needed based on her lab results as well.    It was a pleasure speaking with Janessa today.  I am glad she is feeling better.  We will be in touch after her lab results to make further medication adjustments. I suggested she make an appointment to see Dr. Albino Connors in 3 months with a repeat BMP prior. As always, I asked her to contact us sooner with any questions or concerns.       Telephone visit details:  Total call time: 8 minutes  Originating Location (pt. Location): Home    Distant Location (provider location):  On-site\      Thank you for allowing me to participate in the care of your patient.      Sincerely,     Vero Bacon PA-C     [facility]  cc:   Vero Bacon PA-C  [address]  [place] [address]

## 2023-07-17 NOTE — PATIENT INSTRUCTIONS
Call CORE nurse for any questions or concerns Mon-Fri 8am-4pm:                                                 #[phone]                                       For concerns after hours:                                               #[phone], option 2     1: Medication changes: DECREASE Torsemide to 30 mg daily.   2: Plan from today: We'll fax over orders to your primary office for a BMP to recheck kidney function and electrolytes   -  we'll call with your lab results and medication changes from there. If kidney function is stable - I'd like to increase your Entresto and we can stop the hydralazine.   - see Dr. Albino Connors in 3 months with a lab prior.

## 2023-07-20 ENCOUNTER — CARE COORDINATION (OUTPATIENT)
Dept: CARDIOLOGY | Facility: CLINIC | Age: 83
End: 2023-07-20
Payer: COMMERCIAL

## 2023-07-20 NOTE — PROGRESS NOTES
[facility]  VM from pt stating she saw BERNADETTE Berry on Monday and she was supposed have labs at PCP, but they have not received orders, wondering if she still needs them done.     Reviewed chart, pt needs BMP at PCP office per BERNADETTE Berry's 7/17 office note. Faxed orders to  at [phone].     Called pt, reviewed I faxed orders now, asked she give it a little bit before calling to check. Asked if they still do not have them to confirm fax and let me know. Pt stated understanding.     Lily Moncada, RN, BSN  07/20/23 at 2:32 PM

## 2023-07-24 ENCOUNTER — CARE COORDINATION (OUTPATIENT)
Dept: CARDIOLOGY | Facility: CLINIC | Age: 83
End: 2023-07-24
Payer: COMMERCIAL

## 2023-07-24 NOTE — PROGRESS NOTES
[facility]  CORE MARINO visit 7/17/23 with BERNADETTE Berry. Pt tolerating Entresto and BP stable. Torsemide decreased to 30mg daily as HF symptoms improved. Needs labs, if labs stable, recommend increasing Entresto to 97/103mg BID and then can likely stop Hydralazine.     BMP drawn 7/21/23 at PCP office below. Orders for follow up 10/2023 with Dr. ELIZABETH and labs, not yet scheduled. Routed to BERNADETTE Berry for review and final recommendations.     Lily Moncada, RN, BSN  07/24/23 at 8:35 AM

## 2023-07-24 NOTE — PROGRESS NOTES
Her labs look great. If BPs are > 110 systolic, I would increase Entresto as planned and she can stop hydralazine. Would aim to keep BP < 130/80 so if frequently above that with changes she should call us. Hopefully she's still feeling well. Thanks. Christina

## 2023-07-25 NOTE — PROGRESS NOTES
[facility]  Called pt with labs results. Also reviewed recommendations per Jamal FLEMING to increase Entersto to 97/103mg BID and stop Hydralazine if SBP >110's and to call if consistently >130/80's.     Pt checked BP for about a week at home, but has not checked it the last few days. Pt also reports she is having some lightheadedness in the mornings usually after taking her medications, but gets better in the afternoon. Her AM BP's last week ranged from 110-115/60-70's, 1-2 hours after medications. She also took a few afternoon BP's, which were 130/64 and 135/56. Pt reports dizziness not necessarily every day, but usually if it does happen its in am after meds and again improved in the afternoon. She is feeling fine today thus far. Will confirm with BERNADETTE Berry that she still wants to make change given this information.     Lily Moncada RN, BSN  07/25/23 at 10:58 AM

## 2023-07-25 NOTE — PROGRESS NOTES
If she's having some lightheadedness then why don't we hold off for now. I think she can still discontinue the hydralazine to simplify things. I'd have her keep an eye on the BPs. If they are starting to trend up, I'd suggest we increase it then. Thanks! Christina

## 2023-07-25 NOTE — PROGRESS NOTES
[facility]  Called pt and reviewed recommendations from BERNADETTE Berry. Pt verbalized understanding. Hydralazine removed from list. Asked pt to check BP daily 1-2 hours after am meds and to call if worsening lightheadedness or SBP consistently >130's prior to next follow up. Pt stated understanding.     Lily Moncada RN, BSN  07/25/23 at 1:00 PM

## 2023-07-28 NOTE — PROGRESS NOTES
[facility] - CORE Clinic    -Janessa Babb is calling today as instructed given SBP is elevated above 130. BP's have only been elevated today. Yesterday it as 119/61. She states she did have some dizziness after taking all of her medications this am.     137/70 HR 66  134/76 HR 70  149/51 HR 68    She is going into her PCP this afternoon due to neck pain. It is okay to leave a message if needed.     Will route to Christina for review.    Shey Mcnulty RN BAN   2:37 PM 7/28/2023    CORE nurse line M-F 8a-4p: [phone]

## 2023-07-31 NOTE — PROGRESS NOTES
[facility] CORE Clinic    -  Vero Bacon PA-C  You3 days ago     AL  Yes, lets just wait. Thanks.     Spoke with Janessa Babb, she will continue to watch her BPs and call if they continue to be elevated. She did not check them over the weekend.     Shey Mcnulty RN BAN   11:15 AM 7/31/2023    CORE nurse line M-F 8a-4p: [phone]

## 2023-08-04 NOTE — PHARMACY-CONSULT NOTE
From: Toniann Meckel Day  To: Bethany Sheets  Sent: 8/3/2023 10:48 PM CDT  Subject: Sore throat     Nathen Barry had a sore throat for a few days and a cough. I was wondering if you could send me an antibiotic to [facility] in [place]. Pharmacy Consult to evaluate for medication related stroke core measures    Janessa Holley MARCO A Melendez, 81 year old female admitted for acute ischemic stroke in the distal right M1 territory on 11/4/2022.    Thrombolytic was given on 11/4/22 at 1418.    VTE Prophylaxis SCDs /PCDs placed on 11/4, as appropriate prior to end of hospital day 2.    Antithrombotic: aspirin started on 11/6 (did not meet end of hospital day 2). Continue antithrombotic therapy on discharge to meet quality measures, unless contraindicated.    Anticoagulation if history of A-fib/flutter: Patient has history of A-fib, and pharmacy liaison has been consulted for cost of DOACs. Please have anticoagulation on discharge to meet quality measures, unless contraindicated.    LDL Cholesterol Calculated   Date Value Ref Range Status   11/04/2022 39 <=100 mg/dL Final   05/06/2021 54 <100 mg/dL Final     Comment:     Desirable:       <100 mg/dl       Patient's home statin, Zocor (simvastatin) restarted; continue statin on discharge to meet quality measures, unless contraindicated.     Recommendations: Please order maintenance aspirin doses and anticoagulation and continue on discharge to meet quality core measures.    Thank you for the consult.    Nara Marquez RPH 11/6/2022 7:52 AM

## 2023-08-05 NOTE — MR AVS SNAPSHOT
After Visit Summary   2/27/2017    Larry Melendez    MRN: [id number]           Patient Information     Date Of Birth          1940        Visit Information        Provider Department      2/27/2017 9:15 AM Brett Smith MD [facility]        Today's Diagnoses     Cardiomyopathy        Hypercholesterolemia        Benign essential hypertension           Follow-ups after your visit        Additional Services     Follow-Up with Nurse           Follow-Up with Cardiologist                 Your next 10 appointments already scheduled     Mar 06, 2017  9:00 AM CST   LAB with [facility]   [facility] ([facility])    [address] [place] [id number] [phone]           Patient must bring picture ID.  Patient should be prepared to give a urine specimen  Please do not eat 10-12 hours before your appointment if you are coming in fasting for labs on lipids, cholesterol, or glucose (sugar).  Pregnant women should follow their Care Team instructions. Water with medications is okay. Do not drink coffee or other fluids.   If you have concerns about taking  your medications, please ask at office or if scheduling via [employer], send a message by clicking on Secure Messaging, Message Your Care Team.              Future tests that were ordered for you today     Open Future Orders        Priority Expected Expires Ordered    Follow-Up with Nurse Routine 3/6/2017 2/27/2018 2/27/2017    Basic metabolic panel Routine 8/26/2017 2/27/2018 2/27/2017    Follow-Up with Cardiologist Routine 8/26/2017 2/27/2018 2/27/2017    Basic metabolic panel Routine 3/6/2017 2/27/2018 2/27/2017            Who to contact     If you have questions or need follow up information about today's clinic visit or your schedule please contact [facility] directly at  "[phone].  Normal or non-critical lab and imaging results will be communicated to you by MyChart, letter or phone within 4 business days after the clinic has received the results. If you do not hear from us within 7 days, please contact the clinic through [employer] or phone. If you have a critical or abnormal lab result, we will notify you by phone as soon as possible.  Submit refill requests through [employer] or call your pharmacy and they will forward the refill request to us. Please allow 3 business days for your refill to be completed.          Additional Information About Your Visit        SendUshart Information     [employer] lets you send messages to your doctor, view your test results, renew your prescriptions, schedule appointments and more. To sign up, go to [email or web address] . Click on \"Log in\" on the left side of the screen, which will take you to the Welcome page. Then click on \"Sign up Now\" on the right side of the page.     You will be asked to enter the access code listed below, as well as some personal information. Please follow the directions to create your username and password.     Your access code is: [email or web address]  Expires: 2017 10:06 AM     Your access code will  in 90 days. If you need help or a new code, please call your [facility] clinic or [phone].        Care EveryWhere ID     This is your Care EveryWhere ID. This could be used by other organizations to access your [facility] medical records  [id number]        Your Vitals Were     Pulse Height BMI (Body Mass Index)             68 1.524 m (5') 28.51 kg/m2          Blood Pressure from Last 3 Encounters:   17 130/80   16 128/72   10/08/15 118/62    Weight from Last 3 Encounters:   17 66.2 kg (146 lb)   16 64 kg (141 lb)   10/08/15 64 kg (141 lb)              We Performed the Following     Follow-Up with Cardiologist          Today's Medication Changes          These changes are accurate as of: 17 " 10:06 AM.  If you have any questions, ask your nurse or doctor.               Start taking these medicines.        Dose/Directions    hydrochlorothiazide 25 MG tablet   Commonly known as:  HYDRODIURIL   Used for:  Benign essential hypertension   Started by:  Brett Smith MD        Dose:  25 mg   Take 1 tablet (25 mg) by mouth daily   Quantity:  90 tablet   Refills:  3       valsartan 320 MG tablet   Commonly known as:  DIOVAN   Used for:  Benign essential hypertension   Started by:  Brett Smith MD        Dose:  320 mg   Take 1 tablet (320 mg) by mouth daily   Quantity:  90 tablet   Refills:  3         Stop taking these medicines if you haven't already. Please contact your care team if you have questions.     lisinopril-hydrochlorothiazide 20-12.5 MG per tablet   Commonly known as:  PRINZIDE/ZESTORETIC   Stopped by:  Brett Smith MD                Where to get your medicines      These medications were sent to [facility] [address] [place] [address] AT [address] [address]     Phone:  [phone]     hydrochlorothiazide 25 MG tablet    valsartan 320 MG tablet                Primary Care Provider Office Phone # Fax #    Malaika Rodriguez [phone]       [facility] [address]  [facility] [address]        Thank you!     Thank you for choosing [facility] [place]  for your care. Our goal is always to provide you with excellent care. Hearing back from our patients is one way we can continue to improve our services. Please take a few minutes to complete the written survey that you may receive in the mail after your visit with us. Thank you!             Your Updated Medication List - Protect others around you: Learn how to safely use, store and throw away your medicines at www.disposemymeds.org.          This list is accurate as of: 2/27/17 10:06 AM.   Always use your most recent med list.                   Brand Name Dispense Instructions for use    carvedilol 25 MG tablet    COREG    180 tablet    Take 1 tablet (25 mg) by mouth 2 times daily       cyanocobalamin 1000 MCG/ML injection    VITAMIN B12     Inject 1 mL as directed every 30 days.       hydrochlorothiazide 25 MG tablet    HYDRODIURIL    90 tablet    Take 1 tablet (25 mg) by mouth daily       potassium chloride SA 10 MEQ CR tablet    K-DUR/KLOR-CON M    90 tablet    Take 1 tablet (10 mEq) by mouth daily       priLOSEC OTC 20 MG tablet   Generic drug:  omeprazole      Take 20 mg by mouth daily       simvastatin 10 MG tablet    ZOCOR    90 tablet    Take 1 tablet (10 mg) by mouth At Bedtime       TYLENOL PO      Take 500 mg by mouth as needed for mild pain or fever       valsartan 320 MG tablet    DIOVAN    90 tablet    Take 1 tablet (320 mg) by mouth daily       vitamin D 2000 UNITS tablet      Take 1 tablet by mouth daily          diff breathing

## 2023-08-07 ENCOUNTER — TELEPHONE (OUTPATIENT)
Dept: CARDIOLOGY | Facility: CLINIC | Age: 83
End: 2023-08-07
Payer: COMMERCIAL

## 2023-08-07 NOTE — TELEPHONE ENCOUNTER
Hi team 5,    Found this pt in the WQ with old orders from [facility] dated 2/2023 for ECHO and MARINO OV - pt has since seen MARINO's please review if pt still need these orders if not remove if still do send to scheduling pool to do.    Artie

## 2023-08-12 NOTE — PROGRESS NOTES
Cardiology Clinic Progress Note  Larry Melendez MRN# [id number]   YOB: 1940 Age: 78 year old          Assessment and Plan:     1. Idiopathic cardiomyopathy    Left drop in the LVEF from 40-45% on recent echocardiogram.  Previous echocardiogram in November 2017 showed LVEF 50-55%    CT coronary angiogram showed non-obstructive disease    Patient is asymptomatic    Follow up in 1 year with Dr. Smith and repeat echocardiogram    2. Non-obstructive CAD    CT coronary angiogram showed total score of 319 with 274 concentrated in the left main.     Continue risk factor modification with statin therapy.  Of note, she is not on aspirin due to history of GI bleeding.    3. Hypertension    Elevated initially again in clinic, but patient reports home blood pressures are relatively well controlled.      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    Continue carvedilol 50 mg twice daily, hydralazine 25 mg 3 times daily, irbesartan 300 mg daily and terazosin 5 mg daily at bedtime.    4. Hyperlipidemia    Fasting lipids from 11/12/18: , HDL 55, LDL 59, Trigs 127    Continue simvastatin 10 mg daily         History of Presenting Illness:    Larry Melendez is a very pleasant 78 year old patient of Dr. Smith who presents today to follow for a CT coronary angiogram. She has a past medical history significant for idiopathic cardiomyopathy, hypertension, and hyperlipidemia.    Her blood pressure has becoming increasingly difficult to control.  Initially was well controlled on hydrochlorothiazide, but due to significant hyponatremia and had to be discontinued.  Amlodipine 10 mg caused significant peripheral edema.  She is currently under significant amount of stress as her daughter-in-law a recently passed away from lung cancer in her son has also been diagnosed with lung cancer.    She was seen by Dr. Smith in November of 2018. Due to considerable GI upset, he recommended stopping  olmesartan as it can cause a syndrome like celiac disease causing malabsorption. He started candesartan 32 mg daily in it's place. Candesartan was not covered by her insurance. So, then irbesartan 300 mg daily was started. She returned for a BP follow up and her BP was 156/92. In his absence Lakhwinder Bianchi recommended hydralazine 25 mg TID.     A repeat echocardiogram for surveillance of her cardiomyopathy was completed on 12/14/18. It revealed that her LVEF had dropped back to 40-45% with mild to moderate global hypokinesis. Her previous LVEF was 50-55% in November 2017. Of note, her BP at the time of echocardiogram was 169/95.  She monitors her blood pressure frequently at home and states runs between 120-135/65-72.  Due to the decline in her ejection fraction a CT coronary angiogram was obtained.  Her total score was 319 placing her in the 77th percentile when compared to age and gender matched control group.  Majority of the calcium was concentrated in the left anterior descending artery.    Today in clinic her blood pressure was again initially elevated but improved to 128/82 upon recheck.  She denies any progressive shortness of breath on exertion and her lower extremity edema is controlled with 20-40 mg of p.o. Lasix as needed.  She does not have any orthopnea, PND, palpitations or chest discomfort.               Review of Systems:   Review of Systems:  Skin:  Negative     Eyes:  Positive for glasses  ENT:  Negative    Respiratory:  Positive for wheezing  Cardiovascular:  Negative    Gastroenterology: Positive for heartburn  Genitourinary:  Negative    Musculoskeletal:  Positive for back pain;joint pain;arthritis  Neurologic:  Negative    Psychiatric:  Positive for anxiety  Heme/Lymph/Imm:  Negative    Endocrine:  Negative              Physical Exam:     Vitals: /82   Pulse 70   Ht 1.524 m (5')   Wt 66.2 kg (146 lb)   BMI 28.51 kg/m    Constitutional:  cooperative, alert and oriented, well  developed, well nourished, in no acute distress overweight      Skin:  warm and dry to the touch, no apparent skin lesions or masses noted        Head:  normocephalic, no masses or lesions;normocephalic        Eyes:  pupils equal and round, conjunctivae and lids unremarkable, sclera white, no xanthalasma, EOMS intact, no nystagmus        ENT:  no pallor or cyanosis, dentition good        Neck:  carotid pulses are full and equal bilaterally, JVP normal, no carotid bruit        Chest:  normal breath sounds, clear to auscultation, normal A-P diameter, normal symmetry, normal respiratory excursion, no use of accessory muscles        Cardiac: regular rhythm;normal S1 and S2;no murmurs, gallops or rubs detected   S4              Abdomen:  not assessed this visit        Vascular: pulses full and equal                                      Extremities and Back:  no edema        Neurological:  no gross motor deficits;affect appropriate               Medications:     Current Outpatient Medications   Medication Sig Dispense Refill     Acetaminophen (TYLENOL PO) Take 500 mg by mouth every 6 hours as needed for mild pain or fever        carvedilol (COREG) 25 MG tablet Take 2 tablets (50 mg) by mouth 2 times daily 360 tablet 1     Cholecalciferol (VITAMIN D) 2000 UNITS tablet Take 1 tablet by mouth daily       furosemide (LASIX) 20 MG tablet 2 pills(40mg) on Tuesday and Thursday, one pill the other 5 days of the week and as directe (Patient taking differently: 20 mg daily As directed) 100 tablet 3     hydrALAZINE (APRESOLINE) 25 MG tablet Take 1 tablet (25 mg) by mouth 3 times daily 270 tablet 3     irbesartan (AVAPRO) 300 MG tablet Take 1 tablet (300 mg) by mouth daily 30 tablet 1     Omeprazole (PRILOSEC PO) Take 10 mg by mouth daily       potassium chloride (K-TAB,KLOR-CON) 10 MEQ tablet Take 1 tablet (10 mEq) by mouth 2 times daily And as needed as directed. 100 tablet 3     simvastatin (ZOCOR) 10 MG tablet Take 1 tablet (10  mg) by mouth At Bedtime 90 tablet 1     terazosin (HYTRIN) 5 MG capsule Take 1 capsule (5 mg) by mouth At Bedtime 90 capsule 3       Family History   Problem Relation Age of Onset     Other Cancer Mother      Other Cancer Brother      Family History Negative No family hx of        Social History     Socioeconomic History     Marital status: Single     Spouse name: Not on file     Number of children: Not on file     Years of education: Not on file     Highest education level: Not on file   Social Needs     Financial resource strain: Not on file     Food insecurity - worry: Not on file     Food insecurity - inability: Not on file     Transportation needs - medical: Not on file     Transportation needs - non-medical: Not on file   Occupational History     Not on file   Tobacco Use     Smoking status: Never Smoker     Smokeless tobacco: Never Used   Substance and Sexual Activity     Alcohol use: Yes     Comment: socially     Drug use: No     Sexual activity: No   Other Topics Concern     Parent/sibling w/ CABG, MI or angioplasty before 65F 55M? Not Asked      Service Not Asked     Blood Transfusions Not Asked     Caffeine Concern Not Asked     Occupational Exposure Not Asked     Hobby Hazards Not Asked     Sleep Concern Not Asked     Stress Concern Not Asked     Weight Concern Not Asked     Special Diet No     Back Care Not Asked     Exercise No     Bike Helmet Not Asked     Seat Belt Not Asked     Self-Exams Not Asked   Social History Narrative     Not on file            Past Medical History:     Past Medical History:   Diagnosis Date     Arthritis      Breast cancer (H)      Cardiomyopathy (H)     ideopathic     Coronary artery disease 9/25/2014     Hypercholesterolemia      Hypertension      Hypokalemia      Malignant neoplasm (H)      Shortness of breath      Shoulder pain               Past Surgical History:     Past Surgical History:   Procedure Laterality Date     BACK SURGERY       CHOLECYSTECTOMY        EYE SURGERY       partial masectomy                Allergies:   Amlodipine; Aspirin; and Codeine sulfate       Data:   All laboratory data reviewed:    Recent Labs   Lab Test 11/12/18  0905 06/12/18  1512 10/08/15  0744 08/29/14   LDL 59  --  52* 73   HDL 55  --  59 59   NHDL 84  --   --   --    CHOL 139  --  135 157   TRIG 127  --  120 124   TSH  --   --   --  0.54   NTBNP  --  2,627*  --   --        Lab Results   Component Value Date    WBC 6.4 03/09/2018    RBC 4.45 03/09/2018    HGB 12.7 03/09/2018    HCT 38.0 03/09/2018    MCV 85 03/09/2018    MCH 28.5 03/09/2018    MCHC 33.4 03/09/2018    RDW 15.0 03/09/2018     03/09/2018       Lab Results   Component Value Date     11/12/2018    POTASSIUM 3.8 11/12/2018    CHLORIDE 97 (L) 11/12/2018    CO2 31 (H) 11/12/2018    ANIONGAP 12.8 11/12/2018     (H) 11/12/2018    BUN 19 11/12/2018    CR 0.90 11/12/2018    GFRESTIMATED 70 01/21/2019    GFRESTBLACK 84 01/21/2019    PENNY 9.6 11/12/2018      Lab Results   Component Value Date    AST 16 03/09/2018    ALT 18 11/12/2018       No results found for: A1C    Lab Results   Component Value Date    INR 0.97 06/05/2009         MELE NIXON, APRN, CNP  [facility]     normal...

## 2023-08-21 DIAGNOSIS — I10 BENIGN ESSENTIAL HYPERTENSION: ICD-10-CM

## 2023-08-21 RX ORDER — TERAZOSIN 5 MG/1
5 CAPSULE ORAL AT BEDTIME
Qty: 90 CAPSULE | Refills: 0 | Status: SHIPPED | OUTPATIENT
Start: 2023-08-21 | End: 2023-11-08

## 2023-08-28 ENCOUNTER — ANCILLARY PROCEDURE (OUTPATIENT)
Dept: MRI IMAGING | Facility: CLINIC | Age: 83
End: 2023-08-28
Attending: INTERNAL MEDICINE
Payer: COMMERCIAL

## 2023-08-28 DIAGNOSIS — K86.2 PANCREATIC CYST: ICD-10-CM

## 2023-08-28 PROCEDURE — A9585 GADOBUTROL INJECTION: HCPCS | Mod: JZ | Performed by: INTERNAL MEDICINE

## 2023-08-28 PROCEDURE — 74183 MRI ABD W/O CNTR FLWD CNTR: CPT

## 2023-08-28 PROCEDURE — 255N000002 HC RX 255 OP 636: Mod: JZ | Performed by: INTERNAL MEDICINE

## 2023-08-28 RX ORDER — GADOBUTROL 604.72 MG/ML
5.5 INJECTION INTRAVENOUS ONCE
Status: COMPLETED | OUTPATIENT
Start: 2023-08-28 | End: 2023-08-28

## 2023-08-28 RX ADMIN — GADOBUTROL 5.5 ML: 604.72 INJECTION INTRAVENOUS at 09:41

## 2023-08-30 ENCOUNTER — PATIENT OUTREACH (OUTPATIENT)
Dept: CARDIOLOGY | Facility: CLINIC | Age: 83
End: 2023-08-30
Payer: COMMERCIAL

## 2023-08-30 DIAGNOSIS — I48.91 ATRIAL FIBRILLATION, UNSPECIFIED TYPE (H): ICD-10-CM

## 2023-08-30 DIAGNOSIS — I50.22 CHRONIC SYSTOLIC HEART FAILURE (H): ICD-10-CM

## 2023-08-30 NOTE — PROGRESS NOTES
[facility] Heart-CORE Clinic  Spoke with pt, she ended up going to the pharmacy and paying for 30 days to get her by. She doesn't want to keep going back and forth with meds. Pt has oncology visit tomorrow at 10am and then will come by after and I will help her get paperwork filled out and sent. Pt very thankful for help.     Lily Moncada RN, BSN  08/30/23 at 4:37 PM

## 2023-08-30 NOTE — PROGRESS NOTES
[facility]-CORE Clinic  Called pt, no answer. LVM letting her know I have a plan from Dr. ELIZABETH about what to do when out of Entresto, and I also talked to patient assistance nurse and we started Eliquis forms. Asked pt to call back and let me know an approximate time she will be by tomorrow and to ask for me so I can help her fill out the paperwork, as patient assistance nurse said if we can get paperwork faxed tomorrow and do urgent request, possible we could get response prior to her being out of medication. Asked pt to call me back to discuss a time she will be by tomorrow so I can plan to be available and then I can review med change and patient assistance with her tomorrow in clinic.     Lily Moncada RN, BSN  08/30/23 at 3:38 PM

## 2023-08-30 NOTE — PROGRESS NOTES
[facility]: Heart Failure Care Coordination   Follow-Up Outreach     Situation/Background:      Chief Complaint   Patient presents with    CORE     Entresto and Eliquis     VM received from pt stating she paid $47 last month for Entresto, and now went to  both Entresto and Eliquis and Entresto is $492 and Eliquis is $413 and she cannot afford this. Pt asked for call back to discuss changing back to more affordable medications.     Assessment:      Do not see any documentation that patient has used 30 day day free coupon for either Entresto or Eliquis. Called [facility], unfortunately, pt has used both 30 day free coupons previously and is not eligible for them again.  Pharmacy did state a 1 alex supply of Entresto would be $173.34 and Eliquis 1 month supply would be $145.60.     Called pt and discussed above. Even $47 a month before was too expensive for pt. She actually has Entresto patient assistance forms she has been holding onto because she is very unsure about sharing her private information. Pt states her annual income is around 20K. Given this, pt should qualify for both Eliquis and Entresto patient assistance, but those will take time. Pt only has 1 day left of Entresto and 1 week left of Eliquis. Pt states she has an appt at the cancer clinic tomorrow and can drop off the Entresto paperwork, told her I would put Eliquis paperwork at  and asked she pick that up when she drops off Entresto. Did review that usually it can take time to get approval and medication from patient assistance if approved, so we will need alternative plan for Entresto and likely Eliquis, unless we can get Eliquis sent and approved ASAP. Pt asked if we could just use the Entresto PAP info for the Eliquis. Reviewed they are different companies, so we can make copies and use income proof, but will need her to fill out a separate form as again, its a different med and different company. Pt asked about  going back to previous medication Irbesartan, although she does not want to yo-yo medications and keep switching back and forth. Reviewed with pt, its her choice. We prefer Entresto, but if she doesn't want to pursue patient assistance or cannot afford, that is her decision. Pt does not understand why the price changed, reviewed notes from pharmacy liaison about coverage gap. Told pt I will review with provider, but will likely need to put her back on Irbesartan in place of Entresto as we will not get anything approved in 1 day. Will also need to talk to patient assistance nurse to see if possible to get Eliquis paperwork in and approved in the next week. Pt will drop off Entresto paperwork tomorrow, will ask  to call CORE nurse to help her fill out Eliquis paperwork and sign so we can hopefully submit that tomorrow.      [employer] patient assistance forms available for tomorrow when pt drops off Entresto paperwork. BERNADETTE Berry off the rest of this week. Will send to Dr. ELIZABETH to review Entresto issue.     Lily Moncada, RN, BSN  08/30/23 at 1:36 PM

## 2023-08-31 ENCOUNTER — DOCUMENTATION ONLY (OUTPATIENT)
Dept: CARDIOLOGY | Facility: CLINIC | Age: 83
End: 2023-08-31
Payer: COMMERCIAL

## 2023-08-31 RX ORDER — SACUBITRIL AND VALSARTAN 49; 51 MG/1; MG/1
1 TABLET, FILM COATED ORAL 2 TIMES DAILY
Qty: 60 TABLET | Refills: 11 | Status: SHIPPED | OUTPATIENT
Start: 2023-08-31 | End: 2023-08-31

## 2023-08-31 RX ORDER — SACUBITRIL AND VALSARTAN 49; 51 MG/1; MG/1
1 TABLET, FILM COATED ORAL 2 TIMES DAILY
Qty: 60 TABLET | Refills: 11 | Status: SHIPPED | OUTPATIENT
Start: 2023-08-31 | End: 2024-01-09

## 2023-08-31 NOTE — PROGRESS NOTES
[facility] Heart-CORE Clinic  Met with patient and her Step-daughter Rafaela. Complete Entresto and Eliquis patient assistance forms. Faxed forms to both companies with request for them to be ran urgently. Pt has enough through Wednesday next week for Eliquis and 30 days of Entresto. She is going to  10 days and pay for Eliquis at pharmacy to get her by, as they may go to cabin this weekend for the next week. Will check status tomorrow and let pt/step-daughter Rafaela know on status.     Also discussed [employer] [place] with pt, as she had expressed having limited income and trouble even affording food with cost of her drugs. Pt hesitant but step daughter encouraged pt to try, she agreed. [employer] forms filled out and faxed. Asked pt to let us know if she doesn't hear from them in the next few weeks. Pt stated understanding.     Messaged CORE RN board to check with Eliquis and Entresto PAPs tomorrow.    Lily Moncada, RN, BSN  08/31/23 at 11:20 AM

## 2023-09-01 NOTE — PROGRESS NOTES
[facility] Heart-CORE Clinic  Called [employer] to confirm receipt of patient assistance forms for Entresto. Per [employer], it takes 24-48 hours to upload fax into system regardless if urgent status. She recommended we check back in Tuesday or Wednesday next week.     Called [employer] for Eliquis as well, they also recommended calling early next week to confirm fax received.     Called pt, updated her on above. She is going to be in town this weekend. She is going to try to go to pharmacy this weekend to get 1-2 weeks of Eliquis, she has enough through next Wednesday right now. Will check in with her again Tuesday after checking in with companies.     Lily Moncada RN, BSN  09/01/23 at 3:06 PM

## 2023-09-08 DIAGNOSIS — E83.42 HYPOMAGNESEMIA: ICD-10-CM

## 2023-09-08 DIAGNOSIS — I47.29 NSVT (NONSUSTAINED VENTRICULAR TACHYCARDIA) (H): ICD-10-CM

## 2023-09-08 NOTE — PROGRESS NOTES
[facility] Heart - CORE Clinic    -Left  Janessa to arrange call with [employer] to aid in patient assistance program.     Shey Mcnulty RN BAN   1:55 PM 9/8/2023    CORE nurse line M-F 8a-4p: [phone]

## 2023-09-08 NOTE — TELEPHONE ENCOUNTER
Received refill request for:  Magnesium  Last OV was: 7/17/23 with BERNADETTE Duncan  Labs: 7/21/23  No future appointments.  New script sent to: SUDHA Norris   4:17 PM 9/8/2023    CORE nurse line M-F 8a-4p: [phone]

## 2023-09-08 NOTE — PROGRESS NOTES
[facility] Heart - CORE Clinic    -Spoke with Grisel at [employer]. He can only mail out the EOB to her address and she will have to get it to use either by mail or by dropping it off. He states is will take 10-15 business days to get the document. He did confirm she is in the coverage gap.      Reminder to the board to call Janessa Babb in 10 days.     Shey Mcnulty RN BAN   2:22 PM 9/8/2023    CORE nurse line M-F 8a-4p: [phone]

## 2023-09-11 ENCOUNTER — DOCUMENTATION ONLY (OUTPATIENT)
Dept: CARDIOLOGY | Facility: CLINIC | Age: 83
End: 2023-09-11
Payer: COMMERCIAL

## 2023-09-11 NOTE — PROGRESS NOTES
"Received a fax from [employer], pt was APPROVED for entresto  Effective 9-6-2023 thru 12--will send as FYI to core team  gee n    9- I called pt to notify her, had to leave a message with the info to call [employer] to get her shipment started, [phone]  Her approval number is [id number] I left her this information  [facility]      9- this is RE [employer] jason, pt did both [employer] and [employer]    Call from lana @ [employer], the provider page was \"cut off\" so I re faxed to both [phone], amd [phone]  [facility]      9- received fax from [employer], the pt was NOT approved for eliquis,   The 3% OOP was not met to qualify for free eliquis    [facility]      9- pt had called today, she got her EOB from [employer] showing   What she has spent on drugs-she will mail this to [place] office to mu attention  [facility]      9- received additional documentation from copays/EOB showing OOP-I re faxed 5 pages to B[employer]-gee n    9- I received a fax from [employer] today, pt was APPROVED for Eliquis effective 9- thru 12--I called to let her know-laylas lpn    "

## 2023-09-12 NOTE — PROGRESS NOTES
[facility] Heart-CORE Clinic  Call from pt, she received notification that Entresto was approved. She has letter in front of her and wanted to check if this was what we needed or not. Reviewed that she should call the [phone] number on the letter to acknowledge and they will arrange for shipment of Entresto to her.     Reviewed we still need EOB from insurance that should be coming via mail, reviewed once she gets that, let us know ASAP so we can get it submitted to patient assistance for Eliquis. Pt verbalized understanding.     iLly Moncada, RN, BSN  09/12/23 at 3:49 PM

## 2023-11-08 DIAGNOSIS — I10 BENIGN ESSENTIAL HYPERTENSION: ICD-10-CM

## 2023-11-08 RX ORDER — TERAZOSIN 5 MG/1
5 CAPSULE ORAL AT BEDTIME
Qty: 90 CAPSULE | Refills: 2 | Status: SHIPPED | OUTPATIENT
Start: 2023-11-08 | End: 2024-07-25

## 2023-11-29 ENCOUNTER — PATIENT OUTREACH (OUTPATIENT)
Dept: CARDIOLOGY | Facility: CLINIC | Age: 83
End: 2023-11-29
Payer: COMMERCIAL

## 2023-11-29 NOTE — PROGRESS NOTES
[facility] Heart-CORE Clinic    Background: Last visit 7/2023 and was virtual; Janessa's fogginess had improved off of digoxin, and dyspnea improved on 40 mg/day of torsemide, BP was stable.   --Consideration made to increase entresto for med optimization, but in follow-up call a few weeks after visit, she reported occasional dizziness; hydralazine was discontinued.  --8/2023 coverage gap: subsequently approved for med assistance for eliquis and entresto      No future appointments. Was due for BMP and follow-up with Dr. Albino Connors 10/2023.    Today Janessa called with some questions:    Needs assistance with renewal of medication assistance forms. Will message Nicole ABDULLAHI to reach out to her.   BP taken at a clinic visit this week showed  and 160; I asked Janessa to please take her BP daily, 1-2 hours after her AM meds then call us next week with numbers. We discussed that if SBP is >180 and/or she has vision changes or sudden onset significant headache, these would be reasons to go to ED. She denied these concerns.  Needs to schedule follow-up; will ask scheduling team to call her Friday per her request.    Valerie Joyner RN BSN   5:18 PM 11/29/23  CORE nurse line M-F 8a-4p: [phone]

## 2023-12-06 ENCOUNTER — ANCILLARY PROCEDURE (OUTPATIENT)
Dept: ULTRASOUND IMAGING | Facility: CLINIC | Age: 83
End: 2023-12-06
Attending: INTERNAL MEDICINE
Payer: COMMERCIAL

## 2023-12-06 DIAGNOSIS — E04.2 MULTINODULAR GOITER: ICD-10-CM

## 2023-12-06 DIAGNOSIS — E05.90 HYPERTHYROIDISM: ICD-10-CM

## 2023-12-06 PROCEDURE — 76536 US EXAM OF HEAD AND NECK: CPT

## 2023-12-08 ENCOUNTER — DOCUMENTATION ONLY (OUTPATIENT)
Dept: CARDIOLOGY | Facility: CLINIC | Age: 83
End: 2023-12-08
Payer: COMMERCIAL

## 2023-12-08 DIAGNOSIS — I48.91 ATRIAL FIBRILLATION, UNSPECIFIED TYPE (H): ICD-10-CM

## 2023-12-08 NOTE — PROGRESS NOTES
"12-8-2023 8 paged of complete [employer] application faxed,and Provider portion of [employer] jason to AF team for a signature-Once its signed will fax to BMS as well    Nicole Eason LPN      12- faxed 10 completed pages of [employer] jason today  Nicole Eason Lpn      1-9-2024 pt called today, asking if we have a status update from [employer], she is down to 10 days. I will call [facility] today  Mmunns lpn    1-9-2024 [facility] claims they are missing the provider portion of the application, even through I sent the entire application ,provider and pt sections together.I am RE FAXING today, and will have CORE RN E scribe the RX to Jose Eason Lpn      1-9-2024 received APPROVAL for free Entresto, effective today thru 12-  Approval # [id number]  Pt notified. Nicole Eason Lpn    1-  Pt callig to see if we have \"heard\" anything from [employer], I called them today, they have received the 10 pages I prev faxed, it is \"IN REVIEW\"  I called pt back to let her know.  Nicole Eason Lpn  "

## 2023-12-13 ENCOUNTER — TELEPHONE (OUTPATIENT)
Dept: ANTICOAGULATION | Facility: CLINIC | Age: 83
End: 2023-12-13

## 2023-12-13 DIAGNOSIS — I63.211 CEREBROVASCULAR ACCIDENT (CVA) DUE TO OCCLUSION OF RIGHT VERTEBRAL ARTERY (H): Primary | ICD-10-CM

## 2023-12-13 DIAGNOSIS — I48.91 ATRIAL FIBRILLATION (H): ICD-10-CM

## 2023-12-13 NOTE — TELEPHONE ENCOUNTER
ANTICOAGULATION DIRECT ORAL ANTICOAGULANT MONITORING    SUBJECTIVE     The [facility] [place] [facility] is evaluating Janessa Melendez's Apixaban (Eliquis) as part of its Anticoagulation Monitoring Program.    Indication:Atrial Fibrillation and CVA  Current dose per medication list: Apixaban 5 mg BID  Recent hospitalizations/ED/Office Visits for bleeding/clotting concerns: No  Other bleeding or side effect concerns: No  Additional findings: none    OBJECTIVE     Age: 83 year old    Wt Readings from Last 2 Encounters:   07/14/23 55.3 kg (122 lb)   06/20/23 61 kg (134 lb 6.4 oz)      Lab Results   Component Value Date    CR 1.10 (H) 06/20/2023    CR 1.21 (H) 06/01/2023    CR 1.15 (H) 05/05/2023     Lab Results   Component Value Date    HGB 11.0 06/20/2023    HGB 11.9 05/31/2021     05/05/2023     05/31/2021     ASSESSMENT/PLAN     A chart review for Direct Oral Anticoagulant (DOAC) Stewardship has been completed for:     Dosing: recommend adjustment to Apixaban 2.5 mg BID for age >= 80 years and weight <= 60 kg (consistent with package insert dosing)    Has cardiology appointment on 1/23/24 and will have weight rechecked at that time.    Plan made per [facility] anticoagulation protocol    Marielle Zamora RN  Anticoagulation Clinic

## 2023-12-26 NOTE — TELEPHONE ENCOUNTER
"[facility] Medicine Annual Medicare visit documentation of weight:     Last Filed Vital Signs  - documented in this encounter  Last Filed Vital Signs  Vital Sign Reading Time Taken Comments   Blood Pressure 144/61 09/12/2023 8:51 AM CDT     Pulse 60 09/12/2023 8:51 AM CDT     Temperature - -     Respiratory Rate - -     Oxygen Saturation - -     Inhaled Oxygen Concentration - -     Weight 60.5 kg (133 lb 6.4 oz) 09/12/2023 8:51 AM CDT     Height 144.6 cm (4' 8.93\") 09/12/2023 8:51 AM CDT     Body Mass Index 28.94 09/12/2023 8:51 AM CDT       Patient does not currently meet criteria for dose adjustment. Will continue to follow up after Cardiology appointment on 1/23/23.    Michelle Anderson, IvanD, BCPS    "

## 2024-01-09 DIAGNOSIS — I50.22 CHRONIC SYSTOLIC HEART FAILURE (H): ICD-10-CM

## 2024-01-09 RX ORDER — SACUBITRIL AND VALSARTAN 49; 51 MG/1; MG/1
1 TABLET, FILM COATED ORAL 2 TIMES DAILY
Qty: 60 TABLET | Refills: 11 | Status: SHIPPED | OUTPATIENT
Start: 2024-01-09

## 2024-01-09 NOTE — TELEPHONE ENCOUNTER
Request from pt assistance RN To send Entresto Rx to [facility] for patient assistance purposes.     Future Appointments   Date Time Provider Department Center   1/23/2024 12:45 PM [facility]   1/23/2024  1:45 PM Brett Smith MD [facility] CLIN      Lily Moncada RN, BSN  01/09/24 at 9:46 AM

## 2024-01-23 ENCOUNTER — OFFICE VISIT (OUTPATIENT)
Dept: CARDIOLOGY | Facility: CLINIC | Age: 84
End: 2024-01-23
Payer: COMMERCIAL

## 2024-01-23 ENCOUNTER — LAB (OUTPATIENT)
Dept: LAB | Facility: CLINIC | Age: 84
End: 2024-01-23
Payer: COMMERCIAL

## 2024-01-23 VITALS
SYSTOLIC BLOOD PRESSURE: 144 MMHG | HEART RATE: 67 BPM | OXYGEN SATURATION: 97 % | DIASTOLIC BLOOD PRESSURE: 70 MMHG | BODY MASS INDEX: 26.31 KG/M2 | WEIGHT: 130.5 LBS | HEIGHT: 59 IN

## 2024-01-23 DIAGNOSIS — I10 BENIGN ESSENTIAL HYPERTENSION: ICD-10-CM

## 2024-01-23 DIAGNOSIS — I25.10 CORONARY ARTERY DISEASE INVOLVING NATIVE CORONARY ARTERY OF NATIVE HEART WITHOUT ANGINA PECTORIS: ICD-10-CM

## 2024-01-23 DIAGNOSIS — I48.91 ATRIAL FIBRILLATION, UNSPECIFIED TYPE (H): ICD-10-CM

## 2024-01-23 DIAGNOSIS — E78.00 PURE HYPERCHOLESTEROLEMIA: ICD-10-CM

## 2024-01-23 DIAGNOSIS — I42.9 CARDIOMYOPATHY, UNSPECIFIED TYPE (H): ICD-10-CM

## 2024-01-23 DIAGNOSIS — I50.22 CHRONIC SYSTOLIC HEART FAILURE (H): Primary | ICD-10-CM

## 2024-01-23 DIAGNOSIS — I27.20 MODERATE PULMONARY HYPERTENSION (H): ICD-10-CM

## 2024-01-23 LAB
ANION GAP SERPL CALCULATED.3IONS-SCNC: 6 MMOL/L (ref 7–15)
BUN SERPL-MCNC: 23.5 MG/DL (ref 8–23)
CALCIUM SERPL-MCNC: 9.4 MG/DL (ref 8.8–10.2)
CHLORIDE SERPL-SCNC: 101 MMOL/L (ref 98–107)
CREAT SERPL-MCNC: 1.07 MG/DL (ref 0.51–0.95)
DEPRECATED HCO3 PLAS-SCNC: 34 MMOL/L (ref 22–29)
EGFRCR SERPLBLD CKD-EPI 2021: 52 ML/MIN/1.73M2
GLUCOSE SERPL-MCNC: 119 MG/DL (ref 70–99)
POTASSIUM SERPL-SCNC: 3.8 MMOL/L (ref 3.4–5.3)
SODIUM SERPL-SCNC: 141 MMOL/L (ref 135–145)

## 2024-01-23 PROCEDURE — 99214 OFFICE O/P EST MOD 30 MIN: CPT | Performed by: INTERNAL MEDICINE

## 2024-01-23 PROCEDURE — 80048 BASIC METABOLIC PNL TOTAL CA: CPT | Performed by: PHYSICIAN ASSISTANT

## 2024-01-23 PROCEDURE — 36415 COLL VENOUS BLD VENIPUNCTURE: CPT | Performed by: PHYSICIAN ASSISTANT

## 2024-01-23 NOTE — PROGRESS NOTES
HPI and Plan:     It is my pleasure to see your patient Janessa Melendez in follow-up.  She is an 83-year-old patient with a past history of cardiomyopathy and essential hypertension who last year presented with a stroke and was found to have newly diagnosed and new onset atrial fibrillation.  Fortunately she recovered quite well from the stroke.  Her ventricular rate was somewhat difficult to control even on high-dose carvedilol and she did have digoxin added.  However subsequently she began to develop significant pauses such that the digoxin was stopped and the dose of carvedilol was reduced to 25 mg twice a day.  She also had episodes of nonsustained ventricular tachycardia.  She had a coronary angiogram performed which showed trivial coronary artery disease with no stenosis greater than 15%.  She was seen by electrophysiology who continued with a rate control strategy and did not think that she warranted an EP study or an AICD at this particular time.  She was switched from lisinopril to Entresto which she felt significantly improved her symptomatology.  Right heart catheterization initially showed significantly raised pulmonary artery wedge pressure but with reduction in her blood pressure her wedge pressure came down into the normal range at 16 mmHg.    With that background in mind the patient is feeling significantly better than she had in the past.  The 1 issue that she has is when she takes her medication in the morning time she feels quite fatigued and groggy.  She takes both the Entresto and the carvedilol at the same time.  She may be having a peak dose effect.  She has mild renal insufficiency but her renal function is better now than it has been for the last 6 times that it has been checked.  She does not appear to be volume overloaded apart from her neck veins been at the lower one third of the neck.  She has no peripheral edema.    Impression:  1.  Cardiomyopathy.  Last echocardiogram showed an  EF in the 45 to 50% range.  2.  Trivial coronary artery disease with excellent lipid profile drawn earlier in 2023.  3.  Essential hypertension.  Her blood pressure appears to be quite well-controlled now.  She brought in a log of her blood pressures and the vast majority are less than 140 and sometimes her systolic pressures drop as low as 109.  4.  Dizziness and fatigue in the morning time most likely due to peak dose effect.  5.  Raised right-sided pressures mainly due to raised systemic blood pressure.  Reductions in systemic blood pressure in the Cath Lab brought the wedge pressure from 28 mmHg down to 16 mmHg.    Plan:  1.  I will continue the patient on her present medications which appear to be functioning well for her.  However I have told her to take the Entresto first thing in the morning and then 2 hours later to take the carvedilol medication.  Hopefully this will reduce the peak dose effect.  2.  In 6 months time and have the patient return to see me and at that stage I will repeat her echocardiogram and also repeat her basic metabolic profile and lipid profile.    As always the patient has been told to contact me if she is any questions or any concerns    Brett Smith MD, FACC, FRCPI    Orders Placed This Encounter   Procedures    Basic metabolic panel    Lipid Profile    ALT    Follow-Up with Cardiology       No orders of the defined types were placed in this encounter.      Medications Discontinued During This Encounter   Medication Reason    ipratropium - albuterol 0.5 mg/2.5 mg/3 mL (DUONEB) 0.5-2.5 (3) MG/3ML neb solution Therapy completed (No AVS)    guaiFENesin-dextromethorphan (ROBITUSSIN DM) 100-10 MG/5ML syrup Therapy completed (No AVS)    benzonatate (TESSALON) 100 MG capsule Therapy completed (No AVS)         Encounter Diagnoses   Name Primary?    Cardiomyopathy, unspecified type (H)     Chronic systolic heart failure (H) Yes    Atrial fibrillation, unspecified type (H)     Benign  essential hypertension     Pure hypercholesterolemia     Moderate pulmonary hypertension (H)     Coronary artery disease involving native coronary artery of native heart without angina pectoris        CURRENT MEDICATIONS:  Current Outpatient Medications   Medication Sig Dispense Refill    Acetaminophen (TYLENOL PO) Take 500 mg by mouth every 6 hours as needed for mild pain or fever       amoxicillin (AMOXIL) 500 MG capsule       apixaban ANTICOAGULANT (ELIQUIS) 5 MG tablet Take 1 tablet (5 mg) by mouth 2 times daily 180 tablet 3    carvedilol (COREG) 25 MG tablet Take 1 tablet (25 mg) by mouth 2 times daily 180 tablet 3    cholecalciferol (VITAMIN D3) 25 mcg (1000 units) capsule Take 1 capsule by mouth daily      Magnesium Oxide 250 MG TABS Take 1 tablet (250 mg) by mouth daily 30 tablet 11    methimazole (TAPAZOLE) 5 MG tablet Take 5 mg by mouth twice a week      omeprazole (PRILOSEC) 20 MG DR capsule Take 20 mg by mouth daily      potassium chloride ER (K-TAB/KLOR-CON) 10 MEQ CR tablet Take 1 tablet (10 mEq) by mouth 2 times daily And as needed as directed. 180 tablet 3    sacubitril-valsartan (ENTRESTO) 49-51 MG per tablet Take 1 tablet by mouth 2 times daily 60 tablet 11    simvastatin (ZOCOR) 10 MG tablet Take 1 tablet (10 mg) by mouth At Bedtime 90 tablet 3    terazosin (HYTRIN) 5 MG capsule Take 1 capsule (5 mg) by mouth at bedtime 90 capsule 2    torsemide (DEMADEX) 10 MG tablet Take 3 tablets (30 mg) by mouth daily 270 tablet 2       ALLERGIES     Allergies   Allergen Reactions    Amlodipine      swelling    Aspirin Other (See Comments)     Bleeding              Bleeding, GI Lesion        Codeine Sulfate GI Disturbance       PAST MEDICAL HISTORY:  Past Medical History:   Diagnosis Date    Arthritis     Breast cancer (H)     Cardiomyopathy (H)     ideopathic    Coronary artery disease 9/25/2014    Hypercholesterolemia     Hypertension     Hypokalemia     Malignant neoplasm (H)     Shortness of breath      Shoulder pain        PAST SURGICAL HISTORY:  Past Surgical History:   Procedure Laterality Date    BACK SURGERY      CARDIAC SURGERY      angiogram neg many yrs ago    CHOLECYSTECTOMY      CV CORONARY ANGIOGRAM N/A 5/5/2023    Procedure: Coronary Angiogram;  Surgeon: Kane Daugherty MD;  Location:  HEART CARDIAC CATH LAB    CV LEFT HEART CATH N/A 5/5/2023    Procedure: Left Heart Catheterization;  Surgeon: Kane Daugherty MD;  Location:  HEART CARDIAC CATH LAB    CV RIGHT HEART CATH MEASUREMENTS RECORDED N/A 5/5/2023    Procedure: Right Heart Catheterization;  Surgeon: Kane Daugherty MD;  Location:  HEART CARDIAC CATH LAB    CV RIGHT HEART EXERCISE STRESS STUDY N/A 5/5/2023    Procedure: Stress Drug Study;  Surgeon: Kane Daugherty MD;  Location:  HEART CARDIAC CATH LAB    ESOPHAGOSCOPY, GASTROSCOPY, DUODENOSCOPY (EGD), COMBINED N/A 6/22/2021    Procedure: ESOPHAGOGASTRODUODENOSCOPY, WITH ENDOSCOPIC US WITH FINE NEEDLE ASPIRATION;  Surgeon: Ventura Mcgill MD;  Location:  GI    EYE SURGERY      ORTHOPEDIC SURGERY      lt shoulder replaced, total    partial masectomy         FAMILY HISTORY:  Family History   Problem Relation Age of Onset    Other Cancer Mother     Other Cancer Brother     No Known Problems Son     Heart Failure Daughter     Family History Negative No family hx of        SOCIAL HISTORY:  Social History     Socioeconomic History    Marital status: Single     Spouse name: None    Number of children: None    Years of education: None    Highest education level: None   Tobacco Use    Smoking status: Never    Smokeless tobacco: Never   Substance and Sexual Activity    Alcohol use: Yes     Comment: socially    Drug use: No    Sexual activity: Never   Other Topics Concern    Special Diet No    Exercise No       Review of Systems:  Skin:          Eyes:         ENT:         Respiratory:          Cardiovascular:         Gastroenterology:        Genitourinary:        "  Musculoskeletal:         Neurologic:         Psychiatric:         Heme/Lymph/Imm:         Endocrine:           Physical Exam:  Vitals: BP (!) 144/70   Pulse 67   Ht 1.499 m (4' 11\")   Wt 59.2 kg (130 lb 8 oz)   SpO2 97%   BMI 26.36 kg/m      Constitutional:  cooperative, alert and oriented, well developed, well nourished, in no acute distress overweight      Skin:  warm and dry to the touch, no apparent skin lesions or masses noted          Head:  normocephalic, no masses or lesions;normocephalic        Eyes:  pupils equal and round, conjunctivae and lids unremarkable, sclera white, no xanthalasma, EOMS intact, no nystagmus        Lymph:      ENT:  no pallor or cyanosis, dentition good        Neck:  carotid pulses are full and equal bilaterally, JVP normal, no carotid bruit        Respiratory:  normal breath sounds, clear to auscultation, normal A-P diameter, normal symmetry, normal respiratory excursion, no use of accessory muscles         Cardiac: regular rhythm;normal S1 and S2;no murmurs, gallops or rubs detected   S4            pulses full and equal                                        GI:  not assessed this visit        Extremities and Muscular Skeletal:  no edema;no deformities, clubbing, cyanosis, erythema observed   bilateral LE edema;trace     Bruising over R lower extremity    Neurological:  no gross motor deficits;affect appropriate        Psych:  Alert and Oriented x 3        CC  Vero Bacon PA-C  [id number]  [place] [id number]                "

## 2024-01-23 NOTE — LETTER
1/23/2024    [facility]  [address] [place] [id number]    RE: Janessa STRICKLAND Al       Dear Colleague,     I had the pleasure of seeing Janessa Melendez in the [facility].  HPI and Plan:     It is my pleasure to see your patient Janessa Melendez in follow-up.  She is an 83-year-old patient with a past history of cardiomyopathy and essential hypertension who last year presented with a stroke and was found to have newly diagnosed and new onset atrial fibrillation.  Fortunately she recovered quite well from the stroke.  Her ventricular rate was somewhat difficult to control even on high-dose carvedilol and she did have digoxin added.  However subsequently she began to develop significant pauses such that the digoxin was stopped and the dose of carvedilol was reduced to 25 mg twice a day.  She also had episodes of nonsustained ventricular tachycardia.  She had a coronary angiogram performed which showed trivial coronary artery disease with no stenosis greater than 15%.  She was seen by electrophysiology who continued with a rate control strategy and did not think that she warranted an EP study or an AICD at this particular time.  She was switched from lisinopril to Entresto which she felt significantly improved her symptomatology.  Right heart catheterization initially showed significantly raised pulmonary artery wedge pressure but with reduction in her blood pressure her wedge pressure came down into the normal range at 16 mmHg.    With that background in mind the patient is feeling significantly better than she had in the past.  The 1 issue that she has is when she takes her medication in the morning time she feels quite fatigued and groggy.  She takes both the Entresto and the carvedilol at the same time.  She may be having a peak dose effect.  She has mild renal insufficiency but her renal function is better now than it has been for the last 6  times that it has been checked.  She does not appear to be volume overloaded apart from her neck veins been at the lower one third of the neck.  She has no peripheral edema.    Impression:  1.  Cardiomyopathy.  Last echocardiogram showed an EF in the 45 to 50% range.  2.  Trivial coronary artery disease with excellent lipid profile drawn earlier in 2023.  3.  Essential hypertension.  Her blood pressure appears to be quite well-controlled now.  She brought in a log of her blood pressures and the vast majority are less than 140 and sometimes her systolic pressures drop as low as 109.  4.  Dizziness and fatigue in the morning time most likely due to peak dose effect.  5.  Raised right-sided pressures mainly due to raised systemic blood pressure.  Reductions in systemic blood pressure in the Cath Lab brought the wedge pressure from 28 mmHg down to 16 mmHg.    Plan:  1.  I will continue the patient on her present medications which appear to be functioning well for her.  However I have told her to take the Entresto first thing in the morning and then 2 hours later to take the carvedilol medication.  Hopefully this will reduce the peak dose effect.  2.  In 6 months time and have the patient return to see me and at that stage I will repeat her echocardiogram and also repeat her basic metabolic profile and lipid profile.    As always the patient has been told to contact me if she is any questions or any concerns    Brett Smiht MD, FACC, FRCPI    Orders Placed This Encounter   Procedures    Basic metabolic panel    Lipid Profile    ALT    Follow-Up with Cardiology       No orders of the defined types were placed in this encounter.      Medications Discontinued During This Encounter   Medication Reason    ipratropium - albuterol 0.5 mg/2.5 mg/3 mL (DUONEB) 0.5-2.5 (3) MG/3ML neb solution Therapy completed (No AVS)    guaiFENesin-dextromethorphan (ROBITUSSIN DM) 100-10 MG/5ML syrup Therapy completed (No AVS)     benzonatate (TESSALON) 100 MG capsule Therapy completed (No AVS)         Encounter Diagnoses   Name Primary?    Cardiomyopathy, unspecified type (H)     Chronic systolic heart failure (H) Yes    Atrial fibrillation, unspecified type (H)     Benign essential hypertension     Pure hypercholesterolemia     Moderate pulmonary hypertension (H)     Coronary artery disease involving native coronary artery of native heart without angina pectoris        CURRENT MEDICATIONS:  Current Outpatient Medications   Medication Sig Dispense Refill    Acetaminophen (TYLENOL PO) Take 500 mg by mouth every 6 hours as needed for mild pain or fever       amoxicillin (AMOXIL) 500 MG capsule       apixaban ANTICOAGULANT (ELIQUIS) 5 MG tablet Take 1 tablet (5 mg) by mouth 2 times daily 180 tablet 3    carvedilol (COREG) 25 MG tablet Take 1 tablet (25 mg) by mouth 2 times daily 180 tablet 3    cholecalciferol (VITAMIN D3) 25 mcg (1000 units) capsule Take 1 capsule by mouth daily      Magnesium Oxide 250 MG TABS Take 1 tablet (250 mg) by mouth daily 30 tablet 11    methimazole (TAPAZOLE) 5 MG tablet Take 5 mg by mouth twice a week      omeprazole (PRILOSEC) 20 MG DR capsule Take 20 mg by mouth daily      potassium chloride ER (K-TAB/KLOR-CON) 10 MEQ CR tablet Take 1 tablet (10 mEq) by mouth 2 times daily And as needed as directed. 180 tablet 3    sacubitril-valsartan (ENTRESTO) 49-51 MG per tablet Take 1 tablet by mouth 2 times daily 60 tablet 11    simvastatin (ZOCOR) 10 MG tablet Take 1 tablet (10 mg) by mouth At Bedtime 90 tablet 3    terazosin (HYTRIN) 5 MG capsule Take 1 capsule (5 mg) by mouth at bedtime 90 capsule 2    torsemide (DEMADEX) 10 MG tablet Take 3 tablets (30 mg) by mouth daily 270 tablet 2       ALLERGIES     Allergies   Allergen Reactions    Amlodipine      swelling    Aspirin Other (See Comments)     Bleeding              Bleeding, GI Lesion        Codeine Sulfate GI Disturbance       PAST MEDICAL HISTORY:  Past Medical  History:   Diagnosis Date    Arthritis     Breast cancer (H)     Cardiomyopathy (H)     ideopathic    Coronary artery disease 9/25/2014    Hypercholesterolemia     Hypertension     Hypokalemia     Malignant neoplasm (H)     Shortness of breath     Shoulder pain        PAST SURGICAL HISTORY:  Past Surgical History:   Procedure Laterality Date    BACK SURGERY      CARDIAC SURGERY      angiogram neg many yrs ago    CHOLECYSTECTOMY      CV CORONARY ANGIOGRAM N/A 5/5/2023    Procedure: Coronary Angiogram;  Surgeon: Kane Daugherty MD;  Location:  HEART CARDIAC CATH LAB    CV LEFT HEART CATH N/A 5/5/2023    Procedure: Left Heart Catheterization;  Surgeon: Kane Daugherty MD;  Location:  HEART CARDIAC CATH LAB    CV RIGHT HEART CATH MEASUREMENTS RECORDED N/A 5/5/2023    Procedure: Right Heart Catheterization;  Surgeon: Kane Daugherty MD;  Location: [facility] CARDIAC CATH LAB    CV RIGHT HEART EXERCISE STRESS STUDY N/A 5/5/2023    Procedure: Stress Drug Study;  Surgeon: Kane Daugherty MD;  Location: [facility] CARDIAC CATH LAB    ESOPHAGOSCOPY, GASTROSCOPY, DUODENOSCOPY (EGD), COMBINED N/A 6/22/2021    Procedure: ESOPHAGOGASTRODUODENOSCOPY, WITH ENDOSCOPIC US WITH FINE NEEDLE ASPIRATION;  Surgeon: Ventura Mcgill MD;  Location:  GI    EYE SURGERY      ORTHOPEDIC SURGERY      lt shoulder replaced, total    partial masectomy         FAMILY HISTORY:  Family History   Problem Relation Age of Onset    Other Cancer Mother     Other Cancer Brother     No Known Problems Son     Heart Failure Daughter     Family History Negative No family hx of        SOCIAL HISTORY:  Social History     Socioeconomic History    Marital status: Single     Spouse name: None    Number of children: None    Years of education: None    Highest education level: None   Tobacco Use    Smoking status: Never    Smokeless tobacco: Never   Substance and Sexual Activity    Alcohol use: Yes     Comment: socially    Drug use: No     "Sexual activity: Never   Other Topics Concern    Special Diet No    Exercise No       Review of Systems:  Skin:          Eyes:         ENT:         Respiratory:          Cardiovascular:         Gastroenterology:        Genitourinary:         Musculoskeletal:         Neurologic:         Psychiatric:         Heme/Lymph/Imm:         Endocrine:           Physical Exam:  Vitals: BP (!) 144/70   Pulse 67   Ht 1.499 m (4' 11\")   Wt 59.2 kg (130 lb 8 oz)   SpO2 97%   BMI 26.36 kg/m      Constitutional:  cooperative, alert and oriented, well developed, well nourished, in no acute distress overweight      Skin:  warm and dry to the touch, no apparent skin lesions or masses noted          Head:  normocephalic, no masses or lesions;normocephalic        Eyes:  pupils equal and round, conjunctivae and lids unremarkable, sclera white, no xanthalasma, EOMS intact, no nystagmus        Lymph:      ENT:  no pallor or cyanosis, dentition good        Neck:  carotid pulses are full and equal bilaterally, JVP normal, no carotid bruit        Respiratory:  normal breath sounds, clear to auscultation, normal A-P diameter, normal symmetry, normal respiratory excursion, no use of accessory muscles         Cardiac: regular rhythm;normal S1 and S2;no murmurs, gallops or rubs detected   S4            pulses full and equal                                        GI:  not assessed this visit        Extremities and Muscular Skeletal:  no edema;no deformities, clubbing, cyanosis, erythema observed   bilateral LE edema;trace     Bruising over R lower extremity    Neurological:  no gross motor deficits;affect appropriate        Psych:  Alert and Oriented x 3        CC  Vero Bacon PA-C  [id number]  [place] [address]    Thank you for allowing me to participate in the care of your patient.      Sincerely,     Brett Smith MD, MD     [facility]    "

## 2024-01-23 NOTE — TELEPHONE ENCOUNTER
"1/23/24 cardiology OV:   Physical Exam:  Vitals: BP (!) 144/70   Pulse 67   Ht 1.499 m (4' 11\")   Wt 59.2 kg (130 lb 8 oz)   SpO2 97%   BMI 26.36 kg/m     No additional OV scheduled at this time to evaluate for persistence of meeting 2 of 3. Will continue to evaluate quarterly.     Michelle Anderson, PharmD, BCPS        "

## 2024-02-26 DIAGNOSIS — I42.9 CARDIOMYOPATHY, UNSPECIFIED TYPE (H): ICD-10-CM

## 2024-02-26 DIAGNOSIS — I42.0 DILATED CARDIOMYOPATHY (H): ICD-10-CM

## 2024-02-26 RX ORDER — TORSEMIDE 10 MG/1
30 TABLET ORAL DAILY
Qty: 270 TABLET | Refills: 3 | Status: SHIPPED | OUTPATIENT
Start: 2024-02-26

## 2024-02-28 NOTE — PROGRESS NOTES
"2-  I called pt back today-she has not heard from [employer]. I last called them for an update on 1--was told it was waiting to be processed.  I called today and the rep I spoke w/ \"appoligized\" for the delay in processing. She agreed to expedite the review. I did let david know I would call her as soon as I  Placer back on the status  Nicole Eason Lpn  "

## 2024-02-29 ENCOUNTER — TELEPHONE (OUTPATIENT)
Dept: CARDIOLOGY | Facility: CLINIC | Age: 84
End: 2024-02-29
Payer: COMMERCIAL

## 2024-02-29 DIAGNOSIS — I10 BENIGN ESSENTIAL HYPERTENSION: ICD-10-CM

## 2024-02-29 DIAGNOSIS — I42.0 DILATED CARDIOMYOPATHY (H): ICD-10-CM

## 2024-02-29 RX ORDER — CARVEDILOL 25 MG/1
25 TABLET ORAL 2 TIMES DAILY
Qty: 180 TABLET | Refills: 3 | Status: SHIPPED | OUTPATIENT
Start: 2024-02-29 | End: 2024-08-07

## 2024-02-29 NOTE — TELEPHONE ENCOUNTER
[facility] Call Center    Phone Message    May a detailed message be left on voicemail: yes     Reason for Call: Medication Refill Request    Has the patient contacted the pharmacy for the refill? Yes   Name of medication being requested: carvedilol (COREG) 25 MG tablet   Provider who prescribed the medication: Garland  Pharmacy:   [id number] [place] [id number] AT [facility] [address]     Date medication is needed: 2/29/24    Action Taken: Message routed to:  Other: Cardiology    Travel Screening: Not Applicable    Thank you!  [facility]

## 2024-02-29 NOTE — PROGRESS NOTES
2- did receive a fax today from [employer], pt has been approved for free eliquis, effective 2- thru 12-  I called her , but got the VM, I did leave her the message, and [facility]  [place] her a letter as well. Nicole Eason lpn

## 2024-04-01 DIAGNOSIS — I10 BENIGN ESSENTIAL HYPERTENSION: ICD-10-CM

## 2024-04-01 RX ORDER — POTASSIUM CHLORIDE 750 MG/1
10 TABLET, EXTENDED RELEASE ORAL 2 TIMES DAILY
Qty: 180 TABLET | Refills: 3 | Status: SHIPPED | OUTPATIENT
Start: 2024-04-01

## 2024-04-22 NOTE — TELEPHONE ENCOUNTER
Wt Readings from Last 4 Encounters:   01/23/24 59.2 kg (130 lb 8 oz)   07/14/23 55.3 kg (122 lb)   06/20/23 61 kg (134 lb 6.4 oz)   06/01/23 60.8 kg (134 lb)     PCP visit.  Last Filed Vital Signs  - documented in this encounter  Last Filed Vital Signs  Vital Sign Reading Time Taken Comments   Weight 59.4 kg (131 lb) 03/06/2024 11:15 AM CST     Height - -     Body Mass Index 28.74 01/24/2024 11:07 AM CST       Weight remains stable, but below 60 kg. Upcoming cardiology OV on 7/25/24.     Michelle Anderson, PharmD, BCPS

## 2024-06-17 PROBLEM — Z71.89 ADVANCED DIRECTIVES, COUNSELING/DISCUSSION: Status: RESOLVED | Noted: 2018-01-09 | Resolved: 2024-06-17

## 2024-07-17 DIAGNOSIS — I50.22 CHRONIC SYSTOLIC HEART FAILURE (H): ICD-10-CM

## 2024-07-17 DIAGNOSIS — I42.9 CARDIOMYOPATHY, UNSPECIFIED TYPE (H): Primary | ICD-10-CM

## 2024-07-17 DIAGNOSIS — I42.0 DILATED CARDIOMYOPATHY (H): ICD-10-CM

## 2024-07-22 ENCOUNTER — HOSPITAL ENCOUNTER (OUTPATIENT)
Dept: CARDIOLOGY | Facility: CLINIC | Age: 84
Discharge: HOME | End: 2024-07-22
Attending: INTERNAL MEDICINE | Admitting: INTERNAL MEDICINE
Payer: COMMERCIAL

## 2024-07-22 DIAGNOSIS — I42.9 CARDIOMYOPATHY, UNSPECIFIED TYPE (H): ICD-10-CM

## 2024-07-22 DIAGNOSIS — I50.22 CHRONIC SYSTOLIC HEART FAILURE (H): ICD-10-CM

## 2024-07-22 DIAGNOSIS — I42.0 DILATED CARDIOMYOPATHY (H): ICD-10-CM

## 2024-07-22 DIAGNOSIS — I42.9 CARDIOMYOPATHY (H): Primary | ICD-10-CM

## 2024-07-22 LAB — LVEF ECHO: NORMAL

## 2024-07-22 PROCEDURE — C8929 TTE W OR WO FOL WCON,DOPPLER: HCPCS

## 2024-07-22 PROCEDURE — 93306 TTE W/DOPPLER COMPLETE: CPT | Mod: 26 | Performed by: INTERNAL MEDICINE

## 2024-07-22 PROCEDURE — 999N000208 ECHOCARDIOGRAM COMPLETE

## 2024-07-22 PROCEDURE — 255N000002 HC RX 255 OP 636: Performed by: INTERNAL MEDICINE

## 2024-07-22 RX ADMIN — HUMAN ALBUMIN MICROSPHERES AND PERFLUTREN 9 ML: 10; .22 INJECTION, SOLUTION INTRAVENOUS at 10:54

## 2024-07-23 ENCOUNTER — TELEPHONE (OUTPATIENT)
Dept: CARDIOLOGY | Facility: CLINIC | Age: 84
End: 2024-07-23
Payer: COMMERCIAL

## 2024-07-23 NOTE — TELEPHONE ENCOUNTER
Reviewed echo showing   Left ventricular systolic function is moderately reduced. The visual ejection fraction is 35-40%.  There is moderate global hypokinesia of the left ventricle which is worse in inferior walls.  The right ventricle is normal size. Mildly decreased right ventricular systolic function.  There is mild (1+) mitral regurgitation.  The rhythm was atrial fibrillation.  Previous study from 3/9/2023, reported LV ejection fraction of 45-50%.  .  Per office note dated 1/23/24, Dr. Smith recommended:   1.  I will continue the patient on her present medications which appear to be functioning well for her.  However I have told her to take the Entresto first thing in the morning and then 2 hours later to take the carvedilol medication.  Hopefully this will reduce the peak dose effect.  2.  In 6 months time and have the patient return to see me and at that stage I will repeat her echocardiogram and also repeat her basic metabolic profile and lipid profile.    Pt has appt for labs 7/24/24 & sees Dr. Smith 7/25/24; will message to review. Randy HALEY

## 2024-07-24 ENCOUNTER — LAB (OUTPATIENT)
Dept: LAB | Facility: CLINIC | Age: 84
End: 2024-07-24
Payer: COMMERCIAL

## 2024-07-24 DIAGNOSIS — I25.10 CORONARY ARTERY DISEASE INVOLVING NATIVE CORONARY ARTERY OF NATIVE HEART WITHOUT ANGINA PECTORIS: ICD-10-CM

## 2024-07-24 DIAGNOSIS — I10 BENIGN ESSENTIAL HYPERTENSION: ICD-10-CM

## 2024-07-24 DIAGNOSIS — E78.00 PURE HYPERCHOLESTEROLEMIA: ICD-10-CM

## 2024-07-24 DIAGNOSIS — I50.22 CHRONIC SYSTOLIC HEART FAILURE (H): ICD-10-CM

## 2024-07-24 LAB
ALT SERPL W P-5'-P-CCNC: 20 U/L (ref 0–50)
ANION GAP SERPL CALCULATED.3IONS-SCNC: 10 MMOL/L (ref 7–15)
BUN SERPL-MCNC: 20.5 MG/DL (ref 8–23)
CALCIUM SERPL-MCNC: 9.6 MG/DL (ref 8.8–10.4)
CHLORIDE SERPL-SCNC: 97 MMOL/L (ref 98–107)
CHOLEST SERPL-MCNC: 113 MG/DL
CREAT SERPL-MCNC: 1.08 MG/DL (ref 0.51–0.95)
EGFRCR SERPLBLD CKD-EPI 2021: 51 ML/MIN/1.73M2
FASTING STATUS PATIENT QL REPORTED: YES
GLUCOSE SERPL-MCNC: 128 MG/DL (ref 70–99)
HCO3 SERPL-SCNC: 30 MMOL/L (ref 22–29)
HDLC SERPL-MCNC: 55 MG/DL
LDLC SERPL CALC-MCNC: 45 MG/DL
NONHDLC SERPL-MCNC: 58 MG/DL
POTASSIUM SERPL-SCNC: 4.3 MMOL/L (ref 3.4–5.3)
SODIUM SERPL-SCNC: 137 MMOL/L (ref 135–145)
TRIGL SERPL-MCNC: 67 MG/DL

## 2024-07-24 PROCEDURE — 80061 LIPID PANEL: CPT | Performed by: INTERNAL MEDICINE

## 2024-07-24 PROCEDURE — 80048 BASIC METABOLIC PNL TOTAL CA: CPT | Performed by: INTERNAL MEDICINE

## 2024-07-24 PROCEDURE — 84460 ALANINE AMINO (ALT) (SGPT): CPT | Performed by: INTERNAL MEDICINE

## 2024-07-24 PROCEDURE — 36415 COLL VENOUS BLD VENIPUNCTURE: CPT | Performed by: INTERNAL MEDICINE

## 2024-07-25 ENCOUNTER — OFFICE VISIT (OUTPATIENT)
Dept: CARDIOLOGY | Facility: CLINIC | Age: 84
End: 2024-07-25
Payer: COMMERCIAL

## 2024-07-25 VITALS
HEART RATE: 54 BPM | WEIGHT: 133.2 LBS | SYSTOLIC BLOOD PRESSURE: 135 MMHG | DIASTOLIC BLOOD PRESSURE: 70 MMHG | BODY MASS INDEX: 26.85 KG/M2 | OXYGEN SATURATION: 97 % | HEIGHT: 59 IN

## 2024-07-25 DIAGNOSIS — I25.10 CORONARY ARTERY DISEASE INVOLVING NATIVE CORONARY ARTERY OF NATIVE HEART WITHOUT ANGINA PECTORIS: ICD-10-CM

## 2024-07-25 DIAGNOSIS — I10 BENIGN ESSENTIAL HYPERTENSION: ICD-10-CM

## 2024-07-25 DIAGNOSIS — I42.9 CARDIOMYOPATHY, UNSPECIFIED TYPE (H): ICD-10-CM

## 2024-07-25 DIAGNOSIS — E78.00 PURE HYPERCHOLESTEROLEMIA: ICD-10-CM

## 2024-07-25 DIAGNOSIS — I47.29 NSVT (NONSUSTAINED VENTRICULAR TACHYCARDIA) (H): ICD-10-CM

## 2024-07-25 DIAGNOSIS — I27.20 MODERATE PULMONARY HYPERTENSION (H): ICD-10-CM

## 2024-07-25 DIAGNOSIS — I50.22 CHRONIC SYSTOLIC CONGESTIVE HEART FAILURE (H): ICD-10-CM

## 2024-07-25 DIAGNOSIS — I48.20 CHRONIC ATRIAL FIBRILLATION (H): ICD-10-CM

## 2024-07-25 DIAGNOSIS — I42.8 OTHER CARDIOMYOPATHY (H): Primary | ICD-10-CM

## 2024-07-25 DIAGNOSIS — I50.22 CHRONIC SYSTOLIC HEART FAILURE (H): ICD-10-CM

## 2024-07-25 PROCEDURE — 99214 OFFICE O/P EST MOD 30 MIN: CPT | Performed by: INTERNAL MEDICINE

## 2024-07-25 PROCEDURE — G2211 COMPLEX E/M VISIT ADD ON: HCPCS | Performed by: INTERNAL MEDICINE

## 2024-07-25 RX ORDER — SIMVASTATIN 10 MG
10 TABLET ORAL AT BEDTIME
Qty: 90 TABLET | Refills: 4 | Status: SHIPPED | OUTPATIENT
Start: 2024-07-25

## 2024-07-25 RX ORDER — TERAZOSIN 5 MG/1
5 CAPSULE ORAL AT BEDTIME
Qty: 90 CAPSULE | Refills: 4 | Status: SHIPPED | OUTPATIENT
Start: 2024-07-25

## 2024-07-25 NOTE — PROGRESS NOTES
HPI and Plan:   It is my pleasure to see your patient Cece Melendez who is a very pleasant 83-year-old patient with a history of idiopathic cardiomyopathy, chronic systolic congestive heart failure, essential hypertension, and chronic atrial fibrillation.    Since I last saw her she has been doing relatively well.  She does notice in the morning time that she can get episodes of tiredness and fatigue and maybe for a fleeting few seconds dizziness and lightheadedness.  She takes both Entresto and carvedilol in the morning time.  She does not feel those symptoms in the evening time.  Her blood pressure is well-controlled and she did take a log of her blood pressures over the last few days which are within normal limits and no episodes of hypotension.  I suspect that she is getting a peak dose effect in the morning.      Her echocardiogram was reported as having an ejection fraction of 35 to 40%.  I personally reviewed the echo myself.  I think the EF is more in the 45 to 50% range personally.  Certainly the parasternal short axis views are very much in the 45 to 50% range.  The patient is not complaining of any orthopnea PND or ankle edema.  She does get some shortness of breath on exertion but only if she pushes herself.  She is not complaining any chest pains or chest pressure.  Approximately 13 months ago she had a coronary angiogram performed which shows that she had trivial plaque in her proximal and mid LAD no greater than 15%.    Her lipid profile is excellent with an LDL of 45 HDL of 55 and triglycerides of 67 with a total cholesterol of 113.  She has mild renal insufficiency but her renal function is stable.  The BUN is 20.5 the creatinine is 1.08 and the GFR is 51 sodium is 137 and potassium is 4.3.  This is very similar to what it was in January and better than it was last year.    Her blood pressure is well-controlled at 135/70.    Impression:  1.  Idiopathic cardiomyopathy.  As mentioned above,  the ejection fraction in my opinion is more in the 45 to 50% range being closer to 45%.  This is similar to previously.  2.  Chronic systolic congestive heart failure.  The patient appears to be euvolemic both on physical examination and clinically.  3.  Episodes of dizziness and fatigue in the morning time which I suspect is peak dose effect due to the combination of Entresto and carvedilol.  4.  Excellent lipid profile well within secondary prevention guidelines with no evidence of hepatic toxicity.  5.  Mild renal insufficiency which is stable.  It is also better than last year.  6.  Chronic atrial fibrillation with good rate control.  Did develop episodes of pauses with higher doses of carvedilol.    Plan:  1.  I will obtain a Holter monitor to ensure that she is not getting pauses and to ensure that her ventricular rate is well-controlled.  2.  I will have the patient follow-up with one of our nurse practitioners or physician assistants in 6 months time with a repeat basic metabolic profile  3.  I will see the patient back again in 1 years time I will repeat her lipid profile, basic metabolic profile and echocardiogram to follow her cardiomyopathy.    As always the patient is been told to contact me if she has any questions or any concerns.    The longitudinal plan of care for the diagnosis(es)/condition(s) as documented were addressed during this visit. Due to the added complexity in care, I will continue to support Janessa Babb in the subsequent management and with ongoing continuity of care.     Brett Smith MD, FACC, FRCPI      Orders Placed This Encounter   Procedures    Lipid Profile    ALT    Basic metabolic panel    Lipid Profile    ALT    Follow-Up with Cardiology MARINO    Follow-Up with Cardiology    Holter Monitor 24 hour Adult Pediatric    Echocardiogram Complete       Orders Placed This Encounter   Medications    simvastatin (ZOCOR) 10 MG tablet     Sig: Take 1 tablet (10 mg) by mouth at  bedtime     Dispense:  90 tablet     Refill:  4    terazosin (HYTRIN) 5 MG capsule     Sig: Take 1 capsule (5 mg) by mouth at bedtime     Dispense:  90 capsule     Refill:  4       Medications Discontinued During This Encounter   Medication Reason    methimazole (TAPAZOLE) 5 MG tablet Discontinued by another Health Care Provider (No AVS)    simvastatin (ZOCOR) 10 MG tablet Reorder (No AVS)    terazosin (HYTRIN) 5 MG capsule Reorder (No AVS)         Encounter Diagnoses   Name Primary?    Cardiomyopathy, unspecified type (H)     Chronic systolic heart failure (H)     Chronic atrial fibrillation (H)     Benign essential hypertension     Pure hypercholesterolemia     Moderate pulmonary hypertension (H)     Coronary artery disease involving native coronary artery of native heart without angina pectoris     Other cardiomyopathy (H) Yes    NSVT (nonsustained ventricular tachycardia) (H)     Chronic systolic congestive heart failure (H)        CURRENT MEDICATIONS:  Current Outpatient Medications   Medication Sig Dispense Refill    Acetaminophen (TYLENOL PO) Take 500 mg by mouth every 6 hours as needed for mild pain or fever       amoxicillin (AMOXIL) 500 MG capsule Before the dentist      apixaban ANTICOAGULANT (ELIQUIS) 5 MG tablet Take 1 tablet (5 mg) by mouth 2 times daily 180 tablet 3    carvedilol (COREG) 25 MG tablet Take 1 tablet (25 mg) by mouth 2 times daily 180 tablet 3    cholecalciferol (VITAMIN D3) 25 mcg (1000 units) capsule Take 1 capsule by mouth daily      Magnesium Oxide 250 MG TABS Take 1 tablet (250 mg) by mouth daily 30 tablet 11    omeprazole (PRILOSEC) 20 MG DR capsule Take 20 mg by mouth daily      potassium chloride ER (K-TAB/KLOR-CON) 10 MEQ CR tablet Take 1 tablet (10 mEq) by mouth 2 times daily And as needed as directed. 180 tablet 3    sacubitril-valsartan (ENTRESTO) 49-51 MG per tablet Take 1 tablet by mouth 2 times daily 60 tablet 11    simvastatin (ZOCOR) 10 MG tablet Take 1 tablet (10 mg) by  mouth at bedtime 90 tablet 4    terazosin (HYTRIN) 5 MG capsule Take 1 capsule (5 mg) by mouth at bedtime 90 capsule 4    torsemide (DEMADEX) 10 MG tablet Take 3 tablets (30 mg) by mouth daily 270 tablet 3       ALLERGIES     Allergies   Allergen Reactions    Amlodipine      swelling    Aspirin Other (See Comments)     Bleeding              Bleeding, GI Lesion        Codeine Sulfate GI Disturbance       PAST MEDICAL HISTORY:  Past Medical History:   Diagnosis Date    Arthritis     Breast cancer (H)     Cardiomyopathy (H)     ideopathic    Coronary artery disease 9/25/2014    Hypercholesterolemia     Hypertension     Hypokalemia     Malignant neoplasm (H)     Shortness of breath     Shoulder pain        PAST SURGICAL HISTORY:  Past Surgical History:   Procedure Laterality Date    BACK SURGERY      CARDIAC SURGERY      angiogram neg many yrs ago    CHOLECYSTECTOMY      CV CORONARY ANGIOGRAM N/A 5/5/2023    Procedure: Coronary Angiogram;  Surgeon: Kane Daugherty MD;  Location:  HEART CARDIAC CATH LAB    CV LEFT HEART CATH N/A 5/5/2023    Procedure: Left Heart Catheterization;  Surgeon: Kane Daugherty MD;  Location:  HEART CARDIAC CATH LAB    CV RIGHT HEART CATH MEASUREMENTS RECORDED N/A 5/5/2023    Procedure: Right Heart Catheterization;  Surgeon: Kane Daugherty MD;  Location:  HEART CARDIAC CATH LAB    CV RIGHT HEART EXERCISE STRESS STUDY N/A 5/5/2023    Procedure: Stress Drug Study;  Surgeon: Kane Daugherty MD;  Location:  HEART CARDIAC CATH LAB    ESOPHAGOSCOPY, GASTROSCOPY, DUODENOSCOPY (EGD), COMBINED N/A 6/22/2021    Procedure: ESOPHAGOGASTRODUODENOSCOPY, WITH ENDOSCOPIC US WITH FINE NEEDLE ASPIRATION;  Surgeon: Ventura Mcgill MD;  Location:  GI    EYE SURGERY      ORTHOPEDIC SURGERY      lt shoulder replaced, total    partial masectomy         FAMILY HISTORY:  Family History   Problem Relation Age of Onset    Other Cancer Mother     Other Cancer Brother     No Known Problems  "Son     Heart Failure Daughter     Family History Negative No family hx of        SOCIAL HISTORY:  Social History     Socioeconomic History    Marital status: Single     Spouse name: None    Number of children: None    Years of education: None    Highest education level: None   Tobacco Use    Smoking status: Never    Smokeless tobacco: Never   Substance and Sexual Activity    Alcohol use: Yes     Comment: socially    Drug use: No    Sexual activity: Never   Other Topics Concern    Special Diet No    Exercise No     Social Determinants of Health      Received from [employer] [employer] [facility]    Financial Resource Strain    Received from [employer], [employer] [facility]    Social Connections       Review of Systems:  Skin:          Eyes:         ENT:         Respiratory:          Cardiovascular:         Gastroenterology:        Genitourinary:         Musculoskeletal:         Neurologic:         Psychiatric:         Heme/Lymph/Imm:         Endocrine:           Physical Exam:  Vitals: /70   Pulse 54   Ht 1.499 m (4' 11\")   Wt 60.4 kg (133 lb 3.2 oz)   SpO2 97%   BMI 26.90 kg/m      Constitutional:  cooperative, alert and oriented, well developed, well nourished, in no acute distress overweight      Skin:  warm and dry to the touch, no apparent skin lesions or masses noted          Head:  normocephalic, no masses or lesions;normocephalic        Eyes:  pupils equal and round, conjunctivae and lids unremarkable, sclera white, no xanthalasma, EOMS intact, no nystagmus        Lymph:      ENT:  no pallor or cyanosis, dentition good        Neck:  carotid pulses are full and equal bilaterally, JVP normal, no carotid bruit        Respiratory:  normal breath sounds, clear to auscultation, normal A-P diameter, normal symmetry, normal respiratory excursion, no use of accessory muscles         Cardiac: " no murmurs, gallops or rubs detected (Variable S1 and normal S2) irregularly irregular rhythm              pulses full and equal                                        GI:  not assessed this visit        Extremities and Muscular Skeletal:  no edema;no deformities, clubbing, cyanosis, erythema observed         Bruising over R lower extremity    Neurological:  no gross motor deficits;affect appropriate        Psych:  Alert and Oriented x 3        CC  Brett Smith MD  [id number]  [place] [id number]

## 2024-07-25 NOTE — LETTER
7/25/2024    [facility]  [address] [place] [id number]    RE: Janessa Tomastte       Dear Colleague,     I had the pleasure of seeing Janessa Melendez in the [facility].  HPI and Plan:   It is my pleasure to see your patient Cece Melendez who is a very pleasant 83-year-old patient with a history of idiopathic cardiomyopathy, chronic systolic congestive heart failure, essential hypertension, and chronic atrial fibrillation.    Since I last saw her she has been doing relatively well.  She does notice in the morning time that she can get episodes of tiredness and fatigue and maybe for a fleeting few seconds dizziness and lightheadedness.  She takes both Entresto and carvedilol in the morning time.  She does not feel those symptoms in the evening time.  Her blood pressure is well-controlled and she did take a log of her blood pressures over the last few days which are within normal limits and no episodes of hypotension.  I suspect that she is getting a peak dose effect in the morning.      Her echocardiogram was reported as having an ejection fraction of 35 to 40%.  I personally reviewed the echo myself.  I think the EF is more in the 45 to 50% range personally.  Certainly the parasternal short axis views are very much in the 45 to 50% range.  The patient is not complaining of any orthopnea PND or ankle edema.  She does get some shortness of breath on exertion but only if she pushes herself.  She is not complaining any chest pains or chest pressure.  Approximately 13 months ago she had a coronary angiogram performed which shows that she had trivial plaque in her proximal and mid LAD no greater than 15%.    Her lipid profile is excellent with an LDL of 45 HDL of 55 and triglycerides of 67 with a total cholesterol of 113.  She has mild renal insufficiency but her renal function is stable.  The BUN is 20.5 the creatinine is 1.08 and the GFR is 51  sodium is 137 and potassium is 4.3.  This is very similar to what it was in January and better than it was last year.    Her blood pressure is well-controlled at 135/70.    Impression:  1.  Idiopathic cardiomyopathy.  As mentioned above, the ejection fraction in my opinion is more in the 45 to 50% range being closer to 45%.  This is similar to previously.  2.  Chronic systolic congestive heart failure.  The patient appears to be euvolemic both on physical examination and clinically.  3.  Episodes of dizziness and fatigue in the morning time which I suspect is peak dose effect due to the combination of Entresto and carvedilol.  4.  Excellent lipid profile well within secondary prevention guidelines with no evidence of hepatic toxicity.  5.  Mild renal insufficiency which is stable.  It is also better than last year.  6.  Chronic atrial fibrillation with good rate control.  Did develop episodes of pauses with higher doses of carvedilol.    Plan:  1.  I will obtain a Holter monitor to ensure that she is not getting pauses and to ensure that her ventricular rate is well-controlled.  2.  I will have the patient follow-up with one of our nurse practitioners or physician assistants in 6 months time with a repeat basic metabolic profile  3.  I will see the patient back again in 1 years time I will repeat her lipid profile, basic metabolic profile and echocardiogram to follow her cardiomyopathy.    As always the patient is been told to contact me if she has any questions or any concerns.    The longitudinal plan of care for the diagnosis(es)/condition(s) as documented were addressed during this visit. Due to the added complexity in care, I will continue to support Janessa Babb in the subsequent management and with ongoing continuity of care.     Brett Smith MD, FACC, FRCPI      Orders Placed This Encounter   Procedures    Lipid Profile    ALT    Basic metabolic panel    Lipid Profile    ALT    Follow-Up with  Cardiology MARINO    Follow-Up with Cardiology    Holter Monitor 24 hour Adult Pediatric    Echocardiogram Complete       Orders Placed This Encounter   Medications    simvastatin (ZOCOR) 10 MG tablet     Sig: Take 1 tablet (10 mg) by mouth at bedtime     Dispense:  90 tablet     Refill:  4    terazosin (HYTRIN) 5 MG capsule     Sig: Take 1 capsule (5 mg) by mouth at bedtime     Dispense:  90 capsule     Refill:  4       Medications Discontinued During This Encounter   Medication Reason    methimazole (TAPAZOLE) 5 MG tablet Discontinued by another Health Care Provider (No AVS)    simvastatin (ZOCOR) 10 MG tablet Reorder (No AVS)    terazosin (HYTRIN) 5 MG capsule Reorder (No AVS)         Encounter Diagnoses   Name Primary?    Cardiomyopathy, unspecified type (H)     Chronic systolic heart failure (H)     Chronic atrial fibrillation (H)     Benign essential hypertension     Pure hypercholesterolemia     Moderate pulmonary hypertension (H)     Coronary artery disease involving native coronary artery of native heart without angina pectoris     Other cardiomyopathy (H) Yes    NSVT (nonsustained ventricular tachycardia) (H)     Chronic systolic congestive heart failure (H)        CURRENT MEDICATIONS:  Current Outpatient Medications   Medication Sig Dispense Refill    Acetaminophen (TYLENOL PO) Take 500 mg by mouth every 6 hours as needed for mild pain or fever       amoxicillin (AMOXIL) 500 MG capsule Before the dentist      apixaban ANTICOAGULANT (ELIQUIS) 5 MG tablet Take 1 tablet (5 mg) by mouth 2 times daily 180 tablet 3    carvedilol (COREG) 25 MG tablet Take 1 tablet (25 mg) by mouth 2 times daily 180 tablet 3    cholecalciferol (VITAMIN D3) 25 mcg (1000 units) capsule Take 1 capsule by mouth daily      Magnesium Oxide 250 MG TABS Take 1 tablet (250 mg) by mouth daily 30 tablet 11    omeprazole (PRILOSEC) 20 MG DR capsule Take 20 mg by mouth daily      potassium chloride ER (K-TAB/KLOR-CON) 10 MEQ CR tablet Take 1  tablet (10 mEq) by mouth 2 times daily And as needed as directed. 180 tablet 3    sacubitril-valsartan (ENTRESTO) 49-51 MG per tablet Take 1 tablet by mouth 2 times daily 60 tablet 11    simvastatin (ZOCOR) 10 MG tablet Take 1 tablet (10 mg) by mouth at bedtime 90 tablet 4    terazosin (HYTRIN) 5 MG capsule Take 1 capsule (5 mg) by mouth at bedtime 90 capsule 4    torsemide (DEMADEX) 10 MG tablet Take 3 tablets (30 mg) by mouth daily 270 tablet 3       ALLERGIES     Allergies   Allergen Reactions    Amlodipine      swelling    Aspirin Other (See Comments)     Bleeding              Bleeding, GI Lesion        Codeine Sulfate GI Disturbance       PAST MEDICAL HISTORY:  Past Medical History:   Diagnosis Date    Arthritis     Breast cancer (H)     Cardiomyopathy (H)     ideopathic    Coronary artery disease 9/25/2014    Hypercholesterolemia     Hypertension     Hypokalemia     Malignant neoplasm (H)     Shortness of breath     Shoulder pain        PAST SURGICAL HISTORY:  Past Surgical History:   Procedure Laterality Date    BACK SURGERY      CARDIAC SURGERY      angiogram neg many yrs ago    CHOLECYSTECTOMY      CV CORONARY ANGIOGRAM N/A 5/5/2023    Procedure: Coronary Angiogram;  Surgeon: Kane Daugherty MD;  Location: [facility] CARDIAC CATH LAB    CV LEFT HEART CATH N/A 5/5/2023    Procedure: Left Heart Catheterization;  Surgeon: Kane Daugherty MD;  Location: [facility] CARDIAC CATH LAB     RIGHT HEART CATH MEASUREMENTS RECORDED N/A 5/5/2023    Procedure: Right Heart Catheterization;  Surgeon: Kane Daugherty MD;  Location: [facility] CARDIAC CATH LAB     RIGHT HEART EXERCISE STRESS STUDY N/A 5/5/2023    Procedure: Stress Drug Study;  Surgeon: Kane Daugherty MD;  Location: [facility] CARDIAC CATH LAB    ESOPHAGOSCOPY, GASTROSCOPY, DUODENOSCOPY (EGD), COMBINED N/A 6/22/2021    Procedure: ESOPHAGOGASTRODUODENOSCOPY, WITH ENDOSCOPIC US WITH FINE NEEDLE ASPIRATION;  Surgeon: Ventura Mcgill MD;   "Location:  GI    EYE SURGERY      ORTHOPEDIC SURGERY      lt shoulder replaced, total    partial masectomy         FAMILY HISTORY:  Family History   Problem Relation Age of Onset    Other Cancer Mother     Other Cancer Brother     No Known Problems Son     Heart Failure Daughter     Family History Negative No family hx of        SOCIAL HISTORY:  Social History     Socioeconomic History    Marital status: Single     Spouse name: None    Number of children: None    Years of education: None    Highest education level: None   Tobacco Use    Smoking status: Never    Smokeless tobacco: Never   Substance and Sexual Activity    Alcohol use: Yes     Comment: socially    Drug use: No    Sexual activity: Never   Other Topics Concern    Special Diet No    Exercise No     Social Determinants of Health      Received from [employer]    Financial Resource Strain    Received from [employer]    Social Connections       Review of Systems:  Skin:          Eyes:         ENT:         Respiratory:          Cardiovascular:         Gastroenterology:        Genitourinary:         Musculoskeletal:         Neurologic:         Psychiatric:         Heme/Lymph/Imm:         Endocrine:           Physical Exam:  Vitals: /70   Pulse 54   Ht 1.499 m (4' 11\")   Wt 60.4 kg (133 lb 3.2 oz)   SpO2 97%   BMI 26.90 kg/m      Constitutional:  cooperative, alert and oriented, well developed, well nourished, in no acute distress overweight      Skin:  warm and dry to the touch, no apparent skin lesions or masses noted          Head:  normocephalic, no masses or lesions;normocephalic        Eyes:  pupils equal and round, conjunctivae and lids unremarkable, sclera white, no xanthalasma, EOMS intact, no nystagmus        Lymph:      ENT:  no pallor or cyanosis, dentition good        Neck:  " carotid pulses are full and equal bilaterally, JVP normal, no carotid bruit        Respiratory:  normal breath sounds, clear to auscultation, normal A-P diameter, normal symmetry, normal respiratory excursion, no use of accessory muscles         Cardiac: no murmurs, gallops or rubs detected (Variable S1 and normal S2) irregularly irregular rhythm              pulses full and equal                                        GI:  not assessed this visit        Extremities and Muscular Skeletal:  no edema;no deformities, clubbing, cyanosis, erythema observed         Bruising over R lower extremity    Neurological:  no gross motor deficits;affect appropriate        Psych:  Alert and Oriented x 3        CC  Brett Smith MD  [address]  [place] [address]      Thank you for allowing me to participate in the care of your patient.      Sincerely,     Brett Smith MD, MD     [facility]

## 2024-08-05 ENCOUNTER — HOSPITAL ENCOUNTER (OUTPATIENT)
Dept: CARDIOLOGY | Facility: CLINIC | Age: 84
Discharge: HOME | End: 2024-08-05
Attending: INTERNAL MEDICINE | Admitting: INTERNAL MEDICINE
Payer: COMMERCIAL

## 2024-08-05 DIAGNOSIS — I48.20 CHRONIC ATRIAL FIBRILLATION (H): ICD-10-CM

## 2024-08-05 PROCEDURE — 93227 XTRNL ECG REC<48 HR R&I: CPT | Performed by: INTERNAL MEDICINE

## 2024-08-05 PROCEDURE — 93225 XTRNL ECG REC<48 HRS REC: CPT

## 2024-08-07 ENCOUNTER — TELEPHONE (OUTPATIENT)
Dept: CARDIOLOGY | Facility: CLINIC | Age: 84
End: 2024-08-07
Payer: COMMERCIAL

## 2024-08-07 DIAGNOSIS — I42.9 CARDIOMYOPATHY, UNSPECIFIED TYPE (H): ICD-10-CM

## 2024-08-07 DIAGNOSIS — I42.0 DILATED CARDIOMYOPATHY (H): ICD-10-CM

## 2024-08-07 DIAGNOSIS — I10 BENIGN ESSENTIAL HYPERTENSION: ICD-10-CM

## 2024-08-07 DIAGNOSIS — I45.5 SINUS PAUSE: ICD-10-CM

## 2024-08-07 DIAGNOSIS — I48.20 CHRONIC ATRIAL FIBRILLATION (H): Primary | ICD-10-CM

## 2024-08-07 DIAGNOSIS — R00.1 BRADYCARDIA: ICD-10-CM

## 2024-08-07 RX ORDER — CARVEDILOL 6.25 MG/1
18.75 TABLET ORAL 2 TIMES DAILY
Qty: 180 TABLET | Refills: 2 | Status: SHIPPED | OUTPATIENT
Start: 2024-08-07 | End: 2024-08-30

## 2024-08-07 NOTE — TELEPHONE ENCOUNTER
Attempted to call patient with recommendations from Dr. Albino Connors, left message for patient to call back.  JONG Miramontes RN

## 2024-08-07 NOTE — TELEPHONE ENCOUNTER
Would reduce the dose of carvedilol down to 18.75 mg/day and in 2 weeks time I would repeat the Holter monitor.  I had technicians correct the report which stated that the patient had a 3.78-second pause.  The longest pause was 3.26 seconds which is within normal limits for atrial fibrillation patients but I would like to see the pauses shorter.  Thanks

## 2024-08-07 NOTE — TELEPHONE ENCOUNTER
Patient called back, informed of recommendations from Dr. Albino Connors to decrease carvedilol to 18.75 mg twice daily with repeat Holter in 2 weeks.  Prescription E scripted to [facility] drug and order placed for repeat Holter in 2 weeks.  Will message scheduling to call patient to arrange.  JONG Miramontes RN

## 2024-08-07 NOTE — TELEPHONE ENCOUNTER
Reviewed Holter monitor showing   1. Janessa Melendez was monitored for 24 hours.  The quality of the tracing was good.  The principle rhythm was Atrial Fibrillation.  The  average HR was 63 bpm, maximum HR was 112 bpm, and minimum HR was 46 bpm.  There were 695 pauses greater than 2.0 seconds, longest pause was 3.26  seconds. The QRS duration was 0.11 seconds, and QT interval was 0.31-0.47 seconds.  2. There were 700 ventricular ectopics. 680 of these were isolated PVCs. There were 10 couplets.  3. Patient event button was pressed 0 times and marked no symptoms.  4. There were 694 RR pauses greater than 2.0 seconds. The longest RR pause was 3.26 seconds @ 10:36 PM.    Per office note dated 7/25/24, Dr. Smith recommended:   1.  I will obtain a Holter monitor to ensure that she is not getting pauses and to ensure that her ventricular rate is well-controlled.  2.  I will have the patient follow-up with one of our nurse practitioners or physician assistants in 6 months time with a repeat basic metabolic profile  3.  I will see the patient back again in 1 years time I will repeat her lipid profile, basic metabolic profile and echocardiogram to follow her cardiomyopathy.    Will message Dr. Smith to review. Randy HALEY

## 2024-08-20 DIAGNOSIS — E83.42 HYPOMAGNESEMIA: ICD-10-CM

## 2024-08-20 DIAGNOSIS — I47.29 NSVT (NONSUSTAINED VENTRICULAR TACHYCARDIA) (H): ICD-10-CM

## 2024-08-21 ENCOUNTER — HOSPITAL ENCOUNTER (OUTPATIENT)
Dept: CARDIOLOGY | Facility: CLINIC | Age: 84
Discharge: HOME | End: 2024-08-21
Attending: INTERNAL MEDICINE | Admitting: INTERNAL MEDICINE
Payer: COMMERCIAL

## 2024-08-21 DIAGNOSIS — I10 BENIGN ESSENTIAL HYPERTENSION: ICD-10-CM

## 2024-08-21 DIAGNOSIS — I48.20 CHRONIC ATRIAL FIBRILLATION (H): ICD-10-CM

## 2024-08-21 DIAGNOSIS — I45.5 SINUS PAUSE: ICD-10-CM

## 2024-08-21 DIAGNOSIS — I42.9 CARDIOMYOPATHY, UNSPECIFIED TYPE (H): ICD-10-CM

## 2024-08-21 DIAGNOSIS — I42.0 DILATED CARDIOMYOPATHY (H): ICD-10-CM

## 2024-08-21 PROCEDURE — 93225 XTRNL ECG REC<48 HRS REC: CPT

## 2024-08-21 PROCEDURE — 93227 XTRNL ECG REC<48 HR R&I: CPT | Performed by: INTERNAL MEDICINE

## 2024-08-26 ENCOUNTER — TELEPHONE (OUTPATIENT)
Dept: CARDIOLOGY | Facility: CLINIC | Age: 84
End: 2024-08-26
Payer: COMMERCIAL

## 2024-08-26 DIAGNOSIS — I10 BENIGN ESSENTIAL HYPERTENSION: ICD-10-CM

## 2024-08-26 DIAGNOSIS — I42.0 DILATED CARDIOMYOPATHY (H): ICD-10-CM

## 2024-08-27 NOTE — TELEPHONE ENCOUNTER
I reviewed the Holter monitor myself.  The longest pause was 3.1 seconds.  This is acceptable in patients in atrial fibrillation where we will except a pause up to 3.5 seconds.  Will inquire from the patient if she is any dizziness or lightheadedness.  The ventricular rate is well-controlled.  Thanks

## 2024-08-27 NOTE — TELEPHONE ENCOUNTER
Reviewed Holter monitor showing  1. Janessa Melendez was monitored for 24 hours. Quality of the tracng was good. Principle rhythm was Atrial fibrillation. Average HR was 64 bpm,  maximum HR was 107 bpm at 5:01 PM, minimum HR was 47 bpm at 11:26 AM. QRS duration measured 0.11 seconds, QT interval 0.379-0.457 seconds. The longest pause was 3.11 seconds at 9:38 AM.     2. There were 448 ventricular ectopic beats (1% [place]), 441 of these were isolated PVCs. There were 2 couplets and 1 triplet.     3. The patient event button was not pressed and no symptoms were noted.    Per office note dated 7/25/24, Dr. Smith recommended:   1.  I will obtain a Holter monitor to ensure that she is not getting pauses and to ensure that her ventricular rate is well-controlled.  2.  I will have the patient follow-up with one of our nurse practitioners or physician assistants in 6 months time with a repeat basic metabolic profile  3.  I will see the patient back again in 1 years time I will repeat her lipid profile, basic metabolic profile and echocardiogram to follow her cardiomyopathy.    Will message Dr. Smith to review. Randy HALEY

## 2024-08-28 NOTE — TELEPHONE ENCOUNTER
Called patient, denies any lightheadedness dizziness.  Patient states she gets a feeling of foggy or cloudy some in the morning, but again denies any lightheadedness or dizziness.  Patient states she is not able to explain the foggy or cloudy feeling any better.  Patient states the symptoms seem to occur shortly after taking the medications in the morning but symptoms do resolve by the afternoon.  Patient's carvedilol was decreased earlier this month and patient states she is feeling a little bit better with the decrease in carvedilol.  Will let Dr. Albino Connors know.  JONG Miramontes RN

## 2024-08-29 NOTE — TELEPHONE ENCOUNTER
I would drop down to 12.5 mg twice a day of the carvedilol and see if her symptoms improve.  She should have a blood pressure check performed 1 week after we reduce the dose down.

## 2024-08-30 RX ORDER — CARVEDILOL 6.25 MG/1
12.5 TABLET ORAL 2 TIMES DAILY
Qty: 120 TABLET | Refills: 2 | Status: SHIPPED | OUTPATIENT
Start: 2024-08-30

## 2024-08-30 NOTE — TELEPHONE ENCOUNTER
Called patient with recommendations from Dr. Albino Connors to reduce carvedilol to 12.5 mg twice daily with BP check in 1 week to see if this improves her symptoms.  Patient states it is difficult for her to get to the clinic and asking if she can have it done at PCP office with [facility] or a pharmacy.  Patient advised she could and was given my direct number to call if there is any questions or concerns or fax number to have BP check faxed to us.  JONG Miramontes RN

## 2024-10-09 ENCOUNTER — DOCUMENTATION ONLY (OUTPATIENT)
Dept: CARDIOLOGY | Facility: CLINIC | Age: 84
End: 2024-10-09
Payer: COMMERCIAL

## 2024-10-09 DIAGNOSIS — I48.91 ATRIAL FIBRILLATION, UNSPECIFIED TYPE (H): ICD-10-CM

## 2024-10-09 DIAGNOSIS — I50.22 CHRONIC SYSTOLIC HEART FAILURE (H): ICD-10-CM

## 2024-10-09 RX ORDER — SACUBITRIL AND VALSARTAN 49; 51 MG/1; MG/1
1 TABLET, FILM COATED ORAL 2 TIMES DAILY
Qty: 180 TABLET | Refills: 3 | Status: SHIPPED | OUTPATIENT
Start: 2024-10-09

## 2024-10-09 NOTE — PROGRESS NOTES
"10-9-2024 I spoke w/pt today, she has received a letter from [employer] about the added step of contacting Medicare D \"extra Help\" program,before re enrolling in the assistance program.  I explained it is just a call she will need to make. If denied the extra Help-the denial letter will need to be submitted along with BOTH [employer] and [employer] re enrollment apps.  I sent her a packet with ALL the directions, copies of last years income info and Insurance info-she can use those numbers on the RE Enrollment jason.  I sent her info on the Medicare D extra help program and Phone number to call.   I sent her the applications for both programs with her  program ID numbers added to the forms.  I encouraged Janessa to call if either her or DTR Rafaela have any questions  I will work on the provider portions  Nicole Eason lpn    "

## 2024-10-10 NOTE — PROGRESS NOTES
10- received the signed Jared CHAPMAN portion of the application, I faxed 2 pages to [facility].Waiting for signature on the [employer] jason-Nicole Eason LPN    10- received the signed [employer] application signed provider portion-faxed 2 paged to [facility]  Nicole Eason lpn

## 2024-10-30 NOTE — PROGRESS NOTES
10- received pts portions of both Eliquis and Entresto applications-faxed to [employer] and [employer]  Nicole Eason Lpn

## 2024-10-31 ENCOUNTER — TELEPHONE (OUTPATIENT)
Dept: CARDIOLOGY | Facility: CLINIC | Age: 84
End: 2024-10-31
Payer: COMMERCIAL

## 2024-10-31 NOTE — TELEPHONE ENCOUNTER
ANTICOAGULATION DIRECT ORAL ANTICOAGULANT MONITORING    Chart reviewed.     Weight rechecked on 7/25/24.  Wt Readings from Last 3 Encounters:   07/25/24 60.4 kg (133 lb 3.2 oz)   01/23/24 59.2 kg (130 lb 8 oz)   07/14/23 55.3 kg (122 lb)          Dosing: recommend adjustment to Apixaban 2.5 mg TWICE daily for age >= 80 years and weight <= 60 kg (consistent with package insert dosing)     Plan made per [facility] anticoagulation protocol-- routing to provider for review.        Marielle Zamora RN  Anticoagulation Clinic  [phone]

## 2024-10-31 NOTE — TELEPHONE ENCOUNTER
Completed forms were faxed to [employer]. Janessa had to sign one form and that was mailed to her with instructions to sign it and mail it to [employer] when she was done.     Eliana ROBERTSON, RN  12:20 PM 10/31/2024  Nurse line: [facility] 8am-4p [phone]       ----- Message from Valerie STEINER sent at 10/30/2024  4:08 PM CDT -----  Regarding: Open Process and [employer] [place]  [employer] [phone] (Betsy) called to alert us that Janessa has sent us provider portion to reapply for meals.    Shey said forms are in our paper follow up bin. Please complete and fax to [employer].    I left a VM for Betsy that we'd work on this 10/31.

## 2024-11-01 NOTE — TELEPHONE ENCOUNTER
Zcah Ventura MD1 minute ago (10:12 AM)     Yes, I agree, please lower Eliquis to 2.5 mg bid.  DI

## 2024-11-13 ENCOUNTER — DOCUMENTATION ONLY (OUTPATIENT)
Dept: ANTICOAGULATION | Facility: CLINIC | Age: 84
End: 2024-11-13
Payer: COMMERCIAL

## 2024-11-13 NOTE — TELEPHONE ENCOUNTER
Called and LVM for patient requesting callback to discuss apixaban dose adjustment.     Marielle Zamora RN  [facility] Anticoagulation  [phone]

## 2024-11-13 NOTE — PROGRESS NOTES
Encounter made to discontinue apixaban 5 mg dose as lower dose has been approved by provider, was discussed with patient and new order placed.     Marielle Zamora RN  [facility]  [phone]

## 2024-11-13 NOTE — TELEPHONE ENCOUNTER
Patient returned call.  Spoke with Janessa and reviewed apixaban dose adjustment instructions.   Patient has her Eliquis filled through [employer] so will need a paper copy sent to them and forms filled out again.   Will route to Nicole Eason LPN for assistance with this.    Marielle Zamora RN  [facility] Anticoagulation  [phone]

## 2024-11-14 NOTE — TELEPHONE ENCOUNTER
Nicole Eason LPN will take care of getting Rx to [employer].  No further action needed from writer.    Marielle Zamora RN  [facility]  [phone]

## 2024-11-15 ENCOUNTER — DOCUMENTATION ONLY (OUTPATIENT)
Dept: CARDIOLOGY | Facility: CLINIC | Age: 84
End: 2024-11-15
Payer: COMMERCIAL

## 2024-11-15 DIAGNOSIS — I48.91 ATRIAL FIBRILLATION (H): ICD-10-CM

## 2024-11-15 DIAGNOSIS — I63.211 CEREBROVASCULAR ACCIDENT (CVA) DUE TO OCCLUSION OF RIGHT VERTEBRAL ARTERY (H): ICD-10-CM

## 2024-11-15 NOTE — PROGRESS NOTES
11- received a call from pt 11- and e mail from INR RN-pts eliquis has been decreased to 2.5mg BID-[facility] for the [employer] assistance program Today  Nicole Eason lpn

## 2024-11-25 DIAGNOSIS — I10 BENIGN ESSENTIAL HYPERTENSION: ICD-10-CM

## 2024-11-25 DIAGNOSIS — I42.0 DILATED CARDIOMYOPATHY (H): ICD-10-CM

## 2024-11-25 RX ORDER — CARVEDILOL 6.25 MG/1
12.5 TABLET ORAL 2 TIMES DAILY
Qty: 120 TABLET | Refills: 11 | Status: SHIPPED | OUTPATIENT
Start: 2024-11-25

## 2025-02-10 DIAGNOSIS — I42.0 DILATED CARDIOMYOPATHY (H): ICD-10-CM

## 2025-02-10 DIAGNOSIS — I42.9 CARDIOMYOPATHY, UNSPECIFIED TYPE (H): ICD-10-CM

## 2025-02-10 RX ORDER — TORSEMIDE 10 MG/1
30 TABLET ORAL DAILY
Qty: 270 TABLET | Refills: 1 | Status: SHIPPED | OUTPATIENT
Start: 2025-02-10

## 2025-03-05 NOTE — PROGRESS NOTES
[facility] Call Center    Phone Message    May a detailed message be left on voicemail: yes     Reason for Call: Other: Pt is returning call to [place].     Action Taken: Other: cardio    Travel Screening: Not Applicable    Thank you!  Special[facility]       Date of Service:                                                                      [facility]  Called [employer], they do not have a file for patient and asked us to allow another 24-48 hours and call back Thursday to see if items received. Ask in clinic team to refax Entresto application to [employer] just in case.     Called [employer], they received application, but the provider signature page is cut off and needs to be resent. They also noted that pt is Medicare part D and pt will need to call pharmacy to get something sent showing they have are in the coverage gap. ID number for [employer] is [id number]. Fax #[phone].    Called pt and updated her on above. Reviewed that both forms resent, so no answers yet. Pt picked up 2 more weeks of Eliquis at pharmacy this weekend. Reviewed they need more documentation, and I will call pharmacy and ask them to send it. Told pt we will continue to monitor. She has enough Entresto through end of the month. Will recheck in again end of this week.     Called [employer], they cannot provide documents like this, so referred me to calling [employer] at [phone] to get that as pt plan uses them. P    Called [employer], spoke with Rep. Pt will need to be on line to give permission to request EOB- do not want Medicare D explanation of benefits because it goes through July. Need EOB for current year to date. They asked we call back to [phone] to get.     Attempted to call pt to review and see if she could do a 3 way call to insurance with me to request documents be faxed. Asked for call back.     Lily Moncada, RN, BSN  09/05/23 at 3:42 PM

## 2025-03-17 ENCOUNTER — LAB (OUTPATIENT)
Dept: LAB | Facility: CLINIC | Age: 85
End: 2025-03-17
Payer: COMMERCIAL

## 2025-03-17 DIAGNOSIS — I50.22 CHRONIC SYSTOLIC CONGESTIVE HEART FAILURE (H): ICD-10-CM

## 2025-03-17 DIAGNOSIS — I25.10 CORONARY ARTERY DISEASE INVOLVING NATIVE CORONARY ARTERY OF NATIVE HEART WITHOUT ANGINA PECTORIS: ICD-10-CM

## 2025-03-17 LAB
ALT SERPL W P-5'-P-CCNC: 18 U/L (ref 0–50)
CHOLEST SERPL-MCNC: 121 MG/DL
FASTING STATUS PATIENT QL REPORTED: YES
HDLC SERPL-MCNC: 55 MG/DL
LDLC SERPL CALC-MCNC: 50 MG/DL
NONHDLC SERPL-MCNC: 66 MG/DL
TRIGL SERPL-MCNC: 81 MG/DL

## 2025-03-17 PROCEDURE — 84460 ALANINE AMINO (ALT) (SGPT): CPT | Performed by: INTERNAL MEDICINE

## 2025-03-17 PROCEDURE — 36415 COLL VENOUS BLD VENIPUNCTURE: CPT | Performed by: INTERNAL MEDICINE

## 2025-03-17 PROCEDURE — 80061 LIPID PANEL: CPT | Performed by: INTERNAL MEDICINE

## 2025-03-20 ENCOUNTER — OFFICE VISIT (OUTPATIENT)
Dept: CARDIOLOGY | Facility: CLINIC | Age: 85
End: 2025-03-20
Attending: INTERNAL MEDICINE
Payer: COMMERCIAL

## 2025-03-20 VITALS
BODY MASS INDEX: 27.12 KG/M2 | HEIGHT: 59 IN | DIASTOLIC BLOOD PRESSURE: 82 MMHG | WEIGHT: 134.5 LBS | OXYGEN SATURATION: 98 % | HEART RATE: 63 BPM | SYSTOLIC BLOOD PRESSURE: 137 MMHG

## 2025-03-20 DIAGNOSIS — I42.8 OTHER CARDIOMYOPATHY (H): ICD-10-CM

## 2025-03-20 DIAGNOSIS — I47.29 NSVT (NONSUSTAINED VENTRICULAR TACHYCARDIA) (H): ICD-10-CM

## 2025-03-20 DIAGNOSIS — I10 BENIGN ESSENTIAL HYPERTENSION: ICD-10-CM

## 2025-03-20 DIAGNOSIS — I25.10 CORONARY ARTERY DISEASE INVOLVING NATIVE CORONARY ARTERY OF NATIVE HEART WITHOUT ANGINA PECTORIS: ICD-10-CM

## 2025-03-20 DIAGNOSIS — I42.0 DILATED CARDIOMYOPATHY (H): ICD-10-CM

## 2025-03-20 DIAGNOSIS — I48.20 CHRONIC ATRIAL FIBRILLATION (H): ICD-10-CM

## 2025-03-20 DIAGNOSIS — E78.00 PURE HYPERCHOLESTEROLEMIA: ICD-10-CM

## 2025-03-20 DIAGNOSIS — I50.22 CHRONIC SYSTOLIC HEART FAILURE (H): ICD-10-CM

## 2025-03-20 DIAGNOSIS — I27.20 MODERATE PULMONARY HYPERTENSION (H): ICD-10-CM

## 2025-03-20 DIAGNOSIS — I50.22 CHRONIC SYSTOLIC CONGESTIVE HEART FAILURE (H): Primary | ICD-10-CM

## 2025-03-20 DIAGNOSIS — I42.9 CARDIOMYOPATHY, UNSPECIFIED TYPE (H): ICD-10-CM

## 2025-03-20 LAB
ANION GAP SERPL CALCULATED.3IONS-SCNC: 19 MMOL/L (ref 7–15)
BUN SERPL-MCNC: 23.2 MG/DL (ref 8–23)
CALCIUM SERPL-MCNC: ABNORMAL MG/DL
CHLORIDE SERPL-SCNC: 101 MMOL/L (ref 98–107)
CREAT SERPL-MCNC: 1.14 MG/DL (ref 0.51–0.95)
EGFRCR SERPLBLD CKD-EPI 2021: 48 ML/MIN/1.73M2
GLUCOSE SERPL-MCNC: 132 MG/DL (ref 70–99)
HCO3 SERPL-SCNC: 23 MMOL/L (ref 22–29)
POTASSIUM SERPL-SCNC: 4.8 MMOL/L (ref 3.4–5.3)
SODIUM SERPL-SCNC: 143 MMOL/L (ref 135–145)

## 2025-03-20 RX ORDER — CARVEDILOL 12.5 MG/1
12.5 TABLET ORAL 2 TIMES DAILY
Qty: 180 TABLET | Refills: 3 | Status: SHIPPED | OUTPATIENT
Start: 2025-03-20

## 2025-03-20 RX ORDER — POTASSIUM CHLORIDE 750 MG/1
10 TABLET, EXTENDED RELEASE ORAL 2 TIMES DAILY
Qty: 180 TABLET | Refills: 3 | Status: SHIPPED | OUTPATIENT
Start: 2025-03-20

## 2025-03-20 NOTE — LETTER
3/20/2025    [facility]  [address] [place] [id number]    RE: Janessa Melendez       Dear Colleague,     I had the pleasure of seeing Janessa Melendez in the [facility].    Cardiology Clinic Progress Note  Janessa Melendez MRN# [id number]   YOB: 1940 Age: 84 year old   Primary Cardiologist: Dr. Smith  Reason for visit: 6 month follow up             Assessment and Plan:       1.  Idiopathic cardiomyopathy  -Stable in the 45-50% range on 7/2024 TTE    2.  Chronic HFrEF  - LVEF: 35-40% (7/2024), reviewed by Sarah, felt to be more in the 45-50% range  - NYHA class II  - Etiology: idiopathic  - Fluid status: euvolemic; suspected dry weight: 130-135#  - Diuretic regimen: torsemide 30mg daily   - Ischemic evaluation: Cor angio 5/2023: trivial CAD  - Guideline directed medical therapy:  - Beta blocker: Carvedilol 12.5mg BID  - ACEI/ARB/ARNI: Entresto 49-51mg BID  - Aldactone antagonist: None  - SGLT2 inhibitor: None    3.  Hyperlipidemia, with LDL at goal   -3/2025 LDL 50     4.  Mild renal insufficiency, stable on October labs   -Baseline creatinine 1.0-1.2    5.  Chronic atrial fibrillation, rate control approach, [id number]  -Higher doses of carvedilol led to pauses >3 seconds   -Previously on digoxin-> worsened dizziness     Janessa continues to have episodes of transient fogginess and dizziness, concerning for orthostatic hypotension.  Her readings did show a drop in blood pressure upon standing of about 50 points today, however very quickly rebounded.  I am concerned she may be mildly dehydrated with her ongoing use of Entresto and torsemide.  She is also on Hytrin at night which may be a contributor to her orthostasis.  I am going to check her lab work and see if there is evidence of dehydration prior to make any medication adjustments.  Her lab work from October, however was stable and she appears euvolemic  today.  I am less concerned about anemia/bleeding as a contributory etiology as the symptoms have been ongoing for several years and she has not had any recent bleeding issues with her Eliquis.  Her lipid profile from last week showed well-controlled LDL on her current dose of simvastatin.  Will repeat her echocardiogram in July for annual reassessment of her cardiomyopathy and have her follow-up with Dr. Albino Connors afterward.  I will reach out to her after I get the results of her lab work from today with further medication adjustments.    Plan:  BMP added to labs from last week  Continue GDMT: carvedilol 12.5mg BID, entresto 49-51mg BID, torsemide 30mg daily   Continue eliquis 2.5mg BID  Continue simvastatin 10mg daily   Repeat echocardiogram in July     Follow up: Follow up with Dr. Smith in July         History of Presenting Illness:    Janessa Melendez is a very pleasant 84 year old female with a history of idiopathic cardiomyopathy, chronic systolic congestive heart failure, essential hypertension, and chronic atrial fibrillation (2022).     Patient has followed with Dr. Smith for 2 decades and [facility] clinic for medication titration. She underwent a coronary angiogram following initial diagnosis of cardiomyopathy and episodes of NSVT which revealed only trivial CAD. She was seen by EP in 2023 for evaluation of NSVT and continuation of beta blockers without further evaluation was recommended.     She was seen by Dr. Albino Connors most recently in July of 2024 at which point she was having some episodes of dizziness and fatigue in the morning which she felt was likely secondary to peak dose effect of Entresto combined with carvedilol.  He repeated a Holter monitor which showed average heart rate 63bpm, a 3.78 second pause and a 3.26 second pause and carvedilol was reduced to 18.75mg BID.     In August, her carvedilol was reduced to 12.5mg BID after a repeat monitor showed a 3.1 second pause  and patient reported morning fogginess/cloudiness.     She had lab work completed on 3/17/2025 that showed ALT 18, total cholesterol 21, HDL 55, LDL 50, triglycerides 81.  Labs reviewed from care everywhere done 10/7/2024 showed sodium 141, potassium 4.2, BUN 27, creatinine 1.16, GFR 47.    Patient is here today for a follow-up of her recent labs and testing. Patient reports feeling good overall. She has episodes of dizziness that make her feel foggy, most often in the mornings when she sits up in the bed after awakening. It will occur even prior to her medications. Happens for a few seconds each day. This morning she awoke and felt lightheaded after sitting up and fell backwards onto the bed.    She goes for walks every day now, a few blocks and feels well with this. Her weights have been stable at home and she has not had issues with edema.     Patient denies chest pain or chest tightness. Denies tachycardia or palpitations. Denies shortness of breath, orthopnea or PND.     Blood pressure 137/82 and HR 63 in clinic today. Denies any bleeding issues with eliquis.  She is compliant with her medications.      Recent Hospitalizations   None in 2025           Social History      Social History     Socioeconomic History     Marital status: Single     Spouse name: Not on file     Number of children: Not on file     Years of education: Not on file     Highest education level: Not on file   Occupational History     Not on file   Tobacco Use     Smoking status: Never     Smokeless tobacco: Never   Substance and Sexual Activity     Alcohol use: Yes     Comment: socially     Drug use: No     Sexual activity: Never   Other Topics Concern     Parent/sibling w/ CABG, MI or angioplasty before 65F 55M? Not Asked      Service Not Asked     Blood Transfusions Not Asked     Caffeine Concern Not Asked     Occupational Exposure Not Asked     Hobby Hazards Not Asked     Sleep Concern Not Asked     Stress Concern Not Asked      "Weight Concern Not Asked     Special Diet No     Back Care Not Asked     Exercise No     Bike Helmet Not Asked     Seat Belt Not Asked     Self-Exams Not Asked   Social History Narrative     Not on file     Social Drivers of Health     Financial Resource Strain: High Risk (1/1/2022)    [facility]    Financial Resource Strain      Difficulty of Paying Living Expenses: Not on file      Difficulty of Paying Living Expenses: Not on file   Food Insecurity: Not on file   Transportation Needs: Not on file   Physical Activity: Not on file   Stress: Not on file   Social Connections: Unknown (1/1/2022)    [facility], [facility]    Social Connections      Frequency of Communication with Friends and Family: Not on file   Interpersonal Safety: Not on file   Housing Stability: Not on file            Review of Systems:   Skin:        Eyes:       ENT:       Respiratory:  Positive for dyspnea on exertion  Cardiovascular:    dizziness, Positive for, palpitations, fatigue  Gastroenterology:      Genitourinary:       Musculoskeletal:       Neurologic:       Psychiatric:       Heme/Lymph/Imm:       Endocrine:              Physical Exam:   Vitals: /82   Pulse 63   Ht 1.499 m (4' 11\")   SpO2 98%   BMI 26.90 kg/m     Wt Readings from Last 4 Encounters:   07/25/24 60.4 kg (133 lb 3.2 oz)   01/23/24 59.2 kg (130 lb 8 oz)   07/14/23 55.3 kg (122 lb)   06/20/23 61 kg (134 lb 6.4 oz)     GEN: well nourished, in no acute distress.  HEENT:  Pupils equal, round. Sclerae nonicteric.   NECK: Supple, no masses appreciated. No JVD with patient supine.  C/V:  Irregularly irregular rate and rhythm, no murmur, rub or gallop.    RESP: Respirations are unlabored. Clear to auscultation bilaterally without wheezing, rales, or rhonchi.  GI: Abdomen soft, nontender.  EXTREM: No " LE edema.  NEURO: Alert and oriented, cooperative.  SKIN: Warm and dry.        Data:     LIPID RESULTS:  Lab Results   Component Value Date    CHOL 121 03/17/2025    CHOL 133 05/06/2021    HDL 55 03/17/2025    HDL 59 05/06/2021    LDL 50 03/17/2025    LDL 54 05/06/2021    TRIG 81 03/17/2025    TRIG 101 05/06/2021    CHOLHDLRATIO 2.3 10/08/2015     LIVER ENZYME RESULTS:  Lab Results   Component Value Date    AST 18 04/04/2023    AST 14 05/31/2021    ALT 18 03/17/2025    ALT 26 05/31/2021     CBC RESULTS:  Lab Results   Component Value Date    WBC 8.8 05/05/2023    WBC 8.0 05/31/2021    RBC 3.47 (L) 05/05/2023    RBC 3.92 05/31/2021    HGB 11.0 (L) 06/20/2023    HGB 11.9 05/31/2021    HCT 33.0 (L) 05/05/2023    HCT 35.7 05/31/2021    MCV 95 05/05/2023    MCV 91 05/31/2021    MCH 30.8 05/05/2023    MCH 30.4 05/31/2021    MCHC 32.4 05/05/2023    MCHC 33.3 05/31/2021    RDW 14.4 05/05/2023    RDW 14.0 05/31/2021     05/05/2023     05/31/2021     BMP RESULTS:  Lab Results   Component Value Date     07/24/2024     05/31/2021    POTASSIUM 4.3 07/24/2024    POTASSIUM 3.6 11/06/2022    POTASSIUM 3.3 (L) 05/31/2021    CHLORIDE 97 (L) 07/24/2024    CHLORIDE 100 07/21/2023    CHLORIDE 99 05/31/2021    CO2 30 (H) 07/24/2024    CO2 27 11/06/2022    CO2 31 05/31/2021    ANIONGAP 10 07/24/2024    ANIONGAP 6 11/06/2022    ANIONGAP 7 05/31/2021     (H) 07/24/2024     (H) 11/07/2022     (H) 11/06/2022     (H) 05/31/2021    BUN 20.5 07/24/2024    BUN 14 11/06/2022    BUN 14 05/31/2021    CR 1.08 (H) 07/24/2024    CR 0.73 05/31/2021    GFRESTIMATED 51 (L) 07/24/2024    GFRESTIMATED 78 05/31/2021    GFRESTBLACK >90 05/31/2021    PENNY 9.6 07/24/2024    PENNY 9.0 05/31/2021      A1C RESULTS:  Lab Results   Component Value Date    A1C 5.7 (H) 11/04/2022     INR RESULTS:  Lab Results   Component Value Date    INR 1.02 05/05/2023    INR 1.11 11/05/2022    INR 0.97 06/05/2009             Medications     Current Outpatient Medications   Medication Sig Dispense Refill     Acetaminophen (TYLENOL PO) Take 500 mg by mouth every 6 hours as needed for mild pain or fever        amoxicillin (AMOXIL) 500 MG capsule Before the dentist       apixaban ANTICOAGULANT (ELIQUIS) 2.5 MG tablet Take 1 tablet (2.5 mg) by mouth 2 times daily. 180 tablet 2     carvedilol (COREG) 6.25 MG tablet Take 2 tablets (12.5 mg) by mouth 2 times daily. 120 tablet 11     cholecalciferol (VITAMIN D3) 25 mcg (1000 units) capsule Take 1 capsule by mouth daily       Magnesium Oxide 250 MG TABS Take 1 tablet (250 mg) by mouth daily 30 tablet 11     omeprazole (PRILOSEC) 20 MG DR capsule Take 20 mg by mouth daily       potassium chloride ER (K-TAB/KLOR-CON) 10 MEQ CR tablet Take 1 tablet (10 mEq) by mouth 2 times daily And as needed as directed. 180 tablet 3     sacubitril-valsartan (ENTRESTO) 49-51 MG per tablet Take 1 tablet by mouth 2 times daily. 180 tablet 3     simvastatin (ZOCOR) 10 MG tablet Take 1 tablet (10 mg) by mouth at bedtime 90 tablet 4     terazosin (HYTRIN) 5 MG capsule Take 1 capsule (5 mg) by mouth at bedtime 90 capsule 4     torsemide (DEMADEX) 10 MG tablet Take 3 tablets (30 mg) by mouth daily. 270 tablet 1          Past Medical History     Past Medical History:   Diagnosis Date     Arthritis      Breast cancer (H)      Cardiomyopathy (H)     ideopathic     Coronary artery disease 9/25/2014     Hypercholesterolemia      Hypertension      Hypokalemia      Malignant neoplasm (H)      Shortness of breath      Shoulder pain      Past Surgical History:   Procedure Laterality Date     BACK SURGERY       CARDIAC SURGERY      angiogram neg many yrs ago     CHOLECYSTECTOMY       CV CORONARY ANGIOGRAM N/A 5/5/2023    Procedure: Coronary Angiogram;  Surgeon: Kane Daugherty MD;  Location:  HEART CARDIAC CATH LAB     CV LEFT HEART CATH N/A 5/5/2023    Procedure: Left Heart Catheterization;  Surgeon: Kane Daugherty MD;   Location:  HEART CARDIAC CATH LAB     CV RIGHT HEART CATH MEASUREMENTS RECORDED N/A 5/5/2023    Procedure: Right Heart Catheterization;  Surgeon: Kane Daugherty MD;  Location:  HEART CARDIAC CATH LAB     CV RIGHT HEART EXERCISE STRESS STUDY N/A 5/5/2023    Procedure: Stress Drug Study;  Surgeon: Kane Daugherty MD;  Location:  HEART CARDIAC CATH LAB     ESOPHAGOSCOPY, GASTROSCOPY, DUODENOSCOPY (EGD), COMBINED N/A 6/22/2021    Procedure: ESOPHAGOGASTRODUODENOSCOPY, WITH ENDOSCOPIC US WITH FINE NEEDLE ASPIRATION;  Surgeon: Ventura Mcgill MD;  Location:  GI     EYE SURGERY       ORTHOPEDIC SURGERY      lt shoulder replaced, total     partial masectomy       Family History   Problem Relation Age of Onset     Other Cancer Mother      Other Cancer Brother      No Known Problems Son      Heart Failure Daughter      Family History Negative No family hx of             Allergies   Amlodipine, Aspirin, and Codeine sulfate        Heidi Haque NP  [facility]       Thank you for allowing me to participate in the care of your patient.      Sincerely,     Heidi Haque NP     [facility]  cc:   Brett Smith MD  [address] [address]

## 2025-03-20 NOTE — PATIENT INSTRUCTIONS
Today's Recommendations    Your cholesterol looked good  I added some lab work to your previous labs from last week, I will call you after   We will repeat your heart ultrasound in July   Continue all medications without changes.  Please follow up with Dr. Smith in July.    Please send a [employer] message or call [phone] to the RN team with questions or concerns.     Scheduling number [phone]  KATIE Jacobo, CNP

## 2025-03-20 NOTE — PROGRESS NOTES
Cardiology Clinic Progress Note  Janessa Melendez MRN# [id number]   YOB: 1940 Age: 84 year old   Primary Cardiologist: Dr. Smith  Reason for visit: 6 month follow up             Assessment and Plan:       1.  Idiopathic cardiomyopathy  -Stable in the 45-50% range on 7/2024 TTE    2.  Chronic HFrEF  - LVEF: 35-40% (7/2024), reviewed by Sarah, felt to be more in the 45-50% range  - NYHA class II  - Etiology: idiopathic  - Fluid status: euvolemic; suspected dry weight: 130-135#  - Diuretic regimen: torsemide 30mg daily   - Ischemic evaluation: Cor angio 5/2023: trivial CAD  - Guideline directed medical therapy:  - Beta blocker: Carvedilol 12.5mg BID  - ACEI/ARB/ARNI: Entresto 49-51mg BID  - Aldactone antagonist: None  - SGLT2 inhibitor: None    3.  Hyperlipidemia, with LDL at goal   -3/2025 LDL 50     4.  Mild renal insufficiency, stable on October labs   -Baseline creatinine 1.0-1.2    5.  Chronic atrial fibrillation, rate control approach, [id number]  -Higher doses of carvedilol led to pauses >3 seconds   -Previously on digoxin-> worsened dizziness     Janessa continues to have episodes of transient fogginess and dizziness, concerning for orthostatic hypotension.  Her readings did show a drop in blood pressure upon standing of about 50 points today, however very quickly rebounded.  I am concerned she may be mildly dehydrated with her ongoing use of Entresto and torsemide.  She is also on Hytrin at night which may be a contributor to her orthostasis.  I am going to check her lab work and see if there is evidence of dehydration prior to make any medication adjustments.  Her lab work from October, however was stable and she appears euvolemic today.  I am less concerned about anemia/bleeding as a contributory etiology as the symptoms have been ongoing for several years and she has not had any recent bleeding issues with her Eliquis.  Her lipid profile from last week showed  well-controlled LDL on her current dose of simvastatin.  Will repeat her echocardiogram in July for annual reassessment of her cardiomyopathy and have her follow-up with Dr. Albino Connors afterward.  I will reach out to her after I get the results of her lab work from today with further medication adjustments.    Plan:  BMP added to labs from last week  Continue GDMT: carvedilol 12.5mg BID, entresto 49-51mg BID, torsemide 30mg daily   Continue eliquis 2.5mg BID  Continue simvastatin 10mg daily   Repeat echocardiogram in July     Follow up: Follow up with Dr. Smith in July         History of Presenting Illness:    Janessa Melendez is a very pleasant 84 year old female with a history of idiopathic cardiomyopathy, chronic systolic congestive heart failure, essential hypertension, and chronic atrial fibrillation (2022).     Patient has followed with Dr. Smith for 2 decades and [facility] for medication titration. She underwent a coronary angiogram following initial diagnosis of cardiomyopathy and episodes of NSVT which revealed only trivial CAD. She was seen by EP in 2023 for evaluation of NSVT and continuation of beta blockers without further evaluation was recommended.     She was seen by Dr. Albino Connors most recently in July of 2024 at which point she was having some episodes of dizziness and fatigue in the morning which she felt was likely secondary to peak dose effect of Entresto combined with carvedilol.  He repeated a Holter monitor which showed average heart rate 63bpm, a 3.78 second pause and a 3.26 second pause and carvedilol was reduced to 18.75mg BID.     In August, her carvedilol was reduced to 12.5mg BID after a repeat monitor showed a 3.1 second pause and patient reported morning fogginess/cloudiness.     She had lab work completed on 3/17/2025 that showed ALT 18, total cholesterol 21, HDL 55, LDL 50, triglycerides 81.  Labs reviewed from care everywhere done 10/7/2024 showed sodium  141, potassium 4.2, BUN 27, creatinine 1.16, GFR 47.    Patient is here today for a follow-up of her recent labs and testing. Patient reports feeling good overall. She has episodes of dizziness that make her feel foggy, most often in the mornings when she sits up in the bed after awakening. It will occur even prior to her medications. Happens for a few seconds each day. This morning she awoke and felt lightheaded after sitting up and fell backwards onto the bed.    She goes for walks every day now, a few blocks and feels well with this. Her weights have been stable at home and she has not had issues with edema.     Patient denies chest pain or chest tightness. Denies tachycardia or palpitations. Denies shortness of breath, orthopnea or PND.     Blood pressure 137/82 and HR 63 in clinic today. Denies any bleeding issues with eliquis.  She is compliant with her medications.      Recent Hospitalizations   None in 2025           Social History      Social History     Socioeconomic History    Marital status: Single     Spouse name: Not on file    Number of children: Not on file    Years of education: Not on file    Highest education level: Not on file   Occupational History    Not on file   Tobacco Use    Smoking status: Never    Smokeless tobacco: Never   Substance and Sexual Activity    Alcohol use: Yes     Comment: socially    Drug use: No    Sexual activity: Never   Other Topics Concern    Parent/sibling w/ CABG, MI or angioplasty before 65F 55M? Not Asked     Service Not Asked    Blood Transfusions Not Asked    Caffeine Concern Not Asked    Occupational Exposure Not Asked    Hobby Hazards Not Asked    Sleep Concern Not Asked    Stress Concern Not Asked    Weight Concern Not Asked    Special Diet No    Back Care Not Asked    Exercise No    Bike Helmet Not Asked    Seat Belt Not Asked    Self-Exams Not Asked   Social History Narrative    Not on file     Social Drivers of Health     Financial Resource Strain:  "High Risk (1/1/2022)    [facility], [facility]    Financial Resource Strain     Difficulty of Paying Living Expenses: Not on file     Difficulty of Paying Living Expenses: Not on file   Food Insecurity: Not on file   Transportation Needs: Not on file   Physical Activity: Not on file   Stress: Not on file   Social Connections: Unknown (1/1/2022)    Received from [facility], [facility]    Social Connections     Frequency of Communication with Friends and Family: Not on file   Interpersonal Safety: Not on file   Housing Stability: Not on file            Review of Systems:   Skin:        Eyes:       ENT:       Respiratory:  Positive for dyspnea on exertion  Cardiovascular:    dizziness, Positive for, palpitations, fatigue  Gastroenterology:      Genitourinary:       Musculoskeletal:       Neurologic:       Psychiatric:       Heme/Lymph/Imm:       Endocrine:              Physical Exam:   Vitals: /82   Pulse 63   Ht 1.499 m (4' 11\")   SpO2 98%   BMI 26.90 kg/m     Wt Readings from Last 4 Encounters:   07/25/24 60.4 kg (133 lb 3.2 oz)   01/23/24 59.2 kg (130 lb 8 oz)   07/14/23 55.3 kg (122 lb)   06/20/23 61 kg (134 lb 6.4 oz)     GEN: well nourished, in no acute distress.  HEENT:  Pupils equal, round. Sclerae nonicteric.   NECK: Supple, no masses appreciated. No JVD with patient supine.  C/V:  Irregularly irregular rate and rhythm, no murmur, rub or gallop.    RESP: Respirations are unlabored. Clear to auscultation bilaterally without wheezing, rales, or rhonchi.  GI: Abdomen soft, nontender.  EXTREM: No LE edema.  NEURO: Alert and oriented, cooperative.  SKIN: Warm and dry.        Data:     LIPID RESULTS:  Lab Results   Component Value Date    CHOL 121 03/17/2025    CHOL 133 05/06/2021    HDL 55 03/17/2025    HDL 59 05/06/2021    LDL 50 03/17/2025    LDL 54 " 05/06/2021    TRIG 81 03/17/2025    TRIG 101 05/06/2021    CHOLHDLRATIO 2.3 10/08/2015     LIVER ENZYME RESULTS:  Lab Results   Component Value Date    AST 18 04/04/2023    AST 14 05/31/2021    ALT 18 03/17/2025    ALT 26 05/31/2021     CBC RESULTS:  Lab Results   Component Value Date    WBC 8.8 05/05/2023    WBC 8.0 05/31/2021    RBC 3.47 (L) 05/05/2023    RBC 3.92 05/31/2021    HGB 11.0 (L) 06/20/2023    HGB 11.9 05/31/2021    HCT 33.0 (L) 05/05/2023    HCT 35.7 05/31/2021    MCV 95 05/05/2023    MCV 91 05/31/2021    MCH 30.8 05/05/2023    MCH 30.4 05/31/2021    MCHC 32.4 05/05/2023    MCHC 33.3 05/31/2021    RDW 14.4 05/05/2023    RDW 14.0 05/31/2021     05/05/2023     05/31/2021     BMP RESULTS:  Lab Results   Component Value Date     07/24/2024     05/31/2021    POTASSIUM 4.3 07/24/2024    POTASSIUM 3.6 11/06/2022    POTASSIUM 3.3 (L) 05/31/2021    CHLORIDE 97 (L) 07/24/2024    CHLORIDE 100 07/21/2023    CHLORIDE 99 05/31/2021    CO2 30 (H) 07/24/2024    CO2 27 11/06/2022    CO2 31 05/31/2021    ANIONGAP 10 07/24/2024    ANIONGAP 6 11/06/2022    ANIONGAP 7 05/31/2021     (H) 07/24/2024     (H) 11/07/2022     (H) 11/06/2022     (H) 05/31/2021    BUN 20.5 07/24/2024    BUN 14 11/06/2022    BUN 14 05/31/2021    CR 1.08 (H) 07/24/2024    CR 0.73 05/31/2021    GFRESTIMATED 51 (L) 07/24/2024    GFRESTIMATED 78 05/31/2021    GFRESTBLACK >90 05/31/2021    PENNY 9.6 07/24/2024    PENNY 9.0 05/31/2021      A1C RESULTS:  Lab Results   Component Value Date    A1C 5.7 (H) 11/04/2022     INR RESULTS:  Lab Results   Component Value Date    INR 1.02 05/05/2023    INR 1.11 11/05/2022    INR 0.97 06/05/2009            Medications     Current Outpatient Medications   Medication Sig Dispense Refill    Acetaminophen (TYLENOL PO) Take 500 mg by mouth every 6 hours as needed for mild pain or fever       amoxicillin (AMOXIL) 500 MG capsule Before the dentist      apixaban ANTICOAGULANT  (ELIQUIS) 2.5 MG tablet Take 1 tablet (2.5 mg) by mouth 2 times daily. 180 tablet 2    carvedilol (COREG) 6.25 MG tablet Take 2 tablets (12.5 mg) by mouth 2 times daily. 120 tablet 11    cholecalciferol (VITAMIN D3) 25 mcg (1000 units) capsule Take 1 capsule by mouth daily      Magnesium Oxide 250 MG TABS Take 1 tablet (250 mg) by mouth daily 30 tablet 11    omeprazole (PRILOSEC) 20 MG DR capsule Take 20 mg by mouth daily      potassium chloride ER (K-TAB/KLOR-CON) 10 MEQ CR tablet Take 1 tablet (10 mEq) by mouth 2 times daily And as needed as directed. 180 tablet 3    sacubitril-valsartan (ENTRESTO) 49-51 MG per tablet Take 1 tablet by mouth 2 times daily. 180 tablet 3    simvastatin (ZOCOR) 10 MG tablet Take 1 tablet (10 mg) by mouth at bedtime 90 tablet 4    terazosin (HYTRIN) 5 MG capsule Take 1 capsule (5 mg) by mouth at bedtime 90 capsule 4    torsemide (DEMADEX) 10 MG tablet Take 3 tablets (30 mg) by mouth daily. 270 tablet 1          Past Medical History     Past Medical History:   Diagnosis Date    Arthritis     Breast cancer (H)     Cardiomyopathy (H)     ideopathic    Coronary artery disease 9/25/2014    Hypercholesterolemia     Hypertension     Hypokalemia     Malignant neoplasm (H)     Shortness of breath     Shoulder pain      Past Surgical History:   Procedure Laterality Date    BACK SURGERY      CARDIAC SURGERY      angiogram neg many yrs ago    CHOLECYSTECTOMY      CV CORONARY ANGIOGRAM N/A 5/5/2023    Procedure: Coronary Angiogram;  Surgeon: Kane Daugherty MD;  Location: [facility] CARDIAC CATH LAB    CV LEFT HEART CATH N/A 5/5/2023    Procedure: Left Heart Catheterization;  Surgeon: Kane Daugherty MD;  Location: [facility] CARDIAC CATH LAB    CV RIGHT HEART CATH MEASUREMENTS RECORDED N/A 5/5/2023    Procedure: Right Heart Catheterization;  Surgeon: Kane Daugherty MD;  Location: [facility] CARDIAC CATH LAB    CV RIGHT HEART EXERCISE STRESS STUDY N/A 5/5/2023    Procedure: Stress Drug Study;   Surgeon: Kane Daugherty MD;  Location:  HEART CARDIAC CATH LAB    ESOPHAGOSCOPY, GASTROSCOPY, DUODENOSCOPY (EGD), COMBINED N/A 6/22/2021    Procedure: ESOPHAGOGASTRODUODENOSCOPY, WITH ENDOSCOPIC US WITH FINE NEEDLE ASPIRATION;  Surgeon: Ventura Mcgill MD;  Location:  GI    EYE SURGERY      ORTHOPEDIC SURGERY      lt shoulder replaced, total    partial masectomy       Family History   Problem Relation Age of Onset    Other Cancer Mother     Other Cancer Brother     No Known Problems Son     Heart Failure Daughter     Family History Negative No family hx of             Allergies   Amlodipine, Aspirin, and Codeine sulfate        Heidi Haque, SHORTY  [facility]

## 2025-03-21 ENCOUNTER — CARE COORDINATION (OUTPATIENT)
Dept: CARDIOLOGY | Facility: CLINIC | Age: 85
End: 2025-03-21
Payer: COMMERCIAL

## 2025-03-21 DIAGNOSIS — I42.9 CARDIOMYOPATHY, UNSPECIFIED TYPE (H): ICD-10-CM

## 2025-03-21 DIAGNOSIS — I42.0 DILATED CARDIOMYOPATHY (H): ICD-10-CM

## 2025-03-21 RX ORDER — TORSEMIDE 10 MG/1
20 TABLET ORAL DAILY
Qty: 180 TABLET | Refills: 3 | Status: SHIPPED | OUTPATIENT
Start: 2025-03-21 | End: 2025-03-31

## 2025-03-21 NOTE — PROGRESS NOTES
Heidi Haque NP [facility] Heart Team 7  Please let her know that her labs show she may be a little dehydrated. Lets have her reduce her torsemide to 20mg daily and follow up with her in a week to see if her symptoms improve.    Her cholesterol and liver test looked good. Thanks.        I called pt and reviewed update on labs and recommendation from Rosemarie Haque, MARINO to decrease torsemide to 20 mg daily. I will call pt in a week to see if her symptoms have improved. Magalis HALEY March 21, 2025, 3:31 PM

## 2025-03-27 NOTE — PROGRESS NOTES
I called pt for an update on her symptoms. Pt said her lightheadedness has improved and is much better since decreasing the torsemide. She still has occasional lightheadedness and brain fog, but is feeling better overall. Pt reports she always has some shortness of breath, but she hasn't had any worsening shortness of breath and has not had any swelling since decreasing the torsemide. I will send an update to Rosemarie Haque, MARINO. Magalis HALEY March 27, 2025, 10:35 AM

## 2025-03-31 RX ORDER — TORSEMIDE 10 MG/1
TABLET ORAL
Qty: 210 TABLET | Refills: 3 | Status: SHIPPED | OUTPATIENT
Start: 2025-03-31

## 2025-03-31 NOTE — PROGRESS NOTES
Heidi Haque, SHORTY  You32 minutes ago (9:24 AM)       Noted. Lets have her continue the reduced dosing then with close monitoring of her weights and symptoms. Thanks.      I called pt with an update. Pt said that her weight has gone up 3-4# since decreasing torsemide to 20 mg daily. She doesn't have any swelling or shortness of breath. Her weight is 135# this morning and last Thu it was around 132# per pt. I will route an update to Rosemarie Haque, MARINO. Magalis HALEY March 31, 2025, 10:03 AM

## 2025-03-31 NOTE — PROGRESS NOTES
Heidi Haque, SHORTY  You38 minutes ago (10:33 AM)       She can take an extra 10mg torsemide for weight gain-2-3 lbs in 1-2 days or 5 lbs in a week. Thanks.     I called pt and reviewed recommendation from Rosemarie Haque, MARINO to take an extra torsemide 10 mg for weight gain 2-3# in 1-2 days or 5# in a week. Pt will call if she ends up having to take an additional 10 mg PRN frequently. Magalis HALEY March 31, 2025, 11:32 AM

## 2025-06-03 ENCOUNTER — APPOINTMENT (OUTPATIENT)
Dept: CT IMAGING | Facility: CLINIC | Age: 85
End: 2025-06-03
Attending: EMERGENCY MEDICINE
Payer: COMMERCIAL

## 2025-06-03 ENCOUNTER — HOSPITAL ENCOUNTER (OUTPATIENT)
Facility: CLINIC | Age: 85
Setting detail: OBSERVATION
Discharge: HOME | End: 2025-06-05
Attending: EMERGENCY MEDICINE | Admitting: STUDENT IN AN ORGANIZED HEALTH CARE EDUCATION/TRAINING PROGRAM
Payer: COMMERCIAL

## 2025-06-03 ENCOUNTER — OFFICE VISIT (OUTPATIENT)
Dept: URGENT CARE | Facility: URGENT CARE | Age: 85
End: 2025-06-03
Payer: COMMERCIAL

## 2025-06-03 ENCOUNTER — APPOINTMENT (OUTPATIENT)
Dept: MRI IMAGING | Facility: CLINIC | Age: 85
End: 2025-06-03
Attending: STUDENT IN AN ORGANIZED HEALTH CARE EDUCATION/TRAINING PROGRAM
Payer: COMMERCIAL

## 2025-06-03 VITALS
BODY MASS INDEX: 26.66 KG/M2 | TEMPERATURE: 97.8 F | SYSTOLIC BLOOD PRESSURE: 144 MMHG | OXYGEN SATURATION: 100 % | RESPIRATION RATE: 20 BRPM | DIASTOLIC BLOOD PRESSURE: 74 MMHG | WEIGHT: 132 LBS | HEART RATE: 72 BPM

## 2025-06-03 DIAGNOSIS — S22.050A CLOSED WEDGE COMPRESSION FRACTURE OF T5 VERTEBRA, INITIAL ENCOUNTER (H): Primary | ICD-10-CM

## 2025-06-03 DIAGNOSIS — R07.9 CHEST PAIN, UNSPECIFIED TYPE: ICD-10-CM

## 2025-06-03 DIAGNOSIS — N39.0 ACUTE UTI: ICD-10-CM

## 2025-06-03 DIAGNOSIS — R10.9 RIGHT FLANK PAIN: Primary | ICD-10-CM

## 2025-06-03 DIAGNOSIS — M54.9 INTRACTABLE BACK PAIN: ICD-10-CM

## 2025-06-03 DIAGNOSIS — R79.89 ELEVATED TROPONIN: ICD-10-CM

## 2025-06-03 DIAGNOSIS — S22.050A COMPRESSION FRACTURE OF T5 VERTEBRA, INITIAL ENCOUNTER (H): ICD-10-CM

## 2025-06-03 DIAGNOSIS — I50.32 CHRONIC DIASTOLIC CONGESTIVE HEART FAILURE (H): ICD-10-CM

## 2025-06-03 LAB
ALBUMIN SERPL BCG-MCNC: 4.4 G/DL (ref 3.5–5.2)
ALBUMIN UR-MCNC: NEGATIVE MG/DL
ALP SERPL-CCNC: 107 U/L (ref 40–150)
ALT SERPL W P-5'-P-CCNC: 25 U/L (ref 0–50)
ANION GAP SERPL CALCULATED.3IONS-SCNC: 11 MMOL/L (ref 7–15)
APPEARANCE UR: CLEAR
AST SERPL W P-5'-P-CCNC: 20 U/L (ref 0–45)
ATRIAL RATE - MUSE: NORMAL BPM
BASOPHILS # BLD AUTO: 0 10E3/UL (ref 0–0.2)
BASOPHILS NFR BLD AUTO: 0 %
BILIRUB SERPL-MCNC: 0.5 MG/DL
BILIRUB UR QL STRIP: NEGATIVE
BUN SERPL-MCNC: 24.1 MG/DL (ref 8–23)
CALCIUM SERPL-MCNC: 9.5 MG/DL (ref 8.8–10.4)
CHLORIDE SERPL-SCNC: 102 MMOL/L (ref 98–107)
COLOR UR AUTO: ABNORMAL
CREAT SERPL-MCNC: 1.1 MG/DL (ref 0.51–0.95)
DIASTOLIC BLOOD PRESSURE - MUSE: NORMAL MMHG
EGFRCR SERPLBLD CKD-EPI 2021: 50 ML/MIN/1.73M2
EOSINOPHIL # BLD AUTO: 0.1 10E3/UL (ref 0–0.7)
EOSINOPHIL NFR BLD AUTO: 1 %
ERYTHROCYTE [DISTWIDTH] IN BLOOD BY AUTOMATED COUNT: 13.5 % (ref 10–15)
GLUCOSE SERPL-MCNC: 124 MG/DL (ref 70–99)
GLUCOSE UR STRIP-MCNC: NEGATIVE MG/DL
HCO3 SERPL-SCNC: 29 MMOL/L (ref 22–29)
HCT VFR BLD AUTO: 38 % (ref 35–47)
HGB BLD-MCNC: 12.1 G/DL (ref 11.7–15.7)
HGB UR QL STRIP: NEGATIVE
HOLD SPECIMEN: NORMAL
HYALINE CASTS: 2 /LPF
IMM GRANULOCYTES # BLD: 0 10E3/UL
IMM GRANULOCYTES NFR BLD: 0 %
INTERPRETATION ECG - MUSE: NORMAL
KETONES UR STRIP-MCNC: NEGATIVE MG/DL
LEUKOCYTE ESTERASE UR QL STRIP: ABNORMAL
LIPASE SERPL-CCNC: 17 U/L (ref 13–60)
LYMPHOCYTES # BLD AUTO: 1.2 10E3/UL (ref 0.8–5.3)
LYMPHOCYTES NFR BLD AUTO: 18 %
MCH RBC QN AUTO: 30.3 PG (ref 26.5–33)
MCHC RBC AUTO-ENTMCNC: 31.8 G/DL (ref 31.5–36.5)
MCV RBC AUTO: 95 FL (ref 78–100)
MONOCYTES # BLD AUTO: 0.5 10E3/UL (ref 0–1.3)
MONOCYTES NFR BLD AUTO: 7 %
MUCOUS THREADS #/AREA URNS LPF: PRESENT /LPF
NEUTROPHILS # BLD AUTO: 5.1 10E3/UL (ref 1.6–8.3)
NEUTROPHILS NFR BLD AUTO: 73 %
NITRATE UR QL: NEGATIVE
NRBC # BLD AUTO: 0 10E3/UL
NRBC BLD AUTO-RTO: 0 /100
NT-PROBNP SERPL-MCNC: 3803 PG/ML (ref 0–624)
P AXIS - MUSE: NORMAL DEGREES
PH UR STRIP: 5 [PH] (ref 5–7)
PLATELET # BLD AUTO: 229 10E3/UL (ref 150–450)
POTASSIUM SERPL-SCNC: 4.1 MMOL/L (ref 3.4–5.3)
PR INTERVAL - MUSE: NORMAL MS
PROT SERPL-MCNC: 7.4 G/DL (ref 6.4–8.3)
QRS DURATION - MUSE: 98 MS
QT - MUSE: 398 MS
QTC - MUSE: 453 MS
R AXIS - MUSE: -57 DEGREES
RBC # BLD AUTO: 4 10E6/UL (ref 3.8–5.2)
RBC URINE: 3 /HPF
SODIUM SERPL-SCNC: 142 MMOL/L (ref 135–145)
SP GR UR STRIP: 1.01 (ref 1–1.03)
SQUAMOUS EPITHELIAL: 1 /HPF
SYSTOLIC BLOOD PRESSURE - MUSE: NORMAL MMHG
T AXIS - MUSE: 23 DEGREES
TROPONIN T SERPL HS-MCNC: 27 NG/L
TROPONIN T SERPL HS-MCNC: 29 NG/L
UROBILINOGEN UR STRIP-MCNC: NORMAL MG/DL
VENTRICULAR RATE- MUSE: 78 BPM
WBC # BLD AUTO: 6.9 10E3/UL (ref 4–11)
WBC URINE: 14 /HPF

## 2025-06-03 PROCEDURE — 83690 ASSAY OF LIPASE: CPT | Performed by: EMERGENCY MEDICINE

## 2025-06-03 PROCEDURE — 72128 CT CHEST SPINE W/O DYE: CPT

## 2025-06-03 PROCEDURE — 96376 TX/PRO/DX INJ SAME DRUG ADON: CPT

## 2025-06-03 PROCEDURE — 99207 PR NON-BILLABLE SERV PER CHARTING: CPT | Performed by: EMERGENCY MEDICINE

## 2025-06-03 PROCEDURE — 84484 ASSAY OF TROPONIN QUANT: CPT | Performed by: EMERGENCY MEDICINE

## 2025-06-03 PROCEDURE — 96365 THER/PROPH/DIAG IV INF INIT: CPT

## 2025-06-03 PROCEDURE — 99222 1ST HOSP IP/OBS MODERATE 55: CPT | Performed by: STUDENT IN AN ORGANIZED HEALTH CARE EDUCATION/TRAINING PROGRAM

## 2025-06-03 PROCEDURE — 82310 ASSAY OF CALCIUM: CPT | Performed by: EMERGENCY MEDICINE

## 2025-06-03 PROCEDURE — 250N000011 HC RX IP 250 OP 636: Mod: JZ | Performed by: STUDENT IN AN ORGANIZED HEALTH CARE EDUCATION/TRAINING PROGRAM

## 2025-06-03 PROCEDURE — 87086 URINE CULTURE/COLONY COUNT: CPT | Performed by: EMERGENCY MEDICINE

## 2025-06-03 PROCEDURE — 36415 COLL VENOUS BLD VENIPUNCTURE: CPT | Performed by: EMERGENCY MEDICINE

## 2025-06-03 PROCEDURE — 85025 COMPLETE CBC W/AUTO DIFF WBC: CPT | Performed by: EMERGENCY MEDICINE

## 2025-06-03 PROCEDURE — 72146 MRI CHEST SPINE W/O DYE: CPT

## 2025-06-03 PROCEDURE — 81001 URINALYSIS AUTO W/SCOPE: CPT | Performed by: EMERGENCY MEDICINE

## 2025-06-03 PROCEDURE — G0378 HOSPITAL OBSERVATION PER HR: HCPCS

## 2025-06-03 PROCEDURE — 71275 CT ANGIOGRAPHY CHEST: CPT

## 2025-06-03 PROCEDURE — 3077F SYST BP >= 140 MM HG: CPT | Performed by: EMERGENCY MEDICINE

## 2025-06-03 PROCEDURE — 99222 1ST HOSP IP/OBS MODERATE 55: CPT | Performed by: PHYSICIAN ASSISTANT

## 2025-06-03 PROCEDURE — 3078F DIAST BP <80 MM HG: CPT | Performed by: EMERGENCY MEDICINE

## 2025-06-03 PROCEDURE — 250N000011 HC RX IP 250 OP 636: Performed by: EMERGENCY MEDICINE

## 2025-06-03 PROCEDURE — 83880 ASSAY OF NATRIURETIC PEPTIDE: CPT | Performed by: EMERGENCY MEDICINE

## 2025-06-03 PROCEDURE — 250N000009 HC RX 250: Performed by: EMERGENCY MEDICINE

## 2025-06-03 PROCEDURE — 93005 ELECTROCARDIOGRAM TRACING: CPT

## 2025-06-03 PROCEDURE — 84132 ASSAY OF SERUM POTASSIUM: CPT | Performed by: EMERGENCY MEDICINE

## 2025-06-03 PROCEDURE — 96375 TX/PRO/DX INJ NEW DRUG ADDON: CPT | Mod: 59

## 2025-06-03 PROCEDURE — 250N000013 HC RX MED GY IP 250 OP 250 PS 637: Performed by: STUDENT IN AN ORGANIZED HEALTH CARE EDUCATION/TRAINING PROGRAM

## 2025-06-03 PROCEDURE — 85014 HEMATOCRIT: CPT | Performed by: EMERGENCY MEDICINE

## 2025-06-03 PROCEDURE — 250N000013 HC RX MED GY IP 250 OP 250 PS 637: Performed by: EMERGENCY MEDICINE

## 2025-06-03 PROCEDURE — 99285 EMERGENCY DEPT VISIT HI MDM: CPT | Mod: 25

## 2025-06-03 RX ORDER — CEFTRIAXONE 1 G/1
1 INJECTION, POWDER, FOR SOLUTION INTRAMUSCULAR; INTRAVENOUS EVERY 24 HOURS
Status: DISCONTINUED | OUTPATIENT
Start: 2025-06-04 | End: 2025-06-05

## 2025-06-03 RX ORDER — HYDROMORPHONE HYDROCHLORIDE 1 MG/ML
0.5 INJECTION, SOLUTION INTRAMUSCULAR; INTRAVENOUS; SUBCUTANEOUS EVERY 30 MIN PRN
Status: DISCONTINUED | OUTPATIENT
Start: 2025-06-03 | End: 2025-06-03

## 2025-06-03 RX ORDER — HYDROMORPHONE HCL IN WATER/PF 6 MG/30 ML
0.4 PATIENT CONTROLLED ANALGESIA SYRINGE INTRAVENOUS
Status: DISCONTINUED | OUTPATIENT
Start: 2025-06-03 | End: 2025-06-04

## 2025-06-03 RX ORDER — LIDOCAINE 4 G/G
1 PATCH TOPICAL
Status: DISCONTINUED | OUTPATIENT
Start: 2025-06-04 | End: 2025-06-05 | Stop reason: HOSPADM

## 2025-06-03 RX ORDER — HYDROMORPHONE HCL IN WATER/PF 6 MG/30 ML
0.2 PATIENT CONTROLLED ANALGESIA SYRINGE INTRAVENOUS
Status: DISCONTINUED | OUTPATIENT
Start: 2025-06-03 | End: 2025-06-04

## 2025-06-03 RX ORDER — TERAZOSIN 5 MG/1
5 CAPSULE ORAL AT BEDTIME
Status: DISCONTINUED | OUTPATIENT
Start: 2025-06-03 | End: 2025-06-05 | Stop reason: HOSPADM

## 2025-06-03 RX ORDER — HYDROMORPHONE HYDROCHLORIDE 2 MG/1
2 TABLET ORAL EVERY 4 HOURS PRN
Status: DISCONTINUED | OUTPATIENT
Start: 2025-06-03 | End: 2025-06-05 | Stop reason: HOSPADM

## 2025-06-03 RX ORDER — ACETAMINOPHEN 500 MG
500 TABLET ORAL EVERY 6 HOURS PRN
Status: ON HOLD | COMMUNITY
End: 2025-06-05

## 2025-06-03 RX ORDER — ACETAMINOPHEN 650 MG/1
650 SUPPOSITORY RECTAL EVERY 8 HOURS
Status: DISCONTINUED | OUTPATIENT
Start: 2025-06-03 | End: 2025-06-05 | Stop reason: HOSPADM

## 2025-06-03 RX ORDER — LORAZEPAM 1 MG/1
1 TABLET ORAL ONCE
Status: COMPLETED | OUTPATIENT
Start: 2025-06-03 | End: 2025-06-03

## 2025-06-03 RX ORDER — HYDROMORPHONE HYDROCHLORIDE 1 MG/ML
0.3 INJECTION, SOLUTION INTRAMUSCULAR; INTRAVENOUS; SUBCUTANEOUS ONCE
Status: COMPLETED | OUTPATIENT
Start: 2025-06-03 | End: 2025-06-03

## 2025-06-03 RX ORDER — LIDOCAINE 4 G/G
2 PATCH TOPICAL ONCE
Status: COMPLETED | OUTPATIENT
Start: 2025-06-03 | End: 2025-06-04

## 2025-06-03 RX ORDER — CEFTRIAXONE 2 G/1
2 INJECTION, POWDER, FOR SOLUTION INTRAMUSCULAR; INTRAVENOUS ONCE
Status: COMPLETED | OUTPATIENT
Start: 2025-06-03 | End: 2025-06-03

## 2025-06-03 RX ORDER — OMEPRAZOLE 40 MG/1
40 CAPSULE, DELAYED RELEASE ORAL DAILY
COMMUNITY

## 2025-06-03 RX ORDER — HYDROMORPHONE HYDROCHLORIDE 1 MG/ML
0.3 INJECTION, SOLUTION INTRAMUSCULAR; INTRAVENOUS; SUBCUTANEOUS EVERY 30 MIN PRN
Status: DISCONTINUED | OUTPATIENT
Start: 2025-06-03 | End: 2025-06-03

## 2025-06-03 RX ORDER — ACETAMINOPHEN 325 MG/1
650 TABLET ORAL EVERY 8 HOURS
Status: DISCONTINUED | OUTPATIENT
Start: 2025-06-03 | End: 2025-06-05 | Stop reason: HOSPADM

## 2025-06-03 RX ORDER — IOPAMIDOL 755 MG/ML
66 INJECTION, SOLUTION INTRAVASCULAR ONCE
Status: COMPLETED | OUTPATIENT
Start: 2025-06-03 | End: 2025-06-03

## 2025-06-03 RX ORDER — ACETAMINOPHEN 325 MG/1
650 TABLET ORAL ONCE
Status: COMPLETED | OUTPATIENT
Start: 2025-06-03 | End: 2025-06-03

## 2025-06-03 RX ORDER — CARVEDILOL 12.5 MG/1
12.5 TABLET ORAL 2 TIMES DAILY
Status: DISCONTINUED | OUTPATIENT
Start: 2025-06-03 | End: 2025-06-05 | Stop reason: HOSPADM

## 2025-06-03 RX ORDER — TRAMADOL HYDROCHLORIDE 50 MG/1
50 TABLET ORAL ONCE
Status: COMPLETED | OUTPATIENT
Start: 2025-06-03 | End: 2025-06-03

## 2025-06-03 RX ADMIN — HYDROMORPHONE HYDROCHLORIDE 0.2 MG: 0.2 INJECTION, SOLUTION INTRAMUSCULAR; INTRAVENOUS; SUBCUTANEOUS at 23:34

## 2025-06-03 RX ADMIN — TRAMADOL HYDROCHLORIDE 50 MG: 50 TABLET, COATED ORAL at 15:55

## 2025-06-03 RX ADMIN — LORAZEPAM 1 MG: 1 TABLET ORAL at 21:56

## 2025-06-03 RX ADMIN — ACETAMINOPHEN 650 MG: 325 TABLET, FILM COATED ORAL at 15:50

## 2025-06-03 RX ADMIN — LIDOCAINE 2 PATCH: 4 PATCH TOPICAL at 19:33

## 2025-06-03 RX ADMIN — ACETAMINOPHEN 650 MG: 325 TABLET, FILM COATED ORAL at 23:32

## 2025-06-03 RX ADMIN — HYDROMORPHONE HYDROCHLORIDE 0.3 MG: 1 INJECTION, SOLUTION INTRAMUSCULAR; INTRAVENOUS; SUBCUTANEOUS at 23:02

## 2025-06-03 RX ADMIN — CEFTRIAXONE SODIUM 2 G: 2 INJECTION, POWDER, FOR SOLUTION INTRAMUSCULAR; INTRAVENOUS at 19:32

## 2025-06-03 RX ADMIN — SODIUM CHLORIDE 80 ML: 9 INJECTION, SOLUTION INTRAVENOUS at 16:23

## 2025-06-03 RX ADMIN — IOPAMIDOL 66 ML: 755 INJECTION, SOLUTION INTRAVENOUS at 16:23

## 2025-06-03 RX ADMIN — CARVEDILOL 12.5 MG: 12.5 TABLET, FILM COATED ORAL at 23:33

## 2025-06-03 ASSESSMENT — ACTIVITIES OF DAILY LIVING (ADL)
ADLS_ACUITY_SCORE: 55

## 2025-06-03 NOTE — ED PROVIDER NOTES
Emergency Department Note      History of Present Illness     Chief Complaint   Back Pain and Rib Pain      HPI   Janessa Melendez is a 84 year old female, anticoagulated on Eliquis, with a history of atrial fibrillation, chronic diastolic CHF, coronary artery disease, cerebrovascular accident, hypertension, and hyperlipidemia who presents to the ED for evaluation of right flank pain. The patient reports that she has not onset of pain in the right flank region which starts in the middle of her back and then wraps around to the right flank, the right rib cage and across the upper abdomen.  She states that the pain has significantly worsened over the past 2 days and currently rates 10 on a scale of 1-10.  The pain is worse with movement and certain positions, coughing and taking a deep breath.  There was no recent trauma or falls.  She was seen at the urgent care and referred to the emergency department due to the concern for pulmonary embolism.  She denies radiation of pain down her arms or legs.  She is also experiencing some shortness of breath.  No new cough, fevers or chills.  No leg pain or swelling.  She is anticoagulant Eliquis.  No rectal bleeding.  No nausea or vomiting.    Independent Historian   Daughter as detailed above.    Review of External Notes   I reviewed UC office visit from today (6/3/25). Patient was seen for right flank pain.    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  Paroxysmal ventricular tachycardia  Dyspnea on exertion  Nonsustained ventricular tachycardia  Hypokalemia   Atherosclerosis of coronary artery  Atrial  fibrillation  Leukocytosis  Moderate COPD  Multinodular goiter  Cyst of pancreas  Thrombocytosis     Medications   Eliquis  Coreg  Entresto  Zocor  Hytrin  Demadex  Prilosec     Surgical History   Past Surgical History:   Procedure Laterality Date    BACK SURGERY      CARDIAC SURGERY      angiogram neg many yrs ago    CHOLECYSTECTOMY      CV CORONARY ANGIOGRAM N/A 5/5/2023    Procedure: Coronary Angiogram;  Surgeon: Kane Daugherty MD;  Location:  HEART CARDIAC CATH LAB    CV LEFT HEART CATH N/A 5/5/2023    Procedure: Left Heart Catheterization;  Surgeon: Kane Daugherty MD;  Location:  HEART CARDIAC CATH LAB    CV RIGHT HEART CATH MEASUREMENTS RECORDED N/A 5/5/2023    Procedure: Right Heart Catheterization;  Surgeon: Kane Daugherty MD;  Location:  HEART CARDIAC CATH LAB    CV RIGHT HEART EXERCISE STRESS STUDY N/A 5/5/2023    Procedure: Stress Drug Study;  Surgeon: Kane Daugherty MD;  Location:  HEART CARDIAC CATH LAB    ESOPHAGOSCOPY, GASTROSCOPY, DUODENOSCOPY (EGD), COMBINED N/A 6/22/2021    Procedure: ESOPHAGOGASTRODUODENOSCOPY, WITH ENDOSCOPIC US WITH FINE NEEDLE ASPIRATION;  Surgeon: Ventura Mcgill MD;  Location:  GI    EYE SURGERY      ORTHOPEDIC SURGERY      lt shoulder replaced, total    partial masectomy         Physical Exam     Patient Vitals for the past 24 hrs:   BP Temp Temp src Pulse Resp SpO2   06/03/25 1323 (!) 150/100 98.2  F (36.8  C) Temporal 66 18 98 %     Physical Exam  Nursing note and vitals reviewed.  Constitutional:  Appears well-developed and well-nourished.   HENT:   Head:    Atraumatic.   Mouth/Throat:   Oropharynx is clear and moist. No oropharyngeal exudate.   Eyes:    Pupils are equal, round, and reactive to light.   Neck:    Normal range of motion. Neck supple.      No tracheal deviation present. No thyromegaly present.   Cardiovascular:  Normal rate, regular rhythm, no murmur   Pulmonary/Chest: Nontender chest wall.  No rash.  Breath sounds are  clear and equal without wheezes or crackles.  Abdominal:   Soft. Bowel sounds are normal. Exhibits no distension and      no mass. There is no tenderness.      There is no rebound and no guarding.   Musculoskeletal:  Exhibits no edema.  Nontender arms and legs.  Back:                           Mild right CVA tenderness.  Lymphadenopathy:  No cervical adenopathy.   Neurological:   Alert and oriented to person, place, and time. GCS 15.  CN 2-12 intact.  and proximal upper extremity strength strong and equal.  Bilateral lower extremity strength strong and equal, including strong dorsiflexion and plantarflexion strength.  Sensation intact and equal to the face, arms and legs.  No facial droop or weakness. Normal speech.  Follows commands and answers questions normally.    Skin:    Skin is warm and dry. No rash noted. No pallor.       Diagnostics     Lab Results   Labs Ordered and Resulted from Time of ED Arrival to Time of ED Departure   COMPREHENSIVE METABOLIC PANEL - Abnormal       Result Value    Sodium 142      Potassium 4.1      Carbon Dioxide (CO2) 29      Anion Gap 11      Urea Nitrogen 24.1 (*)     Creatinine 1.10 (*)     GFR Estimate 50 (*)     Calcium 9.5      Chloride 102      Glucose 124 (*)     Alkaline Phosphatase 107      AST 20      ALT 25      Protein Total 7.4      Albumin 4.4      Bilirubin Total 0.5     TROPONIN T, HIGH SENSITIVITY - Abnormal    Troponin T, High Sensitivity 27 (*)    NT-PROBNP - Abnormal    NT-proBNP 3,803 (*)    ROUTINE UA WITH MICROSCOPIC REFLEX TO CULTURE - Abnormal    Color Urine Light Yellow      Appearance Urine Clear      Glucose Urine Negative      Bilirubin Urine Negative      Ketones Urine Negative      Specific Gravity Urine 1.010      Blood Urine Negative      pH Urine 5.0      Protein Albumin Urine Negative      Urobilinogen Urine Normal      Nitrite Urine Negative      Leukocyte Esterase Urine Large (*)     Mucus Urine Present (*)     RBC Urine 3 (*)     WBC Urine  14 (*)     Squamous Epithelials Urine 1      Hyaline Casts Urine 2     TROPONIN T, HIGH SENSITIVITY - Abnormal    Troponin T, High Sensitivity 29 (*)    LIPASE - Normal    Lipase 17     CBC WITH PLATELETS AND DIFFERENTIAL    WBC Count 6.9      RBC Count 4.00      Hemoglobin 12.1      Hematocrit 38.0      MCV 95      MCH 30.3      MCHC 31.8      RDW 13.5      Platelet Count 229      % Neutrophils 73      % Lymphocytes 18      % Monocytes 7      % Eosinophils 1      % Basophils 0      % Immature Granulocytes 0      NRBCs per 100 WBC 0      Absolute Neutrophils 5.1      Absolute Lymphocytes 1.2      Absolute Monocytes 0.5      Absolute Eosinophils 0.1      Absolute Basophils 0.0      Absolute Immature Granulocytes 0.0      Absolute NRBCs 0.0     URINE CULTURE       Imaging   CT Chest PE Abdomen Pelvis w Contrast   Final Result   IMPRESSION:   1.  No pulmonary embolism.   2.  Mosaic attenuation of the lungs suggesting small airway disease.   3.  No acute findings in the abdomen and pelvis.         CT Thoracic Spine w/o Contrast   Final Result   IMPRESSION:   1.  Compared to 2022, there has been slightly increased moderate height loss of the T5 vertebral body. Recommend clinical correlation for focal pain.   2.  Unchanged chronic height loss of the T4, T6-T9, T11 and L1 vertebral bodies.             EKG   ECG taken at 1326, ECG read at `328  Atrial fibrillation  Left axis deviation   RSR' or QR pattern in V1 suggests right ventricular conduction delay  Inferior infarct, age undetermined  Anterolateral infarct, age undetermined  Abnormal ECG   Rate 78 bpm. LA interval * ms. QRS duration 98 ms. QT/QTc 398/453 ms. P-R-T axes * -57 23.    Independent Interpretation   None    ED Course      Medications Administered   Medications   HYDROmorphone (PF) (DILAUDID) injection 0.5 mg (has no administration in time range)   Lidocaine (LIDOCARE) 4 % Patch 2 patch (has no administration in time range)   acetaminophen (TYLENOL) tablet  650 mg (650 mg Oral $Given 6/3/25 1550)   traMADol (ULTRAM) tablet 50 mg (50 mg Oral $Given 6/3/25 1555)   iopamidol (ISOVUE-370) solution 66 mL (66 mLs Intravenous $Given 6/3/25 1623)   sodium chloride 0.9 % bag for CT scan flush (80 mLs Intravenous $Given 6/3/25 1623)       Procedures   Procedures     Discussion of Management   Admitting Hospitalist, Dr. Grossman  Neurosurgery, Dr. Charlotte Barcenas    ED Course   ED Course as of 06/03/25 1954 Tue Jun 03, 2025   1525 I obtained history and examined the patient as noted above.     1928 I spoke with Dr. Barcenas of neurosurgery regarding patient's presentation and plan of care.   1931 I rechecked the patient and updated them on imaging results.   1954 I consulted with Dr. Grossman of the hospitalist service and discussed patient admission. They accepted care of the patient.       Additional Documentation  None    Medical Decision Making / Diagnosis     CMS Diagnoses: None    MIPS   CT for PE was ordered because the patient is high risk for pulmonary embolism.  CT/MRI of the lumbar spine was obtained because of risk factors for fracture (minor trauma in elderly/osteoporosis).     WANG   Janessa Melendez is a 84 year old female who arrives to the emergency department due to intractable severe back pain who I found to have a worsening T5 vertebral compression fracture which I feel is the cause of her pain.  I consulted neurosurgery who recommends that she have an MRI of her thoracic spine and be admitted to the hospital for pain control and further evaluation.  Also found her to have a urinalysis which is concerning for UTI so she was given ceftriaxone IV.  I considered the possibly of pyelonephritis.  Troponins were mildly elevated but remained fairly flat so I did not feel there was sign of acute myocardial infarction.  There was no rash so I felt shingles was unlikely.  No concern for cholecystitis or appendicitis at this time aced on CT imaging.  CT scan did not  show any sign of pulmonary embolism or pneumonia.  There was no sign of pneumothorax or rib fracture.  She was admitted to the care of the hospitalist service for further evaluation treatment.  MRI of her thoracic spine is pending.  BNP is elevated and she has a history of chronic diastolic congestive heart failure.  She is in chronic atrial fibrillation which is rate controlled and she is anticoagulated with Eliquis.    Disposition   The patient was admitted to the hospital.     Diagnosis     ICD-10-CM    1. Compression fracture of T5 vertebra, initial encounter (H)  S22.050A       2. Intractable back pain  M54.9       3. Acute UTI  N39.0       4. Elevated troponin  R79.89       5. Chronic diastolic congestive heart failure (H)  I50.32            Discharge Medications   New Prescriptions    No medications on file         Scribe Disclosure:  RENETTA, Pippa Nolan, am serving as a scribe at 6:42 PM on 6/3/2025 to document services personally performed by Jami Jaramillo MD based on my observations and the provider's statements to me.        Jami Jaramillo MD  06/04/25 0047

## 2025-06-03 NOTE — PROGRESS NOTES
Assessment & Plan     Diagnosis:    ICD-10-CM    1. Right flank pain  R10.9       2. Chest pain, unspecified type  R07.9           Medical Decision Making:  Janessa Melendez is a 84 year old female who presents with right flank and chest pain progressively worse over the past week. Broad differential diagnosis was considered including ACS, PE, ureterolithiasis, tumor, colitis, cholecystitis, aneurysm, dissection, hydronephrosis, pneumonia, rib fracture, UTI, pyelonephritis amongst many other etiologies.  Patient does have significant cardiac history, pain is worse with exertion and deep breathing.  Patient is tearful here and clearly in moderate/severe acute distress. Given her progressively worsening pain, cardiac history, concerns for above differential and inability to rule out many of the emergent processes; I directed her to go to the ER now for further evaluation. Daughter will take her next door to [facility] now. Questions answered.     Sudeep Santa PA-C  [facility] URGENT CARE    Subjective     Janessa Melendez is a 84 year old female who presents to clinic today for the following health issues:  Chief Complaint   Patient presents with    Urgent Care    Back Pain     Pt presents with stabbing R side mid back pain, onset a week.        HPI  Patient with complex past medical history including CVA, ACS/CAD, breast cancer, presenting to urgent care for evaluation of right flank and chest pain.  She notes her pain has been getting progressively worse over the past week, much worse today.  Around that week it seemed like it was all over her chest and abdomen, but now seems to be localized on the right flank and chest region.  She is not feeling short of breath, but does note that breathing does seem to make it a little bit worse.  She does get some chest pain with exertion but this is not a new problem.  No calf pain or leg swelling, nausea, vomiting, diarrhea, cough or URI symptoms.  "She describes this as a \"stabbing pain\" that is getting worse.  No urinary symptoms such as dysuria, hematuria, frequency or urgency of urination.    Review of Systems    See HPI    Objective      Vitals: BP (!) 144/74   Pulse 72   Temp 97.8  F (36.6  C) (Tympanic)   Resp 20   Wt 59.9 kg (132 lb)   SpO2 100%   BMI 26.66 kg/m        Patient Vitals for the past 24 hrs:   BP Temp Temp src Pulse Resp SpO2 Weight   06/03/25 1241 (!) 144/74 -- -- -- -- -- --   06/03/25 1237 (!) 154/64 97.8  F (36.6  C) Tympanic 72 20 100 % 59.9 kg (132 lb)       Vital signs reviewed by: Sudeep Santa PA-C    Physical Exam   Constitutional: Patient is alert and cooperative. Moderate/severe acute distress. Tearful.   Cardiovascular: Irregularly irregular rate and rhythm.  Pulmonary/Chest: Lungs are clear to auscultation throughout. Effort normal. No respiratory distress. No wheezes, rales or rhonchi.  GI: Abdomen is soft and non-tender throughout.   Neurological: Alert and oriented x3.   MSK: right CVA tenderness to percussion  Psychiatric:The patient has a normal mood and affect.     Sudeep Santa PA-C, Yue 3, 2025        "

## 2025-06-03 NOTE — ED TRIAGE NOTES
Pt reports severe right sided rib pain that started a week ago, has gotten worse. Pt states that when the pain started the pain wrapped all around her upper abdomen.

## 2025-06-03 NOTE — PROGRESS NOTES
Urgent Care Clinic Visit    Chief Complaint   Patient presents with    Urgent Care    Back Pain     Pt presents with stabbing L side mid back pain, onset a week.               6/3/2025    12:37 PM   Additional Questions   Roomed by Yuni Stanton   Accompanied by Rafaela(daughter)

## 2025-06-04 ENCOUNTER — DOCUMENTATION ONLY (OUTPATIENT)
Dept: ORTHOPEDICS | Facility: CLINIC | Age: 85
End: 2025-06-04
Payer: COMMERCIAL

## 2025-06-04 ENCOUNTER — APPOINTMENT (OUTPATIENT)
Dept: GENERAL RADIOLOGY | Facility: CLINIC | Age: 85
End: 2025-06-04
Attending: NURSE PRACTITIONER
Payer: COMMERCIAL

## 2025-06-04 PROCEDURE — G0378 HOSPITAL OBSERVATION PER HR: HCPCS

## 2025-06-04 PROCEDURE — 96376 TX/PRO/DX INJ SAME DRUG ADON: CPT

## 2025-06-04 PROCEDURE — 250N000013 HC RX MED GY IP 250 OP 250 PS 637

## 2025-06-04 PROCEDURE — 99233 SBSQ HOSP IP/OBS HIGH 50: CPT

## 2025-06-04 PROCEDURE — L0456 TLSO FLEX TRNK SJ-SS PRE CST: HCPCS

## 2025-06-04 PROCEDURE — 250N000011 HC RX IP 250 OP 636: Performed by: STUDENT IN AN ORGANIZED HEALTH CARE EDUCATION/TRAINING PROGRAM

## 2025-06-04 PROCEDURE — 250N000013 HC RX MED GY IP 250 OP 250 PS 637: Performed by: STUDENT IN AN ORGANIZED HEALTH CARE EDUCATION/TRAINING PROGRAM

## 2025-06-04 PROCEDURE — 96375 TX/PRO/DX INJ NEW DRUG ADDON: CPT

## 2025-06-04 PROCEDURE — 72070 X-RAY EXAM THORAC SPINE 2VWS: CPT

## 2025-06-04 PROCEDURE — 250N000011 HC RX IP 250 OP 636: Performed by: EMERGENCY MEDICINE

## 2025-06-04 RX ORDER — AMOXICILLIN 250 MG
1 CAPSULE ORAL 2 TIMES DAILY PRN
Status: DISCONTINUED | OUTPATIENT
Start: 2025-06-04 | End: 2025-06-05 | Stop reason: HOSPADM

## 2025-06-04 RX ORDER — AMOXICILLIN 250 MG
2 CAPSULE ORAL 2 TIMES DAILY PRN
Status: DISCONTINUED | OUTPATIENT
Start: 2025-06-04 | End: 2025-06-05 | Stop reason: HOSPADM

## 2025-06-04 RX ORDER — METHOCARBAMOL 500 MG/1
500 TABLET, FILM COATED ORAL 4 TIMES DAILY PRN
Status: DISCONTINUED | OUTPATIENT
Start: 2025-06-04 | End: 2025-06-05 | Stop reason: HOSPADM

## 2025-06-04 RX ORDER — ONDANSETRON 2 MG/ML
4 INJECTION INTRAMUSCULAR; INTRAVENOUS EVERY 6 HOURS PRN
Status: DISCONTINUED | OUTPATIENT
Start: 2025-06-04 | End: 2025-06-05 | Stop reason: HOSPADM

## 2025-06-04 RX ORDER — PROCHLORPERAZINE MALEATE 5 MG/1
5 TABLET ORAL EVERY 6 HOURS PRN
Status: DISCONTINUED | OUTPATIENT
Start: 2025-06-04 | End: 2025-06-05 | Stop reason: HOSPADM

## 2025-06-04 RX ORDER — ONDANSETRON 2 MG/ML
4 INJECTION INTRAMUSCULAR; INTRAVENOUS ONCE
Status: COMPLETED | OUTPATIENT
Start: 2025-06-04 | End: 2025-06-04

## 2025-06-04 RX ORDER — ONDANSETRON 4 MG/1
4 TABLET, ORALLY DISINTEGRATING ORAL EVERY 6 HOURS PRN
Status: DISCONTINUED | OUTPATIENT
Start: 2025-06-04 | End: 2025-06-05 | Stop reason: HOSPADM

## 2025-06-04 RX ORDER — PANTOPRAZOLE SODIUM 40 MG/1
40 TABLET, DELAYED RELEASE ORAL
Status: DISCONTINUED | OUTPATIENT
Start: 2025-06-04 | End: 2025-06-05 | Stop reason: HOSPADM

## 2025-06-04 RX ORDER — POTASSIUM CHLORIDE 750 MG/1
10 TABLET, EXTENDED RELEASE ORAL 2 TIMES DAILY
Status: DISCONTINUED | OUTPATIENT
Start: 2025-06-04 | End: 2025-06-05 | Stop reason: HOSPADM

## 2025-06-04 RX ORDER — POTASSIUM CHLORIDE 750 MG/1
10 TABLET, EXTENDED RELEASE ORAL 2 TIMES DAILY
Status: DISCONTINUED | OUTPATIENT
Start: 2025-06-04 | End: 2025-06-04

## 2025-06-04 RX ORDER — OMEPRAZOLE 20 MG/1
40 CAPSULE, DELAYED RELEASE ORAL DAILY
Status: DISCONTINUED | OUTPATIENT
Start: 2025-06-04 | End: 2025-06-04

## 2025-06-04 RX ADMIN — ACETAMINOPHEN 650 MG: 325 TABLET, FILM COATED ORAL at 22:50

## 2025-06-04 RX ADMIN — ONDANSETRON 4 MG: 2 INJECTION, SOLUTION INTRAMUSCULAR; INTRAVENOUS at 00:23

## 2025-06-04 RX ADMIN — TORSEMIDE 30 MG: 20 TABLET ORAL at 08:15

## 2025-06-04 RX ADMIN — POTASSIUM CHLORIDE 10 MEQ: 750 TABLET, EXTENDED RELEASE ORAL at 21:08

## 2025-06-04 RX ADMIN — SACUBITRIL AND VALSARTAN 1 TABLET: 49; 51 TABLET, FILM COATED ORAL at 21:21

## 2025-06-04 RX ADMIN — TERAZOSIN HYDROCHLORIDE 5 MG: 5 CAPSULE ORAL at 22:50

## 2025-06-04 RX ADMIN — METHOCARBAMOL 500 MG: 500 TABLET ORAL at 21:21

## 2025-06-04 RX ADMIN — CEFTRIAXONE SODIUM 1 G: 1 INJECTION, POWDER, FOR SOLUTION INTRAMUSCULAR; INTRAVENOUS at 21:22

## 2025-06-04 RX ADMIN — ACETAMINOPHEN 650 MG: 325 TABLET, FILM COATED ORAL at 08:14

## 2025-06-04 RX ADMIN — POTASSIUM CHLORIDE 10 MEQ: 750 TABLET, EXTENDED RELEASE ORAL at 08:14

## 2025-06-04 RX ADMIN — PANTOPRAZOLE SODIUM 40 MG: 40 TABLET, DELAYED RELEASE ORAL at 08:15

## 2025-06-04 RX ADMIN — HYDROMORPHONE HYDROCHLORIDE 0.2 MG: 0.2 INJECTION, SOLUTION INTRAMUSCULAR; INTRAVENOUS; SUBCUTANEOUS at 11:09

## 2025-06-04 RX ADMIN — ACETAMINOPHEN 650 MG: 325 TABLET, FILM COATED ORAL at 15:18

## 2025-06-04 RX ADMIN — SACUBITRIL AND VALSARTAN 1 TABLET: 49; 51 TABLET, FILM COATED ORAL at 08:15

## 2025-06-04 RX ADMIN — CARVEDILOL 12.5 MG: 12.5 TABLET, FILM COATED ORAL at 08:14

## 2025-06-04 RX ADMIN — CARVEDILOL 12.5 MG: 12.5 TABLET, FILM COATED ORAL at 21:08

## 2025-06-04 ASSESSMENT — ACTIVITIES OF DAILY LIVING (ADL)
ADLS_ACUITY_SCORE: 59
ADLS_ACUITY_SCORE: 49
ADLS_ACUITY_SCORE: 52
ADLS_ACUITY_SCORE: 49
ADLS_ACUITY_SCORE: 49
ADLS_ACUITY_SCORE: 52
ADLS_ACUITY_SCORE: 56
ADLS_ACUITY_SCORE: 52
ADLS_ACUITY_SCORE: 56
ADLS_ACUITY_SCORE: 55
ADLS_ACUITY_SCORE: 56
ADLS_ACUITY_SCORE: 49
ADLS_ACUITY_SCORE: 52
ADLS_ACUITY_SCORE: 49
ADLS_ACUITY_SCORE: 56
DEPENDENT_IADLS:: INDEPENDENT
ADLS_ACUITY_SCORE: 52
ADLS_ACUITY_SCORE: 59
ADLS_ACUITY_SCORE: 56

## 2025-06-04 NOTE — PROGRESS NOTES
"ALEJANDRO Riddle is an 84 yof that presents today at the [facility] room #5524 for a fitting of a TLSO. The patient suffered from a T5 fracture.    O- 4'11\", 132lbs.      A- I had measured the patients waist to be at 28\" of circumference. I have fit the patient with the [employer] 456 TLSO by adjusting the belt of the brace to an Small. The TLSO was fit onto the patient and the patient felt satisfaction and comfort. Patient was instructed on wear and care of the brace. Fit and function appear adequate at this time.     P-Patient will wear the orthosis whenever she is sitting or standing. The orthosis is not needed as much while lying down. The patient will contact us with any question or concerns. Follow up as needed.     Electronically signed by: Maged Morales CPO  "

## 2025-06-04 NOTE — PROVIDER NOTIFICATION
MD Notification    Notified Person: MD    Notified Person Name: Dr Gilles Barba    Notification Date/Time: 06/04/2025 at 03:18    Notification Interaction: [employer] messaging    Purpose of Notification: the MRI results are completed. Would like to verify if pt should remain on bedrest    Orders Received: Pt should remain on bedrest till Neurosurgery sees pt    Comments:

## 2025-06-04 NOTE — CONSULTS
Neurosurgery Consult    Assessment  83 yo female on Eliquis presenting to ER with back pain.  CT reveals multiple thoracic compression fractures likely chronic as noted on prior imaging with increase in height loss at T5.    Plan:  MRI thoracic spine without contrast.  Bed rest until MRI.  NS will make final recommendations after MRI.    CHRISTIE    Janessa Melendez is a 84 year old female, anticoagulated on Eliquis, with a history of atrial fibrillation, coronary artery disease, cerebrovascular accident, hypertension, and hyperlipidemia who presents to the ED for back pain and rib pain.  She states she was in [place] a few weeks ago and noticed after she got home she had pain and tightness in a band across her chest. She reports it only progressively got worse since coming home. She reports mostly pain on the side that sometimes radiates to the front in a band. It is worse with laughing and turning her body.  Currently her pain has sub cited with pain medication.     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    Exam  B/P: 105/95, T: 98.2, P: 75, R: 21  Awake and alert, no acute distress.  No pain on palpation of spine.  Moving all four extremities well.  Neuro intact.    Imaging    CT thoracic spine:    IMPRESSION:  1.  Compared to 2022, there has been slightly increased moderate height loss of the T5 vertebral body. Recommend  clinical correlation for focal pain.  2.  Unchanged chronic height loss of the T4, T6-T9, T11 and L1 vertebral bodies.     Discussed with Dr. Pena.    Xi Barcenas, MPAS  [facility] Neurosurgery  [address] [address]

## 2025-06-04 NOTE — PROGRESS NOTES
Contacted regarding 85 yo female presenting to ER with back pain.  Hx of multiple thoracic compression fractures.    CT reveals compression fractures at T4, T5, T6, T7, T8, T9, T11 and L1.    Imaging:    CT thoracic spine:    IMPRESSION:  1.  Compared to 2022, there has been slightly increased moderate height loss of the T5 vertebral body. Recommend clinical correlation for focal pain.  2.  Unchanged chronic height loss of the T4, T6-T9, T11 and L1 vertebral bodies.     RECOMMENDATIONS:  MRI thoracic spine without contrast to determine any acuity in these compression fractures.    Xi Barcenas MPAS  [facility] Neurosurgery  [address]  [address] [place] [id number]           Simponi Pregnancy And Lactation Text: The risk during pregnancy and breastfeeding is uncertain with this medication.

## 2025-06-04 NOTE — PROGRESS NOTES
Admission/Transfer from: home  2 RN skin assessment completed. Yes  Name of 2nd RN: Olga Hearn  Significant findings include: none  Provider Paged for [facility] Nurse Consult Order? No

## 2025-06-04 NOTE — CONSULTS
Care Management Initial Consult    General Information  Assessment completed with: Patient, Patient and daughter Rafaela  Type of CM/SW Visit: Initial Assessment    Primary Care Provider verified and updated as needed: Yes   Readmission within the last 30 days:        Reason for Consult: discharge planning  Advance Care Planning:            Communication Assessment  Patient's communication style: spoken language (English or Bilingual)    Hearing Difficulty or Deaf: no   Wear Glasses or Blind: yes    Cognitive  Cognitive/Neuro/Behavioral: WDL                      Living Environment:   People in home: alone     Current living Arrangements: condominium      Able to return to prior arrangements: yes       Family/Social Support:  Care provided by: self  Provides care for: no one  Marital Status: Single  Support system:            Description of Support System:           Current Resources:   Patient receiving home care services: No        Community Resources:    Equipment currently used at home: none  Supplies currently used at home:      Employment/Financial:  Employment Status: retired        Financial Concerns:             Does the patient's insurance plan have a 3 day qualifying hospital stay waiver?  No    Lifestyle & Psychosocial Needs:  Social Drivers of Health     Food Insecurity: Not on file   Depression: Not at risk (3/20/2025)    PHQ-2     PHQ-2 Score: 0   Housing Stability: Not on file   Tobacco Use: Low Risk  (6/3/2025)    Patient History     Smoking Tobacco Use: Never     Smokeless Tobacco Use: Never     Passive Exposure: Not on file   Financial Resource Strain: High Risk (1/1/2022)    [facility]    Financial Resource Strain     Difficulty of Paying Living Expenses: Not on file     Difficulty of Paying Living Expenses: Not on file   Alcohol Use: Not on file   Transportation Needs: Not on file   Physical Activity: Not on file   Interpersonal Safety: Not on file    Stress: Not on file   Social Connections: Unknown (1/1/2022)    Received from [employer] [facility]    Social Connections     Frequency of Communication with Friends and Family: Not on file   Health Literacy: Not on file       Functional Status:  Prior to admission patient needed assistance:   Dependent ADLs:: Independent  Dependent IADLs:: Independent       Mental Health Status:  Mental Health Status: No Current Concerns       Chemical Dependency Status:                Values/Beliefs:  Spiritual, Cultural Beliefs, Zoroastrianism Practices, Values that affect care:                 Discussed  Partnership in Safe Discharge Planning  document with patient/family: Yes: Patient and SVETLANA Russo    Additional Information:  Writer met with patient and introduced self and role in discharge planning.  Patint shared that she lives alone and is independent with self care, shopping, driving, etc.  Patient has back pain that limits her ability to carry groceries up a set of stairs to her apartment.  Patient's son and SVETLANA are sometimes able to help.  Patient has had OP PT in the past.  Writer requested provider order a PT eval.   Writer explained our team will follow and help with discharge planning if needed    Next Steps:    Follow and review PT recs for discharge planning    [place] RN

## 2025-06-04 NOTE — PROGRESS NOTES
[facility]    Medicine Progress Note - Hospitalist Service    Date of Admission:  6/3/2025    Assessment & Plan      Janessa Melendez is a 84 year old female with history of Afib on eliquis, HTN, systolic heart failure, CVA and CKD who presented to the ED for back pain.     Back pain  Multiple known compression fractures from T4-L1  Height loss of known T5 compression fracture  Ms. Melendez developed back pain after a trip to [place] that starts in her mid back and radiates out in band across her flank and chest. It is apparently worse with turning, bending and laughing. Within the ED CT negative for significant pulmonary disease or PE, known compression fractures evident with 80% height loss to T5 which is where pain is located.  - Neurosurgery was consulted in the ED and recommended MRI to better evaluate the known fractures, especially T5.  - MRI results as follows: Acute/subacute-on-chronic anterior wedging burst-type fracture of T5 with up to 80% loss of height anteriorly. Chronic compression deformities of T4, T6, T7, T8 and T9.  - Orthotics consulted. [place] brace applied. This is to be worn when upright and out of bed.   -Avoid heavy lifting, bending or twisting as able  -Physical therapy consultation  -OT consultation    Possible UTI  - Trend urine cultures (In process)  - Continue Rocephin     CKD  Baseline Cr 1.1-1.2  - Stable    pAfib  - Hold eliquis until plan determined for any possible intervention  - Resume PTA coreg    HFrEF 35-40%  Elevated BNP  - Despite elevated BNP no signs of heart failure exacerbation  - Would resume home entresto and torsemide  - Monitor clinically       Observation Goals: -diagnostic tests and consults completed and resulted, -vital signs normal or at patient baseline, -adequate pain control on oral analgesics, -safe disposition plan has been identified, Nurse to notify provider when observation goals have been met and patient is ready for  discharge.  Diet: Regular Diet Adult    DVT Prophylaxis: Pneumatic Compression Devices  Davis Catheter: Not present  Lines: None     Cardiac Monitoring: ACTIVE order. Indication: AMI (NSTEMI/ STEMI) (48 hours)  Code Status: Full Code      Clinically Significant Risk Factors Present on Admission                # Drug Induced Coagulation Defect: home medication list includes an anticoagulant medication    # Hypertension: Noted on problem list  # Chronic heart failure with reduced ejection fraction: last echo with EF <40%              # Financial/Environmental Concerns:           Social Drivers of Health     Received from [employer] [facility]    Financial Resource Strain    [email or web address]    Social Connections          Disposition Plan   Medically Ready for Discharge: Anticipated Tomorrow           The patient's care was discussed with the Attending Physician, Dr. Neal.    Ventura Emmanuel NP  Hospitalist Service  [facility]  Securely message with [employer] (more info)  Text page via [facility] Paging/Directory   ______________________________________________________________________    Interval History   I personally met with and examined Ms. Melendez on 6/4/2025. No acute events overnight. She continues to reports persistent back pain. She continues to take PO hydromorphone which she states has been helping. I have also added PRN Robaxin for muscle spasms Of note, Ms. Melendez lives alone in an apartment which requires the use of stairs. She is concerned about her mobility and ability to apply medication patches on her own when she discharges. PT and OT to evaluate for appropriate discharge disposition. Plan for conservative management with Aspen brace and lifting restrictions.   Presently, she is hemodynamically stable and oxygenating well on room air. CMS is intact and no new bowel or bladder incontinence. No focal  deficits on exam.   Urine culture is pending, but given presence of leukocyte esterase, and WBC on UA, will continue Rocephin for now.       Physical Exam   Vital Signs: Temp: 97.5  F (36.4  C) Temp src: Oral BP: 126/61 Pulse: 67   Resp: 18 SpO2: 98 % O2 Device: None (Room air)    Weight: 0 lbs 0 oz  Physical Exam  Vitals reviewed.   Constitutional:       General: She is not in acute distress.  Cardiovascular:      Rate and Rhythm: Normal rate and regular rhythm.      Pulses: Normal pulses.      Heart sounds: Normal heart sounds.   Pulmonary:      Effort: Pulmonary effort is normal.      Breath sounds: Normal breath sounds and air entry.   Abdominal:      General: Bowel sounds are normal.      Palpations: Abdomen is soft.      Tenderness: There is no abdominal tenderness.   Skin:     General: Skin is warm and dry.   Neurological:      General: No focal deficit present.      Mental Status: She is alert.   Psychiatric:         Attention and Perception: Attention normal.         Mood and Affect: Mood normal.         Speech: Speech normal.         Behavior: Behavior normal. Behavior is cooperative.         Thought Content: Thought content normal.         Cognition and Memory: Cognition normal.         Judgment: Judgment normal.         Medical Decision Making   60 MINUTES SPENT BY ME on the date of service doing chart review, history, exam, documentation & further activities per the note.      Data     I have personally reviewed the following data over the past 24 hrs:    6.9  \   12.1   / 229     142 102 24.1 (H) /  124 (H)   4.1 29 1.10 (H) \     ALT: 25 AST: 20 AP: 107 TBILI: 0.5   ALB: 4.4 TOT PROTEIN: 7.4 LIPASE: 17     Trop: 29 (H) BNP: 3,803 (H)       Imaging results reviewed over the past 24 hrs:   Recent Results (from the past 24 hours)   CT Thoracic Spine w/o Contrast    Narrative    EXAM: CT THORACIC SPINE W/O CONTRAST  LOCATION: [facility]  DATE: 6/3/2025    INDICATION: check for  fx, lower thoracic pain radiating to right chest  COMPARISON: CT chest, abdomen and pelvis dated 11/21/2022.  TECHNIQUE: Routine CT Thoracic Spine without IV contrast. Multiplanar reformats. Dose reduction techniques were used.     FINDINGS:  VERTEBRA: Kyphotic deformity measuring approximately 51 degrees centered on the T5-T9 vertebral bodies is not significantly changed. S-shaped curvature of the thoracolumbar spine. Slightly increased moderate height loss of the T5 vertebral body compared   to 2022. Unchanged height loss of the T4, T6-T9 vertebral bodies and the superior endplates of the T11 and L1 vertebral body. Mild retropulsion of the posterior cortex of the T7 and T9 vertebral bodies is unchanged. Multilevel disc height loss and facet   arthropathy. No acute fracture or posttraumatic subluxation.     CANAL/FORAMINA: Small disc osteophyte complexes at multiple levels. No high-grade spinal canal stenosis. Mild neural foraminal narrowing at multiple levels.    PARASPINAL: No acute extraspinal abnormality.      Impression    IMPRESSION:  1.  Compared to 2022, there has been slightly increased moderate height loss of the T5 vertebral body. Recommend clinical correlation for focal pain.  2.  Unchanged chronic height loss of the T4, T6-T9, T11 and L1 vertebral bodies.     CT Chest PE Abdomen Pelvis w Contrast    Narrative    EXAM: CT CHEST PE ABDOMEN PELVIS W CONTRAST  LOCATION: [facility]  DATE: 6/3/2025    INDICATION: pleuritic right chest pain  check for PE, pneumonia, rib fx, Appendicitis, malignancy, ascending cholangitis, biliary obstruction, perforated ulcer  COMPARISON: 11/21/2022  TECHNIQUE: CT chest pulmonary angiogram and routine CT abdomen pelvis with IV contrast. Arterial phase through the chest and venous phase through the abdomen and pelvis. Multiplanar reformats and MIP reconstructions were performed. Dose reduction   techniques were used.   CONTRAST: 66mL Isovue  370    FINDINGS:  ANGIOGRAM CHEST: Pulmonary arteries are normal caliber and negative for pulmonary emboli. Thoracic aorta is negative for dissection. No CT evidence of right heart strain.     LUNGS AND PLEURA: Small linear areas of atelectasis and scarring are noted bilaterally. Bibasilar atelectasis. Mosaic attenuation of the lungs.    MEDIASTINUM/AXILLAE: Heart is normal in size. No mediastinal, axillary, or hilar adenopathy.    CORONARY ARTERY CALCIFICATION: Moderate.    HEPATOBILIARY: Slightly heterogeneous liver suggesting hepatic congestion. Cholecystectomy.    PANCREAS: Normal.    SPLEEN: Normal.    ADRENAL GLANDS: Normal.    KIDNEYS/BLADDER: No significant mass, stone, or hydronephrosis. Duodenal diverticulum.    BOWEL: Diverticulosis of the colon. No acute inflammatory change. No obstruction.  Appendix normal.    LYMPH NODES: No lymphadenopathy.    VASCULATURE: Severe atherosclerotic disease of the abdominal aorta and its branches.    PELVIC ORGANS: Bladder within normal limits. Uterus within normal limits.    MUSCULOSKELETAL: Degenerative changes of hips and spine. Mild compression deformity of L4.      Impression    IMPRESSION:  1.  No pulmonary embolism.  2.  Mosaic attenuation of the lungs suggesting small airway disease.  3.  No acute findings in the abdomen and pelvis.     MR Thoracic Spine w/o Contrast    Narrative    EXAM: MR THORACIC SPINE W/O CONTRAST  LOCATION: [facility]  DATE: 6/3/2025    INDICATION: Back pain, eval fractures.  COMPARISON: Thoracic spine CT on the same date.  TECHNIQUE: Routine Thoracic Spine MRI without IV contrast.    FINDINGS:   Moderate anterior wedging burst fracture of T5 with up to 80% loss of height anteriorly and marrow edema compatible with an acute/subacute-on-chronic compression fracture. Edema extends into both pedicles. Chronic compression deformities of T4, T6, T7,   T8 and T9. Moderate upper thoracic kyphosis. Moderate multilevel disc  degeneration. Disc osteophyte complexes at T3-T4 through T9-T10. No high-grade central canal or neural foraminal narrowing. Normal signal in the thoracic cord. There is at least   moderate central canal stenosis at C5-C6 and C6-C7. Modic type I changes at C5 through C7.      No extraspinal abnormality.      Impression    IMPRESSION:  1.  Acute/subacute-on-chronic anterior wedging burst-type fracture of T5 with up to 80% loss of height anteriorly.  2.  Chronic compression deformities of T4, T6, T7, T8 and T9.  3.  No high-grade central canal or neural foraminal stenosis.

## 2025-06-04 NOTE — H&P
[facility]    History and Physical - Hospitalist Service       Date of Admission:  6/3/2025    Assessment & Plan      Janessa Melendez is a 84 year old female with history of Afib on eliquis, HTN, systolic heart failure, CVA and CKD who presented to the ED for back pain.     Back pain  Multiple known compression fractures from T4-L1  Possible height loss of known T5 compression fracture  Developed back pain after a trip to [place] that starts in her mid back and radiates out in band across her flank and chest. Worse with turning, bending and laughing. Within the ED CT negative for significant pulmonary disease or PE, known compression fractures evident with possible progression of height loss to T5 which is where pain is located.    - neurosurgery consulted in the ED and recommended MRI to better evaluate the known fractures, especially T5  - continue pain control, would recommend dilaudid before MRI    Possible UTI  - trend urine cultures  - continue rocephin     CKD  Baseline Cr 1.1-1.2  - stable    pAfib  - hold eliquis until plan determined for any possible intervention  - resume PTA coreg    HFrEF 35-40%  Elevated BNP  - despite elevated BNP no signs of heart failure exacerbation  - would resume home entresto and torsemide  - monitor clinically        Diet: Regular Diet Adult    DVT Prophylaxis: Pneumatic Compression Devices  Davis Catheter: Not present  Lines: None     Cardiac Monitoring: None  Code Status:  FULL    Clinically Significant Risk Factors Present on Admission                # Drug Induced Coagulation Defect: home medication list includes an anticoagulant medication    # Hypertension: Noted on problem list  # Chronic heart failure with reduced ejection fraction: last echo with EF <40%              # Financial/Environmental Concerns:           Disposition Plan     Medically Ready for Discharge: Anticipated in 2-4 Days           Christie Grossman DO  Hospitalist  [facility]  Securely message with [employer] (more info)  Text page via [employer] Paging/Directory     ______________________________________________________________________    Chief Complaint   Back pain    History is obtained from the patient    History of Present Illness   Janessa Melendez is a 84 year old female who presents for back pain    Patient was in [place] a few weeks ago and noticed after she got home she had pain and tightness in a band across her chest. She reports it only progressively got worse since coming home. She reports mostly pain on the side that sometimes radiates to the front in a band. It is worse with laughing and turning her body. No pain or pressure to her chest. No SOB.  She reports worrying about her heart a lot but really denies any symptoms of dypsnea, leg swelling reduced exercise tolerance She eats a low sodium diet but does not track her weight and does not take extra torsemide. She is very thirsty and hungry in the ED    Past Medical History    Past Medical History:   Diagnosis Date    Arthritis     Breast cancer (H)     Cardiomyopathy (H)     ideopathic    Coronary artery disease 9/25/2014    Hypercholesterolemia     Hypertension     Hypokalemia     Malignant neoplasm (H)     Shortness of breath     Shoulder pain        Past Surgical History   Past Surgical History:   Procedure Laterality Date    BACK SURGERY      CARDIAC SURGERY      angiogram neg many yrs ago    CHOLECYSTECTOMY      CV CORONARY ANGIOGRAM N/A 5/5/2023    Procedure: Coronary Angiogram;  Surgeon: Kane Daugherty MD;  Location: [facility] CARDIAC CATH LAB    CV LEFT HEART CATH N/A 5/5/2023    Procedure: Left Heart Catheterization;  Surgeon: Kane Daugherty MD;  Location: [facility] CARDIAC CATH LAB    CV RIGHT HEART CATH MEASUREMENTS RECORDED N/A 5/5/2023    Procedure: Right Heart Catheterization;  Surgeon: Kane Daugherty MD;  Location:  HEART CARDIAC CATH LAB    CV  RIGHT HEART EXERCISE STRESS STUDY N/A 5/5/2023    Procedure: Stress Drug Study;  Surgeon: Kane Daugherty MD;  Location:  HEART CARDIAC CATH LAB    ESOPHAGOSCOPY, GASTROSCOPY, DUODENOSCOPY (EGD), COMBINED N/A 6/22/2021    Procedure: ESOPHAGOGASTRODUODENOSCOPY, WITH ENDOSCOPIC US WITH FINE NEEDLE ASPIRATION;  Surgeon: Ventura Mcgill MD;  Location:  GI    EYE SURGERY      ORTHOPEDIC SURGERY      lt shoulder replaced, total    partial masectomy         Prior to Admission Medications   Prior to Admission Medications   Prescriptions Last Dose Informant Patient Reported? Taking?   Acetaminophen (TYLENOL PO)  Self, Daughter Yes No   Sig: Take 500 mg by mouth every 6 hours as needed for mild pain or fever    Magnesium Oxide 250 MG TABS   No No   Sig: Take 1 tablet (250 mg) by mouth daily   amoxicillin (AMOXIL) 500 MG capsule  Self, Daughter Yes No   Sig: Before the dentist   apixaban ANTICOAGULANT (ELIQUIS) 2.5 MG tablet   No No   Sig: Take 1 tablet (2.5 mg) by mouth 2 times daily.   carvedilol (COREG) 12.5 MG tablet   No No   Sig: Take 1 tablet (12.5 mg) by mouth 2 times daily.   cholecalciferol (VITAMIN D3) 25 mcg (1000 units) capsule  Self, Daughter Yes No   Sig: Take 1 capsule by mouth daily   omeprazole (PRILOSEC) 20 MG DR capsule  Self, Daughter Yes No   Sig: Take 20 mg by mouth daily   potassium chloride ER (K-TAB/KLOR-CON) 10 MEQ CR tablet   No No   Sig: Take 1 tablet (10 mEq) by mouth 2 times daily. And as needed as directed.   sacubitril-valsartan (ENTRESTO) 49-51 MG per tablet   No No   Sig: Take 1 tablet by mouth 2 times daily.   simvastatin (ZOCOR) 10 MG tablet   No No   Sig: Take 1 tablet (10 mg) by mouth at bedtime   terazosin (HYTRIN) 5 MG capsule   No No   Sig: Take 1 capsule (5 mg) by mouth at bedtime   torsemide (DEMADEX) 10 MG tablet   No No   Sig: Take 20 mg daily and an additional 10 mg PRN weight gain or swelling      Facility-Administered Medications: None        Review of Systems     The 10 point Review of Systems is negative other than noted in the HPI or here.      Physical Exam   Vital Signs: Temp: 98.2  F (36.8  C) Temp src: Temporal BP: (!) 150/100 Pulse: 66   Resp: 18 SpO2: 98 %      Weight: 0 lbs 0 oz    Constitutional: Awake, alert, cooperative, no apparent distress. Back pain on palpation of mid thoracic back and when turning in bed  Eyes: Conjunctiva and pupils examined and normal.  HEENT: Moist mucous membranes, normal dentition.  Respiratory: Clear to auscultation bilaterally, no crackles or wheezing.  Cardiovascular: Regular rate and rhythm, normal S1 and S2, and no murmur noted.  GI: Soft, non-distended, non-tender, normal bowel sounds.  Skin: No rashes, no cyanosis, no edema.  Musculoskeletal: No joint swelling, erythema or tenderness.  Neurologic: moving all extremities spontaneously  Psychiatric: Alert, oriented to person, place and time, no obvious anxiety or depression.     Medical Decision Making       71 MINUTES SPENT BY ME on the date of service doing chart review, history, exam, documentation & further activities per the note.      Data     I have personally reviewed the following data over the past 24 hrs:    6.9  \   12.1   / 229     142 102 24.1 (H) /  124 (H)   4.1 29 1.10 (H) \     ALT: 25 AST: 20 AP: 107 TBILI: 0.5   ALB: 4.4 TOT PROTEIN: 7.4 LIPASE: 17     Trop: 29 (H) BNP: 3,803 (H)       Imaging results reviewed over the past 24 hrs:   Recent Results (from the past 24 hours)   CT Thoracic Spine w/o Contrast    Narrative    EXAM: CT THORACIC SPINE W/O CONTRAST  LOCATION: [facility]  DATE: 6/3/2025    INDICATION: check for fx, lower thoracic pain radiating to right chest  COMPARISON: CT chest, abdomen and pelvis dated 11/21/2022.  TECHNIQUE: Routine CT Thoracic Spine without IV contrast. Multiplanar reformats. Dose reduction techniques were used.     FINDINGS:  VERTEBRA: Kyphotic deformity measuring approximately 51 degrees centered on  the T5-T9 vertebral bodies is not significantly changed. S-shaped curvature of the thoracolumbar spine. Slightly increased moderate height loss of the T5 vertebral body compared   to 2022. Unchanged height loss of the T4, T6-T9 vertebral bodies and the superior endplates of the T11 and L1 vertebral body. Mild retropulsion of the posterior cortex of the T7 and T9 vertebral bodies is unchanged. Multilevel disc height loss and facet   arthropathy. No acute fracture or posttraumatic subluxation.     CANAL/FORAMINA: Small disc osteophyte complexes at multiple levels. No high-grade spinal canal stenosis. Mild neural foraminal narrowing at multiple levels.    PARASPINAL: No acute extraspinal abnormality.      Impression    IMPRESSION:  1.  Compared to 2022, there has been slightly increased moderate height loss of the T5 vertebral body. Recommend clinical correlation for focal pain.  2.  Unchanged chronic height loss of the T4, T6-T9, T11 and L1 vertebral bodies.     CT Chest PE Abdomen Pelvis w Contrast    Narrative    EXAM: CT CHEST PE ABDOMEN PELVIS W CONTRAST  LOCATION: [facility]  DATE: 6/3/2025    INDICATION: pleuritic right chest pain  check for PE, pneumonia, rib fx, Appendicitis, malignancy, ascending cholangitis, biliary obstruction, perforated ulcer  COMPARISON: 11/21/2022  TECHNIQUE: CT chest pulmonary angiogram and routine CT abdomen pelvis with IV contrast. Arterial phase through the chest and venous phase through the abdomen and pelvis. Multiplanar reformats and MIP reconstructions were performed. Dose reduction   techniques were used.   CONTRAST: 66mL Isovue 370    FINDINGS:  ANGIOGRAM CHEST: Pulmonary arteries are normal caliber and negative for pulmonary emboli. Thoracic aorta is negative for dissection. No CT evidence of right heart strain.     LUNGS AND PLEURA: Small linear areas of atelectasis and scarring are noted bilaterally. Bibasilar atelectasis. Mosaic attenuation of the  lungs.    MEDIASTINUM/AXILLAE: Heart is normal in size. No mediastinal, axillary, or hilar adenopathy.    CORONARY ARTERY CALCIFICATION: Moderate.    HEPATOBILIARY: Slightly heterogeneous liver suggesting hepatic congestion. Cholecystectomy.    PANCREAS: Normal.    SPLEEN: Normal.    ADRENAL GLANDS: Normal.    KIDNEYS/BLADDER: No significant mass, stone, or hydronephrosis. Duodenal diverticulum.    BOWEL: Diverticulosis of the colon. No acute inflammatory change. No obstruction.  Appendix normal.    LYMPH NODES: No lymphadenopathy.    VASCULATURE: Severe atherosclerotic disease of the abdominal aorta and its branches.    PELVIC ORGANS: Bladder within normal limits. Uterus within normal limits.    MUSCULOSKELETAL: Degenerative changes of hips and spine. Mild compression deformity of L4.      Impression    IMPRESSION:  1.  No pulmonary embolism.  2.  Mosaic attenuation of the lungs suggesting small airway disease.  3.  No acute findings in the abdomen and pelvis.

## 2025-06-04 NOTE — PLAN OF CARE
Goal Outcome Evaluation:         Plan of care reviewed with patient           PRIMARY Concern: admitted with severe rib and back pain. HX of multiple thoracic compression fractures    SAFETY RISK Concerns (fall risk, behaviors, etc.): Fall Risk, Bed rest      Aggression Tool Color: Green  Isolation/Type: n/a  Tests/Procedures for NEXT shift: none  Consults? (Pending/following, signed-off?) Neurosurgery consult,  Where is patient from? (Home, TCU, etc.): home lives alone  Other Important info for NEXT shift:   Anticipated DC date & active delays: TBD  _____________________________________________________________________________  SUMMARY NOTE:     Orientation/Cognitive: A&Ox4  Observation Goals (Met/ Not Met): Not met  Mobility Level/Assist Equipment: bedrest  Antibiotics & Plan (IV/po, length of tx left): IV rocephine for UTI  Pain Management: declined pain medication, reports she did not so well with IV pain medication.   Complete Pain Reassessment: Y/N Yes Due next: next assessment  Tele/VS/O2: VSS on RA, afebrile. Tele: A-fib  ABNL Lab/BG: CR 1.10, BNP 3803, Trops 27-29, UA abnormal  Diet: regular  Bowel/Bladder: continent. Has purewick in place d/t be on bedrest  Skin Concerns: n/a  Drains/Devices: PIV is sl'd  Patient Stated Goal for Today: to feel better

## 2025-06-04 NOTE — ED NOTES
[facility]  ED Nurse Handoff Report    ED Chief complaint: Back Pain and Rib Pain      ED Diagnosis:   Final diagnoses:   Compression fracture of T5 vertebra, initial encounter (H)   Intractable back pain   Acute UTI   Elevated troponin   Chronic diastolic congestive heart failure (H)       Code Status: pending discussion with admitting MD    Allergies:   Allergies   Allergen Reactions    Amlodipine      swelling    Aspirin Other (See Comments)     Bleeding              Bleeding, GI Lesion        Codeine Sulfate GI Disturbance       Patient Story: Pt reports severe right sided rib pain that started a week ago, has gotten worse. Pt states that when the pain started the pain wrapped all around her upper abdomen.   Focused Assessment:  alert, oriented x4. Afebrile. Hypertensive. HR 80s. O2 stable, room air. Ambulatory. Voiding. Pain in back and R ribs.     Treatments and/or interventions provided: labs, imaging, abx  Patient's response to treatments and/or interventions: tolerated well    To be done/followed up on inpatient unit:  pending    Does this patient have any cognitive concerns?: none    Activity level - Baseline/Home:  Independent  Activity Level - Current:   Independent    Patient's Preferred language: English   Needed?: No    Isolation: None  Infection: Not Applicable  Sepsis treatment initiated: No  Patient tested for COVID 19 prior to admission: NO  Bariatric?: No    Vital Signs:   Vitals:    06/03/25 1323 06/03/25 1933   BP: (!) 150/100    Pulse: 66    Resp: 18    Temp: 98.2  F (36.8  C)    TempSrc: Temporal    SpO2: 98% 98%       Cardiac Rhythm:     Was the PSS-3 completed:   Yes  What interventions are required if any?               Family Comments:   OBS brochure/video discussed/provided to patient/family: N/A              Name of person given brochure if not patient:               Relationship to patient:     For the majority of the shift this patient's behavior was Green.    Behavioral interventions performed were .    ED NURSE PHONE NUMBER: [phone]

## 2025-06-04 NOTE — PLAN OF CARE
Goal Outcome Evaluation:      Plan of Care Reviewed With: patient    DATE & SHIFT: 6/4  [id number]   PRIMARY Concern: Back pain, multiple compression fractures, possible UTI  SAFETY RISK Concerns (fall risk, behaviors, etc.): Fall       Aggression Tool Color: Green   Isolation/Type: None   Tests/Procedures for NEXT shift: AM labs   Consults? (Pending/following, signed-off?) Neurosurgery and CC following   Where is patient from? (Home, TCU, etc.): Home   Other Important info for NEXT shift: None   Anticipated DC date & active delays: 6/5?  _____________________________________________________________________________  SUMMARY NOTE:   Orientation/Cognitive: AOx4  Observation Goals (Met/ Not Met): Not met   Mobility Level/Assist Equipment: A1/GB/W. TLSO brace to be worn when OOB or sitting upright   Antibiotics & Plan (IV/po, length of tx left): IV Rocephin q24hrs   Pain Management: On scheduled Tylenol, C/o 8/10 back pain after TLSO brace placement, gave IV Dilaudid with relief, however pt had 1 emesis following administration, MD hernandez and IV Dilaudid discontinued.   Complete Pain Reassessment: Y Due next: Next shift   Tele/VS/O2: A-fib CVR/ VSS on RA  ABNL Lab/BG: No new labs today   Diet: Regular, tolerating   Bowel/Bladder: Continent, voiding using BR   Skin Concerns: WNL   Drains/Devices: PIV SL   Patient Stated Goal for Today: Rest

## 2025-06-04 NOTE — PROGRESS NOTES
RECEIVING UNIT ED HANDOFF REVIEW    ED Nurse Handoff Report was reviewed by: Maria Fernanda Vance RN on June 4, 2025 at 12:36 AM

## 2025-06-04 NOTE — PROGRESS NOTES
Observation goals  PRIOR TO DISCHARGE             -diagnostic tests and consults completed and resulted:NOT MET, Neurosurgery consult    -vital signs normal or at patient baseline: PARTIALLY MET, BP elevated at times    -adequate pain control on oral analgesics: MET    -safe disposition plan has been identified: NOT MET    Nurse to notify provider when observation goals have been met and patient is ready for discharge.

## 2025-06-04 NOTE — PROGRESS NOTES
Observation goals  PRIOR TO DISCHARGE        Comments:      -diagnostic tests and consults completed and resulted:Not met, Neurosurgery consult    -vital signs normal or at patient baseline: Partially met, at times BP elevated    -adequate pain control on oral analgesics: Partially met, IV dilaudid     -safe disposition plan has been identified: Not met    Nurse to notify provider when observation goals have been met and patient is ready for discharge.

## 2025-06-04 NOTE — PROGRESS NOTES
[facility]  Neurosurgery Daily Progress Note  Date of Admission:  6/3/2025    Assessment  84F on eliquis, admitted with back pain. Imaging reveals multiple thoracic spine compression fractures, T5 burst fracture with up to 80% loss of height.     Interval History   Orthotics fitted TLSO brace. Patient reports sharp muscular thoracic back pain, radiates to right sided ribs, mostly occurs with movement. Pain is better today compared to yesterday, managed with pain medications. Neuro exam stable.     MR THORACIC SPINE W/O CONTRAST  LOCATION: [facility]  DATE: 6/3/2025  IMPRESSION:  1.  Acute/subacute-on-chronic anterior wedging burst-type fracture of T5 with up to 80% loss of height anteriorly.  2.  Chronic compression deformities of T4, T6, T7, T8 and T9.  3.  No high-grade central canal or neural foraminal stenosis.    Plan   -Upright thoracic spine xray while wearing brace  -Wear brace when upright and out of bed   -Avoid heavy lifting, bending, twisting  -Continue pain control measures as needed  -Monitor neuro exam   -Appreciate assistance from specialties     Teresa Corley CNP  [facility] Neurosurgery  Clinic phone number: [phone]  Securely message or page via [employer], [email or web address], or [employer]     Physical Exam   Temp: 97.5  F (36.4  C) Temp src: Oral BP: 102/44 Pulse: 77   Resp: 18 SpO2: 96 % O2 Device: None (Room air)      Mental status:  Alert and oriented x 3, speech is fluent, following commands  Motor:  Moves all extremities with appropriate strength.   Shoulder Abduction  Right:  5/5   Left:  5/5  Biceps                      Right:  5/5   Left:  5/5  Triceps                     Right:  5/5   Left:  5/5  Wrist Extensors        Right:  5/5   Left:  5/5  Wrist Flexors            Right:  5/5   Left:  5/5  Intrinsics                   Right:  5/5   Left:  5/5    Iliopsoas  (hip flexion)               Right: 5/5  Left:  5/5  Quadriceps  (knee  extension)       Right:  5/5  Left:  5/5  Hamstrings  (knee flexion)            Right:  5/5  Left:  5/5  Gastroc Soleus  (PF)                          Right:  5/5  Left:  5/5  Tibialis Ant  (DF)                          Right:  5/5  Left:  5/5  EHL                          Right:  5/5  Left:  5/5    Sensation:  Intact to light touch   Reflexes: Negative Hoffmans. Negative clonus.   TLSO brace intact

## 2025-06-05 ENCOUNTER — APPOINTMENT (OUTPATIENT)
Dept: PHYSICAL THERAPY | Facility: CLINIC | Age: 85
End: 2025-06-05
Payer: COMMERCIAL

## 2025-06-05 VITALS
TEMPERATURE: 98 F | HEART RATE: 69 BPM | DIASTOLIC BLOOD PRESSURE: 62 MMHG | RESPIRATION RATE: 16 BRPM | SYSTOLIC BLOOD PRESSURE: 119 MMHG | OXYGEN SATURATION: 96 %

## 2025-06-05 LAB
ANION GAP SERPL CALCULATED.3IONS-SCNC: 12 MMOL/L (ref 7–15)
BACTERIA UR CULT: NORMAL
BUN SERPL-MCNC: 24.9 MG/DL (ref 8–23)
CALCIUM SERPL-MCNC: 8.8 MG/DL (ref 8.8–10.4)
CHLORIDE SERPL-SCNC: 102 MMOL/L (ref 98–107)
CREAT SERPL-MCNC: 1.02 MG/DL (ref 0.51–0.95)
EGFRCR SERPLBLD CKD-EPI 2021: 54 ML/MIN/1.73M2
ERYTHROCYTE [DISTWIDTH] IN BLOOD BY AUTOMATED COUNT: 13.7 % (ref 10–15)
GLUCOSE SERPL-MCNC: 110 MG/DL (ref 70–99)
HCO3 SERPL-SCNC: 27 MMOL/L (ref 22–29)
HCT VFR BLD AUTO: 33.2 % (ref 35–47)
HGB BLD-MCNC: 10.9 G/DL (ref 11.7–15.7)
MCH RBC QN AUTO: 30.4 PG (ref 26.5–33)
MCHC RBC AUTO-ENTMCNC: 32.8 G/DL (ref 31.5–36.5)
MCV RBC AUTO: 93 FL (ref 78–100)
PLATELET # BLD AUTO: 207 10E3/UL (ref 150–450)
POTASSIUM SERPL-SCNC: 3.9 MMOL/L (ref 3.4–5.3)
RBC # BLD AUTO: 3.59 10E6/UL (ref 3.8–5.2)
SODIUM SERPL-SCNC: 141 MMOL/L (ref 135–145)
WBC # BLD AUTO: 6.4 10E3/UL (ref 4–11)

## 2025-06-05 PROCEDURE — 36415 COLL VENOUS BLD VENIPUNCTURE: CPT

## 2025-06-05 PROCEDURE — G0378 HOSPITAL OBSERVATION PER HR: HCPCS

## 2025-06-05 PROCEDURE — 84132 ASSAY OF SERUM POTASSIUM: CPT

## 2025-06-05 PROCEDURE — 85041 AUTOMATED RBC COUNT: CPT

## 2025-06-05 PROCEDURE — 97116 GAIT TRAINING THERAPY: CPT | Mod: GP

## 2025-06-05 PROCEDURE — 85018 HEMOGLOBIN: CPT

## 2025-06-05 PROCEDURE — 99239 HOSP IP/OBS DSCHRG MGMT >30: CPT | Performed by: PHYSICIAN ASSISTANT

## 2025-06-05 PROCEDURE — 250N000013 HC RX MED GY IP 250 OP 250 PS 637: Performed by: STUDENT IN AN ORGANIZED HEALTH CARE EDUCATION/TRAINING PROGRAM

## 2025-06-05 PROCEDURE — 97530 THERAPEUTIC ACTIVITIES: CPT | Mod: GP

## 2025-06-05 PROCEDURE — 97161 PT EVAL LOW COMPLEX 20 MIN: CPT | Mod: GP

## 2025-06-05 PROCEDURE — 250N000013 HC RX MED GY IP 250 OP 250 PS 637

## 2025-06-05 RX ORDER — HYDROMORPHONE HYDROCHLORIDE 2 MG/1
1 TABLET ORAL EVERY 4 HOURS PRN
Qty: 10 TABLET | Refills: 0 | Status: SHIPPED | OUTPATIENT
Start: 2025-06-05

## 2025-06-05 RX ORDER — ACETAMINOPHEN 500 MG
1000 TABLET ORAL EVERY 8 HOURS PRN
COMMUNITY
Start: 2025-06-05

## 2025-06-05 RX ORDER — LIDOCAINE 4 G/G
1 PATCH TOPICAL EVERY 24 HOURS
Qty: 14 PATCH | Refills: 0 | Status: SHIPPED | OUTPATIENT
Start: 2025-06-05

## 2025-06-05 RX ORDER — METHOCARBAMOL 500 MG/1
500 TABLET, FILM COATED ORAL 4 TIMES DAILY PRN
Qty: 15 TABLET | Refills: 0 | Status: SHIPPED | OUTPATIENT
Start: 2025-06-05

## 2025-06-05 RX ADMIN — LIDOCAINE 1 PATCH: 4 PATCH TOPICAL at 08:18

## 2025-06-05 RX ADMIN — ACETAMINOPHEN 650 MG: 325 TABLET, FILM COATED ORAL at 16:43

## 2025-06-05 RX ADMIN — TORSEMIDE 30 MG: 20 TABLET ORAL at 08:18

## 2025-06-05 RX ADMIN — POTASSIUM CHLORIDE 10 MEQ: 750 TABLET, EXTENDED RELEASE ORAL at 08:18

## 2025-06-05 RX ADMIN — CARVEDILOL 12.5 MG: 12.5 TABLET, FILM COATED ORAL at 08:18

## 2025-06-05 RX ADMIN — PANTOPRAZOLE SODIUM 40 MG: 40 TABLET, DELAYED RELEASE ORAL at 08:18

## 2025-06-05 RX ADMIN — SACUBITRIL AND VALSARTAN 1 TABLET: 49; 51 TABLET, FILM COATED ORAL at 08:18

## 2025-06-05 RX ADMIN — HYDROMORPHONE HYDROCHLORIDE 1 MG: 2 TABLET ORAL at 14:12

## 2025-06-05 RX ADMIN — METHOCARBAMOL 500 MG: 500 TABLET ORAL at 08:18

## 2025-06-05 RX ADMIN — ACETAMINOPHEN 650 MG: 325 TABLET, FILM COATED ORAL at 08:17

## 2025-06-05 RX ADMIN — HYDROMORPHONE HYDROCHLORIDE 1 MG: 2 TABLET ORAL at 08:33

## 2025-06-05 ASSESSMENT — ACTIVITIES OF DAILY LIVING (ADL)
ADLS_ACUITY_SCORE: 56

## 2025-06-05 NOTE — PROGRESS NOTES
06/05/25 1030   Appointment Info   Signing Clinician's Name / Credentials (PT) Israel Ingram DPT   Rehab Comments (PT) TLSO when OOB   Quick Adds   Quick Adds Certification   Living Environment   People in Home alone   Current Living Arrangements apartment   Home Accessibility stairs to enter home   Number of Stairs, Main Entrance greater than 10 stairs   Stair Railings, Main Entrance railings safe and in good condition   Transportation Anticipated family or friend will provide   Living Environment Comments Reports living alone in an apartment. Notes a flight of stairs to enter.   Self-Care   Usual Activity Tolerance good   Current Activity Tolerance moderate   Equipment Currently Used at Home none   Fall history within last six months no   Activity/Exercise/Self-Care Comment Reports baseline independent w/ mobility and ADLs w/o AD. Reports that she does have a supportive family that is able to assist.   General Information   Onset of Illness/Injury or Date of Surgery 06/03/25   Referring Physician Ventura Emmanuel, NP   Patient/Family Therapy Goals Statement (PT) Return to home   Pertinent History of Current Problem (include personal factors and/or comorbidities that impact the POC) Janessa Melendez is a 84 year old female with history of Afib on eliquis, HTN, systolic heart failure, CVA and CKD who presented to the ED for back pain.   Existing Precautions/Restrictions fall;brace worn when out of bed   Cognition   Affect/Mental Status (Cognition) WFL   Orientation Status (Cognition) oriented x 4   Follows Commands (Cognition) WFL   Pain Assessment   Patient Currently in Pain Yes, see Vital Sign flowsheet  (Minor pain noted to back)   Integumentary/Edema   Integumentary/Edema no deficits were identifed   Range of Motion (ROM)   ROM Comment WFLs for mobility and transfers no formal testing completed   Strength (Manual Muscle Testing)   Strength Comments WFLs for mobility and transfers no formal  testing completed   Bed Mobility   Comment, (Bed Mobility) Supine to sitting EOB w/ SBA   Transfers   Comment, (Transfers) Sit to stand w/ no AD and SBA   Gait/Stairs (Locomotion)   Comment, (Gait/Stairs) 10 ft w/ no AD and SBA   Balance   Balance Comments No overt LOB noted   Clinical Impression   Criteria for Skilled Therapeutic Intervention Yes, treatment indicated   PT Diagnosis (PT) Impaired ambulation   Influenced by the following impairments Impaired strength, balance and activity tolerance   Functional limitations due to impairments Impaired ADLs, IADLs and functional mobility   Clinical Presentation (PT Evaluation Complexity) stable   Clinical Presentation Rationale Clinical judgment   Clinical Decision Making (Complexity) low complexity   Planned Therapy Interventions (PT) balance training;bed mobility training;gait training;home exercise program;patient/family education;stair training;strengthening;transfer training;progressive activity/exercise   Risk & Benefits of therapy have been explained evaluation/treatment results reviewed;care plan/treatment goals reviewed;risks/benefits reviewed;current/potential barriers reviewed;participants voiced agreement with care plan;participants included;patient   PT Total Evaluation Time   PT Eval, Low Complexity Minutes (82836) 8   Therapy Certification   Start of care date 06/05/25   Certification date from 06/05/25   Certification date to 06/15/25   Medical Diagnosis Back pain   Physical Therapy Goals   PT Frequency Daily   PT Predicted Duration/Target Date for Goal Attainment 06/15/25   PT Goals Bed Mobility;Transfers;Gait;Stairs   PT: Bed Mobility Independent;Supine to/from sit   PT: Transfers Independent;Sit to/from stand;Assistive device   PT: Gait Independent;Greater than 200 feet   PT: Stairs Independent;Greater than 10 stairs;Rail on left   Interventions   Interventions Quick Adds Gait Training;Therapeutic Activity   Therapeutic Activity   Therapeutic  Activities: dynamic activities to improve functional performance Minutes (37197) 10   Symptoms Noted During/After Treatment Shortness of breath   Treatment Detail/Skilled Intervention Pt supine in bed at start of session. Agreeable to PT. Educated on spinal precautions and use of TLSO brace. Educated on log roll technique for supine to sit transfer. Good technique for log roll once given verbal cues. Denied dizziness/lightheadedness. Given visual demonstration on how to don TLSO brace. Pt standing w/ no AD and SBA. Donning TLSO brace w/ SBA w/ minor verbal cues. Sit to stand x3 during session w/ no AD and SBA. Given visual demonstration for reverse log roll. Seated in bedside chair at end of session w/ call light in place and chair alarm on.   Gait Training   Gait Training Minutes (86479) 13   Symptoms Noted During/After Treatment (Gait Training) shortness of breath;increased pain   Treatment Detail/Skilled Intervention Pt ambulating ~150 ft x1 and 300 ft x1 w/ no AD and SBA. Fair dairo throughout. No LOB. One episode of sharp pain in back only lasting a few seconds. Auditory SOB noted, however, satting in the mid to high 90s on RA. Able to converse throughout. Needing verbal cues for spinal precautions once in hallway when attempting to bend over to pet service dog. Pt completing x15 stairs w/ single rail and SBA. Reciprocal pattern noted. Fair dario. No LOB.   PT Discharge Planning   PT Plan Review spinal precautions, bed mobility, activity tolerance   PT Discharge Recommendation (DC Rec) home with assist   PT Rationale for DC Rec Pt below baseline mobility but moving well. Currently ambulating w/o AD and SBA. Able to don own TLSO brace well. Anticipate when medically ready Pt can return to home w/ support from family for higher level ADLs/IADLs. Notes supportive family at home.   PT Brief overview of current status Goals of therapy will be to address safe mobility and make recs for d/c to next level of care.  Pt and RN will continue to follow all falls risk precautions as documented by RN staff while hospitalized   PT Total Distance Amb During Session (feet) 450   Physical Therapy Time and Intention   Timed Code Treatment Minutes 23   Total Session Time (sum of timed and untimed services) 31   [facility]                                                                                   OUTPATIENT PHYSICAL THERAPY    PLAN OF TREATMENT FOR OUTPATIENT REHABILITATION   Patient's Last Name, First Name, LAURIEJanessa Bajwa YOB: 1940   Provider's Name   [facility]   Medical Record No.  [id number]     Onset Date: 06/03/25 Start of Care Date: 06/05/25     Medical Diagnosis:  Back pain               PT Diagnosis:  Impaired ambulation Certification Dates:  From: 06/05/25  To: 06/15/25       See note for plan of treatment, functional goals, and certification details.    I CERTIFY THE NEED FOR THESE SERVICES FURNISHED UNDER        THIS PLAN OF TREATMENT AND WHILE UNDER MY CARE (Physician co-signature of this document indicates review and certification of the therapy plan).

## 2025-06-05 NOTE — PROGRESS NOTES
Observation goals  PRIOR TO DISCHARGE             -diagnostic tests and consults completed and resulted:MET     -vital signs normal or at patient baseline: MET    -adequate pain control on oral analgesics: MET    -safe disposition plan has been identified: NOT MET    Nurse to notify provider when observation goals have been met and patient is ready for discharge.

## 2025-06-05 NOTE — PLAN OF CARE
OT orders received, and per consult with PT noted patient doing well and currently has no acute skilled OT needs. Therefore, OT to sign off and complete order.

## 2025-06-05 NOTE — PLAN OF CARE
-diagnostic tests and consults completed and resulted: not met   -vital signs normal or at patient baseline : met   -adequate pain control on oral analgesics : not met  -safe disposition plan has been identified : not met   Nurse to notify provider when observation goals have been met and patient is ready for discharge. Not met yet.

## 2025-06-05 NOTE — PLAN OF CARE
Goal Outcome Evaluation:      Plan of Care Reviewed With: patient    Overall Patient Progress: improvingOverall Patient Progress: improving       DATE & SHIFT: 06/05/2025 for 19:00 - 07:30    PRIMARY Concern: admitted with severe rib and back pain. HX of multiple thoracic compression fractures    SAFETY RISK Concerns (fall risk, behaviors, etc.): Fall Risk    Aggression Tool Color: Green  Isolation/Type: n/a  Tests/Procedures for NEXT shift: morning labs  Consults? (Pending/following, signed-off?) Neurosurgery following, PT eval for today, CC following for safe discharge planning  Where is patient from? (Home, TCU, etc.): home lives alone  Other Important info for NEXT shift: TLSO needs to be one walking or sitting up in chair  Anticipated DC date & active delays: TBD  _____________________________________________________________________________  SUMMARY NOTE:     Orientation/Cognitive: A&Ox4  Observation Goals (Met/ Not Met): Not met  Mobility Level/Assist Equipment: Ax1 walker/GB and TLSO brace  Antibiotics & Plan (IV/po, length of tx left): IV rocephine for UTI  Pain Management: scheduled Tylenol, prn robaxin. Pain increases with movement  Complete Pain Reassessment: Y/N Yes Due next: next assessment  Tele/VS/O2: VSS on RA, afebrile. Tele: A-fib. Pt gets short of breath with walking to restroom. Will continue to monitor  ABNL Lab/BG: CR 1.10, BNP 3803, Trops 27-29, UA abnormal  Diet: regular  Bowel/Bladder: continent. Has purewick in place   Skin Concerns: n/a  Drains/Devices: PIV is sl'd  Patient Stated Goal for Today: to feel better and pain controlled

## 2025-06-05 NOTE — PROGRESS NOTES
Observation goals  PRIOR TO DISCHARGE             -diagnostic tests and consults completed and resulted: Partially met, PT eval scheduled for 06/04,  standing xray with TLSO brace done and resulted    -vital signs normal or at patient baseline: MET    -adequate pain control on oral analgesics: Partially met, pain increases with movement.    -safe disposition plan has been identified: NOT MET,awaiting PT eval     Nurse to notify provider when observation goals have been met and patient is ready for discharge.

## 2025-06-05 NOTE — PROGRESS NOTES
Observation goals  PRIOR TO DISCHARGE             -diagnostic tests and consults completed and resulted:NOT MET, XR Thoracic to be done     -vital signs normal or at patient baseline: MET    -adequate pain control on oral analgesics: MET    -safe disposition plan has been identified: NOT MET    Nurse to notify provider when observation goals have been met and patient is ready for discharge.

## 2025-06-05 NOTE — PROGRESS NOTES
Neurosurgery Progress     Dx:     Acute/subacute-on-chronic anterior wedging burst-type fracture of T5 with up to 80% loss of height anteriorly.     Neuro stable.     TODAY'S PLAN:     Ms. Melendez's most recent imaging exhibits an acute/subacute-on-chronic anterior wedging burst-type fracture of T5 with up to 80% loss of height anteriorly.     Our colleagues on the Orthotics team have fit, supplied and provided donning and doffing instructions for an off the shelf TLSO. The OTS spinal brace will be used to reduce pain by restricting mobility of the trunk, to facilitate healing following the injury to the spine and related soft tissue, and to support weak spinal muscles. We instructed the patient to wear the brace when out of bed, but may shower without the brace on. The brace will most likely be required for 3 months. We have ordered and reviewed  upright x-rays to include ap and lateral views obtained in the brace. Our team will also facilitate a follow-up evaluation in approximately one month with updated imaging. Should she not obtain appropriate relief at the end of one month with medical management and bracing, consideration of a vertebroplasty/kyphoplasty by our team would be appropriate.     In the interim, we feel that it would be in her best interest to proceed with a conservative approach by pain management set forth by the Medical team here at [facility]. We do suggest that she not lift anything greater than 5-10 pounds and avoid excessive bending, twisting, and turning.     We did review the images together and we explained her condition and the recommended treatment.     In the event that patient's symptoms worsen or change we would appreciate being contacted. We did discuss signs of a worsening problem that she should seek being evaluated. We will sign off at this time.     We did review the above information with the patient whom agrees with the plan and did verbalize understanding.    ________________________________________________________________     Ms. Melendez overall feels well and denies any significant discomfort. Tolerating regular diet without n/v. Up ambulating. Voiding without difficulty.     /62 (BP Location: Left arm)   Pulse 69   Temp 98  F (36.7  C) (Oral)   Resp 16   SpO2 96%      Pt sitting in her chair. Appears comfortable and in no apparent distress, moving all extremities.   CN II-XII intact, alert and appropriate with conversation and following commands.   Bilateral upper and lower extremities with appropriate strength. DTR's WNL.   Spine is tender to palpation throughout. Sensation intact throughout. Acceptable ROM.   Calves soft and non-tender bilaterally.     All pertinent labs and updated imaging reviewed in EPIC.     Omid Rivera PA-C   Neurosurgery

## 2025-06-05 NOTE — PROGRESS NOTES
A/OX4, Moves all extremities spontaneously. VSS .pain control with tylenol, 650 mg PO. IV removed.   Discharge instruction provided and pt verbalized understanding.  Neurosurgery consulted   TLSO brace when upright and out of bed   Avoid heavy lifting, bending, twisting   Follow up with repeat xrays in 1 week, clinic will arrange  Pt was transported via wheelchair.   Pt's son waiting at door 6.         i

## 2025-06-05 NOTE — PROGRESS NOTES
Observation goals  PRIOR TO DISCHARGE             -diagnostic tests and consults completed and resulted: Partially met, PT eval scheduled for 06/04,  standing xray with TLSO brace done and resulted    -vital signs normal or at patient baseline: MET    -adequate pain control on oral analgesics: MET    -safe disposition plan has been identified: NOT MET,awaiting PT eval     Nurse to notify provider when observation goals have been met and patient is ready for discharge.

## 2025-06-05 NOTE — PLAN OF CARE
A/OX4,VSS.pain control with tylenol,robaxin and PO Dilaudid. Up SBA,voiding well in bathroom. TLSO brace on when up and walking. Pt is tolerating regular diet, plan to discharge home this evening. Tele read A-fib/CVR. Lidocaine patch applied.

## 2025-06-05 NOTE — DISCHARGE SUMMARY
[facility]  Hospitalist Discharge Summary      Date of Admission:  6/3/2025  Date of Discharge:  6/5/2025  Discharging Provider: Cary Burger PA-C  Discharge Service: Hospitalist Service    Discharge Diagnoses   Acute to subacute on chronic T5 burst-type fracture  Chronic compression deformities of T4, T6, T7, T8, and T9  UTI ruled out  CKD  Paroxysmal atrial fibrillation  Chronic HFrEF    Clinically Significant Risk Factors          Follow-ups Needed After Discharge   Follow-up Appointments       Follow Up      Follow up with neurosurgery in 1 month with repeat xrays, clinic will call to arrange              Unresulted Labs Ordered in the Past 30 Days of this Admission       No orders found from 5/4/2025 to 6/4/2025.          Discharge Disposition   Discharged to home  Condition at discharge: Stable    Hospital Course   Janessa Melendez is a 84 year old female with history of Afib on eliquis, HTN, systolic heart failure, CVA and CKD who presented to the ED for back pain.      Acute to subacute on chronic T5 burst-type fracture  Chronic compression deformities of T4, T6, T7, T8, and T9  *Ms. Melendez developed back pain after a trip to [place] that starts in her mid back and radiates out in band across her flank and chest. It is apparently worse with turning, bending and laughing. Within the ED CT negative for significant pulmonary disease or PE, known compression fractures evident with 80% height loss to T5 which is where pain is located.  *MRI T-spine showed acute/subacute on chronic anterior wedging burst-type fracture of T5 with up to 80% loss of height anteriorly. Chronic compression deformities of T4, T6, T7, T8, and T9.  - Neurosurgery consulted  - TLSO brace when upright and out of bed  - Avoid heavy lifting, bending, twisting   - Follow up with repeat xrays in 1 week, clinic will arrange  - Continue APAP, Robaxin PRN, PO Dilaudid PRN, Lidoderm patch for pain  control  - PT consulted, recommend home with assist     UTI ruled out  *Urine culture grew <10k mixture of urogenital arsen  - Discontinued IV Rocephin, no further antibiotics warranted     CKD  *Baseline Cr 1.1-1.2, stable.     Paroxysmal atrial fibrillation  - Continue PTA Coreg, Eliquis     Chronic HFrEF 35-40%  *Despite elevated BNP no signs of heart failure exacerbation  - Continue PTA entresto and torsemide    Consultations This Hospital Stay   NEUROSURGERY IP CONSULT  PHYSICAL THERAPY ADULT IP CONSULT  OCCUPATIONAL THERAPY ADULT IP CONSULT  CARE MANAGEMENT / SOCIAL WORK IP CONSULT    Code Status   Full Code    Time Spent on this Encounter   Cary JACKSON PA-C, personally saw the patient today and spent greater than 30 minutes discharging this patient.       Cary Burger PA-C  [facility] EXTENDED RECOVERY AND SHORT STAY  [address] [address]  Phone: [phone]  ______________________________________________________________________    Physical Exam   Vital Signs: Temp: 98  F (36.7  C) Temp src: Oral BP: 119/62 Pulse: 69   Resp: 16 SpO2: 96 % O2 Device: None (Room air)    Weight: 0 lbs 0 oz  Constitutional: Awake, alert, cooperative, no apparent distress.    ENT: Normocephalic, without obvious abnormality, atraumatic. Normal sclera.    Neck: Supple, symmetrical, trachea midline  Pulmonary: No increased work of breathing, diminished  Cardiovascular: Regular rate and rhythm  Neuro: Oriented x 3. Moves all extremities spontaneously. No focal neuro deficits.  Psych:  Normal affect and mood  Extremities: Normal muscle tone. No gross deformities noted.       Primary Care Physician   [facility]    Discharge Orders      Reason for your hospital stay    You were hospitalized for a back fracture and given a TLSO brace     Activity    Your activity upon discharge: do not lift anything greater than 5-10 pounds and avoid excessive bending,  twisting, and turning. Wear TLSBO brace whenever sitting upright and with ambulation.     Follow Up    Follow up with neurosurgery in 1 month with repeat xrays, clinic will call to arrange     When to contact your care team    Contact your care team for severe uncontrolled pain, new numbness or weakness in the legs, numbness of the groin, inability to control your bowel to bladder function, or any other new or worsening problems.     Diet    Follow this diet upon discharge: Current Diet:Orders Placed This Encounter      Regular Diet Adult       Significant Results and Procedures   Results for orders placed or performed during the hospital encounter of 06/03/25   CT Chest PE Abdomen Pelvis w Contrast    Narrative    EXAM: CT CHEST PE ABDOMEN PELVIS W CONTRAST  LOCATION: [facility]  DATE: 6/3/2025    INDICATION: pleuritic right chest pain  check for PE, pneumonia, rib fx, Appendicitis, malignancy, ascending cholangitis, biliary obstruction, perforated ulcer  COMPARISON: 11/21/2022  TECHNIQUE: CT chest pulmonary angiogram and routine CT abdomen pelvis with IV contrast. Arterial phase through the chest and venous phase through the abdomen and pelvis. Multiplanar reformats and MIP reconstructions were performed. Dose reduction   techniques were used.   CONTRAST: 66mL Isovue 370    FINDINGS:  ANGIOGRAM CHEST: Pulmonary arteries are normal caliber and negative for pulmonary emboli. Thoracic aorta is negative for dissection. No CT evidence of right heart strain.     LUNGS AND PLEURA: Small linear areas of atelectasis and scarring are noted bilaterally. Bibasilar atelectasis. Mosaic attenuation of the lungs.    MEDIASTINUM/AXILLAE: Heart is normal in size. No mediastinal, axillary, or hilar adenopathy.    CORONARY ARTERY CALCIFICATION: Moderate.    HEPATOBILIARY: Slightly heterogeneous liver suggesting hepatic congestion. Cholecystectomy.    PANCREAS: Normal.    SPLEEN: Normal.    ADRENAL GLANDS:  Normal.    KIDNEYS/BLADDER: No significant mass, stone, or hydronephrosis. Duodenal diverticulum.    BOWEL: Diverticulosis of the colon. No acute inflammatory change. No obstruction.  Appendix normal.    LYMPH NODES: No lymphadenopathy.    VASCULATURE: Severe atherosclerotic disease of the abdominal aorta and its branches.    PELVIC ORGANS: Bladder within normal limits. Uterus within normal limits.    MUSCULOSKELETAL: Degenerative changes of hips and spine. Mild compression deformity of L4.      Impression    IMPRESSION:  1.  No pulmonary embolism.  2.  Mosaic attenuation of the lungs suggesting small airway disease.  3.  No acute findings in the abdomen and pelvis.     CT Thoracic Spine w/o Contrast    Narrative    EXAM: CT THORACIC SPINE W/O CONTRAST  LOCATION: [facility]  DATE: 6/3/2025    INDICATION: check for fx, lower thoracic pain radiating to right chest  COMPARISON: CT chest, abdomen and pelvis dated 11/21/2022.  TECHNIQUE: Routine CT Thoracic Spine without IV contrast. Multiplanar reformats. Dose reduction techniques were used.     FINDINGS:  VERTEBRA: Kyphotic deformity measuring approximately 51 degrees centered on the T5-T9 vertebral bodies is not significantly changed. S-shaped curvature of the thoracolumbar spine. Slightly increased moderate height loss of the T5 vertebral body compared   to 2022. Unchanged height loss of the T4, T6-T9 vertebral bodies and the superior endplates of the T11 and L1 vertebral body. Mild retropulsion of the posterior cortex of the T7 and T9 vertebral bodies is unchanged. Multilevel disc height loss and facet   arthropathy. No acute fracture or posttraumatic subluxation.     CANAL/FORAMINA: Small disc osteophyte complexes at multiple levels. No high-grade spinal canal stenosis. Mild neural foraminal narrowing at multiple levels.    PARASPINAL: No acute extraspinal abnormality.      Impression    IMPRESSION:  1.  Compared to 2022, there has been  slightly increased moderate height loss of the T5 vertebral body. Recommend clinical correlation for focal pain.  2.  Unchanged chronic height loss of the T4, T6-T9, T11 and L1 vertebral bodies.     MR Thoracic Spine w/o Contrast    Narrative    EXAM: MR THORACIC SPINE W/O CONTRAST  LOCATION: [facility]  DATE: 6/3/2025    INDICATION: Back pain, eval fractures.  COMPARISON: Thoracic spine CT on the same date.  TECHNIQUE: Routine Thoracic Spine MRI without IV contrast.    FINDINGS:   Moderate anterior wedging burst fracture of T5 with up to 80% loss of height anteriorly and marrow edema compatible with an acute/subacute-on-chronic compression fracture. Edema extends into both pedicles. Chronic compression deformities of T4, T6, T7,   T8 and T9. Moderate upper thoracic kyphosis. Moderate multilevel disc degeneration. Disc osteophyte complexes at T3-T4 through T9-T10. No high-grade central canal or neural foraminal narrowing. Normal signal in the thoracic cord. There is at least   moderate central canal stenosis at C5-C6 and C6-C7. Modic type I changes at C5 through C7.      No extraspinal abnormality.      Impression    IMPRESSION:  1.  Acute/subacute-on-chronic anterior wedging burst-type fracture of T5 with up to 80% loss of height anteriorly.  2.  Chronic compression deformities of T4, T6, T7, T8 and T9.  3.  No high-grade central canal or neural foraminal stenosis.   XR Thoracic Spine 2 Views    Narrative    EXAM: XR THORACIC SPINE 2 VIEWS  LOCATION: [facility]  DATE: 6/4/2025    INDICATION: upright xray while wearing brace, evaluate thoracic compression fx and T5 burst fx  COMPARISON: MRI thoracic spine 6/3/2025 CT thoracic spine 6/3/2025 also reviewed      Impression    IMPRESSION: 12 thoracic segments. Redemonstration of multilevel compressions in the mid thoracic spine including T4-T9 are redemonstrated. Accentuation of thoracic kyphosis. The loss of height of  these vertebral bodies is stable from prior CT and MR,   most significant at T5 loss of height is about 75-80% anteriorly. Edema is present on prior MRI at T5 suggesting this is recent time course with the remainder the compressions without edema, appearing nonacute. No new or progressive compressions are   suspected in the thoracic spine. Left-sided TSA hardware. Leftward thoracic curve. Cardiac enlargement. Right-sided mediastinal/perihilar clips. Elevation right hemidiaphragm. Strands of fibrosis in the right middle lobe and lingula.        Discharge Medications   Current Discharge Medication List        START taking these medications    Details   HYDROmorphone (DILAUDID) 2 MG tablet Take 0.5 tablets (1 mg) by mouth every 4 hours as needed for moderate to severe pain.  Qty: 10 tablet, Refills: 0    Associated Diagnoses: Closed wedge compression fracture of T5 vertebra, initial encounter (H)      Lidocaine (LIDOCARE) 4 % Patch Place 1 patch over 12 hours onto the skin every 24 hours. To prevent lidocaine toxicity, patient should be patch free for 12 hrs daily.  Qty: 14 patch, Refills: 0    Associated Diagnoses: Closed wedge compression fracture of T5 vertebra, initial encounter (H)      methocarbamol (ROBAXIN) 500 MG tablet Take 1 tablet (500 mg) by mouth 4 times daily as needed for muscle spasms.  Qty: 15 tablet, Refills: 0    Associated Diagnoses: Closed wedge compression fracture of T5 vertebra, initial encounter (H)           CONTINUE these medications which have CHANGED    Details   acetaminophen (TYLENOL) 500 MG tablet Take 2 tablets (1,000 mg) by mouth every 8 hours as needed for mild pain.    Associated Diagnoses: Closed wedge compression fracture of T5 vertebra, initial encounter (H)           CONTINUE these medications which have NOT CHANGED    Details   amoxicillin (AMOXIL) 500 MG capsule Take 2,000 mg by mouth as needed (dental appt).      apixaban ANTICOAGULANT (ELIQUIS) 2.5 MG tablet Take 1 tablet  (2.5 mg) by mouth 2 times daily.  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  Qty: 180 tablet, Refills: 3    Associated Diagnoses: Benign essential hypertension; Dilated cardiomyopathy (H)      cholecalciferol (VITAMIN D3) 25 mcg (1000 units) capsule Take 1 capsule by mouth daily      Magnesium Oxide 250 MG TABS Take 1 tablet (250 mg) by mouth daily  Qty: 30 tablet, Refills: 11    Associated Diagnoses: Hypomagnesemia; NSVT (nonsustained ventricular tachycardia) (H)      omeprazole (PRILOSEC) 40 MG DR capsule Take 40 mg by mouth daily.      potassium chloride ER (K-TAB/KLOR-CON) 10 MEQ CR tablet Take 1 tablet (10 mEq) by mouth 2 times daily. And as needed as directed.  Qty: 180 tablet, Refills: 3    Associated Diagnoses: Benign essential hypertension      sacubitril-valsartan (ENTRESTO) 49-51 MG per tablet Take 1 tablet by mouth 2 times daily.  Qty: 180 tablet, Refills: 3    Comments: [facility] RE ENROLLMENT ID# [id number]  Associated Diagnoses: Chronic systolic heart failure (H)      simvastatin (ZOCOR) 10 MG tablet Take 1 tablet (10 mg) by mouth at bedtime  Qty: 90 tablet, Refills: 4    Associated Diagnoses: Pure hypercholesterolemia      terazosin (HYTRIN) 5 MG capsule Take 1 capsule (5 mg) by mouth at bedtime  Qty: 90 capsule, Refills: 4    Associated Diagnoses: Benign essential hypertension      torsemide (DEMADEX) 10 MG tablet Take 20 mg daily and an additional 10 mg PRN weight gain or swelling  Qty: 210 tablet, Refills: 3    Associated Diagnoses: Dilated cardiomyopathy (H); Cardiomyopathy, unspecified type (H)           Allergies   Allergies   Allergen Reactions     Amlodipine      swelling    Aspirin Other (See Comments)     Bleeding              Bleeding, GI Lesion        Codeine Sulfate GI Disturbance

## 2025-06-06 NOTE — PLAN OF CARE
Physical Therapy Discharge Summary    Reason for therapy discharge:    Discharged to home.    Progress towards therapy goal(s). See goals on Care Plan in [facility] electronic health record for goal details.  Goals partially met.  Barriers to achieving goals:   discharge on same date as initial evaluation.    Therapy recommendation(s):    No further therapy is recommended. Pt below baseline mobility but moving well. Currently ambulating w/o AD and SBA. Able to don own TLSO brace well. Anticipate when medically ready Pt can return to home w/ support from family for higher level ADLs/IADLs. Notes supportive family at home.  PT Brief overview of current status: Goals of therapy will be to address safe mobility and make recs for d/c to next level of care. Pt and RN will continue to follow all falls risk precautions as documented by RN staff while hospitalized  PT Total Distance Amb During Session (feet): 450    Recommendation above provided by last treating therapist.

## 2025-06-08 ENCOUNTER — PATIENT OUTREACH (OUTPATIENT)
Dept: CARE COORDINATION | Facility: CLINIC | Age: 85
End: 2025-06-08
Payer: COMMERCIAL

## 2025-06-08 NOTE — PROGRESS NOTES
Connected Care Resource Center:   [facility] Resource Center Contact  [facility]/Voicemail     Clinical Data: Post-Discharge Outreach     Outreach attempted x 2.  Left message on patient's voicemail, providing [facility]'s central phone number of [phone] ([phone]) for questions/concerns and/or to schedule an appt with an [facility] provider, if they do not have a PCP.      Plan:  [facility] will do no further outreaches at this time.       Phuong Franco MA  Connected Care Resource Center, [facility]    *Connected Care Resource Team does NOT follow patient ongoing. Referrals are identified based on internal discharge reports and the outreach is to ensure patient has an understanding of their discharge instructions.

## 2025-07-14 ENCOUNTER — MEDICAL CORRESPONDENCE (OUTPATIENT)
Dept: HEALTH INFORMATION MANAGEMENT | Facility: CLINIC | Age: 85
End: 2025-07-14
Payer: COMMERCIAL

## 2025-07-21 ENCOUNTER — LAB (OUTPATIENT)
Dept: LAB | Facility: CLINIC | Age: 85
End: 2025-07-21
Payer: COMMERCIAL

## 2025-07-21 ENCOUNTER — HOSPITAL ENCOUNTER (OUTPATIENT)
Dept: CARDIOLOGY | Facility: CLINIC | Age: 85
Discharge: HOME | End: 2025-07-21
Attending: INTERNAL MEDICINE | Admitting: INTERNAL MEDICINE
Payer: COMMERCIAL

## 2025-07-21 DIAGNOSIS — I25.10 CORONARY ARTERY DISEASE INVOLVING NATIVE CORONARY ARTERY OF NATIVE HEART WITHOUT ANGINA PECTORIS: ICD-10-CM

## 2025-07-21 DIAGNOSIS — I42.9 CARDIOMYOPATHY, UNSPECIFIED TYPE (H): ICD-10-CM

## 2025-07-21 DIAGNOSIS — I50.22 CHRONIC SYSTOLIC HEART FAILURE (H): ICD-10-CM

## 2025-07-21 LAB
ALT SERPL W P-5'-P-CCNC: 17 U/L (ref 0–50)
ANION GAP SERPL CALCULATED.3IONS-SCNC: 14 MMOL/L (ref 7–15)
BUN SERPL-MCNC: 27.2 MG/DL (ref 8–23)
CALCIUM SERPL-MCNC: 9.4 MG/DL (ref 8.8–10.4)
CHLORIDE SERPL-SCNC: 99 MMOL/L (ref 98–107)
CHOLEST SERPL-MCNC: 122 MG/DL
CREAT SERPL-MCNC: 1.11 MG/DL (ref 0.51–0.95)
EGFRCR SERPLBLD CKD-EPI 2021: 49 ML/MIN/1.73M2
FASTING STATUS PATIENT QL REPORTED: YES
GLUCOSE SERPL-MCNC: 118 MG/DL (ref 70–99)
HCO3 SERPL-SCNC: 28 MMOL/L (ref 22–29)
HDLC SERPL-MCNC: 57 MG/DL
LDLC SERPL CALC-MCNC: 50 MG/DL
LVEF ECHO: NORMAL
NONHDLC SERPL-MCNC: 65 MG/DL
POTASSIUM SERPL-SCNC: 3.7 MMOL/L (ref 3.4–5.3)
SODIUM SERPL-SCNC: 141 MMOL/L (ref 135–145)
TRIGL SERPL-MCNC: 75 MG/DL

## 2025-07-21 PROCEDURE — 93306 TTE W/DOPPLER COMPLETE: CPT

## 2025-07-21 PROCEDURE — 80048 BASIC METABOLIC PNL TOTAL CA: CPT

## 2025-07-21 PROCEDURE — 36415 COLL VENOUS BLD VENIPUNCTURE: CPT

## 2025-07-21 PROCEDURE — 80061 LIPID PANEL: CPT

## 2025-07-21 PROCEDURE — 93306 TTE W/DOPPLER COMPLETE: CPT | Mod: 26 | Performed by: INTERNAL MEDICINE

## 2025-07-21 PROCEDURE — 84460 ALANINE AMINO (ALT) (SGPT): CPT

## 2025-07-28 ENCOUNTER — TRANSCRIBE ORDERS (OUTPATIENT)
Dept: OTHER | Age: 85
End: 2025-07-28

## 2025-07-28 DIAGNOSIS — S22.059D CLOSED FRACTURE OF FIFTH THORACIC VERTEBRA WITH ROUTINE HEALING, UNSPECIFIED FRACTURE MORPHOLOGY, SUBSEQUENT ENCOUNTER: Primary | ICD-10-CM

## 2025-07-30 ENCOUNTER — PRE VISIT (OUTPATIENT)
Dept: NEUROSURGERY | Facility: CLINIC | Age: 85
End: 2025-07-30
Payer: COMMERCIAL

## 2025-07-30 NOTE — TELEPHONE ENCOUNTER
NEUROSURGERY - NEW PREVISIT PLANNING    Referring Provider: Fabiola Hughes MD - [facility]   OVN    Reason For Visit:  Closed fracture of fifth thoracic vertebra with routine healing, unspecified fracture morphology        IMAGING STATUS/LOCATION DATE/TYPE   MRI In chart 06/03/2025 - Thoracic   CT In chart 6/3/2025   XRAY Report in chart  In chart 7/17/2025 - thoracic -[facility]  6/3/2025 - Thoracic   NOTES STATUS/LOCATION DATE/TYPE   Other specialist OVN:     EMG     INJECTION     PHYSICAL THERAPY     SURGERY       Does patient have C2C?  Year last updated Action     YES   []      Please update at appointment if outdated more than 5 years       NO     [x]   N/A   Please complete C2C at appointment

## 2025-07-30 NOTE — TELEPHONE ENCOUNTER
7/30/2025 - Faxed [facility] requesting records.  Called [facility] to get XR images pushed.

## 2025-08-05 ENCOUNTER — TELEPHONE (OUTPATIENT)
Dept: NEUROSURGERY | Facility: CLINIC | Age: 85
End: 2025-08-05
Payer: COMMERCIAL

## 2025-08-06 ENCOUNTER — OFFICE VISIT (OUTPATIENT)
Dept: NEUROSURGERY | Facility: CLINIC | Age: 85
End: 2025-08-06
Attending: FAMILY MEDICINE
Payer: COMMERCIAL

## 2025-08-06 ENCOUNTER — TELEPHONE (OUTPATIENT)
Dept: NEUROSURGERY | Facility: CLINIC | Age: 85
End: 2025-08-06

## 2025-08-06 VITALS — OXYGEN SATURATION: 98 % | DIASTOLIC BLOOD PRESSURE: 76 MMHG | SYSTOLIC BLOOD PRESSURE: 108 MMHG | HEART RATE: 67 BPM

## 2025-08-06 DIAGNOSIS — S22.059D CLOSED FRACTURE OF FIFTH THORACIC VERTEBRA WITH ROUTINE HEALING, UNSPECIFIED FRACTURE MORPHOLOGY, SUBSEQUENT ENCOUNTER: ICD-10-CM

## 2025-08-06 PROCEDURE — 1125F AMNT PAIN NOTED PAIN PRSNT: CPT | Performed by: NURSE PRACTITIONER

## 2025-08-06 PROCEDURE — 99203 OFFICE O/P NEW LOW 30 MIN: CPT | Performed by: NURSE PRACTITIONER

## 2025-08-06 PROCEDURE — 3078F DIAST BP <80 MM HG: CPT | Performed by: NURSE PRACTITIONER

## 2025-08-06 PROCEDURE — 3074F SYST BP LT 130 MM HG: CPT | Performed by: NURSE PRACTITIONER

## 2025-08-06 ASSESSMENT — PAIN SCALES - GENERAL: PAINLEVEL_OUTOF10: MILD PAIN (1)

## (undated) DEVICE — CATH ANGIO INFINITI 3DRC 4FRX100CM 538476

## (undated) DEVICE — DEFIB PRO-PADZ LVP LQD GEL ADULT 8900-2105-01

## (undated) DEVICE — Device

## (undated) DEVICE — CATH DIAG 4FR JL 4.5 538417

## (undated) DEVICE — KIT HAND CONTROL ANGIOTOUCH ACIST 65CM AT-P65

## (undated) DEVICE — SOL WATER IRRIG 1000ML BOTTLE 2F7114

## (undated) DEVICE — SYR ANGIOGRAPHY MULTIUSE KIT ACIST 014612

## (undated) DEVICE — WIRE GUIDE 0.018"X180CM PLATINUM PLUS H74917531

## (undated) DEVICE — MANIFOLD KIT ANGIO AUTOMATED 014613

## (undated) DEVICE — INTRO SHEATH 4FRX10CM PINNACLE RSS402

## (undated) DEVICE — TOTE ANGIO CORP PC15AT SAN32CC83O

## (undated) DEVICE — INTRO SHEATH 7FRX10CM PINNACLE RSS702

## (undated) RX ORDER — HEPARIN SODIUM 1000 [USP'U]/ML
INJECTION, SOLUTION INTRAVENOUS; SUBCUTANEOUS
Status: DISPENSED
Start: 2023-05-05

## (undated) RX ORDER — NITROGLYCERIN 0.4 MG/1
TABLET SUBLINGUAL
Status: DISPENSED
Start: 2019-01-21

## (undated) RX ORDER — METOPROLOL TARTRATE 50 MG
TABLET ORAL
Status: DISPENSED
Start: 2019-01-21

## (undated) RX ORDER — METOPROLOL TARTRATE 1 MG/ML
INJECTION, SOLUTION INTRAVENOUS
Status: DISPENSED
Start: 2019-01-21

## (undated) RX ORDER — HEPARIN SODIUM 200 [USP'U]/100ML
INJECTION, SOLUTION INTRAVENOUS
Status: DISPENSED
Start: 2023-05-05

## (undated) RX ORDER — FENTANYL CITRATE 50 UG/ML
INJECTION, SOLUTION INTRAMUSCULAR; INTRAVENOUS
Status: DISPENSED
Start: 2023-05-05

## (undated) RX ORDER — SODIUM NITROPRUSSIDE 25 MG/ML
INJECTION INTRAVENOUS
Status: DISPENSED
Start: 2023-05-05

## (undated) RX ORDER — LIDOCAINE HYDROCHLORIDE 10 MG/ML
INJECTION, SOLUTION EPIDURAL; INFILTRATION; INTRACAUDAL; PERINEURAL
Status: DISPENSED
Start: 2023-05-05